# Patient Record
Sex: FEMALE | Race: BLACK OR AFRICAN AMERICAN | NOT HISPANIC OR LATINO | Employment: OTHER | URBAN - METROPOLITAN AREA
[De-identification: names, ages, dates, MRNs, and addresses within clinical notes are randomized per-mention and may not be internally consistent; named-entity substitution may affect disease eponyms.]

---

## 2020-07-01 ENCOUNTER — OFFICE VISIT (OUTPATIENT)
Dept: PRIMARY CARE CLINIC | Facility: CLINIC | Age: 65
End: 2020-07-01
Payer: MEDICARE

## 2020-07-01 VITALS
HEART RATE: 71 BPM | WEIGHT: 192 LBS | HEIGHT: 61 IN | SYSTOLIC BLOOD PRESSURE: 150 MMHG | TEMPERATURE: 98 F | DIASTOLIC BLOOD PRESSURE: 84 MMHG | OXYGEN SATURATION: 98 % | BODY MASS INDEX: 36.25 KG/M2

## 2020-07-01 DIAGNOSIS — E11.9 ENCOUNTER FOR DIABETIC FOOT EXAM: ICD-10-CM

## 2020-07-01 DIAGNOSIS — Z79.4 TYPE 2 DIABETES MELLITUS WITH DIABETIC POLYNEUROPATHY, WITH LONG-TERM CURRENT USE OF INSULIN: ICD-10-CM

## 2020-07-01 DIAGNOSIS — R51.9 CHRONIC NONINTRACTABLE HEADACHE, UNSPECIFIED HEADACHE TYPE: ICD-10-CM

## 2020-07-01 DIAGNOSIS — H81.13 BENIGN PAROXYSMAL POSITIONAL VERTIGO DUE TO BILATERAL VESTIBULAR DISORDER: ICD-10-CM

## 2020-07-01 DIAGNOSIS — M79.18 CHRONIC MUSCULOSKELETAL PAIN: ICD-10-CM

## 2020-07-01 DIAGNOSIS — B37.9 YEAST INFECTION: ICD-10-CM

## 2020-07-01 DIAGNOSIS — K21.9 GASTROESOPHAGEAL REFLUX DISEASE WITHOUT ESOPHAGITIS: ICD-10-CM

## 2020-07-01 DIAGNOSIS — I50.22 CHRONIC SYSTOLIC HEART FAILURE: ICD-10-CM

## 2020-07-01 DIAGNOSIS — L28.0 LICHEN SIMPLEX CHRONICUS: ICD-10-CM

## 2020-07-01 DIAGNOSIS — E78.2 MIXED HYPERLIPIDEMIA: ICD-10-CM

## 2020-07-01 DIAGNOSIS — I25.118 CORONARY ARTERY DISEASE OF NATIVE ARTERY OF NATIVE HEART WITH STABLE ANGINA PECTORIS: ICD-10-CM

## 2020-07-01 DIAGNOSIS — E66.9 OBESITY (BMI 30-39.9): ICD-10-CM

## 2020-07-01 DIAGNOSIS — Z01.00 DIABETIC EYE EXAM: ICD-10-CM

## 2020-07-01 DIAGNOSIS — G56.03 BILATERAL CARPAL TUNNEL SYNDROME: ICD-10-CM

## 2020-07-01 DIAGNOSIS — G89.29 CHRONIC NONINTRACTABLE HEADACHE, UNSPECIFIED HEADACHE TYPE: ICD-10-CM

## 2020-07-01 DIAGNOSIS — E11.9 DIABETIC EYE EXAM: ICD-10-CM

## 2020-07-01 DIAGNOSIS — E55.9 VITAMIN D DEFICIENCY: ICD-10-CM

## 2020-07-01 DIAGNOSIS — Z95.5 HISTORY OF HEART ARTERY STENT: ICD-10-CM

## 2020-07-01 DIAGNOSIS — Z76.89 ESTABLISHING CARE WITH NEW DOCTOR, ENCOUNTER FOR: ICD-10-CM

## 2020-07-01 DIAGNOSIS — Z00.00 ANNUAL PHYSICAL EXAM: Primary | ICD-10-CM

## 2020-07-01 DIAGNOSIS — I10 ESSENTIAL HYPERTENSION: ICD-10-CM

## 2020-07-01 DIAGNOSIS — G89.29 CHRONIC MUSCULOSKELETAL PAIN: ICD-10-CM

## 2020-07-01 DIAGNOSIS — G47.33 OSA (OBSTRUCTIVE SLEEP APNEA): ICD-10-CM

## 2020-07-01 DIAGNOSIS — E11.42 TYPE 2 DIABETES MELLITUS WITH DIABETIC POLYNEUROPATHY, WITH LONG-TERM CURRENT USE OF INSULIN: ICD-10-CM

## 2020-07-01 DIAGNOSIS — I25.2 HISTORY OF HEART ATTACK: ICD-10-CM

## 2020-07-01 LAB
ALBUMIN/CREAT UR: 8.6 UG/MG (ref 0–30)
BACTERIA #/AREA URNS AUTO: ABNORMAL /HPF
BILIRUB UR QL STRIP: NEGATIVE
CLARITY UR REFRACT.AUTO: ABNORMAL
COLOR UR AUTO: YELLOW
CREAT UR-MCNC: 151 MG/DL (ref 15–325)
GLUCOSE UR QL STRIP: NEGATIVE
HGB UR QL STRIP: NEGATIVE
KETONES UR QL STRIP: NEGATIVE
LEUKOCYTE ESTERASE UR QL STRIP: ABNORMAL
MICROALBUMIN UR DL<=1MG/L-MCNC: 13 UG/ML
MICROSCOPIC COMMENT: ABNORMAL
NITRITE UR QL STRIP: NEGATIVE
PH UR STRIP: 7 [PH] (ref 5–8)
PROT UR QL STRIP: NEGATIVE
RBC #/AREA URNS AUTO: 1 /HPF (ref 0–4)
SP GR UR STRIP: 1.01 (ref 1–1.03)
SQUAMOUS #/AREA URNS AUTO: 5 /HPF
URN SPEC COLLECT METH UR: ABNORMAL
WBC #/AREA URNS AUTO: 10 /HPF (ref 0–5)

## 2020-07-01 PROCEDURE — 99999 PR PBB SHADOW E&M-NEW PATIENT-LVL V: CPT | Mod: PBBFAC,,, | Performed by: FAMILY MEDICINE

## 2020-07-01 PROCEDURE — 93010 EKG 12-LEAD: ICD-10-PCS | Mod: S$GLB,,, | Performed by: INTERNAL MEDICINE

## 2020-07-01 PROCEDURE — 99387 INIT PM E/M NEW PAT 65+ YRS: CPT | Mod: S$GLB,,, | Performed by: FAMILY MEDICINE

## 2020-07-01 PROCEDURE — 3046F PR MOST RECENT HEMOGLOBIN A1C LEVEL > 9.0%: ICD-10-PCS | Mod: CPTII,S$GLB,, | Performed by: FAMILY MEDICINE

## 2020-07-01 PROCEDURE — 81001 URINALYSIS AUTO W/SCOPE: CPT

## 2020-07-01 PROCEDURE — 99387 PR PREVENTIVE VISIT,NEW,65 & OVER: ICD-10-PCS | Mod: S$GLB,,, | Performed by: FAMILY MEDICINE

## 2020-07-01 PROCEDURE — 99499 RISK ADDL DX/OHS AUDIT: ICD-10-PCS | Mod: S$GLB,,, | Performed by: FAMILY MEDICINE

## 2020-07-01 PROCEDURE — 93005 ELECTROCARDIOGRAM TRACING: CPT | Mod: S$GLB,,, | Performed by: FAMILY MEDICINE

## 2020-07-01 PROCEDURE — 3046F HEMOGLOBIN A1C LEVEL >9.0%: CPT | Mod: CPTII,S$GLB,, | Performed by: FAMILY MEDICINE

## 2020-07-01 PROCEDURE — 82043 UR ALBUMIN QUANTITATIVE: CPT

## 2020-07-01 PROCEDURE — 93010 ELECTROCARDIOGRAM REPORT: CPT | Mod: S$GLB,,, | Performed by: INTERNAL MEDICINE

## 2020-07-01 PROCEDURE — 99499 UNLISTED E&M SERVICE: CPT | Mod: S$GLB,,, | Performed by: FAMILY MEDICINE

## 2020-07-01 PROCEDURE — 93005 EKG 12-LEAD: ICD-10-PCS | Mod: S$GLB,,, | Performed by: FAMILY MEDICINE

## 2020-07-01 PROCEDURE — 99999 PR PBB SHADOW E&M-NEW PATIENT-LVL V: ICD-10-PCS | Mod: PBBFAC,,, | Performed by: FAMILY MEDICINE

## 2020-07-01 RX ORDER — FUROSEMIDE 40 MG/1
40 TABLET ORAL DAILY
COMMUNITY
Start: 2019-04-10 | End: 2021-04-07 | Stop reason: SDUPTHER

## 2020-07-01 RX ORDER — DICLOFENAC SODIUM 10 MG/G
1 GEL TOPICAL
COMMUNITY
Start: 2020-02-03 | End: 2023-04-18 | Stop reason: SDUPTHER

## 2020-07-01 RX ORDER — DEXTROSE 4 G
TABLET,CHEWABLE ORAL
Status: CANCELLED | OUTPATIENT
Start: 2020-07-01

## 2020-07-01 RX ORDER — METOPROLOL SUCCINATE 200 MG/1
TABLET, EXTENDED RELEASE ORAL
COMMUNITY
Start: 2020-06-23 | End: 2020-09-22 | Stop reason: SDUPTHER

## 2020-07-01 RX ORDER — CYCLOBENZAPRINE HCL 10 MG
10 TABLET ORAL
Qty: 36 TABLET | Refills: 3 | Status: SHIPPED | OUTPATIENT
Start: 2020-07-01 | End: 2020-09-22 | Stop reason: SDUPTHER

## 2020-07-01 RX ORDER — PEN NEEDLE, DIABETIC 30 GX3/16"
1 NEEDLE, DISPOSABLE MISCELLANEOUS
COMMUNITY
Start: 2014-04-22 | End: 2020-07-01

## 2020-07-01 RX ORDER — AMLODIPINE BESYLATE 10 MG/1
10 TABLET ORAL DAILY
Status: ON HOLD | COMMUNITY
Start: 2014-08-20 | End: 2021-10-30 | Stop reason: HOSPADM

## 2020-07-01 RX ORDER — INSULIN PUMP SYRINGE, 3 ML
1 EACH MISCELLANEOUS DAILY PRN
Qty: 1 EACH | Refills: 1 | Status: SHIPPED | OUTPATIENT
Start: 2020-07-01 | End: 2020-08-10 | Stop reason: HOSPADM

## 2020-07-01 RX ORDER — AMITRIPTYLINE HYDROCHLORIDE 75 MG/1
75 TABLET ORAL
COMMUNITY
Start: 2020-02-03 | End: 2020-07-01 | Stop reason: SDUPTHER

## 2020-07-01 RX ORDER — CHLORTHALIDONE 25 MG/1
25 TABLET ORAL DAILY
Status: ON HOLD | COMMUNITY
Start: 2020-04-20 | End: 2021-10-30 | Stop reason: HOSPADM

## 2020-07-01 RX ORDER — ATORVASTATIN CALCIUM 80 MG/1
80 TABLET, FILM COATED ORAL NIGHTLY
COMMUNITY
Start: 2020-04-20 | End: 2020-09-22 | Stop reason: SDUPTHER

## 2020-07-01 RX ORDER — INSULIN ASPART 100 [IU]/ML
25 INJECTION, SOLUTION INTRAVENOUS; SUBCUTANEOUS
COMMUNITY
Start: 2014-08-20 | End: 2020-07-01 | Stop reason: SDUPTHER

## 2020-07-01 RX ORDER — INSULIN ASPART 100 [IU]/ML
25 INJECTION, SOLUTION INTRAVENOUS; SUBCUTANEOUS
Qty: 69 ML | Refills: 3 | Status: SHIPPED | OUTPATIENT
Start: 2020-07-01 | End: 2020-07-02

## 2020-07-01 RX ORDER — LISINOPRIL 40 MG/1
40 TABLET ORAL DAILY
COMMUNITY
Start: 2020-06-23 | End: 2020-09-22 | Stop reason: SDUPTHER

## 2020-07-01 RX ORDER — FLUCONAZOLE 150 MG/1
150 TABLET ORAL ONCE AS NEEDED
Qty: 3 TABLET | Refills: 5 | Status: SHIPPED | OUTPATIENT
Start: 2020-07-01 | End: 2020-07-01

## 2020-07-01 RX ORDER — INSULIN LISPRO 100 [IU]/ML
15 INJECTION, SOLUTION INTRAVENOUS; SUBCUTANEOUS
COMMUNITY
Start: 2020-03-09 | End: 2020-07-01 | Stop reason: HOSPADM

## 2020-07-01 RX ORDER — FLUCONAZOLE 150 MG/1
TABLET ORAL
COMMUNITY
Start: 2020-05-12 | End: 2020-07-01 | Stop reason: SDUPTHER

## 2020-07-01 RX ORDER — CLOPIDOGREL BISULFATE 75 MG/1
TABLET ORAL
COMMUNITY
Start: 2020-04-20 | End: 2020-09-22 | Stop reason: SDUPTHER

## 2020-07-01 RX ORDER — INSULIN ASPART 100 [IU]/ML
25 INJECTION, SOLUTION INTRAVENOUS; SUBCUTANEOUS
COMMUNITY
End: 2020-07-01

## 2020-07-01 RX ORDER — DULAGLUTIDE 1.5 MG/.5ML
1.5 INJECTION, SOLUTION SUBCUTANEOUS WEEKLY
Qty: 6 ML | Refills: 3
Start: 2020-07-01 | End: 2020-11-23 | Stop reason: SDUPTHER

## 2020-07-01 RX ORDER — GABAPENTIN 100 MG/1
100 CAPSULE ORAL 3 TIMES DAILY
COMMUNITY
Start: 2020-04-01 | End: 2020-08-10 | Stop reason: SDUPTHER

## 2020-07-01 RX ORDER — METFORMIN HYDROCHLORIDE 500 MG/1
1000 TABLET, EXTENDED RELEASE ORAL
COMMUNITY
Start: 2015-03-31 | End: 2020-07-15

## 2020-07-01 RX ORDER — LIDOCAINE AND PRILOCAINE 25; 25 MG/G; MG/G
CREAM TOPICAL
COMMUNITY
Start: 2020-06-17 | End: 2021-05-17 | Stop reason: SDUPTHER

## 2020-07-01 RX ORDER — AMITRIPTYLINE HYDROCHLORIDE 75 MG/1
75 TABLET ORAL NIGHTLY
Qty: 90 TABLET | Refills: 3
Start: 2020-07-01 | End: 2020-11-23 | Stop reason: SDUPTHER

## 2020-07-01 RX ORDER — ASPIRIN 81 MG/1
81 TABLET ORAL DAILY
COMMUNITY
Start: 2014-08-20 | End: 2020-08-10 | Stop reason: HOSPADM

## 2020-07-01 RX ORDER — SPIRONOLACTONE 50 MG/1
50 TABLET, FILM COATED ORAL DAILY
COMMUNITY
Start: 2020-06-23 | End: 2020-09-22 | Stop reason: SDUPTHER

## 2020-07-01 RX ORDER — LANCETS
1 EACH MISCELLANEOUS 3 TIMES DAILY
Qty: 300 EACH | Refills: 3 | Status: SHIPPED | OUTPATIENT
Start: 2020-07-01 | End: 2022-02-10

## 2020-07-01 RX ORDER — DULAGLUTIDE 0.75 MG/.5ML
INJECTION, SOLUTION SUBCUTANEOUS
COMMUNITY
Start: 2020-05-10 | End: 2020-07-01 | Stop reason: HOSPADM

## 2020-07-01 RX ORDER — DEXTROSE 4 G
TABLET,CHEWABLE ORAL
COMMUNITY
Start: 2020-03-09 | End: 2020-08-10 | Stop reason: HOSPADM

## 2020-07-01 RX ORDER — PEN NEEDLE, DIABETIC 30 GX3/16"
1 NEEDLE, DISPOSABLE MISCELLANEOUS 4 TIMES DAILY
Qty: 400 EACH | Refills: 3 | Status: SHIPPED | OUTPATIENT
Start: 2020-07-01

## 2020-07-01 RX ORDER — EZETIMIBE 10 MG/1
10 TABLET ORAL DAILY
COMMUNITY
Start: 2019-11-21 | End: 2020-09-22 | Stop reason: SDUPTHER

## 2020-07-01 RX ORDER — ISOPROPYL ALCOHOL 70 ML/100ML
1 SWAB TOPICAL 3 TIMES DAILY
Qty: 300 EACH | Refills: 3 | Status: SHIPPED | OUTPATIENT
Start: 2020-07-01 | End: 2020-07-01 | Stop reason: SDUPTHER

## 2020-07-01 RX ORDER — INSULIN PUMP SYRINGE, 3 ML
EACH MISCELLANEOUS
Qty: 1 EACH | Refills: 0 | Status: SHIPPED | OUTPATIENT
Start: 2020-07-01 | End: 2020-07-01 | Stop reason: SDUPTHER

## 2020-07-01 RX ORDER — LANCETS
EACH MISCELLANEOUS
COMMUNITY
Start: 2013-08-21 | End: 2020-07-01 | Stop reason: SDUPTHER

## 2020-07-01 NOTE — PROGRESS NOTES
Patient, Mercedez Purvis (MRN #[id number]), presented with a recorded BMI of 36.28 kg/m^2 and a documented comorbidity(s):  - Diabetes Mellitus Type 2  - Obstructive Sleep Apnea  - Hypertension  - Hyperlipidemia  - Atrial Fibrillation  to which the severe obesity is a contributing factor. This is consistent with the definition of severe obesity (BMI 35.0-39.9) with comorbidity (ICD-10 E66.01, Z68.35). The patient's severe obesity was monitored, evaluated, addressed and/or treated. This addendum to the medical record is made on 07/01/2020.

## 2020-07-01 NOTE — TELEPHONE ENCOUNTER
"----- Message from Marcelo Elizabeth sent at 7/1/2020 10:18 AM CDT -----  Regarding: Rx refil  Contact: [facility] [phone]  Patient would like to get medical advice.    Comments: Pharmacy calling stating Insurance will only cover "[facility]" and must go through the Insurance [facility], will not cover through pharmacy,    Please call an advise  Thank you      "

## 2020-07-01 NOTE — PATIENT INSTRUCTIONS
Fasting, 2 hrs after largest (lunch time), bedtime    Monitor blood sugars when you wake up (fasting) target < 100. Range 70 - 130; Before each meal target < 110. Range 70 - 130; Two hours after each meal target < 140, but will accept < 180 and at Bedtime Target < 120. Range 90- 150.     Avoid any blood sugar readings below 70 mg/dL; do not skip meals; eat at regular intervals Sunday-Sunday. Signs and symptoms of hypoglycemia are shakes, feeling like heart racing, sweating, dizziness, weakness, and confusion ; remember your body is so used to HIGH readings that even when on target you may feel like you are low. So measure blood sugars ANYTIME you feel unwell.     How to treat hypoglycemia  Take 15 to 20 grams of oral glucose. Glucose may be ingested in the form of tablets, (6 oz) juice, milk, other snacks, or a meal. Retest glucose after 15 minutes and retreat if glucose is not improved.

## 2020-07-01 NOTE — TELEPHONE ENCOUNTER
Patient's insurance([employer]) does not cover Novolog, they will cover Humalog.    Also all testing supplies prescription needs to be printed, to be sent to [employer]. Will pend supplies to be printed--and will fax to [employer] once complete.

## 2020-07-01 NOTE — PROGRESS NOTES
Subjective:       Patient ID: Mercedez Purvis is a 65 y.o. female.    Chief Complaint: Annual physical and  Establish Care    66 yo female presents for annual physical exam. This is her first visit.    She needs medication refills.    She reports no adverse events with her medication.    She would like to see all her specialists within [facility] and would need referrals.    She does not routine check her blood pressure or blood sugar at home.    The following portions of the patient's history were reviewed and updated as appropriate: allergies, current medications, past family history, past medical history, past social history, past surgical history and problem list.      Review of Systems   Constitutional: Negative for activity change, appetite change, chills, diaphoresis, fatigue, fever and unexpected weight change.   HENT: Negative for nasal congestion, dental problem, facial swelling, nosebleeds, postnasal drip, rhinorrhea, sore throat, tinnitus and trouble swallowing.    Eyes: Positive for visual disturbance. Negative for pain, discharge and itching.   Respiratory: Negative for apnea, chest tightness, shortness of breath, wheezing and stridor.    Cardiovascular: Negative for chest pain, palpitations, leg swelling and claudication.   Gastrointestinal: Negative for abdominal distention, abdominal pain, constipation, diarrhea, nausea, rectal pain and vomiting.   Endocrine: Negative for cold intolerance, heat intolerance and polyuria.   Genitourinary: Positive for vaginal discharge. Negative for difficulty urinating, dysuria, frequency, hematuria and urgency.   Musculoskeletal: Positive for arthralgias. Negative for myalgias, neck pain and neck stiffness.   Integumentary:  Negative for color change and rash.   Neurological: Positive for headaches (chronic vertigo). Negative for dizziness, tremors, seizures, syncope, facial asymmetry and weakness.   Hematological: Negative for adenopathy. Does not bruise/bleed  easily.   Psychiatric/Behavioral: Negative for agitation, confusion, hallucinations, self-injury and suicidal ideas. The patient is not hyperactive.          Objective:      Physical Exam  Constitutional:       General: She is not in acute distress.     Appearance: She is well-developed. She is obese. She is not diaphoretic.   HENT:      Head: Normocephalic and atraumatic.      Right Ear: External ear normal.      Left Ear: External ear normal.   Eyes:      General: No scleral icterus.        Right eye: No discharge.         Left eye: No discharge.      Conjunctiva/sclera: Conjunctivae normal.      Pupils: Pupils are equal, round, and reactive to light.   Neck:      Musculoskeletal: Normal range of motion and neck supple.      Thyroid: No thyromegaly.   Cardiovascular:      Rate and Rhythm: Normal rate and regular rhythm.      Heart sounds: Normal heart sounds. No murmur. No friction rub. No gallop.    Pulmonary:      Effort: Pulmonary effort is normal. No respiratory distress.      Breath sounds: Normal breath sounds.   Chest:      Chest wall: No tenderness.   Abdominal:      General: Bowel sounds are normal. There is distension (obese).      Palpations: Abdomen is soft. There is no mass.      Tenderness: There is no abdominal tenderness. There is no guarding or rebound.   Musculoskeletal: Normal range of motion.   Lymphadenopathy:      Cervical: No cervical adenopathy.   Skin:     General: Skin is warm.      Capillary Refill: Capillary refill takes less than 2 seconds.      Findings: No rash.   Neurological:      Mental Status: She is alert and oriented to person, place, and time.      Cranial Nerves: No cranial nerve deficit.      Motor: No abnormal muscle tone.      Coordination: Coordination normal.      Deep Tendon Reflexes: Reflexes normal.   Psychiatric:         Thought Content: Thought content normal.         Judgment: Judgment normal.         October 3rd, 2019  ECHO   Low normal to mildly impaired LV  systolic function.  LVEF of 50-55%.  2. The diastolic filling indicates abnormal relaxation pattern with normal left atrial pressure.  3. An insufficent tricuspid regurgitation jet velocity envelope precludes confident assessment of pulmonary artery systolic pressure.  4. EF has improved compared to 4/2019.    Assessment:       1. Annual physical exam    2. Establishing care with new doctor, encounter for    3. Type 2 diabetes mellitus with diabetic polyneuropathy, with long-term current use of insulin    4. Encounter for diabetic foot exam    5. Diabetic eye exam    6. Yeast infection    7. Chronic systolic heart failure    8. Coronary artery disease of native artery of native heart with stable angina pectoris    9. History of heart attack    10. History of heart artery stent    11. Essential hypertension    12. TIKA (obstructive sleep apnea)    13. Obesity (BMI 30-39.9)    14. Gastroesophageal reflux disease without esophagitis    15. Mixed hyperlipidemia    16. Vitamin D deficiency    17. Benign paroxysmal positional vertigo due to bilateral vestibular disorder    18. Lichen simplex chronicus    19. Chronic musculoskeletal pain    20. Bilateral carpal tunnel syndrome    21. Chronic nonintractable headache, unspecified headache type        Plan:         1. Annual physical exam  2. Establishing care with new doctor, encounter for  Age appropriate prevention guidelines implemented, unless patient refused  Encourage Advanced directives  Labs ordered   Immunizations reviewed. Encourage yearly influenza vaccine.  Patient Counseling:  --Nutrition: Encourage healthy food choices, adequate water intake, moderate sodium/caffeine intake, diet low in saturated fat and cholesterol. Importance of caloric balance and sufficient intake of fresh fruits, vegetables, fiber, calcium, iron, and 1 mg of folate supplement per day (for females capable of pregnancy).  --Exercise: Stressed the importance of regular exercise.   --Substance  "Abuse: Abstinence from tobacco products. No more than 2 alcoholic drinks per day in men or 1 alcoholic drink per day in women. Avoid illicit drugs, driving or other dangerous activities under the influence. In the event of abuse, treatment offered.   --Sexuality: Safe sex practices to avoid sexually transmitted diseases; careful partner selection, use of condoms and contraceptive alternatives, avoidance of unintended pregnancy in fertile women of child-bearing age  --Injury prevention: encourage safety belts, safety helmets, smoke detector.  --Dental health: Discussed importance of regular tooth brushing X2 daily, flossing X1 daily, and routine dental visits.  --Encourage use of sun screen, wear sun protective clothing  --Encourage routine eye exams    3. Type 2 diabetes mellitus with diabetic polyneuropathy, with long-term current use of insulin  -     blood glucose control, normal Soln; 1 each by Misc.(Non-Drug; Combo Route) route daily as needed. ICD E11.65  Dispense: 1 each; Refill: 1  -     dulaglutide (TRULICITY) 1.5 mg/0.5 mL pen injector; Inject 1.5 mg into the skin once a week.  Dispense: 6 mL; Refill: 3  -     pen needle, diabetic (PEN NEEDLE) 32 gauge x 5/32" Ndle; 1 each by Misc.(Non-Drug; Combo Route) route 4 (four) times daily. ICD 10 E11.42  Dispense: 400 each; Refill: 3  -     lancets Misc; Inject 1 each into the skin 3 (three) times daily. ICD 10 code E 11.42, monitor blood sugar TID  Dispense: 300 each; Refill: 3  -     insulin aspart U-100 (NOVOLOG) 100 unit/mL (3 mL) InPn pen; Inject 25 Units into the skin 3 (three) times daily with meals.  Dispense: 69 mL; Refill: 3  -     alcohol swabs (ALCOHOL WIPES) PadM; Apply 1 each topically 3 (three) times daily. ICD 10 code E 11.42, monitor blood sugar TID, per insurance coverage  Dispense: 300 each; Refill: 3  -     blood sugar diagnostic Strp; 1 strip by Misc.(Non-Drug; Combo Route) route 3 (three) times daily. ICD 10 code E 11.42, monitor blood sugar " TID, per insurance coverage  Dispense: 300 each; Refill: 3  -     blood-glucose meter kit; ICD 10 code E 11.42, monitor blood sugar TID, per insurance coverage  Dispense: 1 each; Refill: 0  -     Ambulatory referral/consult to Endocrinology; Future; Expected date: 07/08/2020  -     CBC Without Differential; Future  -     Comprehensive metabolic panel; Future  -     Hemoglobin A1C; Future  -     Urinalysis  -     Ambulatory referral/consult to Diabetes Education; Future; Expected date: 07/08/2020 Or WILL HAVE HER SEE THE EDUCATOR AT THIS LOCATION    4. Encounter for diabetic foot exam  -     Ambulatory referral/consult to Podiatry; Future; Expected date: 07/08/2020    5. Diabetic eye exam  -     Ambulatory referral/consult to Ophthalmology; Future; Expected date: 07/08/2020    6. Yeast infection  -     fluconazole (DIFLUCAN) 150 MG Tab; Take 1 tablet (150 mg total) by mouth once as needed (yeast infection.). May repeat dose after 72 hrs as needed.  Dispense: 3 tablet; Refill: 5    7. Chronic systolic heart failure  Asymptomatic. Follow with cardiology.    8. Coronary artery disease of native artery of native heart with stable angina pectoris  -     Ambulatory referral/consult to Cardiology; Future; Expected date: 07/08/2020: reevaluated for continued need for dual antiplatelet therapy  -     IN OFFICE EKG 12-LEAD (to Muse)    9. History of heart attack  10. History of heart artery stent  Continue blood pressure medication, statin, optimize blood sugar control.  Optimize blood pressure control.  Weight loss.  Low salt diet.  Follow with Cardiology to reassess continued need of dual antiplatelet therapy but need to continue aspirin lifelong.  The goal is to prevent no further cardiovascular adverse events.    11. Essential hypertension  -     IN OFFICE EKG 12-LEAD (to Muse)  -     TSH; Future  -     Microalbumin/creatinine urine ratio  The patient continues with the antihypertensive medication(s) as prescribed. The  blood pressure readings appear to be above goal. There does not appear to be any symptoms such as chest pain, shortness of breath, palpitations, fatigue, syncope, seizures or headaches. There are no new visual problems. We have discussed the importance of getting more BP data - nurse only appointments can help get data into the chart and/or continuous outpatient blood pressure monitoring. Blood pressure above goal can lead to end organ damage. Further titration of blood pressure medication(s) is necessary per JNC guidelines.  On amlodipine, lisinopril, spironolactone, chlorthalidone, metoprolol.  I will not change her current blood pressure regimen at this time; will work towards optimization with lifestyle changes and adjust blood pressure accordingly.    12. TIKA (obstructive sleep apnea)  CPAP set at 8    13. Obesity (BMI 30-39.9)  The importance of optimum diet & exercise discussed. The goals for weight management of 5-10 % of total body weight reduction in 3 months addressed.     The consumption of a reduced calorie diet, frequent self-weighing, and participation in a lifestyle intervention program are strategies to help maintain weight loss.     14. Gastroesophageal reflux disease without esophagitis  Not currently on PPI therapy.  No bleeding events reported    15. Mixed hyperlipidemia  -     Lipid Panel; Future    16. Vitamin D deficiency  -     Vitamin D; Future    17. Benign paroxysmal positional vertigo due to bilateral vestibular disorder  Manages conservatively with positional changes and Epley's maneuver    18. Lichen simplex chronicus  Asymptomatic at present.  Topical steroids as needed    19. Chronic musculoskeletal pain  -     cyclobenzaprine (FLEXERIL) 10 MG tablet; Take 1 tablet (10 mg total) by mouth 3 (three) times a week.  Dispense: 36 tablet; Refill: 3  Caution advised.  She will benefit from physical therapy program.    20. Bilateral carpal tunnel syndrome  No worsening features    21.  Chronic nonintractable headache, unspecified headache type  -     amitriptyline (ELAVIL) 75 MG tablet; Take 1 tablet (75 mg total) by mouth every evening.  Dispense: 90 tablet; Refill: 3    Follow-up in 1 month.  Will readdress her willingness for health maintenance update.  She will bring in her logs at next visit.      Disclaimer: This note has been generated using voice-recognition software. There may be typographical errors that have been missed during proof-reading

## 2020-07-02 DIAGNOSIS — Z12.11 COLON CANCER SCREENING: ICD-10-CM

## 2020-07-02 RX ORDER — ISOPROPYL ALCOHOL 70 ML/100ML
1 SWAB TOPICAL 3 TIMES DAILY
Qty: 300 EACH | Refills: 3 | Status: SHIPPED | OUTPATIENT
Start: 2020-07-02 | End: 2022-03-28

## 2020-07-02 RX ORDER — INSULIN PUMP SYRINGE, 3 ML
EACH MISCELLANEOUS
Qty: 1 EACH | Refills: 0 | Status: SHIPPED | OUTPATIENT
Start: 2020-07-02 | End: 2020-08-10 | Stop reason: HOSPADM

## 2020-07-02 RX ORDER — INSULIN LISPRO 100 [IU]/ML
25 INJECTION, SOLUTION INTRAVENOUS; SUBCUTANEOUS
Qty: 69 ML | Refills: 3 | Status: SHIPPED | OUTPATIENT
Start: 2020-07-02 | End: 2020-07-15

## 2020-07-13 ENCOUNTER — LAB VISIT (OUTPATIENT)
Dept: LAB | Facility: HOSPITAL | Age: 65
End: 2020-07-13
Payer: MEDICARE

## 2020-07-13 DIAGNOSIS — Z79.4 TYPE 2 DIABETES MELLITUS WITH DIABETIC POLYNEUROPATHY, WITH LONG-TERM CURRENT USE OF INSULIN: ICD-10-CM

## 2020-07-13 DIAGNOSIS — E11.42 TYPE 2 DIABETES MELLITUS WITH DIABETIC POLYNEUROPATHY, WITH LONG-TERM CURRENT USE OF INSULIN: ICD-10-CM

## 2020-07-13 DIAGNOSIS — I10 ESSENTIAL HYPERTENSION: ICD-10-CM

## 2020-07-13 DIAGNOSIS — E55.9 VITAMIN D DEFICIENCY: ICD-10-CM

## 2020-07-13 DIAGNOSIS — E78.2 MIXED HYPERLIPIDEMIA: ICD-10-CM

## 2020-07-13 LAB
25(OH)D3+25(OH)D2 SERPL-MCNC: 18 NG/ML (ref 30–96)
ALBUMIN SERPL BCP-MCNC: 3.4 G/DL (ref 3.5–5.2)
ALP SERPL-CCNC: 128 U/L (ref 55–135)
ALT SERPL W/O P-5'-P-CCNC: 11 U/L (ref 10–44)
ANION GAP SERPL CALC-SCNC: 10 MMOL/L (ref 8–16)
AST SERPL-CCNC: 12 U/L (ref 10–40)
BILIRUB SERPL-MCNC: 0.3 MG/DL (ref 0.1–1)
BUN SERPL-MCNC: 14 MG/DL (ref 8–23)
CALCIUM SERPL-MCNC: 9.4 MG/DL (ref 8.7–10.5)
CHLORIDE SERPL-SCNC: 96 MMOL/L (ref 95–110)
CHOLEST SERPL-MCNC: 175 MG/DL (ref 120–199)
CHOLEST/HDLC SERPL: 5.5 {RATIO} (ref 2–5)
CO2 SERPL-SCNC: 28 MMOL/L (ref 23–29)
CREAT SERPL-MCNC: 1.2 MG/DL (ref 0.5–1.4)
ERYTHROCYTE [DISTWIDTH] IN BLOOD BY AUTOMATED COUNT: 15.9 % (ref 11.5–14.5)
EST. GFR  (AFRICAN AMERICAN): 54.4 ML/MIN/1.73 M^2
EST. GFR  (NON AFRICAN AMERICAN): 47.2 ML/MIN/1.73 M^2
ESTIMATED AVG GLUCOSE: ABNORMAL MG/DL (ref 68–131)
GLUCOSE SERPL-MCNC: 414 MG/DL (ref 70–110)
HBA1C MFR BLD HPLC: >14 % (ref 4–5.6)
HCT VFR BLD AUTO: 41.4 % (ref 37–48.5)
HDLC SERPL-MCNC: 32 MG/DL (ref 40–75)
HDLC SERPL: 18.3 % (ref 20–50)
HGB BLD-MCNC: 12.3 G/DL (ref 12–16)
LDLC SERPL CALC-MCNC: 98.8 MG/DL (ref 63–159)
MCH RBC QN AUTO: 22.4 PG (ref 27–31)
MCHC RBC AUTO-ENTMCNC: 29.7 G/DL (ref 32–36)
MCV RBC AUTO: 76 FL (ref 82–98)
NONHDLC SERPL-MCNC: 143 MG/DL
PLATELET # BLD AUTO: 254 K/UL (ref 150–350)
PMV BLD AUTO: 12.2 FL (ref 9.2–12.9)
POTASSIUM SERPL-SCNC: 4.3 MMOL/L (ref 3.5–5.1)
PROT SERPL-MCNC: 7.5 G/DL (ref 6–8.4)
RBC # BLD AUTO: 5.48 M/UL (ref 4–5.4)
SODIUM SERPL-SCNC: 134 MMOL/L (ref 136–145)
TRIGL SERPL-MCNC: 221 MG/DL (ref 30–150)
TSH SERPL DL<=0.005 MIU/L-ACNC: 2.3 UIU/ML (ref 0.4–4)
WBC # BLD AUTO: 8.16 K/UL (ref 3.9–12.7)

## 2020-07-13 PROCEDURE — 86803 HEPATITIS C AB TEST: CPT

## 2020-07-13 PROCEDURE — 80053 COMPREHEN METABOLIC PANEL: CPT

## 2020-07-13 PROCEDURE — 85027 COMPLETE CBC AUTOMATED: CPT

## 2020-07-13 PROCEDURE — 82306 VITAMIN D 25 HYDROXY: CPT

## 2020-07-13 PROCEDURE — 80061 LIPID PANEL: CPT

## 2020-07-13 PROCEDURE — 83036 HEMOGLOBIN GLYCOSYLATED A1C: CPT

## 2020-07-13 PROCEDURE — 84443 ASSAY THYROID STIM HORMONE: CPT

## 2020-07-13 PROCEDURE — 36415 COLL VENOUS BLD VENIPUNCTURE: CPT

## 2020-07-14 ENCOUNTER — TELEPHONE (OUTPATIENT)
Dept: PRIMARY CARE CLINIC | Facility: CLINIC | Age: 65
End: 2020-07-14

## 2020-07-14 LAB — HCV AB SERPL QL IA: NEGATIVE

## 2020-07-14 NOTE — TELEPHONE ENCOUNTER
----- Message from Jackie Chavez MD sent at 7/14/2020  7:51 AM CDT -----  Results discussed with patient.  Patient would like to see Margarita, our diabetes educator.  Please assist with scheduling.  Patient should follow-up with me with her diabetic logs in 4 weeks.    She was instructed to take at least 1000 units of vitamin-D daily.  Discussed importance of diabetes management.  Discussed that fish oil can be instituted along with statin but patient would like to work on blood sugar control and exercise 1st.

## 2020-07-15 ENCOUNTER — OFFICE VISIT (OUTPATIENT)
Dept: ENDOCRINOLOGY | Facility: CLINIC | Age: 65
End: 2020-07-15
Payer: MEDICARE

## 2020-07-15 ENCOUNTER — CLINICAL SUPPORT (OUTPATIENT)
Dept: OPHTHALMOLOGY | Facility: CLINIC | Age: 65
End: 2020-07-15
Attending: INTERNAL MEDICINE
Payer: MEDICARE

## 2020-07-15 VITALS
WEIGHT: 186.5 LBS | BODY MASS INDEX: 35.21 KG/M2 | SYSTOLIC BLOOD PRESSURE: 104 MMHG | HEART RATE: 100 BPM | DIASTOLIC BLOOD PRESSURE: 70 MMHG | RESPIRATION RATE: 16 BRPM | OXYGEN SATURATION: 95 % | HEIGHT: 61 IN

## 2020-07-15 DIAGNOSIS — I25.2 HISTORY OF HEART ATTACK: ICD-10-CM

## 2020-07-15 DIAGNOSIS — E55.9 VITAMIN D DEFICIENCY: ICD-10-CM

## 2020-07-15 DIAGNOSIS — E78.2 MIXED HYPERLIPIDEMIA: ICD-10-CM

## 2020-07-15 DIAGNOSIS — Z79.4 TYPE 2 DIABETES MELLITUS WITH DIABETIC POLYNEUROPATHY, WITH LONG-TERM CURRENT USE OF INSULIN: Primary | ICD-10-CM

## 2020-07-15 DIAGNOSIS — Z79.4 TYPE 2 DIABETES MELLITUS WITH DIABETIC POLYNEUROPATHY, WITH LONG-TERM CURRENT USE OF INSULIN: ICD-10-CM

## 2020-07-15 DIAGNOSIS — I10 ESSENTIAL HYPERTENSION: ICD-10-CM

## 2020-07-15 DIAGNOSIS — E11.42 TYPE 2 DIABETES MELLITUS WITH DIABETIC POLYNEUROPATHY, WITH LONG-TERM CURRENT USE OF INSULIN: Primary | ICD-10-CM

## 2020-07-15 DIAGNOSIS — E11.3393 MODERATE NONPROLIFERATIVE DIABETIC RETINOPATHY OF BOTH EYES WITHOUT MACULAR EDEMA ASSOCIATED WITH TYPE 2 DIABETES MELLITUS: Primary | ICD-10-CM

## 2020-07-15 DIAGNOSIS — E11.42 TYPE 2 DIABETES MELLITUS WITH DIABETIC POLYNEUROPATHY, WITH LONG-TERM CURRENT USE OF INSULIN: ICD-10-CM

## 2020-07-15 PROCEDURE — 99999 PR PBB SHADOW E&M-EST. PATIENT-LVL V: CPT | Mod: PBBFAC,,, | Performed by: INTERNAL MEDICINE

## 2020-07-15 PROCEDURE — 99999 PR PBB SHADOW E&M-EST. PATIENT-LVL I: CPT | Mod: PBBFAC,,,

## 2020-07-15 PROCEDURE — 99204 PR OFFICE/OUTPT VISIT, NEW, LEVL IV, 45-59 MIN: ICD-10-PCS | Mod: S$GLB,,, | Performed by: INTERNAL MEDICINE

## 2020-07-15 PROCEDURE — 1126F AMNT PAIN NOTED NONE PRSNT: CPT | Mod: S$GLB,,, | Performed by: INTERNAL MEDICINE

## 2020-07-15 PROCEDURE — 3008F BODY MASS INDEX DOCD: CPT | Mod: CPTII,S$GLB,, | Performed by: INTERNAL MEDICINE

## 2020-07-15 PROCEDURE — 1126F PR PAIN SEVERITY QUANTIFIED, NO PAIN PRESENT: ICD-10-PCS | Mod: S$GLB,,, | Performed by: INTERNAL MEDICINE

## 2020-07-15 PROCEDURE — 1159F MED LIST DOCD IN RCRD: CPT | Mod: S$GLB,,, | Performed by: INTERNAL MEDICINE

## 2020-07-15 PROCEDURE — 99999 PR PBB SHADOW E&M-EST. PATIENT-LVL I: ICD-10-PCS | Mod: PBBFAC,,,

## 2020-07-15 PROCEDURE — 2022F DIABETIC EYE SCREENING PHOTO: ICD-10-PCS | Mod: S$GLB,,, | Performed by: OPHTHALMOLOGY

## 2020-07-15 PROCEDURE — 3046F HEMOGLOBIN A1C LEVEL >9.0%: CPT | Mod: CPTII,S$GLB,, | Performed by: INTERNAL MEDICINE

## 2020-07-15 PROCEDURE — 2022F DILAT RTA XM EVC RTNOPTHY: CPT | Mod: S$GLB,,, | Performed by: OPHTHALMOLOGY

## 2020-07-15 PROCEDURE — 1101F PT FALLS ASSESS-DOCD LE1/YR: CPT | Mod: CPTII,S$GLB,, | Performed by: INTERNAL MEDICINE

## 2020-07-15 PROCEDURE — 99999 PR PBB SHADOW E&M-EST. PATIENT-LVL V: ICD-10-PCS | Mod: PBBFAC,,, | Performed by: INTERNAL MEDICINE

## 2020-07-15 PROCEDURE — 1101F PR PT FALLS ASSESS DOC 0-1 FALLS W/OUT INJ PAST YR: ICD-10-PCS | Mod: CPTII,S$GLB,, | Performed by: INTERNAL MEDICINE

## 2020-07-15 PROCEDURE — 1159F PR MEDICATION LIST DOCUMENTED IN MEDICAL RECORD: ICD-10-PCS | Mod: S$GLB,,, | Performed by: INTERNAL MEDICINE

## 2020-07-15 PROCEDURE — 3008F PR BODY MASS INDEX (BMI) DOCUMENTED: ICD-10-PCS | Mod: CPTII,S$GLB,, | Performed by: INTERNAL MEDICINE

## 2020-07-15 PROCEDURE — 92250 FUNDUS PHOTOGRAPHY W/I&R: CPT | Mod: 26,S$GLB,, | Performed by: OPHTHALMOLOGY

## 2020-07-15 PROCEDURE — 92250 DIABETIC EYE SCREENING PHOTO: ICD-10-PCS | Mod: 26,S$GLB,, | Performed by: OPHTHALMOLOGY

## 2020-07-15 PROCEDURE — 99204 OFFICE O/P NEW MOD 45 MIN: CPT | Mod: S$GLB,,, | Performed by: INTERNAL MEDICINE

## 2020-07-15 PROCEDURE — 3046F PR MOST RECENT HEMOGLOBIN A1C LEVEL > 9.0%: ICD-10-PCS | Mod: CPTII,S$GLB,, | Performed by: INTERNAL MEDICINE

## 2020-07-15 RX ORDER — INSULIN LISPRO 100 [IU]/ML
30 INJECTION, SOLUTION INTRAVENOUS; SUBCUTANEOUS
Qty: 90 ML | Refills: 3 | Status: SHIPPED | OUTPATIENT
Start: 2020-07-15 | End: 2020-07-20 | Stop reason: ALTCHOICE

## 2020-07-15 RX ORDER — ACETAMINOPHEN 500 MG
1 TABLET ORAL DAILY
Start: 2020-07-15 | End: 2021-10-29

## 2020-07-15 RX ORDER — INSULIN LISPRO 100 [IU]/ML
30 INJECTION, SOLUTION INTRAVENOUS; SUBCUTANEOUS
Qty: 90 ML | Refills: 3 | Status: SHIPPED | OUTPATIENT
Start: 2020-07-15 | End: 2020-07-15

## 2020-07-15 RX ORDER — METFORMIN HYDROCHLORIDE 500 MG/1
1000 TABLET, EXTENDED RELEASE ORAL 2 TIMES DAILY WITH MEALS
Qty: 360 TABLET | Refills: 3 | Status: SHIPPED | OUTPATIENT
Start: 2020-07-15 | End: 2022-03-28

## 2020-07-15 NOTE — ASSESSMENT & PLAN NOTE
Reviewed goals of therapy are to get the best control we can without hypoglycemia    Refer to education  Plan cgms     Medication changes:   Increase basal to 45 bid  Increase prandial to 30   Increase metformin to 1 gm bid     Reviewed patient's current insulin regimen. Clarified proper insulin dose and timing in relation to meals, etc. Insulin injection sites and proper rotation instructed.       -Advised frequent self blood glucose monitoring.  Patient encouraged to document glucose results and bring them to every clinic visit      -Hypoglycemia precautions discussed. Instructed on precautions before driving.      -Close adherence to lifestyle changes recommended.      -Periodic follow ups for eye evaluations, foot care and dental care suggested.    rtc in  4 weeks    Refer to eye exam

## 2020-07-15 NOTE — LETTER
July 15, 2020      Jackie Chavez MD  [address] [facility] [address]           [facility] - Endocrinology [address] [place]  [address]  [facility] [address]  Phone: [phone]  Fax: [phone]          Patient: Mercedez Purvis   MR Number: [id number]   YOB: 1955   Date of Visit: 7/15/2020       Dear Dr. Jackie Chavez:    Thank you for referring Mercedez Purvis to me for evaluation. Attached you will find relevant portions of my assessment and plan of care.    If you have questions, please do not hesitate to call me. I look forward to following Mercedez Purvis along with you.    Sincerely,    Meera Olivo MD    Enclosure  CC:  No Recipients    If you would like to receive this communication electronically, please contact [email or web address] or [phone] to request more information on [employer] access.    For providers and/or their staff who would like to refer a patient to [facility], please contact us through our one-stop-shop provider referral line, [facility], at [phone].    If you feel you have received this communication in error or would no longer like to receive these types of communications, please e-mail [email or web address]

## 2020-07-15 NOTE — PROGRESS NOTES
Subjective:      Patient ID: Mercedez Purvis is a 65 y.o. female.    Chief Complaint:  Diabetes      History of Present Illness  With regards to the diabetes:    Diagnosed: in her mid 49   Known complications:  PN, CAD    Current regimen:  Metformin 1 gm qd   Basal insulin : levemir 37 bid   prandial insulin: lispro 25 ac tid   trulicity 1.5       Missed doses?  3 x week novolog   Does not miss levemir         # times a day testing - is just starting to recheck - just got a meter   Log reviewed: yes, 200 -400      Hypoglycemic event? None      Diabetes Management Status    Statin: Taking  ACE/ARB: Taking    Screening or Prevention Patient's value Goal Complete/Controlled?   HgA1C Testing and Control   Lab Results   Component Value Date    HGBA1C >14.0 (H) 07/13/2020      Annually/Less than 8% Yes   Lipid profile : 07/13/2020 Annually Yes   LDL control Lab Results   Component Value Date    LDLCALC 98.8 07/13/2020    Annually/Less than 100 mg/dl  Yes   Nephropathy screening Lab Results   Component Value Date    LABMICR 13.0 07/01/2020     Lab Results   Component Value Date    PROTEINUA Negative 07/01/2020    Annually Yes   Blood pressure BP Readings from Last 1 Encounters:   07/15/20 104/70    Less than 140/90 Yes   Dilated retinal exam Most Recent Eye Exam Date: Not Found Annually Yes   Foot exam   Most Recent Foot Exam Date: Not Found Annually No       Last eye exam: > 1 year. no DR   Last podiatry exam: none       Typical meals: trying to keep to a healthy diet    Education - last visit:  > 1 yr ago   Exercise - tries to stay active     Some Polyuria polydipsia nocturia           With regards to the vitamin d deficiency:  currently taking otc 2000 iu a day  Ergocalciferol 50 k monthly      Has sergey on cpap         Review of Systems   Constitutional: Negative for unexpected weight change.   Eyes: Negative for visual disturbance.   Respiratory: Negative for shortness of breath.    Cardiovascular: Negative for  "chest pain.   Gastrointestinal: Negative for abdominal pain.   Musculoskeletal: Negative for arthralgias.   Skin: Negative for wound.   Neurological: Negative for headaches.   Hematological: Does not bruise/bleed easily.   Psychiatric/Behavioral: Negative for sleep disturbance.       Objective:     /70   Pulse 100   Resp 16   Ht 5' 1" (1.549 m)   Wt 84.6 kg (186 lb 8.2 oz)   SpO2 95%   BMI 35.24 kg/m²   BP Readings from Last 3 Encounters:   07/15/20 104/70   07/01/20 (!) 150/84   09/23/10 132/88     Wt Readings from Last 1 Encounters:   07/15/20 0903 84.6 kg (186 lb 8.2 oz)     Body mass index is 35.24 kg/m².      Physical Exam  Vitals signs reviewed.   Constitutional:       Appearance: She is well-developed.   HENT:      Right Ear: External ear normal.      Left Ear: External ear normal.      Nose: Nose normal.   Neck:      Thyroid: No thyromegaly.      Trachea: No tracheal deviation.   Cardiovascular:      Rate and Rhythm: Normal rate.      Heart sounds: No murmur.   Pulmonary:      Effort: Pulmonary effort is normal.      Breath sounds: Normal breath sounds.   Abdominal:      Palpations: Abdomen is soft.      Tenderness: There is no abdominal tenderness.      Hernia: No hernia is present.   Skin:     Findings: No rash.      Comments:        Neurological:      Cranial Nerves: No cranial nerve deficit.      Deep Tendon Reflexes: Reflexes normal.   Psychiatric:         Judgment: Judgment normal.     +acanthosis and skin tags  No supraclavicular fulness  Pale thin striae  Normal proximal muscle strength      injection sites are ok. No lipo hypertropthy or atrophy    Protective Sensation (w/ 10 gram monofilament):  Right: Decreased  Left: Decreased    Visual Inspection:  Normal -  Bilateral    Pedal Pulses:   Right: Present  Left: Present    Posterior tibialis:   Right:Present  Left: Present             Lab Review:   Lab Results   Component Value Date    HGBA1C >14.0 (H) 07/13/2020     Lab Results "   Component Value Date    CHOL 175 07/13/2020    HDL 32 (L) 07/13/2020    LDLCALC 98.8 07/13/2020    TRIG 221 (H) 07/13/2020    CHOLHDL 18.3 (L) 07/13/2020     Lab Results   Component Value Date     (L) 07/13/2020    K 4.3 07/13/2020    CL 96 07/13/2020    CO2 28 07/13/2020     (H) 07/13/2020    BUN 14 07/13/2020    CREATININE 1.2 07/13/2020    CALCIUM 9.4 07/13/2020    PROT 7.5 07/13/2020    ALBUMIN 3.4 (L) 07/13/2020    BILITOT 0.3 07/13/2020    ALKPHOS 128 07/13/2020    AST 12 07/13/2020    ALT 11 07/13/2020    ANIONGAP 10 07/13/2020    ESTGFRAFRICA 54.4 (A) 07/13/2020    EGFRNONAA 47.2 (A) 07/13/2020    TSH 2.301 07/13/2020     Vit D, 25-Hydroxy   Date Value Ref Range Status   07/13/2020 18 (L) 30 - 96 ng/mL Final     Comment:     Vitamin D deficiency.........<10 ng/mL                              Vitamin D insufficiency......10-29 ng/mL       Vitamin D sufficiency........> or equal to 30 ng/mL  Vitamin D toxicity............>100 ng/mL         Assessment and Plan     Type 2 diabetes mellitus with diabetic polyneuropathy, with long-term current use of insulin  Reviewed goals of therapy are to get the best control we can without hypoglycemia    Refer to education  Plan cgms     Medication changes:   Increase basal to 45 bid  Increase prandial to 30   Increase metformin to 1 gm bid     Reviewed patient's current insulin regimen. Clarified proper insulin dose and timing in relation to meals, etc. Insulin injection sites and proper rotation instructed.       -Advised frequent self blood glucose monitoring.  Patient encouraged to document glucose results and bring them to every clinic visit      -Hypoglycemia precautions discussed. Instructed on precautions before driving.      -Close adherence to lifestyle changes recommended.      -Periodic follow ups for eye evaluations, foot care and dental care suggested.    rtc in  4 weeks    Refer to eye exam               Hyperlipemia  On statin      Hypertension  Controlled     History of heart attack  Address above   May benefit from an SGLT 2 as next steps    Vitamin D deficiency  Start over the counter vitamin D 2000 iu a day

## 2020-07-16 NOTE — PROGRESS NOTES
HPI     Diabetic Eye Exam      Additional comments: diabetic eye photos              Comments     Screening diabetic photos          Last edited by Meseret Brower on 7/16/2020  9:54 AM. (History)            Assessment /Plan     For exam results, see Encounter Report.    Type 2 diabetes mellitus with diabetic polyneuropathy, with long-term current use of insulin  -     Diabetic Eye Screening Photo      65 y.o. y/o here for screening for Diabetic Renopathy with non-dilated fundus photos per Sharla Vasquez MD

## 2020-07-17 PROBLEM — E11.3393 MODERATE NONPROLIFERATIVE DIABETIC RETINOPATHY OF BOTH EYES WITHOUT MACULAR EDEMA ASSOCIATED WITH TYPE 2 DIABETES MELLITUS: Status: ACTIVE | Noted: 2020-07-17

## 2020-07-20 ENCOUNTER — OFFICE VISIT (OUTPATIENT)
Dept: PRIMARY CARE CLINIC | Facility: CLINIC | Age: 65
End: 2020-07-20
Payer: MEDICARE

## 2020-07-20 VITALS
RESPIRATION RATE: 18 BRPM | TEMPERATURE: 98 F | DIASTOLIC BLOOD PRESSURE: 65 MMHG | BODY MASS INDEX: 35.71 KG/M2 | WEIGHT: 189.13 LBS | HEIGHT: 61 IN | HEART RATE: 107 BPM | SYSTOLIC BLOOD PRESSURE: 125 MMHG

## 2020-07-20 DIAGNOSIS — Z79.4 TYPE 2 DIABETES MELLITUS WITH DIABETIC POLYNEUROPATHY, WITH LONG-TERM CURRENT USE OF INSULIN: Primary | ICD-10-CM

## 2020-07-20 DIAGNOSIS — E11.42 TYPE 2 DIABETES MELLITUS WITH DIABETIC POLYNEUROPATHY, WITH LONG-TERM CURRENT USE OF INSULIN: Primary | ICD-10-CM

## 2020-07-20 PROCEDURE — 1101F PR PT FALLS ASSESS DOC 0-1 FALLS W/OUT INJ PAST YR: ICD-10-PCS | Mod: CPTII,S$GLB,, | Performed by: NURSE PRACTITIONER

## 2020-07-20 PROCEDURE — 1126F PR PAIN SEVERITY QUANTIFIED, NO PAIN PRESENT: ICD-10-PCS | Mod: S$GLB,,, | Performed by: NURSE PRACTITIONER

## 2020-07-20 PROCEDURE — 1159F MED LIST DOCD IN RCRD: CPT | Mod: S$GLB,,, | Performed by: NURSE PRACTITIONER

## 2020-07-20 PROCEDURE — 1101F PT FALLS ASSESS-DOCD LE1/YR: CPT | Mod: CPTII,S$GLB,, | Performed by: NURSE PRACTITIONER

## 2020-07-20 PROCEDURE — 2024F 7 FLD RTA PHOTO EVC RTNOPTHY: CPT | Mod: S$GLB,,, | Performed by: NURSE PRACTITIONER

## 2020-07-20 PROCEDURE — 99999 PR PBB SHADOW E&M-EST. PATIENT-LVL V: ICD-10-PCS | Mod: PBBFAC,,, | Performed by: NURSE PRACTITIONER

## 2020-07-20 PROCEDURE — 1126F AMNT PAIN NOTED NONE PRSNT: CPT | Mod: S$GLB,,, | Performed by: NURSE PRACTITIONER

## 2020-07-20 PROCEDURE — 3046F HEMOGLOBIN A1C LEVEL >9.0%: CPT | Mod: CPTII,S$GLB,, | Performed by: NURSE PRACTITIONER

## 2020-07-20 PROCEDURE — 3008F BODY MASS INDEX DOCD: CPT | Mod: CPTII,S$GLB,, | Performed by: NURSE PRACTITIONER

## 2020-07-20 PROCEDURE — 99214 PR OFFICE/OUTPT VISIT, EST, LEVL IV, 30-39 MIN: ICD-10-PCS | Mod: S$GLB,,, | Performed by: NURSE PRACTITIONER

## 2020-07-20 PROCEDURE — 2024F PR 7 FIELD PHOTOS WITH INTERP/ REVIEW: ICD-10-PCS | Mod: S$GLB,,, | Performed by: NURSE PRACTITIONER

## 2020-07-20 PROCEDURE — 1159F PR MEDICATION LIST DOCUMENTED IN MEDICAL RECORD: ICD-10-PCS | Mod: S$GLB,,, | Performed by: NURSE PRACTITIONER

## 2020-07-20 PROCEDURE — 99214 OFFICE O/P EST MOD 30 MIN: CPT | Mod: S$GLB,,, | Performed by: NURSE PRACTITIONER

## 2020-07-20 PROCEDURE — 3008F PR BODY MASS INDEX (BMI) DOCUMENTED: ICD-10-PCS | Mod: CPTII,S$GLB,, | Performed by: NURSE PRACTITIONER

## 2020-07-20 PROCEDURE — 3046F PR MOST RECENT HEMOGLOBIN A1C LEVEL > 9.0%: ICD-10-PCS | Mod: CPTII,S$GLB,, | Performed by: NURSE PRACTITIONER

## 2020-07-20 PROCEDURE — 99999 PR PBB SHADOW E&M-EST. PATIENT-LVL V: CPT | Mod: PBBFAC,,, | Performed by: NURSE PRACTITIONER

## 2020-07-20 RX ORDER — BLOOD-GLUCOSE,RECEIVER,CONT
1 EACH MISCELLANEOUS DAILY
Qty: 1 EACH | Refills: 0
Start: 2020-07-20 | End: 2023-05-04

## 2020-07-20 RX ORDER — SUB-Q INSULIN DEVICE, 40 UNIT
1 EACH MISCELLANEOUS DAILY
Qty: 30 EACH | Refills: 6 | Status: SHIPPED | OUTPATIENT
Start: 2020-07-20 | End: 2021-03-25 | Stop reason: SDUPTHER

## 2020-07-20 RX ORDER — BLOOD-GLUCOSE TRANSMITTER
1 EACH MISCELLANEOUS DAILY
Qty: 1 EACH | Refills: 3
Start: 2020-07-20 | End: 2021-08-23 | Stop reason: SDUPTHER

## 2020-07-20 RX ORDER — BLOOD-GLUCOSE SENSOR
1 EACH MISCELLANEOUS
Qty: 3 EACH | Refills: 3
Start: 2020-07-20 | End: 2021-08-23 | Stop reason: SDUPTHER

## 2020-07-20 RX ORDER — INSULIN LISPRO 100 [IU]/ML
80 INJECTION, SOLUTION INTRAVENOUS; SUBCUTANEOUS DAILY
Qty: 30 ML | Refills: 11 | Status: SHIPPED | OUTPATIENT
Start: 2020-07-20 | End: 2020-09-22 | Stop reason: SDUPTHER

## 2020-07-20 NOTE — PROGRESS NOTES
"  HPI: Mercedez Purvis is a 65 y.o.  female c/I for visit to address Diabetes Type 2   This is the first time I am seeing her, she is a patient of Dr. Vasquez's for primary care needs.   She also saw general endocrinology - [facility], Dr. Olivo just last week to address her uncontrolled diabetes.   She is unsure why she is here with me today as well as she just saw a specialist for diabetes management.     She was diagnosed with T2DM about 10 years ago.   Taking metformin 500mg XR - 2 tablets bid. This dose was just increased last week. She is taking.   Also on Trulicity 1.5mg sc weekly - has been taking for about 6 months.   Also on MDI - for 10 years.   Levemir 37 units BID, was advised to increase to 45 units bid, but hasn't done yet/hasn't changed yet.   and novolog 25 units tid ac meals - was advised to increase to 30 units tid meals at last visit, but hasn't done yet.   Patient denies skipping dose of insulin.   She takes insulin injections 4x/daily.   Was hospitalized with what she recalls as a "diabetic coma" in 2010 - that was when she was initially diagnosed with diabetes, and insulin was begun right away.   Has not been hospitalized for diabetes since that time.   Checking sugars 4x/daily - not writing down.   Today's blood sugars 2 hours post prandial is 309 on fingerstick in my office. Patient took her 25 units of novolog this am, but did not take the levemir yet for her morning dose.    Past medical History:   Past Medical History:   Diagnosis Date    Chronic headache     Diabetes     Heart attack 2004    8 stents    Heart failure     History of heart artery stent     Hyperlipemia     Hypertension     Obesity (BMI 30-39.9)     Yeast infection       Family hx: No family history on file.   Current meds:   Current Outpatient Medications:     alcohol swabs (ALCOHOL WIPES) PadM, Apply 1 each topically 3 (three) times daily. ICD 10 code E 11.42, monitor blood sugar TID, per insurance " coverage, Disp: 300 each, Rfl: 3    amitriptyline (ELAVIL) 75 MG tablet, Take 1 tablet (75 mg total) by mouth every evening., Disp: 90 tablet, Rfl: 3    amLODIPine (NORVASC) 10 MG tablet, Take 10 mg by mouth once daily., Disp: , Rfl:     aspirin (ECOTRIN) 81 MG EC tablet, Take 81 mg by mouth once daily., Disp: , Rfl:     atorvastatin (LIPITOR) 80 MG tablet, Take 80 mg by mouth every evening., Disp: , Rfl:     blood glucose control, normal Soln, 1 each by Misc.(Non-Drug; Combo Route) route daily as needed. ICD E11.65, Disp: 1 each, Rfl: 1    blood sugar diagnostic Strp, 1 strip., Disp: , Rfl:     blood sugar diagnostic Strp, 1 strip by Misc.(Non-Drug; Combo Route) route 3 (three) times daily. ICD 10 code E 11.42, monitor blood sugar TID, per insurance coverage, Disp: 300 each, Rfl: 3    blood-glucose meter kit, ICD 10 code E 11.42, monitor blood sugar TID, per insurance coverage, Disp: 1 each, Rfl: 0    blood-glucose meter Misc, Accuchek meter or whatever meter insurance pays for., Disp: , Rfl:     chlorthalidone (HYGROTEN) 25 MG Tab, Take 25 mg by mouth once daily., Disp: , Rfl:     clopidogreL (PLAVIX) 75 mg tablet, TK 1 T PO D, Disp: , Rfl:     cyclobenzaprine (FLEXERIL) 10 MG tablet, Take 1 tablet (10 mg total) by mouth 3 (three) times a week., Disp: 36 tablet, Rfl: 3    diclofenac sodium (VOLTAREN) 1 % Gel, Apply 1 application topically., Disp: , Rfl:     dulaglutide (TRULICITY) 1.5 mg/0.5 mL pen injector, Inject 1.5 mg into the skin once a week., Disp: 6 mL, Rfl: 3    ezetimibe (ZETIA) 10 mg tablet, Take 10 mg by mouth once daily., Disp: , Rfl:     furosemide (LASIX) 40 MG tablet, Take 40 mg by mouth once daily., Disp: , Rfl:     gabapentin (NEURONTIN) 100 MG capsule, Take 100 mg by mouth 3 (three) times daily., Disp: , Rfl:     insulin detemir U-100 (LEVEMIR FLEXTOUCH) 100 unit/mL (3 mL) SubQ InPn pen, Inject 45 Units into the skin 2 (two) times daily., Disp: 90 mL, Rfl: 3    lancets Misc,  "Inject 1 each into the skin 3 (three) times daily. ICD 10 code E 11.42, monitor blood sugar TID, Disp: 300 each, Rfl: 3    lidocaine-prilocaine (EMLA) cream, APPLY AA PRF PAINFUL EPISODES OVER BOTH FEET D RUB IN THOROUGHLY, Disp: , Rfl:     lisinopriL (PRINIVIL,ZESTRIL) 40 MG tablet, Take 40 mg by mouth once daily., Disp: , Rfl:     metFORMIN (GLUCOPHAGE-XR) 500 MG XR 24hr tablet, Take 2 tablets (1,000 mg total) by mouth 2 (two) times daily with meals., Disp: 360 tablet, Rfl: 3    metoprolol succinate (TOPROL-XL) 200 MG 24 hr tablet, TK 1 T PO QD, Disp: , Rfl:     pen needle, diabetic (PEN NEEDLE) 32 gauge x 5/32" Ndle, 1 each by Misc.(Non-Drug; Combo Route) route 4 (four) times daily. ICD 10 E11.42, Disp: 400 each, Rfl: 3    spironolactone (ALDACTONE) 50 MG tablet, Take 50 mg by mouth once daily., Disp: , Rfl:     blood-glucose meter,continuous (DEXCOM G6 ) Misc, 1 each by Misc.(Non-Drug; Combo Route) route Daily. Use as directed with Dexcom G6 transmitter and sensor, Disp: 1 each, Rfl: 0    blood-glucose sensor (DEXCOM G6 SENSOR) Roberta, 1 each by Misc.(Non-Drug; Combo Route) route every 10 days. Apply/change sensor to skin every 10 days., Disp: 3 each, Rfl: 3    blood-glucose transmitter (DEXCOM G6 TRANSMITTER) Roberta, 1 each by Misc.(Non-Drug; Combo Route) route Daily. Use transmitter in sensor with G6 system. Change new transmitter every 3 months., Disp: 1 each, Rfl: 3    cholecalciferol, vitamin D3, (VITAMIN D3) 50 mcg (2,000 unit) Cap, Take 1 capsule (2,000 Units total) by mouth once daily. (Patient not taking: Reported on 7/20/2020), Disp: , Rfl:     insulin lispro (HUMALOG U-100 INSULIN) 100 unit/mL injection, Inject 80 Units into the skin once daily. Gives up to 80 units daily via VGO 40, Disp: 30 mL, Rfl: 11    sub-q insulin device, 40 unit (V-GO 40) Roberta, 1 Device by Misc.(Non-Drug; Combo Route) route once daily., Disp: 30 each, Rfl: 6       Social:   Lives at home by herself.  Does have " "family visit her with social distancing.   Has 5 children, 3 are still living. 13 grandchildren, 11 great-grandchildren.   Diet: not always following ADA diet   Meals: 3 per day and snacks   Breakfast - grits, eggs, sausage,   Lunch - salad, fish, chicken breast.   Dinner -steaks, green beans, small portions of potatoe salad.   Snacks - fruits, apples/oranges, or nuts/pistachios or almonds.  Exercise: none. But does work in yard  Activities: not working.     Current Diabetes medications:   Long acting insulin:  Levemir 37 units bid   Short acting insulin: Novolog 25 units tid ac meals - takes 5 - 15 minutes prior to meals.  GLP1:  Trulicity.   Oral meds: meformin 500 xr - 2 tabs bid  Metformin bid with food    Medications Tried and Failed:   levemir  Novolog.   trulicity  Metformin     Glucose Monitoring:   Checking sugars 4x/day by fingerstick.   Not writing down sugars. States range from 300 - 400 fasting 4x/daily.   Glucometer :  Unsure of name.   CGM: has not used.   Gets supplies from : [facility]    Review of Pertinent co-morbidities/risk factors:   CV: history of MI 2004 - with stents placed in 2010 and also in 2019.    CAD: yes   Takes aspirin and plavix daily.  BP: has history of HTN  Statin: Taking  ACE/ARB: Taking    Vital Signs  /65 (BP Location: Right arm)   Pulse 107   Temp 98 °F (36.7 °C) (Temporal)   Resp 18   Ht 5' 1" (1.549 m)   Wt 85.8 kg (189 lb 2.5 oz)   BMI 35.74 kg/m²   Recheck BP is 125/65 of note.     Pertinent Labs:   Hgba1c   Lab Results   Component Value Date    HGBA1C >14.0 (H) 07/13/2020     Lipid panel   Lab Results   Component Value Date    CHOL 175 07/13/2020    CHOL 99 02/03/2020     Lab Results   Component Value Date    HDL 32 (L) 07/13/2020    HDL 35 (L) 02/03/2020     Lab Results   Component Value Date    LDLCALC 98.8 07/13/2020    LDLCALC 48 02/03/2020     Lab Results   Component Value Date    TRIG 221 (H) 07/13/2020    TRIG 79 02/03/2020     Lab Results   Component " Value Date    CHOLHDL 18.3 (L) 07/13/2020    CHOLHDL 2.83 02/03/2020      CMP  Glucose   Date Value Ref Range Status   07/13/2020 414 (H) 70 - 110 mg/dL Final     BUN, Bld   Date Value Ref Range Status   07/13/2020 14 8 - 23 mg/dL Final     Creatinine   Date Value Ref Range Status   07/13/2020 1.2 0.5 - 1.4 mg/dL Final     eGFR if    Date Value Ref Range Status   07/13/2020 54.4 (A) >60 mL/min/1.73 m^2 Final     eGFR if non    Date Value Ref Range Status   07/13/2020 47.2 (A) >60 mL/min/1.73 m^2 Final     Comment:     Calculation used to obtain the estimated glomerular filtration  rate (eGFR) is the CKD-EPI equation.         Microalbumin creatinine ratio:   Lab Results   Component Value Date    MICALBCREAT 8.6 07/01/2020       Social History     Tobacco Use   Smoking Status Never Smoker        Standards of care:   Eyes: .: 07/16/2020  Foot exam: : 07/15/2020 and done today.   Diabetes education: none.     Review Of Systems:   Gen: Appetite good, no weight gain or loss, denies fatigue and weakness. Has some mild polydypsia  Skin: Skin is intact and heals well, no rashes, no hair changes  Eyes: Denies visual disturbances, no blurred vision.   Resp: no SOB or Dyspnea on exertion, denies cough.   Cardiac: Denies chest pain, palpitations, or swelling.   GI: Denies abdominal pain, nausea or vomiting, diarrhea, constipation.   /GYN: + nocturia, burning or pain.   MS/Neuro: Denies numbness/ tingling in BLE; Gait steady, speech clear  Psych: Denies drug/ETOH abuse, no hx of depression.  Other systems: negative.    Physical Exam:   GENERAL: Well developed, well nourished in appearance.   PSYCH: AAOx3, appropriate mood and affect, pleasant expression, conversant, appears relaxed, well groomed.   EYES: PERRL, Conjunctiva and corneas clear  NECK: Soft and Supple, trachea midline   CHEST: Even, regular, and unlabored respirations  CARDIAC: RRR, S1, S2 heard, no murmurs  ABDOMEN: Soft, non-tender,  non-distended   VASCULAR: pedal pulses palpable bilaterally, no edema.  NEURO:  cranial nerves II - XII intact   MUSCULOSKELETAL: Good ROM, equal strength against resistance to bilateral lower extremities, steady gait.   SKIN: Skin warm, dry, and intact - + acanthosis nigracans mild and skin tags mild.  FEET: +2 dorsalis pedis and posterior pulses noted.  No foot deformity, corns or callus formation,   No Onychomycosis, nails in good condition and well trimmed, no interspace maceration or ulceration noted.    hair growth present over toes/feet.  Positive vibratory response to 128 Hz tuning fork bilaterally.    Protective sensation intact with 10 gram monofilament.      Assessment and Plan of Care:     Mercedez was seen today for diabetes.    Diagnoses and all orders for this visit:    Type 2 diabetes mellitus with diabetic polyneuropathy, with long-term current use of insulin  -     POCT Glucose, Hand-Held Device  -     Ambulatory referral/consult to Diabetes Education; Future  -     Cancel: Ambulatory referral/consult to Ophthalmology; Future    Other orders  -     blood-glucose sensor (DEXCOM G6 SENSOR) Roberta; 1 each by Misc.(Non-Drug; Combo Route) route every 10 days. Apply/change sensor to skin every 10 days.  -     blood-glucose transmitter (DEXCOM G6 TRANSMITTER) Roberta; 1 each by Misc.(Non-Drug; Combo Route) route Daily. Use transmitter in sensor with G6 system. Change new transmitter every 3 months.  -     blood-glucose meter,continuous (DEXCOM G6 ) Misc; 1 each by Misc.(Non-Drug; Combo Route) route Daily. Use as directed with Dexcom G6 transmitter and sensor  -     sub-q insulin device, 40 unit (V-GO 40) Roberta; 1 Device by Misc.(Non-Drug; Combo Route) route once daily.  -     insulin lispro (HUMALOG U-100 INSULIN) 100 unit/mL injection; Inject 80 Units into the skin once daily. Gives up to 80 units daily via VGO 40         1. T2DM with hyperglycemia- Hgba1c goal is 7.5% or less - not at goal. a1c is >  14%.   She had seen Dr. Olivo with general endocrinology at [facility] last week, but prefers my location so will continue care with me for her diabetes management.   Continue metformin 1000 bid. Renal function stable.   Will consider add on of SGLT2i in future. She has not tried.   Continue trulicity 1.5mg sc weekly.   Continue MDI for now - will switch her to VGO 40 - this is pathophysiologically more like the way her body was meant for insulin to be delivered and accepted.   She will likely be on less insulin.   Sent rx's to our pharmacy and put in consult for her to meet with Diabetic educator booker serra, to train.   Also ordered dexcom CGM G6 for personal use - she is checking sugars 4x/daily.   Also taking insulin injections 4x/daily.   This will help with glycemic awareness and control.   discussed DM, progression of disease, long term complications, CV risk factors and tx options.   She already has established CAD and history of MI, so at risk for repeat event.   Advise compliance with ADA diet and encourage exercise  Reviewed  hypoglycemia, s/s and appropriate tx.    Instructed to monitor Blood glucose 4x/day and bring meter/ log to every clinic visit.   Write down in log to bring to clinic to review for next visit.   Eyes - dilated retinal exam 7/15/2020 - no diabetic retinopathy but with follow up recommended in 1 month with ophthalmology.   Podiatry - had check up recently. Foot exam negative today. Advised on proper foot care.     2. HTN- controlled, continue meds as previously prescribed and monitor.   bp initially was 160 systolic, but 120's systolic on recheck.     3. HLP - LDL goal < 100. Minimally at goal. On atorvastatin 80mg every Hs.   Currently on statin therapy- to continue.   hsitory of MI and stents already - on plavix daily.   LFTs WNL    4. Weight - BMI Body mass index is 35.74 kg/m².   Encourage Ada diet and exercise.      Follow up in 4 weeks with OV to review new POC, she will  have started on vgo and dexcom at this point, and can see how her regimen change is going.        7/27/2020 - addendum - dexcom approved per [employer] - GRANT Tejada

## 2020-07-20 NOTE — PATIENT INSTRUCTIONS
Switch from multi dose insulin injections to VGO 40 and novolog insulin in your vgo.   You will meet with Whitney GARCIA (diabetic educator) to show you how to use your vgo.     Also will start on dexcom G6 personal CGM - to help you monitor your sugars continuously.     Continue metformin.     Continue trulicity.

## 2020-07-21 ENCOUNTER — TELEPHONE (OUTPATIENT)
Dept: PRIMARY CARE CLINIC | Facility: CLINIC | Age: 65
End: 2020-07-21

## 2020-07-21 DIAGNOSIS — E11.42 TYPE 2 DIABETES MELLITUS WITH DIABETIC POLYNEUROPATHY, WITH LONG-TERM CURRENT USE OF INSULIN: Primary | ICD-10-CM

## 2020-07-21 DIAGNOSIS — Z79.4 TYPE 2 DIABETES MELLITUS WITH DIABETIC POLYNEUROPATHY, WITH LONG-TERM CURRENT USE OF INSULIN: Primary | ICD-10-CM

## 2020-07-21 NOTE — TELEPHONE ENCOUNTER
Will you make sure this patient gets scheduled with Whitney for diabetes education - she is starting on a personal Dexcom CGM and VGO 40 so needs training for vgo start, I do not think her consult got scheduled.     Thanks,  leyla

## 2020-07-23 ENCOUNTER — OFFICE VISIT (OUTPATIENT)
Dept: CARDIOLOGY | Facility: CLINIC | Age: 65
End: 2020-07-23
Payer: MEDICARE

## 2020-07-23 VITALS
DIASTOLIC BLOOD PRESSURE: 70 MMHG | HEIGHT: 61 IN | OXYGEN SATURATION: 96 % | SYSTOLIC BLOOD PRESSURE: 118 MMHG | WEIGHT: 186.31 LBS | HEART RATE: 88 BPM | BODY MASS INDEX: 35.18 KG/M2

## 2020-07-23 DIAGNOSIS — I25.118 CORONARY ARTERY DISEASE OF NATIVE ARTERY OF NATIVE HEART WITH STABLE ANGINA PECTORIS: ICD-10-CM

## 2020-07-23 DIAGNOSIS — E11.9 TYPE 2 DIABETES MELLITUS WITHOUT COMPLICATION, WITH LONG-TERM CURRENT USE OF INSULIN: ICD-10-CM

## 2020-07-23 DIAGNOSIS — E66.9 CLASS 2 OBESITY WITH BODY MASS INDEX (BMI) OF 35.0 TO 35.9 IN ADULT, UNSPECIFIED OBESITY TYPE, UNSPECIFIED WHETHER SERIOUS COMORBIDITY PRESENT: ICD-10-CM

## 2020-07-23 DIAGNOSIS — I25.10 CORONARY ARTERY DISEASE INVOLVING NATIVE CORONARY ARTERY OF NATIVE HEART WITHOUT ANGINA PECTORIS: Primary | ICD-10-CM

## 2020-07-23 DIAGNOSIS — Z98.61 HISTORY OF PTCA 2: ICD-10-CM

## 2020-07-23 DIAGNOSIS — E78.2 MIXED HYPERLIPIDEMIA: ICD-10-CM

## 2020-07-23 DIAGNOSIS — Z79.4 TYPE 2 DIABETES MELLITUS WITHOUT COMPLICATION, WITH LONG-TERM CURRENT USE OF INSULIN: ICD-10-CM

## 2020-07-23 DIAGNOSIS — I10 ESSENTIAL HYPERTENSION: ICD-10-CM

## 2020-07-23 DIAGNOSIS — G47.33 OSA ON CPAP: ICD-10-CM

## 2020-07-23 PROCEDURE — 1101F PR PT FALLS ASSESS DOC 0-1 FALLS W/OUT INJ PAST YR: ICD-10-PCS | Mod: CPTII,S$GLB,, | Performed by: INTERNAL MEDICINE

## 2020-07-23 PROCEDURE — 2024F PR 7 FIELD PHOTOS WITH INTERP/ REVIEW: ICD-10-PCS | Mod: S$GLB,,, | Performed by: INTERNAL MEDICINE

## 2020-07-23 PROCEDURE — 1101F PT FALLS ASSESS-DOCD LE1/YR: CPT | Mod: CPTII,S$GLB,, | Performed by: INTERNAL MEDICINE

## 2020-07-23 PROCEDURE — 3046F PR MOST RECENT HEMOGLOBIN A1C LEVEL > 9.0%: ICD-10-PCS | Mod: CPTII,S$GLB,, | Performed by: INTERNAL MEDICINE

## 2020-07-23 PROCEDURE — 3008F PR BODY MASS INDEX (BMI) DOCUMENTED: ICD-10-PCS | Mod: CPTII,S$GLB,, | Performed by: INTERNAL MEDICINE

## 2020-07-23 PROCEDURE — 99204 OFFICE O/P NEW MOD 45 MIN: CPT | Mod: S$GLB,,, | Performed by: INTERNAL MEDICINE

## 2020-07-23 PROCEDURE — 99999 PR PBB SHADOW E&M-EST. PATIENT-LVL V: ICD-10-PCS | Mod: PBBFAC,,, | Performed by: INTERNAL MEDICINE

## 2020-07-23 PROCEDURE — 1126F PR PAIN SEVERITY QUANTIFIED, NO PAIN PRESENT: ICD-10-PCS | Mod: S$GLB,,, | Performed by: INTERNAL MEDICINE

## 2020-07-23 PROCEDURE — 2024F 7 FLD RTA PHOTO EVC RTNOPTHY: CPT | Mod: S$GLB,,, | Performed by: INTERNAL MEDICINE

## 2020-07-23 PROCEDURE — 3008F BODY MASS INDEX DOCD: CPT | Mod: CPTII,S$GLB,, | Performed by: INTERNAL MEDICINE

## 2020-07-23 PROCEDURE — 1159F MED LIST DOCD IN RCRD: CPT | Mod: S$GLB,,, | Performed by: INTERNAL MEDICINE

## 2020-07-23 PROCEDURE — 1159F PR MEDICATION LIST DOCUMENTED IN MEDICAL RECORD: ICD-10-PCS | Mod: S$GLB,,, | Performed by: INTERNAL MEDICINE

## 2020-07-23 PROCEDURE — 3046F HEMOGLOBIN A1C LEVEL >9.0%: CPT | Mod: CPTII,S$GLB,, | Performed by: INTERNAL MEDICINE

## 2020-07-23 PROCEDURE — 99204 PR OFFICE/OUTPT VISIT, NEW, LEVL IV, 45-59 MIN: ICD-10-PCS | Mod: S$GLB,,, | Performed by: INTERNAL MEDICINE

## 2020-07-23 PROCEDURE — 99999 PR PBB SHADOW E&M-EST. PATIENT-LVL V: CPT | Mod: PBBFAC,,, | Performed by: INTERNAL MEDICINE

## 2020-07-23 PROCEDURE — 1126F AMNT PAIN NOTED NONE PRSNT: CPT | Mod: S$GLB,,, | Performed by: INTERNAL MEDICINE

## 2020-07-23 NOTE — LETTER
July 23, 2020      Jackie Chavez MD  [address] [facility] [facility] [address]           [facility]  [address]  [facility] [address]  Phone: [phone]  Fax: [phone]          Patient: Mercedez Purvis   MR Number: [id number]   YOB: 1955   Date of Visit: 7/23/2020       Dear Dr. Jackie Chavez:    Thank you for referring Mercedez Purvis to me for evaluation. Attached you will find relevant portions of my assessment and plan of care.    If you have questions, please do not hesitate to call me. I look forward to following Mercedez Purvis along with you.    Sincerely,    Chadd Sweeney MD    Enclosure  CC:  No Recipients    If you would like to receive this communication electronically, please contact [email or web address] or [phone] to request more information on [facility] [employer] access.    For providers and/or their staff who would like to refer a patient to [facility], please contact us through our one-stop-shop provider referral line, [facility], at [phone].    If you feel you have received this communication in error or would no longer like to receive these types of communications, please e-mail [email or web address]

## 2020-07-23 NOTE — PROGRESS NOTES
Subjective:    Patient ID:  Mercedez Purvis is a 65 y.o. female who presents for evaluation of CAD    HPI     The patient is a 65 year old female referred for Cardiology evaluation. She has CAD post PCI, hypertension,  hyperlipidemia, diabetes and obesity.She presented to [facility] in 2016 with chest pain and had a PCI to RCA ans OM2. She presented 4/18/19 with SOB and had a PCI to Cfx and a staged PCI to Cfx and mid RCA. She dnies cyest paion or ARREOLA but not exercising. A echo at [facility] 10/3/19 was normal with EF 50-55%.  Lab Results   Component Value Date     (L) 07/13/2020    K 4.3 07/13/2020    CL 96 07/13/2020    CO2 28 07/13/2020    BUN 14 07/13/2020    CREATININE 1.2 07/13/2020     (H) 07/13/2020    HGBA1C >14.0 (H) 07/13/2020    MG 2.2 08/28/2010    AST 12 07/13/2020    ALT 11 07/13/2020    ALBUMIN 3.4 (L) 07/13/2020    PROT 7.5 07/13/2020    BILITOT 0.3 07/13/2020    WBC 8.16 07/13/2020    HGB 12.3 07/13/2020    HCT 41.4 07/13/2020    MCV 76 (L) 07/13/2020     07/13/2020    INR 1.0 08/27/2010    TSH 2.301 07/13/2020         Lab Results   Component Value Date    CHOL 175 07/13/2020    HDL 32 (L) 07/13/2020    TRIG 221 (H) 07/13/2020       Lab Results   Component Value Date    LDLCALC 98.8 07/13/2020       Past Medical History:   Diagnosis Date    Chronic headache     Diabetes     Heart attack 2004    8 stents    Heart failure     History of heart artery stent     Hyperlipemia     Hypertension     Obesity (BMI 30-39.9)     Yeast infection        Current Outpatient Medications:     amitriptyline (ELAVIL) 75 MG tablet, Take 1 tablet (75 mg total) by mouth every evening., Disp: 90 tablet, Rfl: 3    amLODIPine (NORVASC) 10 MG tablet, Take 10 mg by mouth once daily., Disp: , Rfl:     aspirin (ECOTRIN) 81 MG EC tablet, Take 81 mg by mouth once daily., Disp: , Rfl:     atorvastatin (LIPITOR) 80 MG tablet, Take 80 mg by mouth every evening., Disp: , Rfl:     blood glucose control,  normal Soln, 1 each by Misc.(Non-Drug; Combo Route) route daily as needed. ICD E11.65, Disp: 1 each, Rfl: 1    blood sugar diagnostic Strp, 1 strip., Disp: , Rfl:     blood-glucose meter kit, ICD 10 code E 11.42, monitor blood sugar TID, per insurance coverage, Disp: 1 each, Rfl: 0    blood-glucose meter Misc, Accuchek meter or whatever meter insurance pays for., Disp: , Rfl:     blood-glucose meter,continuous (DEXCOM G6 ) Misc, 1 each by Misc.(Non-Drug; Combo Route) route Daily. Use as directed with Dexcom G6 transmitter and sensor, Disp: 1 each, Rfl: 0    blood-glucose sensor (DEXCOM G6 SENSOR) Roberta, 1 each by Misc.(Non-Drug; Combo Route) route every 10 days. Apply/change sensor to skin every 10 days., Disp: 3 each, Rfl: 3    blood-glucose transmitter (DEXCOM G6 TRANSMITTER) Roberta, 1 each by Misc.(Non-Drug; Combo Route) route Daily. Use transmitter in sensor with G6 system. Change new transmitter every 3 months., Disp: 1 each, Rfl: 3    chlorthalidone (HYGROTEN) 25 MG Tab, Take 25 mg by mouth once daily., Disp: , Rfl:     cholecalciferol, vitamin D3, (VITAMIN D3) 50 mcg (2,000 unit) Cap, Take 1 capsule (2,000 Units total) by mouth once daily., Disp: , Rfl:     clopidogreL (PLAVIX) 75 mg tablet, TK 1 T PO D, Disp: , Rfl:     cyclobenzaprine (FLEXERIL) 10 MG tablet, Take 1 tablet (10 mg total) by mouth 3 (three) times a week., Disp: 36 tablet, Rfl: 3    diclofenac sodium (VOLTAREN) 1 % Gel, Apply 1 application topically., Disp: , Rfl:     dulaglutide (TRULICITY) 1.5 mg/0.5 mL pen injector, Inject 1.5 mg into the skin once a week., Disp: 6 mL, Rfl: 3    ezetimibe (ZETIA) 10 mg tablet, Take 10 mg by mouth once daily., Disp: , Rfl:     furosemide (LASIX) 40 MG tablet, Take 40 mg by mouth once daily., Disp: , Rfl:     gabapentin (NEURONTIN) 100 MG capsule, Take 100 mg by mouth 3 (three) times daily., Disp: , Rfl:     insulin detemir U-100 (LEVEMIR FLEXTOUCH) 100 unit/mL (3 mL) SubQ InPn pen,  "Inject 45 Units into the skin 2 (two) times daily., Disp: 90 mL, Rfl: 3    insulin lispro (HUMALOG U-100 INSULIN) 100 unit/mL injection, Inject 80 Units into the skin once daily. Gives up to 80 units daily via VGO 40, Disp: 30 mL, Rfl: 11    lancets Misc, Inject 1 each into the skin 3 (three) times daily. ICD 10 code E 11.42, monitor blood sugar TID, Disp: 300 each, Rfl: 3    lidocaine-prilocaine (EMLA) cream, APPLY AA PRF PAINFUL EPISODES OVER BOTH FEET D RUB IN THOROUGHLY, Disp: , Rfl:     lisinopriL (PRINIVIL,ZESTRIL) 40 MG tablet, Take 40 mg by mouth once daily., Disp: , Rfl:     metFORMIN (GLUCOPHAGE-XR) 500 MG XR 24hr tablet, Take 2 tablets (1,000 mg total) by mouth 2 (two) times daily with meals., Disp: 360 tablet, Rfl: 3    metoprolol succinate (TOPROL-XL) 200 MG 24 hr tablet, TK 1 T PO QD, Disp: , Rfl:     pen needle, diabetic (PEN NEEDLE) 32 gauge x 5/32" Ndle, 1 each by Misc.(Non-Drug; Combo Route) route 4 (four) times daily. ICD 10 E11.42, Disp: 400 each, Rfl: 3    spironolactone (ALDACTONE) 50 MG tablet, Take 50 mg by mouth once daily., Disp: , Rfl:     sub-q insulin device, 40 unit (V-GO 40) Roberta, 1 Device by Misc.(Non-Drug; Combo Route) route once daily., Disp: 30 each, Rfl: 6    alcohol swabs (ALCOHOL WIPES) PadM, Apply 1 each topically 3 (three) times daily. ICD 10 code E 11.42, monitor blood sugar TID, per insurance coverage (Patient not taking: Reported on 7/23/2020), Disp: 300 each, Rfl: 3    blood sugar diagnostic Strp, 1 strip by Misc.(Non-Drug; Combo Route) route 3 (three) times daily. ICD 10 code E 11.42, monitor blood sugar TID, per insurance coverage, Disp: 300 each, Rfl: 3          Review of Systems   Constitution: Positive for weight loss. Negative for decreased appetite, diaphoresis, fever, malaise/fatigue and weight gain.   HENT: Negative for congestion, ear discharge, ear pain and nosebleeds.    Eyes: Negative for blurred vision, double vision and visual disturbance. " "  Cardiovascular: Negative for chest pain, claudication, cyanosis, dyspnea on exertion, irregular heartbeat, leg swelling, near-syncope, orthopnea, palpitations, paroxysmal nocturnal dyspnea and syncope.   Respiratory: Negative for cough, hemoptysis, shortness of breath, sleep disturbances due to breathing, snoring, sputum production and wheezing.    Endocrine: Negative for polydipsia, polyphagia and polyuria.   Hematologic/Lymphatic: Negative for adenopathy and bleeding problem. Does not bruise/bleed easily.   Skin: Negative for color change, nail changes, poor wound healing and rash.   Musculoskeletal: Negative for muscle cramps and muscle weakness.   Gastrointestinal: Negative for abdominal pain, anorexia, change in bowel habit, hematochezia, nausea and vomiting.   Genitourinary: Positive for frequency. Negative for dysuria and hematuria.   Neurological: Negative for brief paralysis, difficulty with concentration, excessive daytime sleepiness, dizziness, focal weakness, headaches, light-headedness, seizures, vertigo and weakness.   Psychiatric/Behavioral: Negative for altered mental status and depression.   Allergic/Immunologic: Negative for persistent infections.        Objective:/70   Pulse 88   Ht 5' 1" (1.549 m)   Wt 84.5 kg (186 lb 4.6 oz)   SpO2 96%   BMI 35.20 kg/m²           Physical Exam   Constitutional: She is oriented to person, place, and time. She appears well-developed and well-nourished.   obese   HENT:   Head: Normocephalic.   Right Ear: External ear normal.   Left Ear: External ear normal.   Nose: Nose normal.   Inspection of lips, teeth and gums normal   Eyes: Pupils are equal, round, and reactive to light. Conjunctivae and EOM are normal. No scleral icterus.   Neck: Normal range of motion. No JVD present. No tracheal deviation present. No thyromegaly present.   Cardiovascular: Normal rate, regular rhythm, normal heart sounds and intact distal pulses. Exam reveals no gallop and no " friction rub.   No murmur heard.  Pulses:       Femoral pulses are 1+ on the right side and 1+ on the left side.       Dorsalis pedis pulses are 0 on the right side and 1+ on the left side.        Posterior tibial pulses are 0 on the right side and 0 on the left side.   Pulmonary/Chest: Effort normal and breath sounds normal. No respiratory distress. She has no wheezes. She has no rales. She exhibits no tenderness.   Abdominal: Soft. Bowel sounds are normal. She exhibits no distension. There is no hepatosplenomegaly. There is no abdominal tenderness. There is no guarding.   Musculoskeletal: Normal range of motion.         General: No tenderness or edema.   Lymphadenopathy:   Palpation of lymph nodes of neck and groin normal   Neurological: She is oriented to person, place, and time. No cranial nerve deficit. She exhibits normal muscle tone. Coordination normal.   Skin: Skin is warm and dry. No rash noted. No erythema. No pallor.   Palpation of skin normal   Psychiatric: She has a normal mood and affect. Her behavior is normal. Judgment and thought content normal.         Assessment:       1. Coronary artery disease involving native coronary artery of native heart without angina pectoris    2. Coronary artery disease of native artery of native heart with stable angina pectoris    3. History of PTCA 2    4. Essential hypertension    5. Mixed hyperlipidemia    6. Type 2 diabetes mellitus without complication, with long-term current use of insulin    7. TIKA on CPAP    8. Class 2 obesity with body mass index (BMI) of 35.0 to 35.9 in adult, unspecified obesity type, unspecified whether serious comorbidity present         Plan:       Mercedez was seen today for coronary artery disease.    Diagnoses and all orders for this visit:    Coronary artery disease involving native coronary artery of native heart without angina pectoris    Coronary artery disease of native artery of native heart with stable angina pectoris  -      Ambulatory referral/consult to Cardiology  -     Lipid Panel; Future; Expected date: 01/22/2021    History of PTCA 2    Essential hypertension    Mixed hyperlipidemia  -     Lipid Panel; Future; Expected date: 01/22/2021    Type 2 diabetes mellitus without complication, with long-term current use of insulin    TIKA on CPAP    Class 2 obesity with body mass index (BMI) of 35.0 to 35.9 in adult, unspecified obesity type, unspecified whether serious comorbidity present

## 2020-07-24 ENCOUNTER — TELEPHONE (OUTPATIENT)
Dept: OPHTHALMOLOGY | Facility: CLINIC | Age: 65
End: 2020-07-24

## 2020-07-24 ENCOUNTER — TELEPHONE (OUTPATIENT)
Dept: INTERNAL MEDICINE | Facility: CLINIC | Age: 65
End: 2020-07-24

## 2020-07-24 NOTE — TELEPHONE ENCOUNTER
elo with pt , he order that was sent to [email or web address] needs to be sent to [facility]. The paperwork is being submitted on today.

## 2020-07-24 NOTE — TELEPHONE ENCOUNTER
----- Message from Ya Bueno sent at 7/24/2020 10:04 AM CDT -----  Regarding: diabetic supplies  Contact: self   Pt states she came to her appt yesterday and now she is having trouble getting her diabetic supplies filled through the pharmacy. Those items are the blood-glucose meter,continuous (DEXCOM G6 ) Misc,  blood-glucose sensor (DEXCOM G6 SENSOR) Roberta, blood-glucose transmitter (DEXCOM G6 TRANSMITTER) Roberta.Please send to [email or web address] #[id number] [place] [id number] AT [facility] [place] [phone] (Phone)[phone] (Fax) Please call and advise

## 2020-07-27 ENCOUNTER — PATIENT OUTREACH (OUTPATIENT)
Dept: ADMINISTRATIVE | Facility: HOSPITAL | Age: 65
End: 2020-07-27

## 2020-07-27 ENCOUNTER — TELEPHONE (OUTPATIENT)
Dept: PRIMARY CARE CLINIC | Facility: CLINIC | Age: 65
End: 2020-07-27

## 2020-07-27 NOTE — PROGRESS NOTES
Pre-visit chart review completed.  Immunizations reviewed and updated.    Patient due for the following    Mammogram     DEXA SCAN     Shingles Vaccine (1 of 2)    Colorectal Cancer Screening     Pneumococcal Vaccine (65+ Low/Medium Risk) (1 of 2 - PCV13)

## 2020-08-07 ENCOUNTER — PATIENT OUTREACH (OUTPATIENT)
Dept: ADMINISTRATIVE | Facility: OTHER | Age: 65
End: 2020-08-07

## 2020-08-07 DIAGNOSIS — Z12.31 ENCOUNTER FOR SCREENING MAMMOGRAM FOR MALIGNANT NEOPLASM OF BREAST: ICD-10-CM

## 2020-08-07 DIAGNOSIS — Z12.11 ENCOUNTER FOR FIT (FECAL IMMUNOCHEMICAL TEST) SCREENING: Primary | ICD-10-CM

## 2020-08-07 NOTE — PROGRESS NOTES
Care Everywhere: updated uploaded 2017 mammogram report  Immunization: updated  Health Maintenance: updated  Media Review: reviewed for outside mammogram and colon cancer report  Legacy Review:   Order placed: fecal immunochemical test, mammogram   Upcoming appts:  Mammogram scheduling ticket sent to patient's portal

## 2020-08-10 ENCOUNTER — CLINICAL SUPPORT (OUTPATIENT)
Dept: DIABETES | Facility: CLINIC | Age: 65
End: 2020-08-10
Payer: MEDICARE

## 2020-08-10 ENCOUNTER — OFFICE VISIT (OUTPATIENT)
Dept: PRIMARY CARE CLINIC | Facility: CLINIC | Age: 65
End: 2020-08-10
Payer: MEDICARE

## 2020-08-10 VITALS
TEMPERATURE: 99 F | HEIGHT: 61 IN | SYSTOLIC BLOOD PRESSURE: 132 MMHG | OXYGEN SATURATION: 99 % | WEIGHT: 188.06 LBS | BODY MASS INDEX: 35.5 KG/M2 | HEART RATE: 94 BPM | DIASTOLIC BLOOD PRESSURE: 80 MMHG

## 2020-08-10 DIAGNOSIS — E11.42 TYPE 2 DIABETES MELLITUS WITH DIABETIC POLYNEUROPATHY, WITH LONG-TERM CURRENT USE OF INSULIN: Primary | ICD-10-CM

## 2020-08-10 DIAGNOSIS — E11.42 TYPE 2 DIABETES MELLITUS WITH DIABETIC POLYNEUROPATHY, WITH LONG-TERM CURRENT USE OF INSULIN: ICD-10-CM

## 2020-08-10 DIAGNOSIS — E66.9 OBESITY (BMI 30-39.9): ICD-10-CM

## 2020-08-10 DIAGNOSIS — Z12.11 SCREENING FOR MALIGNANT NEOPLASM OF COLON: ICD-10-CM

## 2020-08-10 DIAGNOSIS — Z79.4 TYPE 2 DIABETES MELLITUS WITH DIABETIC POLYNEUROPATHY, WITH LONG-TERM CURRENT USE OF INSULIN: ICD-10-CM

## 2020-08-10 DIAGNOSIS — Z79.4 TYPE 2 DIABETES MELLITUS WITH DIABETIC POLYNEUROPATHY, WITH LONG-TERM CURRENT USE OF INSULIN: Primary | ICD-10-CM

## 2020-08-10 DIAGNOSIS — Z78.0 ENCOUNTER FOR OSTEOPOROSIS SCREENING IN ASYMPTOMATIC POSTMENOPAUSAL PATIENT: ICD-10-CM

## 2020-08-10 DIAGNOSIS — Z13.820 ENCOUNTER FOR OSTEOPOROSIS SCREENING IN ASYMPTOMATIC POSTMENOPAUSAL PATIENT: ICD-10-CM

## 2020-08-10 DIAGNOSIS — L30.9 DERMATITIS: ICD-10-CM

## 2020-08-10 PROCEDURE — 1159F MED LIST DOCD IN RCRD: CPT | Mod: S$GLB,,, | Performed by: FAMILY MEDICINE

## 2020-08-10 PROCEDURE — 2024F 7 FLD RTA PHOTO EVC RTNOPTHY: CPT | Mod: S$GLB,,, | Performed by: FAMILY MEDICINE

## 2020-08-10 PROCEDURE — 1101F PT FALLS ASSESS-DOCD LE1/YR: CPT | Mod: CPTII,S$GLB,, | Performed by: FAMILY MEDICINE

## 2020-08-10 PROCEDURE — 1126F AMNT PAIN NOTED NONE PRSNT: CPT | Mod: S$GLB,,, | Performed by: FAMILY MEDICINE

## 2020-08-10 PROCEDURE — 99214 PR OFFICE/OUTPT VISIT, EST, LEVL IV, 30-39 MIN: ICD-10-PCS | Mod: S$GLB,,, | Performed by: FAMILY MEDICINE

## 2020-08-10 PROCEDURE — 1126F PR PAIN SEVERITY QUANTIFIED, NO PAIN PRESENT: ICD-10-PCS | Mod: S$GLB,,, | Performed by: FAMILY MEDICINE

## 2020-08-10 PROCEDURE — 99999 PR PBB SHADOW E&M-EST. PATIENT-LVL V: CPT | Mod: PBBFAC,,, | Performed by: FAMILY MEDICINE

## 2020-08-10 PROCEDURE — 3008F BODY MASS INDEX DOCD: CPT | Mod: CPTII,S$GLB,, | Performed by: FAMILY MEDICINE

## 2020-08-10 PROCEDURE — 99214 OFFICE O/P EST MOD 30 MIN: CPT | Mod: S$GLB,,, | Performed by: FAMILY MEDICINE

## 2020-08-10 PROCEDURE — G0108 DIAB MANAGE TRN  PER INDIV: HCPCS | Mod: S$GLB,,, | Performed by: DIETITIAN, REGISTERED

## 2020-08-10 PROCEDURE — 3046F HEMOGLOBIN A1C LEVEL >9.0%: CPT | Mod: CPTII,S$GLB,, | Performed by: FAMILY MEDICINE

## 2020-08-10 PROCEDURE — 99999 PR PBB SHADOW E&M-EST. PATIENT-LVL II: ICD-10-PCS | Mod: PBBFAC,,, | Performed by: DIETITIAN, REGISTERED

## 2020-08-10 PROCEDURE — G0108 PR DIAB MANAGE TRN  PER INDIV: ICD-10-PCS | Mod: S$GLB,,, | Performed by: DIETITIAN, REGISTERED

## 2020-08-10 PROCEDURE — 3008F PR BODY MASS INDEX (BMI) DOCUMENTED: ICD-10-PCS | Mod: CPTII,S$GLB,, | Performed by: FAMILY MEDICINE

## 2020-08-10 PROCEDURE — 1159F PR MEDICATION LIST DOCUMENTED IN MEDICAL RECORD: ICD-10-PCS | Mod: S$GLB,,, | Performed by: FAMILY MEDICINE

## 2020-08-10 PROCEDURE — 2024F PR 7 FIELD PHOTOS WITH INTERP/ REVIEW: ICD-10-PCS | Mod: S$GLB,,, | Performed by: FAMILY MEDICINE

## 2020-08-10 PROCEDURE — 1101F PR PT FALLS ASSESS DOC 0-1 FALLS W/OUT INJ PAST YR: ICD-10-PCS | Mod: CPTII,S$GLB,, | Performed by: FAMILY MEDICINE

## 2020-08-10 PROCEDURE — 3046F PR MOST RECENT HEMOGLOBIN A1C LEVEL > 9.0%: ICD-10-PCS | Mod: CPTII,S$GLB,, | Performed by: FAMILY MEDICINE

## 2020-08-10 PROCEDURE — 99999 PR PBB SHADOW E&M-EST. PATIENT-LVL V: ICD-10-PCS | Mod: PBBFAC,,, | Performed by: FAMILY MEDICINE

## 2020-08-10 PROCEDURE — 99999 PR PBB SHADOW E&M-EST. PATIENT-LVL II: CPT | Mod: PBBFAC,,, | Performed by: DIETITIAN, REGISTERED

## 2020-08-10 RX ORDER — GABAPENTIN 100 MG/1
100 CAPSULE ORAL 3 TIMES DAILY
Qty: 270 CAPSULE | Refills: 3 | Status: SHIPPED | OUTPATIENT
Start: 2020-08-10 | End: 2020-11-23 | Stop reason: SDUPTHER

## 2020-08-10 RX ORDER — CLOTRIMAZOLE AND BETAMETHASONE DIPROPIONATE 10; .64 MG/G; MG/G
CREAM TOPICAL 2 TIMES DAILY
Qty: 60 G | Refills: 0 | Status: SHIPPED | OUTPATIENT
Start: 2020-08-10 | End: 2020-08-24

## 2020-08-10 NOTE — PROGRESS NOTES
Subjective:       Patient ID: Mercedez Purvis is a 65 y.o. female.    Chief Complaint: Diabetes (follow up) and Itching (foot)    HPI  Review of Systems      Objective:      Physical Exam    Assessment:       No diagnosis found.    Plan:       ***

## 2020-08-10 NOTE — PATIENT INSTRUCTIONS
Stop all over the counter medication including vitamins and aspirin 1 week before the procedure. Continue Plavix and all other prescription medications. Follow directions for the bowel preparation. Nothing to eat or drink from midnight the day of the procedure.  Once your diet changes (fasting or liquid diet): do not take the rapid acting insulin eg Humalog or Novolog  You may take 1/2 long acting insulin the day prior to the procedure.  You need to continue Plavix because of your heart.  On the morning of the procedure take your blood pressure medication with no more than a teaspoon of water.    You will resume all medications per usual schedule once you are eating and no adverse events were reported.    Colonoscopy: call to schedule

## 2020-08-10 NOTE — PROGRESS NOTES
Subjective:       Patient ID: Mercedez Purvis is a 65 y.o. female.    Chief Complaint:  Diabetes (follow up) and Itching (foot)    64 yo female presents for Diabetes follow up.  She plans to follow with the diabetic specialist/ manager at [facility] and has an upcoming appointment later today with the diabetes educator. She will learn how to use the Dexicom. She is on insulin, Trulicity and metformin.    She would like to see podiatry within [facility]; she has not yet set up an appointment.    She complains of itching of feet. Not burning or tingling for which she takes Gabapentin. She has no rashes on her feet.    She is not up to date on Colonoscopy or DEXA scan.    The following portions of the patient's history were reviewed and updated as appropriate: allergies, current medications, past family history, past medical history, past social history, past surgical history and problem list.      Review of Systems   Constitutional: Negative for activity change, appetite change, chills, diaphoresis, fatigue, fever and unexpected weight change.   HENT: Negative for nasal congestion, dental problem, facial swelling, nosebleeds, postnasal drip, rhinorrhea, sore throat, tinnitus and trouble swallowing.    Eyes: Positive for visual disturbance. Negative for pain, discharge and itching.   Respiratory: Negative for apnea, chest tightness, shortness of breath, wheezing and stridor.    Cardiovascular: Negative for chest pain, palpitations, leg swelling and claudication.   Gastrointestinal: Negative for abdominal distention, abdominal pain, constipation, diarrhea, nausea, rectal pain and vomiting.   Endocrine: Negative for cold intolerance, heat intolerance and polyuria.   Genitourinary: Negative for difficulty urinating, dysuria, frequency, hematuria and urgency.   Musculoskeletal: Positive for arthralgias. Negative for myalgias, neck pain and neck stiffness.   Integumentary:  Negative for color change and rash.  "  Neurological: Positive for headaches (chronic vertigo). Negative for dizziness, tremors, seizures, syncope, facial asymmetry and weakness.   Hematological: Negative for adenopathy. Does not bruise/bleed easily.   Psychiatric/Behavioral: Negative for agitation, confusion, hallucinations, self-injury and suicidal ideas. The patient is not hyperactive.          Objective:       Vitals:    08/10/20 0942   BP: 132/80   Pulse: 94   Temp: 98.5 °F (36.9 °C)   TempSrc: Oral   SpO2: 99%   Weight: 85.3 kg (188 lb 0.8 oz)   Height: 5' 1" (1.549 m)     Physical Exam  Constitutional:       General: She is not in acute distress.     Appearance: She is well-developed. She is obese. She is not diaphoretic.   HENT:      Head: Normocephalic and atraumatic.      Right Ear: External ear normal.      Left Ear: External ear normal.   Eyes:      General: No scleral icterus.        Right eye: No discharge.         Left eye: No discharge.      Conjunctiva/sclera: Conjunctivae normal.      Pupils: Pupils are equal, round, and reactive to light.   Neck:      Musculoskeletal: Normal range of motion and neck supple.      Thyroid: No thyromegaly.   Cardiovascular:      Rate and Rhythm: Normal rate and regular rhythm.      Heart sounds: Normal heart sounds. No murmur. No friction rub. No gallop.    Pulmonary:      Effort: Pulmonary effort is normal. No respiratory distress.      Breath sounds: Normal breath sounds.   Chest:      Chest wall: No tenderness.   Abdominal:      General: Bowel sounds are normal. There is distension (obese).      Palpations: Abdomen is soft. There is no mass.      Tenderness: There is no abdominal tenderness. There is no guarding or rebound.   Musculoskeletal: Normal range of motion.   Lymphadenopathy:      Cervical: No cervical adenopathy.   Skin:     General: Skin is warm.      Capillary Refill: Capillary refill takes less than 2 seconds.      Findings: No rash.   Neurological:      Mental Status: She is alert and " oriented to person, place, and time.      Cranial Nerves: No cranial nerve deficit.      Motor: No abnormal muscle tone.      Coordination: Coordination normal.      Deep Tendon Reflexes: Reflexes normal.   Psychiatric:         Thought Content: Thought content normal.         Judgment: Judgment normal.   Feet:  No rashes or lesions.      October 3rd, 2019  ECHO   Low normal to mildly impaired LV systolic function.  LVEF of 50-55%.  2. The diastolic filling indicates abnormal relaxation pattern with normal left atrial pressure.  3. An insufficent tricuspid regurgitation jet velocity envelope precludes confident assessment of pulmonary artery systolic pressure.  4. EF has improved compared to 4/2019.    Assessment:       1. Type 2 diabetes mellitus with diabetic polyneuropathy, with long-term current use of insulin    2. Obesity (BMI 30-39.9)    3. Dermatitis    4. Encounter for osteoporosis screening in asymptomatic postmenopausal patient    5. Screening for malignant neoplasm of colon        Plan:       1. Type 2 diabetes mellitus with diabetic polyneuropathy, with long-term current use of insulin  Keep appointment with diabetes educator, Diabetic specialist, Optometry  She has an appointment with Podiatrist Dr. Kuhn; wants to see a provider in [facility].  -     gabapentin (NEURONTIN) 100 MG capsule; Take 1 capsule (100 mg total) by mouth 3 (three) times daily.  Dispense: 270 capsule; Refill: 3    2. Obesity (BMI 30-39.9)  The importance of optimum diet & exercise discussed. The goals for weight management of 5-10 % of total body weight reduction in 3 months addressed.   The consumption of a reduced calorie diet, frequent self-weighing, and participation in a lifestyle intervention program are strategies to help maintain weight loss.     3. Dermatitis  -     clotrimazole-betamethasone 1-0.05% (LOTRISONE) cream; Apply topically 2 (two) times daily. May split into individual creams if more affordable. for 14 days   Dispense: 60 g; Refill: 0    4. Encounter for osteoporosis screening in asymptomatic postmenopausal patient  -     DXA Bone Density Spine And Hip; Future    5. Screening for malignant neoplasm of colon  -     Case request GI: COLONOSCOPY  Stop all over the counter medication including vitamins and aspirin 1 week before the procedure. Continue Plavix and all other prescription medications. Follow directions for the bowel preparation. Nothing to eat or drink from midnight the day of the procedure.  Once your diet changes (fasting or liquid diet): do not take the rapid acting insulin eg Humalog or Novolog  You may take 1/2 long acting insulin the day prior to the procedure.  You need to continue Plavix because of your heart.  On the morning of the procedure take your blood pressure medication with no more than a teaspoon of water.    You will resume all medications per usual schedule once you are eating and no adverse events were reported.    Disclaimer: This note has been generated using voice-recognition software. There may be typographical errors that have been missed during proof-reading

## 2020-08-10 NOTE — PROGRESS NOTES
Diabetes Education  Author: Whitney Sheriff RD, CDE  Date: 8/10/2020    Diabetes Care Management Summary  Diabetes Education Record Assessment/Progress: Initial - VGO start    Diabetes Type  Diabetes Type : Type II    Diabetes History  Current Treatment: Insulin    Health Maintenance was reviewed today with patient. Discussed with patient importance of routine eye exams, foot exams/foot care, blood work (i.e.: A1c, microalbumin, and lipid), dental visits, yearly flu vaccine, and pneumonia vaccine as indicated by PCP. Patient verbalized understanding.     Health Maintenance Topics with due status: Not Due       Topic Last Completion Date    TETANUS VACCINE 11/29/2011    Lipid Panel 07/13/2020    Hemoglobin A1c 07/13/2020    Eye Exam 07/16/2020    Foot Exam 07/20/2020    Aspirin/Antiplatelet Therapy 08/10/2020    Influenza Vaccine Not Due     Health Maintenance Due   Topic Date Due    DEXA SCAN  01/28/1994    Shingles Vaccine (1 of 2) 01/28/2004    Colorectal Cancer Screening  12/06/2017    Pneumococcal Vaccine (65+ Low/Medium Risk) (1 of 2 - PCV13) 01/28/2019    Mammogram  10/24/2019     Current Diabetes Treatment   Current Treatment: Insulin    Diabetes Education Assessment/Progress  Diabetes Disease Process (diabetes disease process and treatment options): Not Covered/Deferred  Nutrition (Incorporating nutritional management into one's lifestyle): Not Covered/Deferred  Physical Activity (incorporating physical activity into one's lifestyle): Not Covered/Deferred  Medications (states correct name, dose, onset, peak, duration, side effects & timing of meds): Discussion, Demonstration, Video, Individual Session, Written Materials Provided, Comprehends Key Points, Demonstrates Understanding/Competency(verbalizes/demonstrates)    Patient is here for training on the VGo 40 which will provide 40 units of basal insulin given over 24 hours (1.667 units/hr) and up to 36 units of on-demand bolus dosing in 2-unit increments.      Patient will only be using Humalog insulin in the VGo system. She will be giving 3 clicks at each meal. 4 clicks if BG over 200, 2 clicks if BG less than 100, no clicks if BG less than 70 and 1 clicks at each snack.    Patient was instructed to hold their long-acting insulin this morning. Patient was also advised that she will discontinue taking all insulin injections and that she will only use the Humalog vial with the VGo system.     Reviewed that pt should also DISCONTINUE the following diabetes medications: Novolog and Levemir pens  Reviewed that pt should also CONTINUE the following diabetes medications: Trulicity and Metformin    Patient was instructed on the following:    Insert vial into the EZ Fill and leave in place until vial is empty   Remove plug and insert VGo    Draw insulin into EZ Fill and fill VGo with insulin (check that VGo is full of insulin)    Remove VGo and clean and replace plug (advised to wipe the circular opening with an alcohol swab before replacing)    Select site for VGo (site selection reviewed) and prepare infusion site with aseptic technique    Remove button cover and adhesive liner    Attach VGo to site selected and insert needle by pushing needle button in to activate basal rate    Instructed patient on how to activate the bolus ready button and to push the bolus delivery button into the VGo to deliver 2 units of insulin (1 click = 2 units). Patient advised that once all 36 units of the on-demand bolus have been given, the bolus buttons will lock, but the basal insulin will continue for the remainder of the 24 hours    To remove, release needle by sliding and pressing the needle release button    Remove the VGo and discard (the VGo is 100% disposable)   Patient instructed that the VGo needs to be changed every 24 hours    Patient was able to perform successful return demonstration of all.     General precautions reviewed with the patient:    VGo needs to be removed  before having an MRI. Replace with a new V-Go after the test or procedure is completed. Patient also advised to check with her doctor before any type of surgical procedure to see if the V-Go can be worn.    Patient advised to monitor his/her BG at least 4 times a day (pre-meals and at bedtime)    Patient advised to keep back up long-acting insulin pens (non-) should a problem develop with the VGo. Patient also advised that he/she should have directions from her MD regarding insulin doses should he/she need to switch back to multiple daily injections temporarily.    The VGo should not be exposed to direct sunlight, hot tub, whirlpool or sauna use (replace with a new filled VGo afterward)    Check that the VGo is securely in place during and after periods of increased physical activity, exposure to water, or gone under water to the depth of 3 feet, 3 inches (the VGo can go under water and continue to work safely)    Reviewed s/s and tx of hypoglycemia    Monitoring (monitoring blood glucose/other parameters & using results): Discussion, Individual Session, Comprehends Key Points - patient wearing Dexcom G6 - is using the  because her phone is not compatible. Created a Clarity Account for patient and instructed on how to download  at home.  was downloaded today for comparison with next download (after starting VGO)  Acute Complications (preventing, detecting, and treating acute complications): Discussion, Individual Session, Comprehends Key Points  Chronic Complications (preventing, detecting, and treating chronic complications): Not Covered/Deferred  Clinical (diabetes, other pertinent medical history, and relevant comorbidities reviewed during visit): Not Covered/Deferred  Cognitive (knowledge of self-management skills, functional health literacy): Not Covered/Deferred  Psychosocial (emotional response to diabetes): Not Covered/Deferred  Diabetes Distress and Support Systems: Not  Covered/Deferred  Behavioral (readiness for change, lifestyle practices, self-care behaviors): Not Covered/Deferred    Goals  Patient has selected/evaluated goals during today's session: No    Diabetes Care Plan/Intervention  Education Plan/Intervention: Individual Follow-Up DSMT    Today's Self-Management Care Plan was developed with the patient's input and is based on barriers identified during today's assessment.    The long and short-term goals in the care plan were written with the patient/caregiver's input. The patient has agreed to work toward these goals to improve her overall diabetes control.      The patient received a copy of today's self-management plan and verbalized understanding of the care plan, goals, and all of today's instructions.      The patient was encouraged to communicate with her physician and care team regarding her condition(s) and treatment.  I provided the patient with my contact information today and encouraged her to contact me via phone or patient portal as needed.     Education Units of Time   Time Spent: 60 min

## 2020-08-18 ENCOUNTER — OFFICE VISIT (OUTPATIENT)
Dept: PRIMARY CARE CLINIC | Facility: CLINIC | Age: 65
End: 2020-08-18
Payer: MEDICARE

## 2020-08-18 VITALS
HEART RATE: 83 BPM | SYSTOLIC BLOOD PRESSURE: 130 MMHG | BODY MASS INDEX: 35.09 KG/M2 | RESPIRATION RATE: 16 BRPM | TEMPERATURE: 99 F | WEIGHT: 185.88 LBS | DIASTOLIC BLOOD PRESSURE: 76 MMHG | HEIGHT: 61 IN

## 2020-08-18 DIAGNOSIS — E78.2 MIXED HYPERLIPIDEMIA: ICD-10-CM

## 2020-08-18 DIAGNOSIS — E11.42 TYPE 2 DIABETES MELLITUS WITH DIABETIC POLYNEUROPATHY, WITH LONG-TERM CURRENT USE OF INSULIN: ICD-10-CM

## 2020-08-18 DIAGNOSIS — I25.118 CORONARY ARTERY DISEASE OF NATIVE ARTERY OF NATIVE HEART WITH STABLE ANGINA PECTORIS: ICD-10-CM

## 2020-08-18 DIAGNOSIS — I10 ESSENTIAL HYPERTENSION: ICD-10-CM

## 2020-08-18 DIAGNOSIS — Z79.4 TYPE 2 DIABETES MELLITUS WITH DIABETIC POLYNEUROPATHY, WITH LONG-TERM CURRENT USE OF INSULIN: ICD-10-CM

## 2020-08-18 DIAGNOSIS — E66.9 OBESITY (BMI 30-39.9): Primary | ICD-10-CM

## 2020-08-18 PROCEDURE — 1126F AMNT PAIN NOTED NONE PRSNT: CPT | Mod: S$GLB,,, | Performed by: NURSE PRACTITIONER

## 2020-08-18 PROCEDURE — 1101F PR PT FALLS ASSESS DOC 0-1 FALLS W/OUT INJ PAST YR: ICD-10-PCS | Mod: CPTII,S$GLB,, | Performed by: NURSE PRACTITIONER

## 2020-08-18 PROCEDURE — 1101F PT FALLS ASSESS-DOCD LE1/YR: CPT | Mod: CPTII,S$GLB,, | Performed by: NURSE PRACTITIONER

## 2020-08-18 PROCEDURE — 1159F PR MEDICATION LIST DOCUMENTED IN MEDICAL RECORD: ICD-10-PCS | Mod: S$GLB,,, | Performed by: NURSE PRACTITIONER

## 2020-08-18 PROCEDURE — 99999 PR PBB SHADOW E&M-EST. PATIENT-LVL III: ICD-10-PCS | Mod: PBBFAC,,, | Performed by: NURSE PRACTITIONER

## 2020-08-18 PROCEDURE — 1126F PR PAIN SEVERITY QUANTIFIED, NO PAIN PRESENT: ICD-10-PCS | Mod: S$GLB,,, | Performed by: NURSE PRACTITIONER

## 2020-08-18 PROCEDURE — 3046F HEMOGLOBIN A1C LEVEL >9.0%: CPT | Mod: CPTII,S$GLB,, | Performed by: NURSE PRACTITIONER

## 2020-08-18 PROCEDURE — 99214 PR OFFICE/OUTPT VISIT, EST, LEVL IV, 30-39 MIN: ICD-10-PCS | Mod: S$GLB,,, | Performed by: NURSE PRACTITIONER

## 2020-08-18 PROCEDURE — 99214 OFFICE O/P EST MOD 30 MIN: CPT | Mod: S$GLB,,, | Performed by: NURSE PRACTITIONER

## 2020-08-18 PROCEDURE — 3008F PR BODY MASS INDEX (BMI) DOCUMENTED: ICD-10-PCS | Mod: CPTII,S$GLB,, | Performed by: NURSE PRACTITIONER

## 2020-08-18 PROCEDURE — 95251 PR GLUCOSE MONITOR, 72 HOUR, PHYS INTERP: ICD-10-PCS | Mod: S$GLB,,, | Performed by: NURSE PRACTITIONER

## 2020-08-18 PROCEDURE — 2024F 7 FLD RTA PHOTO EVC RTNOPTHY: CPT | Mod: S$GLB,,, | Performed by: NURSE PRACTITIONER

## 2020-08-18 PROCEDURE — 95251 CONT GLUC MNTR ANALYSIS I&R: CPT | Mod: S$GLB,,, | Performed by: NURSE PRACTITIONER

## 2020-08-18 PROCEDURE — 99999 PR PBB SHADOW E&M-EST. PATIENT-LVL III: CPT | Mod: PBBFAC,,, | Performed by: NURSE PRACTITIONER

## 2020-08-18 PROCEDURE — 3008F BODY MASS INDEX DOCD: CPT | Mod: CPTII,S$GLB,, | Performed by: NURSE PRACTITIONER

## 2020-08-18 PROCEDURE — 3046F PR MOST RECENT HEMOGLOBIN A1C LEVEL > 9.0%: ICD-10-PCS | Mod: CPTII,S$GLB,, | Performed by: NURSE PRACTITIONER

## 2020-08-18 PROCEDURE — 2024F PR 7 FIELD PHOTOS WITH INTERP/ REVIEW: ICD-10-PCS | Mod: S$GLB,,, | Performed by: NURSE PRACTITIONER

## 2020-08-18 PROCEDURE — 1159F MED LIST DOCD IN RCRD: CPT | Mod: S$GLB,,, | Performed by: NURSE PRACTITIONER

## 2020-08-18 NOTE — PROGRESS NOTES
Very pleasant, 65 y.o. female, here for 4 week follow up - managing her type 2 diabetes.   I first saw her July 20th, 2020 -   She was on MDI at that time, with very high sugars.     In the interim, I switched her from MDI to vgo 40 and dexcom.   She is enjoying using both - she is much more aware of her sugars.   She is trying to eat better, and now is learning what foods increase her sugars.     She is using humalog in her vgo 40 - 3 clicks with meals, 4 clicks if sugar is greater than 200 going into meal.   Then 1 click with a snack.   She often snacks in afternoon and before bed - usually a yogurt or/and apple before bed.   Taking in less sweets.   States primarily is doing 3 clicks with meals for most of the day.   Still continues on trulicity 1.5mg sc weekly.   Continues on metformin 1000 metformin XR bid.     Dexcom G6 download interpreted today -   Average BG is 221.   Estimated a1c is 8.6%.   19% time in range.   56% highs.   25% very high.   0% lows.     Her last a1c on mdi was nondetectable >> 14%.   So she has improved for sure down to average BG of 220.   Still not to goal, but large improvement now that she is on continuous insulin.       Last Office visit notes as follows:   HPI: Mercedez Purvis is a 65 y.o.  female c/I for visit to address Diabetes Type 2   This is the first time I am seeing her, she is a patient of Dr. Vasquez's for primary care needs.   She also saw general endocrinology - [facility], Dr. Olivo just last week to address her uncontrolled diabetes.   She is unsure why she is here with me today as well as she just saw a specialist for diabetes management.     She was diagnosed with T2DM about 10 years ago.   Taking metformin 500mg XR - 2 tablets bid. This dose was just increased last week. She is taking.   Also on Trulicity 1.5mg sc weekly - has been taking for about 6 months.   Also on MDI - for 10 years.   Levemir 37 units BID, was advised to increase to 45 units bid, but  "hasn't done yet/hasn't changed yet.   and novolog 25 units tid ac meals - was advised to increase to 30 units tid meals at last visit, but hasn't done yet.   Patient denies skipping dose of insulin.   She takes insulin injections 4x/daily.   Was hospitalized with what she recalls as a "diabetic coma" in 2010 - that was when she was initially diagnosed with diabetes, and insulin was begun right away.   Has not been hospitalized for diabetes since that time.   Checking sugars 4x/daily - not writing down.   Today's blood sugars 2 hours post prandial is 309 on fingerstick in my office. Patient took her 25 units of novolog this am, but did not take the levemir yet for her morning dose.    Past medical History:   Past Medical History:   Diagnosis Date    Chronic headache     Diabetes     Heart attack 2004    8 stents    Heart failure     History of heart artery stent     Hyperlipemia     Hypertension     Obesity (BMI 30-39.9)     Yeast infection       Family hx: No family history on file.   Current meds:   Current Outpatient Medications:     alcohol swabs (ALCOHOL WIPES) PadM, Apply 1 each topically 3 (three) times daily. ICD 10 code E 11.42, monitor blood sugar TID, per insurance coverage, Disp: 300 each, Rfl: 3    amitriptyline (ELAVIL) 75 MG tablet, Take 1 tablet (75 mg total) by mouth every evening., Disp: 90 tablet, Rfl: 3    amLODIPine (NORVASC) 10 MG tablet, Take 10 mg by mouth once daily., Disp: , Rfl:     atorvastatin (LIPITOR) 80 MG tablet, Take 80 mg by mouth every evening., Disp: , Rfl:     blood-glucose meter,continuous (DEXCOM G6 ) Misc, 1 each by Misc.(Non-Drug; Combo Route) route Daily. Use as directed with Dexcom G6 transmitter and sensor, Disp: 1 each, Rfl: 0    blood-glucose sensor (DEXCOM G6 SENSOR) Roberta, 1 each by Misc.(Non-Drug; Combo Route) route every 10 days. Apply/change sensor to skin every 10 days., Disp: 3 each, Rfl: 3    blood-glucose transmitter (DEXCOM G6 " TRANSMITTER) Roberta, 1 each by Misc.(Non-Drug; Combo Route) route Daily. Use transmitter in sensor with G6 system. Change new transmitter every 3 months., Disp: 1 each, Rfl: 3    chlorthalidone (HYGROTEN) 25 MG Tab, Take 25 mg by mouth once daily., Disp: , Rfl:     cholecalciferol, vitamin D3, (VITAMIN D3) 50 mcg (2,000 unit) Cap, Take 1 capsule (2,000 Units total) by mouth once daily., Disp: , Rfl:     clopidogreL (PLAVIX) 75 mg tablet, TK 1 T PO D, Disp: , Rfl:     clotrimazole-betamethasone 1-0.05% (LOTRISONE) cream, Apply topically 2 (two) times daily. May split into individual creams if more affordable. for 14 days, Disp: 60 g, Rfl: 0    cyclobenzaprine (FLEXERIL) 10 MG tablet, Take 1 tablet (10 mg total) by mouth 3 (three) times a week., Disp: 36 tablet, Rfl: 3    diclofenac sodium (VOLTAREN) 1 % Gel, Apply 1 application topically., Disp: , Rfl:     dulaglutide (TRULICITY) 1.5 mg/0.5 mL pen injector, Inject 1.5 mg into the skin once a week., Disp: 6 mL, Rfl: 3    ezetimibe (ZETIA) 10 mg tablet, Take 10 mg by mouth once daily., Disp: , Rfl:     furosemide (LASIX) 40 MG tablet, Take 40 mg by mouth once daily., Disp: , Rfl:     gabapentin (NEURONTIN) 100 MG capsule, Take 1 capsule (100 mg total) by mouth 3 (three) times daily., Disp: 270 capsule, Rfl: 3    insulin detemir U-100 (LEVEMIR FLEXTOUCH) 100 unit/mL (3 mL) SubQ InPn pen, Inject 45 Units into the skin 2 (two) times daily., Disp: 90 mL, Rfl: 3    insulin lispro (HUMALOG U-100 INSULIN) 100 unit/mL injection, Inject 80 Units into the skin once daily. Gives up to 80 units daily via VGO 40, Disp: 30 mL, Rfl: 11    lancets Misc, Inject 1 each into the skin 3 (three) times daily. ICD 10 code E 11.42, monitor blood sugar TID, Disp: 300 each, Rfl: 3    lidocaine-prilocaine (EMLA) cream, APPLY AA PRF PAINFUL EPISODES OVER BOTH FEET D RUB IN THOROUGHLY, Disp: , Rfl:     lisinopriL (PRINIVIL,ZESTRIL) 40 MG tablet, Take 40 mg by mouth once daily., Disp:  ", Rfl:     metFORMIN (GLUCOPHAGE-XR) 500 MG XR 24hr tablet, Take 2 tablets (1,000 mg total) by mouth 2 (two) times daily with meals., Disp: 360 tablet, Rfl: 3    metoprolol succinate (TOPROL-XL) 200 MG 24 hr tablet, TK 1 T PO QD, Disp: , Rfl:     pen needle, diabetic (PEN NEEDLE) 32 gauge x 5/32" Ndle, 1 each by Misc.(Non-Drug; Combo Route) route 4 (four) times daily. ICD 10 E11.42, Disp: 400 each, Rfl: 3    spironolactone (ALDACTONE) 50 MG tablet, Take 50 mg by mouth once daily., Disp: , Rfl:     sub-q insulin device, 40 unit (V-GO 40) Roberta, 1 Device by Misc.(Non-Drug; Combo Route) route once daily., Disp: 30 each, Rfl: 6       Social:   Lives at home by herself.  Does have family visit her with social distancing.   Has 5 children, 3 are still living. 13 grandchildren, 11 great-grandchildren.   Diet: not always following ADA diet   Meals: 3 per day and snacks   Breakfast - grits, eggs, sausage,   Lunch - salad, fish, chicken breast.   Dinner -steaks, green beans, small portions of potatoe salad.   Snacks - fruits, apples/oranges, or nuts/pistachios or almonds.  Exercise: none. But does work in yard  Activities: not working.     Current Diabetes medications:   Long acting insulin:  Levemir 37 units bid   Short acting insulin: Novolog 25 units tid ac meals - takes 5 - 15 minutes prior to meals.  GLP1:  Trulicity.   Oral meds: meformin 500 xr - 2 tabs bid  Metformin bid with food    Medications Tried and Failed:   levemir  Novolog.   trulicity  Metformin     Glucose Monitoring:   Checking sugars 4x/day by fingerstick.   Not writing down sugars. States range from 300 - 400 fasting 4x/daily.   Glucometer :  Unsure of name.   CGM: has not used.   Gets supplies from : [employer] by [employer]    Review of Pertinent co-morbidities/risk factors:   CV: history of MI 2004 - with stents placed in 2010 and also in 2019.    CAD: yes   Takes aspirin and plavix daily.  BP: has history of HTN  Statin: Taking  ACE/ARB: Taking    Vital Signs  BP " "130/76 (BP Location: Left arm, Patient Position: Sitting, BP Method: Medium (Automatic))   Pulse 83   Temp 98.6 °F (37 °C) (Temporal)   Resp 16   Ht 5' 1" (1.549 m)   Wt 84.3 kg (185 lb 13.6 oz)   BMI 35.12 kg/m²   Recheck BP is 125/65 of note.     Pertinent Labs:   Hgba1c   Lab Results   Component Value Date    HGBA1C >14.0 (H) 07/13/2020     Lipid panel   Lab Results   Component Value Date    CHOL 175 07/13/2020    CHOL 99 02/03/2020     Lab Results   Component Value Date    HDL 32 (L) 07/13/2020    HDL 35 (L) 02/03/2020     Lab Results   Component Value Date    LDLCALC 98.8 07/13/2020    LDLCALC 48 02/03/2020     Lab Results   Component Value Date    TRIG 221 (H) 07/13/2020    TRIG 79 02/03/2020     Lab Results   Component Value Date    CHOLHDL 18.3 (L) 07/13/2020    CHOLHDL 2.83 02/03/2020      CMP  Glucose   Date Value Ref Range Status   07/13/2020 414 (H) 70 - 110 mg/dL Final     BUN, Bld   Date Value Ref Range Status   07/13/2020 14 8 - 23 mg/dL Final     Creatinine   Date Value Ref Range Status   07/13/2020 1.2 0.5 - 1.4 mg/dL Final     eGFR if    Date Value Ref Range Status   07/13/2020 54.4 (A) >60 mL/min/1.73 m^2 Final     eGFR if non    Date Value Ref Range Status   07/13/2020 47.2 (A) >60 mL/min/1.73 m^2 Final     Comment:     Calculation used to obtain the estimated glomerular filtration  rate (eGFR) is the CKD-EPI equation.         Microalbumin creatinine ratio:   Lab Results   Component Value Date    MICALBCREAT 8.6 07/01/2020       Social History     Tobacco Use   Smoking Status Never Smoker        Standards of care:   Eyes: .: 07/16/2020  Foot exam: : 07/20/2020 and done today.   Diabetes education: none.     Review Of Systems:   Gen: Appetite good, no weight gain or loss, denies fatigue and weakness. Polydipsia, but improving.   Skin: Skin is intact and heals well, no rashes, no hair changes  Eyes: Denies visual disturbances, some occasional blurred vision. "   Resp: no SOB or Dyspnea on exertion, denies cough.   Cardiac: Denies chest pain, palpitations, or swelling.   GI: Denies abdominal pain, nausea or vomiting, diarrhea, constipation.   /GYN: + nocturia, denies any vaginal burning or pain.   MS/Neuro: Denies numbness/ tingling in BLE; Gait steady, speech clear  Psych: Denies drug/ETOH abuse, no hx of depression.  Other systems: negative.    Physical Exam:   GENERAL: Well developed, well nourished in appearance.   PSYCH: AAOx3, appropriate mood and affect, pleasant expression, conversant, appears relaxed, well groomed.   EYES: PERRL, Conjunctiva and corneas clear  NECK: Soft and Supple, trachea midline   CHEST: Even, regular, and unlabored respirations  ABDOMEN: Soft, non-tender, non-distended   VASCULAR: pedal pulses palpable bilaterally, no edema.  NEURO:  cranial nerves II - XII intact   MUSCULOSKELETAL: Good ROM, equal strength against resistance to bilateral lower extremities, steady gait.   SKIN: Skin warm, dry, and intact - + acanthosis nigracans mild and skin tags mild.  FEET: +2 dorsalis pedis and posterior pulses noted.    Assessment and Plan of Care:     There are no diagnoses linked to this encounter.     1. T2DM with hyperglycemia- Hgba1c goal is 7.5% or less - not at goal. a1c is > 14%.  Improving...   Estimated a1c is now at 8.6%.   She however needs more insulin.   Will continue vgo 40 - increase from 3 clicks with meals to 5 clicks with meal - began 2 weeks ago - had switched her from MDI, doing much better and on less insulin.  1 click for a snakc.    Continue metformin 1000 bid. Renal function stable.   Will consider add on of SGLT2i in future. She has not tried.   Continue trulicity 1.5mg sc weekly.   Continue using Dexcom G6 cgm - interpreted today - see scanned in report.   Titrated clicks/increased based on CGM interpretation).   discussed DM, progression of disease, long term complications, CV risk factors and tx options.   She already has  established CAD and history of MI, so at risk for repeat event.   Advise compliance with ADA diet and encourage exercise  Reviewed  hypoglycemia, s/s and appropriate tx.    Instructed to monitor Blood glucose 4x/day and bring meter/ log to every clinic visit.   Eyes - dilated retinal exam 7/15/2020 - no diabetic retinopathy but with follow up recommended in 1 month with ophthalmology.   Podiatry - had check up recently. Foot exam negative today. Advised on proper foot care.     2. HTN- controlled, continue meds as previously prescribed and monitor.   bp initially was 160 systolic, but 120's systolic on recheck.     3. HLD - LDL goal < 100. Minimally at goal. On atorvastatin 80mg every Hs.   Currently on statin therapy- to continue.   hsitory of MI and stents already - on plavix daily.   LFTs WNL    4. Weight - BMI Body mass index is 35.12 kg/m².   Encourage Ada diet and exercise.    continue Trulicity.     Follow up in 3 -4 weeks with OV to review changes to vgo.   Will consider addition of additional medication at that time or possibly omnipod if not getting enough insulin.

## 2020-08-19 ENCOUNTER — HOSPITAL ENCOUNTER (OUTPATIENT)
Dept: RADIOLOGY | Facility: CLINIC | Age: 65
Discharge: HOME | End: 2020-08-19
Attending: FAMILY MEDICINE
Payer: MEDICARE

## 2020-08-19 DIAGNOSIS — Z78.0 ENCOUNTER FOR OSTEOPOROSIS SCREENING IN ASYMPTOMATIC POSTMENOPAUSAL PATIENT: ICD-10-CM

## 2020-08-19 DIAGNOSIS — Z13.820 ENCOUNTER FOR OSTEOPOROSIS SCREENING IN ASYMPTOMATIC POSTMENOPAUSAL PATIENT: ICD-10-CM

## 2020-08-19 PROCEDURE — 77080 DEXA BONE DENSITY SPINE HIP: ICD-10-PCS | Mod: 26,,, | Performed by: INTERNAL MEDICINE

## 2020-08-19 PROCEDURE — 77080 DXA BONE DENSITY AXIAL: CPT | Mod: 26,,, | Performed by: INTERNAL MEDICINE

## 2020-08-19 PROCEDURE — 77080 DXA BONE DENSITY AXIAL: CPT | Mod: TC

## 2020-08-25 ENCOUNTER — TELEPHONE (OUTPATIENT)
Dept: PRIMARY CARE CLINIC | Facility: CLINIC | Age: 65
End: 2020-08-25

## 2020-09-02 ENCOUNTER — OFFICE VISIT (OUTPATIENT)
Dept: OPTOMETRY | Facility: CLINIC | Age: 65
End: 2020-09-02
Payer: MEDICARE

## 2020-09-02 DIAGNOSIS — E11.3291 TYPE 2 DIABETES MELLITUS WITH MILD NONPROLIFERATIVE RETINOPATHY OF RIGHT EYE WITHOUT MACULAR EDEMA, UNSPECIFIED WHETHER LONG TERM INSULIN USE: ICD-10-CM

## 2020-09-02 DIAGNOSIS — E11.3212 TYPE 2 DIABETES MELLITUS WITH MILD NONPROLIFERATIVE RETINOPATHY OF LEFT EYE AND MACULAR EDEMA, UNSPECIFIED WHETHER LONG TERM INSULIN USE: Primary | ICD-10-CM

## 2020-09-02 DIAGNOSIS — H35.033 HYPERTENSIVE RETINOPATHY OF BOTH EYES: ICD-10-CM

## 2020-09-02 DIAGNOSIS — H52.7 REFRACTIVE ERROR: ICD-10-CM

## 2020-09-02 DIAGNOSIS — E11.9 DIABETIC EYE EXAM: ICD-10-CM

## 2020-09-02 DIAGNOSIS — H25.13 NUCLEAR SCLEROSIS OF BOTH EYES: ICD-10-CM

## 2020-09-02 DIAGNOSIS — Z01.00 DIABETIC EYE EXAM: ICD-10-CM

## 2020-09-02 DIAGNOSIS — H04.123 DRY EYE SYNDROME OF BOTH EYES: ICD-10-CM

## 2020-09-02 PROCEDURE — 99999 PR PBB SHADOW E&M-EST. PATIENT-LVL V: CPT | Mod: PBBFAC,,, | Performed by: OPTOMETRIST

## 2020-09-02 PROCEDURE — 92004 COMPRE OPH EXAM NEW PT 1/>: CPT | Mod: S$GLB,,, | Performed by: OPTOMETRIST

## 2020-09-02 PROCEDURE — 92004 PR EYE EXAM, NEW PATIENT,COMPREHESV: ICD-10-PCS | Mod: S$GLB,,, | Performed by: OPTOMETRIST

## 2020-09-02 PROCEDURE — 99999 PR PBB SHADOW E&M-EST. PATIENT-LVL V: ICD-10-PCS | Mod: PBBFAC,,, | Performed by: OPTOMETRIST

## 2020-09-02 NOTE — PROGRESS NOTES
HPI     Ms. Mercedez Purvis was referred by PCP for a diabetic eye exam.     Patient complains of blurred vision at distance and near. Occasional   itching, no drops.  She had a photo done here 7/2020. She was seen at [facility] last year for a   retina check. No retinopathy at last eye exan. No sx, no injections.    BS this morning was 130. Yesterday her BS fluctuated all day, highest   recording was 265.    Would patient like a refraction today? yes     Paitent denies diplopia, headaches, flashes/floaters, tearing, burning,   redness, and pain.    (-)drops    (+)Diabetes  LBS   Hemoglobin A1C       Date                     Value               Ref Range             Status                07/13/2020               >14.0 (H)           4.0 - 5.6 %           Final                   02/03/2020               14.7 (H)            4.7 - 5.6 %           Final                 08/27/2010               12.4 (H)            4.0 - 6.2 %           Final                OCULAR HISTORY  Last Eye Exam: 9/2019 with outside O.D.  (-)eye surgery   (-)diagnosed or treated for any eye conditions or diseases, n/a    FAMILY HISTORY  (-)Glaucoma        Last edited by Mirtha Akins, OD on 9/2/2020  9:02 PM. (History)            Assessment /Plan     For exam results, see Encounter Report.    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 Educated pt on findings. Mild NPDR OU. No obvious DME OD. Probable DME OS. Will order Mac OCT. Pt to return to [facility] in <2 weeks  for extra testing. Will call with results and determine f/u at that time. Monitor.     3. Educated pt on findings. Not visually significant. No need for removal at this time. Monitor yearly.     4. Educated pt on findings. Recommended ATs BID-TID + gel/jonathon QHS for added lubrication and comfort. Monitor.     5. Vessel changes, OU. Noted in previous notes.   Discussed importance of BP control, taking medications as directed, and following-up with primary care. If changes noted in vision, RTC. Monitor yearly unless changes noted sooner.     6. Refraction deferred today due to unstable BS. Patient to RTC in 1-2 months for refraction after BS has stablized. Monitor.       RTC for Mac OCT, if testing WNL RTC in 1-2 months for f/u refraction.       Addendum 09/04/2020  Mac OCT  OD: Para-foveal edema   OS: DME     Patient called today with results. Refer to retina for further evaluation. Appointment scheduled.      RTC with retina, me prn.

## 2020-09-03 ENCOUNTER — CLINICAL SUPPORT (OUTPATIENT)
Dept: OPHTHALMOLOGY | Facility: CLINIC | Age: 65
End: 2020-09-03
Payer: MEDICARE

## 2020-09-03 ENCOUNTER — OFFICE VISIT (OUTPATIENT)
Dept: PODIATRY | Facility: CLINIC | Age: 65
End: 2020-09-03
Payer: MEDICARE

## 2020-09-03 VITALS
SYSTOLIC BLOOD PRESSURE: 130 MMHG | WEIGHT: 185.88 LBS | HEART RATE: 82 BPM | BODY MASS INDEX: 35.09 KG/M2 | DIASTOLIC BLOOD PRESSURE: 78 MMHG | HEIGHT: 61 IN

## 2020-09-03 DIAGNOSIS — E11.9 ENCOUNTER FOR DIABETIC FOOT EXAM: ICD-10-CM

## 2020-09-03 DIAGNOSIS — E11.3212 TYPE 2 DIABETES MELLITUS WITH MILD NONPROLIFERATIVE RETINOPATHY OF LEFT EYE AND MACULAR EDEMA, UNSPECIFIED WHETHER LONG TERM INSULIN USE: ICD-10-CM

## 2020-09-03 PROCEDURE — 3046F PR MOST RECENT HEMOGLOBIN A1C LEVEL > 9.0%: ICD-10-PCS | Mod: CPTII,S$GLB,, | Performed by: PODIATRIST

## 2020-09-03 PROCEDURE — 99203 PR OFFICE/OUTPT VISIT, NEW, LEVL III, 30-44 MIN: ICD-10-PCS | Mod: S$GLB,,, | Performed by: PODIATRIST

## 2020-09-03 PROCEDURE — 3046F HEMOGLOBIN A1C LEVEL >9.0%: CPT | Mod: CPTII,S$GLB,, | Performed by: PODIATRIST

## 2020-09-03 PROCEDURE — 99999 PR PBB SHADOW E&M-EST. PATIENT-LVL V: ICD-10-PCS | Mod: PBBFAC,,, | Performed by: PODIATRIST

## 2020-09-03 PROCEDURE — 2024F PR 7 FIELD PHOTOS WITH INTERP/ REVIEW: ICD-10-PCS | Mod: S$GLB,,, | Performed by: PODIATRIST

## 2020-09-03 PROCEDURE — 1159F MED LIST DOCD IN RCRD: CPT | Mod: S$GLB,,, | Performed by: PODIATRIST

## 2020-09-03 PROCEDURE — 3008F BODY MASS INDEX DOCD: CPT | Mod: CPTII,S$GLB,, | Performed by: PODIATRIST

## 2020-09-03 PROCEDURE — 2024F 7 FLD RTA PHOTO EVC RTNOPTHY: CPT | Mod: S$GLB,,, | Performed by: PODIATRIST

## 2020-09-03 PROCEDURE — 92134 CPTRZ OPH DX IMG PST SGM RTA: CPT | Mod: S$GLB,,, | Performed by: OPTOMETRIST

## 2020-09-03 PROCEDURE — 1125F PR PAIN SEVERITY QUANTIFIED, PAIN PRESENT: ICD-10-PCS | Mod: S$GLB,,, | Performed by: PODIATRIST

## 2020-09-03 PROCEDURE — 99203 OFFICE O/P NEW LOW 30 MIN: CPT | Mod: S$GLB,,, | Performed by: PODIATRIST

## 2020-09-03 PROCEDURE — 1101F PR PT FALLS ASSESS DOC 0-1 FALLS W/OUT INJ PAST YR: ICD-10-PCS | Mod: CPTII,S$GLB,, | Performed by: PODIATRIST

## 2020-09-03 PROCEDURE — 92134 OCT, RETINA - OU - BOTH EYES: ICD-10-PCS | Mod: S$GLB,,, | Performed by: OPTOMETRIST

## 2020-09-03 PROCEDURE — 99999 PR PBB SHADOW E&M-EST. PATIENT-LVL V: CPT | Mod: PBBFAC,,, | Performed by: PODIATRIST

## 2020-09-03 PROCEDURE — 99999 PR PBB SHADOW E&M-EST. PATIENT-LVL I: CPT | Mod: PBBFAC,,,

## 2020-09-03 PROCEDURE — 1159F PR MEDICATION LIST DOCUMENTED IN MEDICAL RECORD: ICD-10-PCS | Mod: S$GLB,,, | Performed by: PODIATRIST

## 2020-09-03 PROCEDURE — 1125F AMNT PAIN NOTED PAIN PRSNT: CPT | Mod: S$GLB,,, | Performed by: PODIATRIST

## 2020-09-03 PROCEDURE — 1101F PT FALLS ASSESS-DOCD LE1/YR: CPT | Mod: CPTII,S$GLB,, | Performed by: PODIATRIST

## 2020-09-03 PROCEDURE — 99999 PR PBB SHADOW E&M-EST. PATIENT-LVL I: ICD-10-PCS | Mod: PBBFAC,,,

## 2020-09-03 PROCEDURE — 3008F PR BODY MASS INDEX (BMI) DOCUMENTED: ICD-10-PCS | Mod: CPTII,S$GLB,, | Performed by: PODIATRIST

## 2020-09-03 NOTE — LETTER
September 3, 2020      Jackie Chavez MD  [address]  [facility] [address]           [id number]  [address]  [facility] [address]  Phone: [phone]          Patient: Mercedez Purvis   MR Number: [id number]   YOB: 1955   Date of Visit: 9/3/2020       Dear Dr. Jackie Chavez:    Thank you for referring Mercedez Purvis to me for evaluation. Attached you will find relevant portions of my assessment and plan of care.    If you have questions, please do not hesitate to call me. I look forward to following Mercedez Purvis along with you.    Sincerely,    Alona Aponte, JACQUELINE    Enclosure  CC:  No Recipients    If you would like to receive this communication electronically, please contact [email or web address] or [phone] to request more information on "[email or web address]" Link access.    For providers and/or their staff who would like to refer a patient to [facility], please contact us through our one-stop-shop provider referral line, [facility], at [phone].    If you feel you have received this communication in error or would no longer like to receive these types of communications, please e-mail [email or web address]

## 2020-09-03 NOTE — PROGRESS NOTES
Subjective:      Patient ID: Mercedez Purvis is a 65 y.o. female.    Chief Complaint: Diabetic Foot Exam (PHONE ENOUNTER WITH PCP DR MULLEN ) and Foot Pain (BOTTOM OF BOTH FEET )    Mercedez is a 65 y.o. female who presents to the clinic upon referral from Dr. Chavez  for evaluation and treatment of diabetic feet. Mercedez has a past medical history of Chronic headache, Diabetes, Heart attack (2004), Heart failure, History of heart artery stent, Hyperlipemia, Hypertension, Obesity (BMI 30-39.9), and Yeast infection. Patient relates no major problem with feet. Only complaints today consist of diabetic foot exam.    PCP: Jackie Chavez MD    Date Last Seen by PCP:     Current shoe gear: Tennis shoes    Hemoglobin A1C   Date Value Ref Range Status   07/13/2020 >14.0 (H) 4.0 - 5.6 % Final     Comment:     ADA Screening Guidelines:  5.7-6.4%  Consistent with prediabetes  >or=6.5%  Consistent with diabetes  High levels of fetal hemoglobin interfere with the HbA1C  assay. Heterozygous hemoglobin variants (HbS, HgC, etc)do  not significantly interfere with this assay.   However, presence of multiple variants may affect accuracy.     02/03/2020 14.7 (H) 4.7 - 5.6 % Final   08/27/2010 12.4 (H) 4.0 - 6.2 % Final           Review of Systems   Constitution: Negative for chills, fever and malaise/fatigue.   HENT: Negative for hearing loss.    Cardiovascular: Negative for claudication.   Respiratory: Negative for shortness of breath.    Skin: Negative for flushing and rash.   Musculoskeletal: Negative for joint pain and myalgias.   Neurological: Negative for loss of balance, numbness, paresthesias and sensory change.   Psychiatric/Behavioral: Negative for altered mental status.           Objective:      Physical Exam  Vitals signs reviewed.   Cardiovascular:      Pulses:           Dorsalis pedis pulses are 2+ on the right side and 2+ on the left side.        Posterior tibial pulses are 2+ on the right side and 2+  on the left side.      Comments: No edema noted to b/L LEs  Musculoskeletal:      Right foot: Deformity present.      Left foot: Deformity present.      Comments: Adequate joint ROM noted to all lower extremity muscle groups with no pain or crepitation noted. Muscle strength is 5/5 in all groups bilaterally.  Decreased height of medial arches noted with loading of the foot b/L     Feet:      Right foot:      Protective Sensation: 5 sites tested. 5 sites sensed.      Left foot:      Protective Sensation: 5 sites tested. 5 sites sensed.   Skin:     Capillary Refill: Capillary refill takes 2 to 3 seconds.      Findings: No abscess, rash or wound.      Comments: Normal skin tugor noted.   No open lesion noted b/L  Skin temp is warm to warm from proximal to distal b/L.  Webspaces clean, dry, and intact  Nails x10 short   Neurological:      Mental Status: She is alert and oriented to person, place, and time.      Comments: Intact gross sensation noted to b/L LEs   Psychiatric:         Behavior: Behavior normal.               Assessment:       Encounter Diagnosis   Name Primary?    Encounter for diabetic foot exam          Plan:       Mercedez was seen today for diabetic foot exam and foot pain.    Diagnoses and all orders for this visit:    Encounter for diabetic foot exam  -     Ambulatory referral/consult to Podiatry      I counseled the patient on her conditions, their implications and medical management.        - Shoe inspection. Diabetic Foot Education. Patient reminded of the importance of good nutrition and blood sugar control to help prevent podiatric complications of diabetes. Patient instructed on proper foot hygeine. We discussed wearing proper shoe gear, daily foot inspections, never walking without protective shoe gear.   RTC in 1 year or sooner if any new pedal problems should arise.

## 2020-09-14 ENCOUNTER — HOSPITAL ENCOUNTER (OUTPATIENT)
Dept: RADIOLOGY | Facility: HOSPITAL | Age: 65
Discharge: HOME | End: 2020-09-14
Attending: FAMILY MEDICINE
Payer: MEDICARE

## 2020-09-14 DIAGNOSIS — Z12.31 ENCOUNTER FOR SCREENING MAMMOGRAM FOR MALIGNANT NEOPLASM OF BREAST: ICD-10-CM

## 2020-09-14 PROCEDURE — 77063 MAMMO DIGITAL SCREENING BILAT WITH TOMO: ICD-10-PCS | Mod: 26,,, | Performed by: RADIOLOGY

## 2020-09-14 PROCEDURE — 77067 SCR MAMMO BI INCL CAD: CPT | Mod: TC

## 2020-09-14 PROCEDURE — 77067 MAMMO DIGITAL SCREENING BILAT WITH TOMO: ICD-10-PCS | Mod: 26,,, | Performed by: RADIOLOGY

## 2020-09-14 PROCEDURE — 77067 SCR MAMMO BI INCL CAD: CPT | Mod: 26,,, | Performed by: RADIOLOGY

## 2020-09-14 PROCEDURE — 77063 BREAST TOMOSYNTHESIS BI: CPT | Mod: 26,,, | Performed by: RADIOLOGY

## 2020-09-18 ENCOUNTER — TELEPHONE (OUTPATIENT)
Dept: RADIOLOGY | Facility: HOSPITAL | Age: 65
End: 2020-09-18

## 2020-09-18 NOTE — TELEPHONE ENCOUNTER
Spoke with patient and explained mammogram findings.Patient expressed understanding of results. Patient scheduled abnormal mammogram follow up appointment at [facility] on 10/25/2020.

## 2020-09-20 ENCOUNTER — PATIENT OUTREACH (OUTPATIENT)
Dept: ADMINISTRATIVE | Facility: OTHER | Age: 65
End: 2020-09-20

## 2020-09-21 ENCOUNTER — OFFICE VISIT (OUTPATIENT)
Dept: OPHTHALMOLOGY | Facility: CLINIC | Age: 65
End: 2020-09-21
Payer: MEDICARE

## 2020-09-21 DIAGNOSIS — E11.3291 TYPE 2 DIABETES MELLITUS WITH RIGHT EYE AFFECTED BY MILD NONPROLIFERATIVE RETINOPATHY WITHOUT MACULAR EDEMA, WITH LONG-TERM CURRENT USE OF INSULIN: ICD-10-CM

## 2020-09-21 DIAGNOSIS — Z12.11 SCREEN FOR COLON CANCER: Primary | ICD-10-CM

## 2020-09-21 DIAGNOSIS — Z79.4 TYPE 2 DIABETES MELLITUS WITH RIGHT EYE AFFECTED BY MILD NONPROLIFERATIVE RETINOPATHY WITHOUT MACULAR EDEMA, WITH LONG-TERM CURRENT USE OF INSULIN: ICD-10-CM

## 2020-09-21 DIAGNOSIS — E11.3212 TYPE 2 DIABETES MELLITUS WITH LEFT EYE AFFECTED BY MILD NONPROLIFERATIVE RETINOPATHY AND MACULAR EDEMA, WITH LONG-TERM CURRENT USE OF INSULIN: Primary | ICD-10-CM

## 2020-09-21 DIAGNOSIS — E11.36 DIABETIC CATARACT OF BOTH EYES: ICD-10-CM

## 2020-09-21 DIAGNOSIS — Z79.4 TYPE 2 DIABETES MELLITUS WITH LEFT EYE AFFECTED BY MILD NONPROLIFERATIVE RETINOPATHY AND MACULAR EDEMA, WITH LONG-TERM CURRENT USE OF INSULIN: Primary | ICD-10-CM

## 2020-09-21 PROCEDURE — 92014 COMPRE OPH EXAM EST PT 1/>: CPT | Mod: S$GLB,,, | Performed by: OPHTHALMOLOGY

## 2020-09-21 PROCEDURE — 99999 PR PBB SHADOW E&M-EST. PATIENT-LVL III: CPT | Mod: PBBFAC,,, | Performed by: OPHTHALMOLOGY

## 2020-09-21 PROCEDURE — 99999 PR PBB SHADOW E&M-EST. PATIENT-LVL III: ICD-10-PCS | Mod: PBBFAC,,, | Performed by: OPHTHALMOLOGY

## 2020-09-21 PROCEDURE — 92014 PR EYE EXAM, EST PATIENT,COMPREHESV: ICD-10-PCS | Mod: S$GLB,,, | Performed by: OPHTHALMOLOGY

## 2020-09-21 RX ORDER — FLUCONAZOLE 150 MG/1
TABLET ORAL
COMMUNITY
Start: 2020-07-01 | End: 2021-10-29

## 2020-09-21 NOTE — LETTER
September 21, 2020      Mirtha Akins, OD  [address]  [facility] [address]           [facility] [address]  [address]  [facility] [address]  Phone: [phone]  Fax: [phone]          Patient: Mercedez Purvis   MR Number: [id number]   YOB: 1955   Date of Visit: 9/21/2020       Dear Dr. Mirtha Akins:    Thank you for referring Mercedez Purvis to me for evaluation. Attached you will find relevant portions of my assessment and plan of care.    If you have questions, please do not hesitate to call me. I look forward to following Mercedez Purvis along with you.    Sincerely,    Ian Alanis MD    Enclosure  CC:  No Recipients    If you would like to receive this communication electronically, please contact [email or web address] or [phone] to request more information on [facility] [employer] access.    For providers and/or their staff who would like to refer a patient to [facility], please contact us through our one-stop-shop provider referral line, [facility], at [phone].    If you feel you have received this communication in error or would no longer like to receive these types of communications, please e-mail [email or web address]

## 2020-09-21 NOTE — PROGRESS NOTES
Subjective:       Patient ID: Mercedez Purvis is a 65 y.o. female      Chief Complaint   Patient presents with    Concerns About Ocular Health     History of Present Illness  HPI     65 y.o female pt referred by Dr. Akins for  a diabetic eye exam.     Patient complains of blurred vision at distance and near OS worse than OD.     Occasional itching, no drops.  No f/f/pain OU    DM x 10 yrs  Recently getting better BS control with new monitor     Eye med(s) - none        Hemoglobin A1C       Date                     Value               Ref Range             Status                07/13/2020               >14.0 (H)           4.0 - 5.6 %           Final                   02/03/2020               14.7 (H)            4.7 - 5.6 %           Final                 08/27/2010               12.4 (H)            4.0 - 6.2 %           Final                OCULAR HISTORY  Last Eye Exam: 9/2019 with outside O.D.  (-)eye surgery   (-)diagnosed or treated for any eye conditions or diseases, n/a    FAMILY HISTORY  (-)Glaucoma        Last edited by Ian Alanis MD on 9/21/2020  7:11 PM. (History)        Imaging:    Reviewed OCT from Dr Akins visit 2 wks ago    Assessment/Plan:     1. 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 Type 2 diabetes mellitus with right eye affected by mild nonproliferative retinopathy without macular edema, with long-term current use of insulin      3. Diabetic cataract of both eyes      Min ME OS.  Unable to assess vision well with fluctuating BS  Recent improved control of BS  Recommend close obs and Rx of ME OS if worsening or vision worsening  Discussed poss inj and pt amenable if needed    Diabetic Retinopathy discussed in detail, all questions answered  Stressed importance of good BS/BP/Chol Control  RTC immediately PRN any vision changes, hortencia blurry vision, missing vision, floaters,  distortions, etc      Follow up in about 6 weeks (around 11/2/2020), or if symptoms worsen or fail to improve, for Dilated examination, OCT Mac.

## 2020-09-22 ENCOUNTER — OFFICE VISIT (OUTPATIENT)
Dept: PRIMARY CARE CLINIC | Facility: CLINIC | Age: 65
End: 2020-09-22
Payer: MEDICARE

## 2020-09-22 VITALS
WEIGHT: 189.81 LBS | BODY MASS INDEX: 35.84 KG/M2 | HEART RATE: 69 BPM | RESPIRATION RATE: 16 BRPM | SYSTOLIC BLOOD PRESSURE: 138 MMHG | HEIGHT: 61 IN | TEMPERATURE: 97 F | DIASTOLIC BLOOD PRESSURE: 80 MMHG

## 2020-09-22 DIAGNOSIS — E11.69 HYPERLIPIDEMIA ASSOCIATED WITH TYPE 2 DIABETES MELLITUS: ICD-10-CM

## 2020-09-22 DIAGNOSIS — E78.2 MIXED HYPERLIPIDEMIA: ICD-10-CM

## 2020-09-22 DIAGNOSIS — I50.22 CHRONIC SYSTOLIC HEART FAILURE: ICD-10-CM

## 2020-09-22 DIAGNOSIS — E78.5 HYPERLIPIDEMIA ASSOCIATED WITH TYPE 2 DIABETES MELLITUS: ICD-10-CM

## 2020-09-22 DIAGNOSIS — M79.18 CHRONIC MUSCULOSKELETAL PAIN: ICD-10-CM

## 2020-09-22 DIAGNOSIS — G89.29 CHRONIC MUSCULOSKELETAL PAIN: ICD-10-CM

## 2020-09-22 DIAGNOSIS — I15.2 HYPERTENSION ASSOCIATED WITH DIABETES: ICD-10-CM

## 2020-09-22 DIAGNOSIS — I25.118 CORONARY ARTERY DISEASE OF NATIVE ARTERY OF NATIVE HEART WITH STABLE ANGINA PECTORIS: ICD-10-CM

## 2020-09-22 DIAGNOSIS — Z95.5 HISTORY OF HEART ARTERY STENT: ICD-10-CM

## 2020-09-22 DIAGNOSIS — E11.3393 MODERATE NONPROLIFERATIVE DIABETIC RETINOPATHY OF BOTH EYES WITHOUT MACULAR EDEMA ASSOCIATED WITH TYPE 2 DIABETES MELLITUS: ICD-10-CM

## 2020-09-22 DIAGNOSIS — I10 ESSENTIAL HYPERTENSION: ICD-10-CM

## 2020-09-22 DIAGNOSIS — Z79.4 TYPE 2 DIABETES MELLITUS WITH DIABETIC POLYNEUROPATHY, WITH LONG-TERM CURRENT USE OF INSULIN: Primary | ICD-10-CM

## 2020-09-22 DIAGNOSIS — E11.59 HYPERTENSION ASSOCIATED WITH DIABETES: ICD-10-CM

## 2020-09-22 DIAGNOSIS — E11.42 TYPE 2 DIABETES MELLITUS WITH DIABETIC POLYNEUROPATHY, WITH LONG-TERM CURRENT USE OF INSULIN: Primary | ICD-10-CM

## 2020-09-22 DIAGNOSIS — G47.33 OSA (OBSTRUCTIVE SLEEP APNEA): ICD-10-CM

## 2020-09-22 DIAGNOSIS — K21.9 GASTROESOPHAGEAL REFLUX DISEASE WITHOUT ESOPHAGITIS: ICD-10-CM

## 2020-09-22 PROCEDURE — 3008F PR BODY MASS INDEX (BMI) DOCUMENTED: ICD-10-PCS | Mod: CPTII,S$GLB,, | Performed by: NURSE PRACTITIONER

## 2020-09-22 PROCEDURE — 3008F BODY MASS INDEX DOCD: CPT | Mod: CPTII,S$GLB,, | Performed by: NURSE PRACTITIONER

## 2020-09-22 PROCEDURE — 1159F MED LIST DOCD IN RCRD: CPT | Mod: S$GLB,,, | Performed by: NURSE PRACTITIONER

## 2020-09-22 PROCEDURE — 95251 PR GLUCOSE MONITOR, 72 HOUR, PHYS INTERP: ICD-10-PCS | Mod: S$GLB,,, | Performed by: NURSE PRACTITIONER

## 2020-09-22 PROCEDURE — 95251 CONT GLUC MNTR ANALYSIS I&R: CPT | Mod: S$GLB,,, | Performed by: NURSE PRACTITIONER

## 2020-09-22 PROCEDURE — 99999 PR PBB SHADOW E&M-EST. PATIENT-LVL V: ICD-10-PCS | Mod: PBBFAC,,, | Performed by: NURSE PRACTITIONER

## 2020-09-22 PROCEDURE — 99499 UNLISTED E&M SERVICE: CPT | Mod: S$GLB,,, | Performed by: NURSE PRACTITIONER

## 2020-09-22 PROCEDURE — 99999 PR PBB SHADOW E&M-EST. PATIENT-LVL V: CPT | Mod: PBBFAC,,, | Performed by: NURSE PRACTITIONER

## 2020-09-22 PROCEDURE — 99215 OFFICE O/P EST HI 40 MIN: CPT | Mod: S$GLB,,, | Performed by: NURSE PRACTITIONER

## 2020-09-22 PROCEDURE — 1126F AMNT PAIN NOTED NONE PRSNT: CPT | Mod: S$GLB,,, | Performed by: NURSE PRACTITIONER

## 2020-09-22 PROCEDURE — 3046F PR MOST RECENT HEMOGLOBIN A1C LEVEL > 9.0%: ICD-10-PCS | Mod: CPTII,S$GLB,, | Performed by: NURSE PRACTITIONER

## 2020-09-22 PROCEDURE — 1126F PR PAIN SEVERITY QUANTIFIED, NO PAIN PRESENT: ICD-10-PCS | Mod: S$GLB,,, | Performed by: NURSE PRACTITIONER

## 2020-09-22 PROCEDURE — 99215 PR OFFICE/OUTPT VISIT, EST, LEVL V, 40-54 MIN: ICD-10-PCS | Mod: S$GLB,,, | Performed by: NURSE PRACTITIONER

## 2020-09-22 PROCEDURE — 1101F PR PT FALLS ASSESS DOC 0-1 FALLS W/OUT INJ PAST YR: ICD-10-PCS | Mod: CPTII,S$GLB,, | Performed by: NURSE PRACTITIONER

## 2020-09-22 PROCEDURE — 3046F HEMOGLOBIN A1C LEVEL >9.0%: CPT | Mod: CPTII,S$GLB,, | Performed by: NURSE PRACTITIONER

## 2020-09-22 PROCEDURE — 1159F PR MEDICATION LIST DOCUMENTED IN MEDICAL RECORD: ICD-10-PCS | Mod: S$GLB,,, | Performed by: NURSE PRACTITIONER

## 2020-09-22 PROCEDURE — 1101F PT FALLS ASSESS-DOCD LE1/YR: CPT | Mod: CPTII,S$GLB,, | Performed by: NURSE PRACTITIONER

## 2020-09-22 PROCEDURE — 99499 RISK ADDL DX/OHS AUDIT: ICD-10-PCS | Mod: S$GLB,,, | Performed by: NURSE PRACTITIONER

## 2020-09-22 RX ORDER — EZETIMIBE 10 MG/1
10 TABLET ORAL NIGHTLY
Qty: 90 TABLET | Refills: 3 | Status: SHIPPED | OUTPATIENT
Start: 2020-09-22 | End: 2021-02-22 | Stop reason: ALTCHOICE

## 2020-09-22 RX ORDER — EMPAGLIFLOZIN 10 MG/1
10 TABLET, FILM COATED ORAL DAILY
Qty: 30 TABLET | Refills: 6 | Status: SHIPPED | OUTPATIENT
Start: 2020-09-22 | End: 2021-02-22

## 2020-09-22 RX ORDER — CLOTRIMAZOLE AND BETAMETHASONE DIPROPIONATE 10; .64 MG/G; MG/G
CREAM TOPICAL 2 TIMES DAILY
Qty: 15 G | Refills: 1 | Status: SHIPPED | OUTPATIENT
Start: 2020-09-22 | End: 2020-12-21

## 2020-09-22 RX ORDER — ATORVASTATIN CALCIUM 80 MG/1
80 TABLET, FILM COATED ORAL NIGHTLY
Qty: 90 TABLET | Refills: 3 | Status: SHIPPED | OUTPATIENT
Start: 2020-09-22 | End: 2021-11-12 | Stop reason: SDUPTHER

## 2020-09-22 RX ORDER — METOPROLOL SUCCINATE 200 MG/1
200 TABLET, EXTENDED RELEASE ORAL DAILY
Qty: 90 TABLET | Refills: 3 | Status: SHIPPED | OUTPATIENT
Start: 2020-09-22 | End: 2021-12-13 | Stop reason: SDUPTHER

## 2020-09-22 RX ORDER — LISINOPRIL 40 MG/1
40 TABLET ORAL DAILY
Qty: 90 TABLET | Refills: 3 | Status: SHIPPED | OUTPATIENT
Start: 2020-09-22 | End: 2021-09-28 | Stop reason: SDUPTHER

## 2020-09-22 RX ORDER — CLOPIDOGREL BISULFATE 75 MG/1
75 TABLET ORAL DAILY
Qty: 90 TABLET | Refills: 3 | Status: SHIPPED | OUTPATIENT
Start: 2020-09-22 | End: 2021-02-22 | Stop reason: SDUPTHER

## 2020-09-22 RX ORDER — INSULIN LISPRO 100 [IU]/ML
80 INJECTION, SOLUTION INTRAVENOUS; SUBCUTANEOUS DAILY
Qty: 30 ML | Refills: 11 | Status: SHIPPED | OUTPATIENT
Start: 2020-09-22 | End: 2021-05-17 | Stop reason: SDUPTHER

## 2020-09-22 RX ORDER — SPIRONOLACTONE 50 MG/1
50 TABLET, FILM COATED ORAL DAILY
Qty: 90 TABLET | Refills: 1 | Status: SHIPPED | OUTPATIENT
Start: 2020-09-22 | End: 2021-03-25 | Stop reason: SDUPTHER

## 2020-09-22 RX ORDER — CYCLOBENZAPRINE HCL 10 MG
10 TABLET ORAL
Qty: 36 TABLET | Refills: 3 | Status: SHIPPED | OUTPATIENT
Start: 2020-09-23 | End: 2023-01-19 | Stop reason: SDUPTHER

## 2020-09-22 NOTE — PATIENT INSTRUCTIONS
Continue VGO 40 - 5 clicks with breakfast, 5 clicks with lunch, 6 clicks with dinner.   Then 1 - 2 clicks with a snack. You have a total of 18 clicks available to you to give with your meals or snacks.   If BG is 100 or less before the meal, subtract a click - give 4 clicks instead of 5 clicks.   If BG is 180 or greater going into the meal, give 6 clicks (instead of 5 for breakfast and lunch )    Start jardiance 10mg tablet daily - this will help decrease your sugars a bit more.   Drink water, make sure to stay hydrated.     Continue using dexcom G6 - check your sugars all day long and especially before you give your meal or snack time insulin (before you click on your vgo).     Labs and OV in 6 - 8 weeks.

## 2020-09-22 NOTE — PROGRESS NOTES
65 y.o. female, here for 1 month check up for management of type 2 diabetes.   Previous a1c's at 14% on MDI -   Switched her to vgo, and put her on a dexcom.   Average 's. So much improved.     Continues on Trulicity 1.5mg sc weekly.   Also on metformin 1000mg po bid.   Taking humalog insulin via VGO 40 - taking 5 clicks of insulin with meals, 1 with a snack.   Often eats yogurt at bedtime and notices sugar high at nighttime.   Previous on levemir 45 units bid and novolog 25 units tid meals - so previously on total daily dose of 165 units daily.   Large improvement. Now on about 70 units total daily insulin.     Wearing Dexcom G6 - downloaded and interpreted today -   Average BG is 189   42% time in range.   48% highs.   9% very highs.   No lows.     She has multiple comorbidites including heart failure and CAD, with history of multiple stents.   On statin, zetia, bp meds, diuretics and plavix. Requesting refills.   No acute complaints today's visit.     Last visit notes from August 18th, 2020 as follows:   Very pleasant, 65 y.o. female, here for 4 week follow up - managing her type 2 diabetes.   I first saw her July 20th, 2020 -   She was on MDI at that time, with very high sugars.     In the interim, I switched her from MDI to vgo 40 and dexcom.   She is enjoying using both - she is much more aware of her sugars.   She is trying to eat better, and now is learning what foods increase her sugars.     She is using humalog in her vgo 40 - 3 clicks with meals, 4 clicks if sugar is greater than 200 going into meal.   Then 1 click with a snack.     She often snacks in afternoon and before bed - usually a yogurt or/and apple before bed.   Taking in less sweets.   States primarily is doing 3 clicks with meals for most of the day.   Still continues on trulicity 1.5mg sc weekly.   Continues on metformin 1000 metformin XR bid.     Dexcom G6 download interpreted today -   Average BG is 221.   Estimated a1c is 8.6%.   19%  "time in range.   56% highs.   25% very high.   0% lows.     Her last a1c on mdi was nondetectable >> 14%.   So she has improved for sure down to average BG of 220.   Still not to goal, but large improvement now that she is on continuous insulin.       Last Office visit notes as follows:   HPI: Mercedez Purvis is a 65 y.o.  female c/I for visit to address Diabetes Type 2   This is the first time I am seeing her, she is a patient of Dr. Vasquez's for primary care needs.   She also saw general endocrinology - [facility], Dr. Olivo just last week to address her uncontrolled diabetes.   She is unsure why she is here with me today as well as she just saw a specialist for diabetes management.     She was diagnosed with T2DM about 10 years ago.   Taking metformin 500mg XR - 2 tablets bid. This dose was just increased last week. She is taking.   Also on Trulicity 1.5mg sc weekly - has been taking for about 6 months.   Also on MDI - for 10 years.   Levemir 37 units BID, was advised to increase to 45 units bid, but hasn't done yet/hasn't changed yet.   and novolog 25 units tid ac meals - was advised to increase to 30 units tid meals at last visit, but hasn't done yet.   Patient denies skipping dose of insulin.   She takes insulin injections 4x/daily.   Was hospitalized with what she recalls as a "diabetic coma" in 2010 - that was when she was initially diagnosed with diabetes, and insulin was begun right away.   Has not been hospitalized for diabetes since that time.   Checking sugars 4x/daily - not writing down.   Today's blood sugars 2 hours post prandial is 309 on fingerstick in my office. Patient took her 25 units of novolog this am, but did not take the levemir yet for her morning dose.    Past medical History:   Past Medical History:   Diagnosis Date    Chronic headache     Diabetes     Heart attack 2004    8 stents    Heart failure     History of heart artery stent     Hyperlipemia     Hypertension     " Obesity (BMI 30-39.9)     Yeast infection       Family hx: No family history on file.   Current meds:   Current Outpatient Medications:     alcohol swabs (ALCOHOL WIPES) PadM, Apply 1 each topically 3 (three) times daily. ICD 10 code E 11.42, monitor blood sugar TID, per insurance coverage, Disp: 300 each, Rfl: 3    amitriptyline (ELAVIL) 75 MG tablet, Take 1 tablet (75 mg total) by mouth every evening., Disp: 90 tablet, Rfl: 3    amLODIPine (NORVASC) 10 MG tablet, Take 10 mg by mouth once daily., Disp: , Rfl:     atorvastatin (LIPITOR) 80 MG tablet, Take 1 tablet (80 mg total) by mouth every evening., Disp: 90 tablet, Rfl: 3    blood-glucose meter,continuous (DEXCOM G6 ) Misc, 1 each by Misc.(Non-Drug; Combo Route) route Daily. Use as directed with Dexcom G6 transmitter and sensor, Disp: 1 each, Rfl: 0    blood-glucose sensor (DEXCOM G6 SENSOR) Roberta, 1 each by Misc.(Non-Drug; Combo Route) route every 10 days. Apply/change sensor to skin every 10 days., Disp: 3 each, Rfl: 3    blood-glucose transmitter (DEXCOM G6 TRANSMITTER) Roberta, 1 each by Misc.(Non-Drug; Combo Route) route Daily. Use transmitter in sensor with G6 system. Change new transmitter every 3 months., Disp: 1 each, Rfl: 3    chlorthalidone (HYGROTEN) 25 MG Tab, Take 25 mg by mouth once daily., Disp: , Rfl:     cholecalciferol, vitamin D3, (VITAMIN D3) 50 mcg (2,000 unit) Cap, Take 1 capsule (2,000 Units total) by mouth once daily., Disp: , Rfl:     clopidogreL (PLAVIX) 75 mg tablet, Take 1 tablet (75 mg total) by mouth once daily., Disp: 90 tablet, Rfl: 3    [START ON 9/23/2020] cyclobenzaprine (FLEXERIL) 10 MG tablet, Take 1 tablet (10 mg total) by mouth 3 (three) times a week., Disp: 36 tablet, Rfl: 3    diclofenac sodium (VOLTAREN) 1 % Gel, Apply 1 application topically., Disp: , Rfl:     dulaglutide (TRULICITY) 1.5 mg/0.5 mL pen injector, Inject 1.5 mg into the skin once a week., Disp: 6 mL, Rfl: 3    ezetimibe (ZETIA) 10 mg  "tablet, Take 1 tablet (10 mg total) by mouth every evening., Disp: 90 tablet, Rfl: 3    fluconazole (DIFLUCAN) 150 MG Tab, , Disp: , Rfl:     furosemide (LASIX) 40 MG tablet, Take 40 mg by mouth once daily., Disp: , Rfl:     gabapentin (NEURONTIN) 100 MG capsule, Take 1 capsule (100 mg total) by mouth 3 (three) times daily., Disp: 270 capsule, Rfl: 3    insulin detemir U-100 (LEVEMIR FLEXTOUCH) 100 unit/mL (3 mL) SubQ InPn pen, Inject 45 Units into the skin 2 (two) times daily., Disp: 90 mL, Rfl: 3    insulin lispro (HUMALOG U-100 INSULIN) 100 unit/mL injection, Inject 80 Units into the skin once daily. Gives up to 80 units daily via VGO 40, Disp: 30 mL, Rfl: 11    lancets Misc, Inject 1 each into the skin 3 (three) times daily. ICD 10 code E 11.42, monitor blood sugar TID, Disp: 300 each, Rfl: 3    lidocaine-prilocaine (EMLA) cream, APPLY AA PRF PAINFUL EPISODES OVER BOTH FEET D RUB IN THOROUGHLY, Disp: , Rfl:     lisinopriL (PRINIVIL,ZESTRIL) 40 MG tablet, Take 1 tablet (40 mg total) by mouth once daily., Disp: 90 tablet, Rfl: 3    metFORMIN (GLUCOPHAGE-XR) 500 MG XR 24hr tablet, Take 2 tablets (1,000 mg total) by mouth 2 (two) times daily with meals., Disp: 360 tablet, Rfl: 3    metoprolol succinate (TOPROL-XL) 200 MG 24 hr tablet, Take 1 tablet (200 mg total) by mouth once daily., Disp: 90 tablet, Rfl: 3    pen needle, diabetic (PEN NEEDLE) 32 gauge x 5/32" Ndle, 1 each by Misc.(Non-Drug; Combo Route) route 4 (four) times daily. ICD 10 E11.42, Disp: 400 each, Rfl: 3    spironolactone (ALDACTONE) 50 MG tablet, Take 1 tablet (50 mg total) by mouth once daily., Disp: 90 tablet, Rfl: 1    sub-q insulin device, 40 unit (V-GO 40) Roberta, 1 Device by Misc.(Non-Drug; Combo Route) route once daily., Disp: 30 each, Rfl: 6    clotrimazole-betamethasone 1-0.05% (LOTRISONE) cream, Apply topically 2 (two) times daily., Disp: 15 g, Rfl: 1    empagliflozin (JARDIANCE) 10 mg tablet, Take 1 tablet (10 mg total) by " "mouth once daily., Disp: 30 tablet, Rfl: 6       Social:   Lives at home by herself.  Does have family visit her with social distancing.   Has 5 children, 3 are still living. 13 grandchildren, 11 great-grandchildren.   Diet: not always following ADA diet   Meals: 3 per day and snacks   Breakfast - grits, eggs, sausage,   Lunch - salad, fish, chicken breast.   Dinner -steaks, green beans, small portions of potatoe salad.   Snacks - fruits, apples/oranges, or nuts/pistachios or almonds.  Exercise: none. But does work in yard  Activities: not working.     Current Diabetes medications:   humalog insulin via VGO 40 insulin patch.  GLP1:  Trulicity.   Oral meds: meformin 500 xr - 2 tabs bid    Medications Tried and Failed:   levemir 45 units bid.   Novolog 25 units tid ac meals.   trulicity  Metformin     Glucose Monitoring:   CGM: Dexcom G6   Gets supplies from : [employer]  Has glucometer at home.     Review of Pertinent co-morbidities/risk factors:   CV: history of MI 2004 - with stents placed in 2010 and also in 2019.    CAD: yes   Takes aspirin and plavix daily.  BP: has history of HTN  Statin: Taking  ACE/ARB: Taking    Vital Signs  /80 (BP Location: Left arm, Patient Position: Sitting, BP Method: Medium (Manual))   Pulse 69   Temp 96.7 °F (35.9 °C) (Temporal)   Resp 16   Ht 5' 1" (1.549 m)   Wt 86.1 kg (189 lb 13.1 oz)   BMI 35.87 kg/m²   Recheck BP is 125/65 of note.     Pertinent Labs:   Hgba1c   Lab Results   Component Value Date    HGBA1C >14.0 (H) 07/13/2020     Lipid panel   Lab Results   Component Value Date    CHOL 175 07/13/2020    CHOL 99 02/03/2020     Lab Results   Component Value Date    HDL 32 (L) 07/13/2020    HDL 35 (L) 02/03/2020     Lab Results   Component Value Date    LDLCALC 98.8 07/13/2020    LDLCALC 48 02/03/2020     Lab Results   Component Value Date    TRIG 221 (H) 07/13/2020    TRIG 79 02/03/2020     Lab Results   Component Value Date    CHOLHDL 18.3 (L) 07/13/2020    CHOLHDL 2.83 " 02/03/2020      CMP  Glucose   Date Value Ref Range Status   07/13/2020 414 (H) 70 - 110 mg/dL Final     BUN, Bld   Date Value Ref Range Status   07/13/2020 14 8 - 23 mg/dL Final     Creatinine   Date Value Ref Range Status   07/13/2020 1.2 0.5 - 1.4 mg/dL Final     eGFR if    Date Value Ref Range Status   07/13/2020 54.4 (A) >60 mL/min/1.73 m^2 Final     eGFR if non    Date Value Ref Range Status   07/13/2020 47.2 (A) >60 mL/min/1.73 m^2 Final     Comment:     Calculation used to obtain the estimated glomerular filtration  rate (eGFR) is the CKD-EPI equation.         Microalbumin creatinine ratio:   Lab Results   Component Value Date    MICALBCREAT 8.6 07/01/2020       Social History     Tobacco Use   Smoking Status Never Smoker        Standards of care:   Eyes: .: 09/21/2020  Foot exam: : 07/20/2020 and done today.   Diabetes education: none.     Review Of Systems:   Gen: Appetite good, no weight gain or loss, denies fatigue and weakness. Polydipsia, but improving.   Skin: Skin is intact and heals well, no rashes, no hair changes  C/o some itching to her feet, has used a steroid cream in the past which helped and requesting refills.  Eyes: Denies acute visual disturbances, some occasional blurred vision but seems to be improving.   Resp: no SOB or Dyspnea on exertion, denies cough.   Cardiac: Denies chest pain, palpitations, or swelling.   GI: Denies abdominal pain, nausea or vomiting, diarrhea, constipation.   /GYN: + nocturia, denies any vaginal burning or pain.  Previous yeast infection. None in the past year.   MS/Neuro: Denies numbness/ tingling in BLE; taking neurontin 100 tid which helps with her leg and foot neuropathy.   Gait steady, speech clear  Psych: Denies drug/ETOH abuse, no hx of depression.  Other systems: negative.    Physical Exam:   GENERAL: Well developed, well nourished in appearance.   PSYCH: AAOx3, appropriate mood and affect, pleasant expression,  conversant, appears relaxed, well groomed.   EYES: PERRL, Conjunctiva and corneas clear  NECK: Soft and Supple, trachea midline   CHEST: Even, regular, and unlabored respirations  ABDOMEN: Soft, non-tender, non-distended   VASCULAR: pedal pulses palpable bilaterally, no edema.  NEURO:  cranial nerves II - XII intact   MUSCULOSKELETAL: Good ROM, equal strength against resistance to bilateral lower extremities, steady gait.   SKIN: Skin warm, dry, and intact - + acanthosis nigracans mild and skin tags mild.  FEET: +2 dorsalis pedis and posterior pulses noted.    Assessment and Plan of Care:     Mercedez was seen today for diabetes and medication refill.    Diagnoses and all orders for this visit:    Type 2 diabetes mellitus with diabetic polyneuropathy, with long-term current use of insulin  -     Hemoglobin A1C; Future  -     Comprehensive metabolic panel; Future    Moderate nonproliferative diabetic retinopathy of both eyes without macular edema associated with type 2 diabetes mellitus    Hypertension associated with diabetes  -     Microalbumin/creatinine urine ratio; Future    Hyperlipidemia associated with type 2 diabetes mellitus  -     Lipid Panel; Future    Gastroesophageal reflux disease without esophagitis    TIKA (obstructive sleep apnea)    Chronic systolic heart failure  -     spironolactone (ALDACTONE) 50 MG tablet; Take 1 tablet (50 mg total) by mouth once daily.  -     atorvastatin (LIPITOR) 80 MG tablet; Take 1 tablet (80 mg total) by mouth every evening.    Coronary artery disease of native artery of native heart with stable angina pectoris  -     metoprolol succinate (TOPROL-XL) 200 MG 24 hr tablet; Take 1 tablet (200 mg total) by mouth once daily.  -     clopidogreL (PLAVIX) 75 mg tablet; Take 1 tablet (75 mg total) by mouth once daily.  -     atorvastatin (LIPITOR) 80 MG tablet; Take 1 tablet (80 mg total) by mouth every evening.    Essential hypertension  -     metoprolol succinate (TOPROL-XL) 200  MG 24 hr tablet; Take 1 tablet (200 mg total) by mouth once daily.  -     lisinopriL (PRINIVIL,ZESTRIL) 40 MG tablet; Take 1 tablet (40 mg total) by mouth once daily.    Chronic musculoskeletal pain  -     cyclobenzaprine (FLEXERIL) 10 MG tablet; Take 1 tablet (10 mg total) by mouth 3 (three) times a week.    Mixed hyperlipidemia  -     ezetimibe (ZETIA) 10 mg tablet; Take 1 tablet (10 mg total) by mouth every evening.    History of heart artery stent    Other orders  -     empagliflozin (JARDIANCE) 10 mg tablet; Take 1 tablet (10 mg total) by mouth once daily.  -     clotrimazole-betamethasone 1-0.05% (LOTRISONE) cream; Apply topically 2 (two) times daily.  -     insulin lispro (HUMALOG U-100 INSULIN) 100 unit/mL injection; Inject 80 Units into the skin once daily. Gives up to 80 units daily via VGO 40         1. T2DM with hyperglycemia- Hgba1c goal is 7.5% or less - not at goal. a1c is > 14%. Excellent improvement per CGM report today - BG average 189  Continue humalog in vgo 40 - continue 5 clicks with breakfast, 5 clicks with lunch, increase to 6 clicks with dinner. And make sure to do 1 - 2 clicks with snacks.   Gave her paramaters    (previously on 165 units of insulin per day with MDI, now more controlled on 70 total units of insulin per day).   Continue metformin 1000 bid. Renal function stable.   Continue trulicity 1.5mg sc weekly.    Start Jardiance 10 mg - Take 1 tablet po daily.   Discussed MOA, possible side effects including yeast infection, UTI, dehydration and ketoacidosis, importance of maintaining hydration and avoiding No carb diets. Good use hygiene. Notify my office if any side effects. Will monitor renal function closely. Stable at present.   Continue using Dexcom G6 cgm - interpreted today - see scanned in report.   Titrated clicks/increased based on CGM interpretation).   Continues to check sugars 4 times per day.   Continues on insulin injections 4 times per day via VGO  discussed DM,  progression of disease, long term complications, CV risk factors and tx options.   She already has established CAD and history of MI, so at risk for repeat event.   Advise compliance with ADA diet and encourage exercise  Reviewed  hypoglycemia, s/s and appropriate tx.    Instructed to monitor Blood glucose 4x/day and bring meter/ log to every clinic visit.   Eyes - dilated retinal exam 7/15/2020 - no diabetic retinopathy but with follow up recommended in 1 month with ophthalmology.   Had exam done 9/2/2020 with opthalmology  Podiatry - had check up recently. Foot exam negative today. Advised on proper foot care. - Dr. Carrera 2020. Refilled clotrimazole/betamethasone.    2. HTN- controlled, continue meds as previously prescribed and monitor.   meds refilled.     3. HLD - LDL goal < 100. Minimally at goal. On atorvastatin 80mg every Hs and zetia.   Refills given for both.   Currently on statin therapy- to continue.   hsitory of MI and stents already - on plavix daily. Refilled.     4. Weight - BMI Body mass index is 35.87 kg/m².   Encourage Ada diet and exercise.    continue Trulicity.     5. GERD - previously on nexium but stable.   No longer taking.     6. TIKA - stable on cpap    7. Heart failure - stable - refilled BB and diuretics.     8. CAD - continues on aspirin and plavix, s/p 6 stents.       Follow up in 6 - 8 weeks for repeat labs and OV prior.

## 2020-09-25 ENCOUNTER — HOSPITAL ENCOUNTER (OUTPATIENT)
Dept: RADIOLOGY | Facility: HOSPITAL | Age: 65
Discharge: HOME | End: 2020-09-25
Attending: FAMILY MEDICINE
Payer: MEDICARE

## 2020-09-25 DIAGNOSIS — R92.8 ABNORMAL MAMMOGRAM: ICD-10-CM

## 2020-09-25 PROCEDURE — 76642 US BREAST LEFT LIMITED: ICD-10-PCS | Mod: 26,LT,, | Performed by: RADIOLOGY

## 2020-09-25 PROCEDURE — 77065 DX MAMMO INCL CAD UNI: CPT | Mod: 26,LT,, | Performed by: RADIOLOGY

## 2020-09-25 PROCEDURE — 77061 MAMMO DIGITAL DIAGNOSTIC LEFT WITH TOMO: ICD-10-PCS | Mod: 26,LT,, | Performed by: RADIOLOGY

## 2020-09-25 PROCEDURE — 77065 MAMMO DIGITAL DIAGNOSTIC LEFT WITH TOMO: ICD-10-PCS | Mod: 26,LT,, | Performed by: RADIOLOGY

## 2020-09-25 PROCEDURE — 77065 DX MAMMO INCL CAD UNI: CPT | Mod: TC,LT

## 2020-09-25 PROCEDURE — 76642 ULTRASOUND BREAST LIMITED: CPT | Mod: 26,LT,, | Performed by: RADIOLOGY

## 2020-09-25 PROCEDURE — 76642 ULTRASOUND BREAST LIMITED: CPT | Mod: TC,LT

## 2020-09-25 PROCEDURE — 77061 BREAST TOMOSYNTHESIS UNI: CPT | Mod: 26,LT,, | Performed by: RADIOLOGY

## 2020-10-09 ENCOUNTER — PATIENT MESSAGE (OUTPATIENT)
Dept: ADMINISTRATIVE | Facility: HOSPITAL | Age: 65
End: 2020-10-09

## 2020-10-12 ENCOUNTER — TELEPHONE (OUTPATIENT)
Dept: OPTOMETRY | Facility: CLINIC | Age: 65
End: 2020-10-12

## 2020-10-12 NOTE — TELEPHONE ENCOUNTER
Patient BS still uncontrolled. BS >300 per patient. She notes she does not check it regularly. She last checked 1-2 weeks ago.     Discussed spec Rx may be void due to uncontrolled BS. Patient notes understanding and wishes to reschedule refraction appointment. New appointment made for Dec. 2020. Patient scheduled to f/u with Dr. Alanis on 11/02/2020.      Mirtha Akins, OD  10/12/2020

## 2020-10-17 DIAGNOSIS — E55.9 VITAMIN D DEFICIENCY: ICD-10-CM

## 2020-10-17 DIAGNOSIS — E11.42 TYPE 2 DIABETES MELLITUS WITH DIABETIC POLYNEUROPATHY, WITH LONG-TERM CURRENT USE OF INSULIN: ICD-10-CM

## 2020-10-17 DIAGNOSIS — E11.59 HYPERTENSION ASSOCIATED WITH DIABETES: ICD-10-CM

## 2020-10-17 DIAGNOSIS — I15.2 HYPERTENSION ASSOCIATED WITH DIABETES: ICD-10-CM

## 2020-10-17 DIAGNOSIS — E66.9 OBESITY (BMI 30-39.9): ICD-10-CM

## 2020-10-17 DIAGNOSIS — I25.118 CORONARY ARTERY DISEASE OF NATIVE ARTERY OF NATIVE HEART WITH STABLE ANGINA PECTORIS: Primary | ICD-10-CM

## 2020-10-17 DIAGNOSIS — E11.69 HYPERLIPIDEMIA ASSOCIATED WITH TYPE 2 DIABETES MELLITUS: ICD-10-CM

## 2020-10-17 DIAGNOSIS — Z79.4 TYPE 2 DIABETES MELLITUS WITH DIABETIC POLYNEUROPATHY, WITH LONG-TERM CURRENT USE OF INSULIN: ICD-10-CM

## 2020-10-17 DIAGNOSIS — E78.5 HYPERLIPIDEMIA ASSOCIATED WITH TYPE 2 DIABETES MELLITUS: ICD-10-CM

## 2020-11-01 ENCOUNTER — PATIENT OUTREACH (OUTPATIENT)
Dept: ADMINISTRATIVE | Facility: OTHER | Age: 65
End: 2020-11-01

## 2020-11-01 NOTE — PROGRESS NOTES
Requested updates within Care Everywhere.  Patient's chart was reviewed for overdue BECKIE topics.  Immunizations reconciled.

## 2020-11-17 ENCOUNTER — LAB VISIT (OUTPATIENT)
Dept: LAB | Facility: HOSPITAL | Age: 65
End: 2020-11-17
Attending: OPHTHALMOLOGY
Payer: MEDICARE

## 2020-11-17 ENCOUNTER — TELEPHONE (OUTPATIENT)
Dept: INTERNAL MEDICINE | Facility: CLINIC | Age: 65
End: 2020-11-17

## 2020-11-17 DIAGNOSIS — E11.42 TYPE 2 DIABETES MELLITUS WITH DIABETIC POLYNEUROPATHY, WITH LONG-TERM CURRENT USE OF INSULIN: ICD-10-CM

## 2020-11-17 DIAGNOSIS — E11.69 HYPERLIPIDEMIA ASSOCIATED WITH TYPE 2 DIABETES MELLITUS: ICD-10-CM

## 2020-11-17 DIAGNOSIS — E78.5 HYPERLIPIDEMIA ASSOCIATED WITH TYPE 2 DIABETES MELLITUS: ICD-10-CM

## 2020-11-17 DIAGNOSIS — Z79.4 TYPE 2 DIABETES MELLITUS WITH DIABETIC POLYNEUROPATHY, WITH LONG-TERM CURRENT USE OF INSULIN: ICD-10-CM

## 2020-11-17 LAB
ALBUMIN SERPL BCP-MCNC: 3.4 G/DL (ref 3.5–5.2)
ALP SERPL-CCNC: 119 U/L (ref 55–135)
ALT SERPL W/O P-5'-P-CCNC: 12 U/L (ref 10–44)
ANION GAP SERPL CALC-SCNC: 10 MMOL/L (ref 8–16)
AST SERPL-CCNC: 13 U/L (ref 10–40)
BILIRUB SERPL-MCNC: 0.3 MG/DL (ref 0.1–1)
BUN SERPL-MCNC: 13 MG/DL (ref 8–23)
CALCIUM SERPL-MCNC: 8.9 MG/DL (ref 8.7–10.5)
CHLORIDE SERPL-SCNC: 102 MMOL/L (ref 95–110)
CHOLEST SERPL-MCNC: 164 MG/DL (ref 120–199)
CHOLEST/HDLC SERPL: 4.6 {RATIO} (ref 2–5)
CO2 SERPL-SCNC: 30 MMOL/L (ref 23–29)
CREAT SERPL-MCNC: 1.2 MG/DL (ref 0.5–1.4)
EST. GFR  (AFRICAN AMERICAN): 54.4 ML/MIN/1.73 M^2
EST. GFR  (NON AFRICAN AMERICAN): 47.2 ML/MIN/1.73 M^2
ESTIMATED AVG GLUCOSE: 212 MG/DL (ref 68–131)
GLUCOSE SERPL-MCNC: 178 MG/DL (ref 70–110)
HBA1C MFR BLD HPLC: 9 % (ref 4–5.6)
HDLC SERPL-MCNC: 36 MG/DL (ref 40–75)
HDLC SERPL: 22 % (ref 20–50)
LDLC SERPL CALC-MCNC: 96.6 MG/DL (ref 63–159)
NONHDLC SERPL-MCNC: 128 MG/DL
POTASSIUM SERPL-SCNC: 4.1 MMOL/L (ref 3.5–5.1)
PROT SERPL-MCNC: 7.4 G/DL (ref 6–8.4)
SODIUM SERPL-SCNC: 142 MMOL/L (ref 136–145)
TRIGL SERPL-MCNC: 157 MG/DL (ref 30–150)

## 2020-11-17 PROCEDURE — 80053 COMPREHEN METABOLIC PANEL: CPT

## 2020-11-17 PROCEDURE — 36415 COLL VENOUS BLD VENIPUNCTURE: CPT

## 2020-11-17 PROCEDURE — 80061 LIPID PANEL: CPT

## 2020-11-17 PROCEDURE — 83036 HEMOGLOBIN GLYCOSYLATED A1C: CPT

## 2020-11-17 NOTE — TELEPHONE ENCOUNTER
----- Message from Juli Mejia NP sent at 11/17/2020 10:19 AM CST -----  Labs reviewed.   Hgba1c improved to 9%.   Cholesterol improved.   Kidney and liver function stable.   Will see you for upcoming follow up visit -   Thank you,   Juli

## 2020-11-18 ENCOUNTER — TELEPHONE (OUTPATIENT)
Dept: INTERNAL MEDICINE | Facility: CLINIC | Age: 65
End: 2020-11-18

## 2020-11-18 NOTE — TELEPHONE ENCOUNTER
----- Message from Juli Mejia NP sent at 11/18/2020  9:09 AM CST -----  Labs reviewed -   There is no protein in your urine.   Thanks,  juli

## 2020-11-19 ENCOUNTER — OFFICE VISIT (OUTPATIENT)
Dept: OPHTHALMOLOGY | Facility: CLINIC | Age: 65
End: 2020-11-19
Payer: MEDICARE

## 2020-11-19 DIAGNOSIS — E11.3212 TYPE 2 DIABETES MELLITUS WITH LEFT EYE AFFECTED BY MILD NONPROLIFERATIVE RETINOPATHY AND MACULAR EDEMA, WITH LONG-TERM CURRENT USE OF INSULIN: Primary | ICD-10-CM

## 2020-11-19 DIAGNOSIS — Z12.11 SPECIAL SCREENING FOR MALIGNANT NEOPLASMS, COLON: Primary | ICD-10-CM

## 2020-11-19 DIAGNOSIS — E11.36 DIABETIC CATARACT OF BOTH EYES: ICD-10-CM

## 2020-11-19 DIAGNOSIS — Z79.4 TYPE 2 DIABETES MELLITUS WITH LEFT EYE AFFECTED BY MILD NONPROLIFERATIVE RETINOPATHY AND MACULAR EDEMA, WITH LONG-TERM CURRENT USE OF INSULIN: Primary | ICD-10-CM

## 2020-11-19 DIAGNOSIS — Z79.4 TYPE 2 DIABETES MELLITUS WITH RIGHT EYE AFFECTED BY MILD NONPROLIFERATIVE RETINOPATHY WITHOUT MACULAR EDEMA, WITH LONG-TERM CURRENT USE OF INSULIN: ICD-10-CM

## 2020-11-19 DIAGNOSIS — E11.3291 TYPE 2 DIABETES MELLITUS WITH RIGHT EYE AFFECTED BY MILD NONPROLIFERATIVE RETINOPATHY WITHOUT MACULAR EDEMA, WITH LONG-TERM CURRENT USE OF INSULIN: ICD-10-CM

## 2020-11-19 PROCEDURE — 3288F FALL RISK ASSESSMENT DOCD: CPT | Mod: CPTII,S$GLB,, | Performed by: OPHTHALMOLOGY

## 2020-11-19 PROCEDURE — 1126F AMNT PAIN NOTED NONE PRSNT: CPT | Mod: S$GLB,,, | Performed by: OPHTHALMOLOGY

## 2020-11-19 PROCEDURE — 92012 PR EYE EXAM, EST PATIENT,INTERMED: ICD-10-PCS | Mod: S$GLB,,, | Performed by: OPHTHALMOLOGY

## 2020-11-19 PROCEDURE — 3288F PR FALLS RISK ASSESSMENT DOCUMENTED: ICD-10-PCS | Mod: CPTII,S$GLB,, | Performed by: OPHTHALMOLOGY

## 2020-11-19 PROCEDURE — 92134 POSTERIOR SEGMENT OCT RETINA (OCULAR COHERENCE TOMOGRAPHY)-BOTH EYES: ICD-10-PCS | Mod: S$GLB,,, | Performed by: OPHTHALMOLOGY

## 2020-11-19 PROCEDURE — 99999 PR PBB SHADOW E&M-EST. PATIENT-LVL IV: CPT | Mod: PBBFAC,,, | Performed by: OPHTHALMOLOGY

## 2020-11-19 PROCEDURE — 92012 INTRM OPH EXAM EST PATIENT: CPT | Mod: S$GLB,,, | Performed by: OPHTHALMOLOGY

## 2020-11-19 PROCEDURE — 1101F PT FALLS ASSESS-DOCD LE1/YR: CPT | Mod: CPTII,S$GLB,, | Performed by: OPHTHALMOLOGY

## 2020-11-19 PROCEDURE — 92134 CPTRZ OPH DX IMG PST SGM RTA: CPT | Mod: S$GLB,,, | Performed by: OPHTHALMOLOGY

## 2020-11-19 PROCEDURE — 1126F PR PAIN SEVERITY QUANTIFIED, NO PAIN PRESENT: ICD-10-PCS | Mod: S$GLB,,, | Performed by: OPHTHALMOLOGY

## 2020-11-19 PROCEDURE — 1101F PR PT FALLS ASSESS DOC 0-1 FALLS W/OUT INJ PAST YR: ICD-10-PCS | Mod: CPTII,S$GLB,, | Performed by: OPHTHALMOLOGY

## 2020-11-19 PROCEDURE — 99999 PR PBB SHADOW E&M-EST. PATIENT-LVL IV: ICD-10-PCS | Mod: PBBFAC,,, | Performed by: OPHTHALMOLOGY

## 2020-11-19 NOTE — LETTER
November 19, 2020      Evelyn Almendarez, FNP  [address] [facility] [address]           [facility] [address]  [address]  [facility] [address]  Phone: [phone]  Fax: [phone]          Patient: Mercedez Purvis   MR Number: [id number]   YOB: 1955   Date of Visit: 11/19/2020       Dear Evelyn Almendarez:    Thank you for referring Mercedez Purvis to me for evaluation. Attached you will find relevant portions of my assessment and plan of care.    If you have questions, please do not hesitate to call me. I look forward to following Mercedez Purvis along with you.    Sincerely,    Ian Alanis MD    Enclosure  CC:  No Recipients    If you would like to receive this communication electronically, please contact [email or web address] or [phone] to request more information on [facility] access.    For providers and/or their staff who would like to refer a patient to [facility], please contact us through our one-stop-shop provider referral line, [facility], at [phone].    If you feel you have received this communication in error or would no longer like to receive these types of communications, please e-mail [email or web address]

## 2020-11-19 NOTE — PROGRESS NOTES
Subjective:       Patient ID: Mercedez Purvis is a 65 y.o. female      Chief Complaint   Patient presents with    Diabetic Eye Exam    Blurred Vision     History of Present Illness  HPI     DLS 09/21/2020 Dr Alanis    Pt here for follow up DFE OCT     Pt reports no changes in vision.  Overall thinks vision is pretty good  No eye pain , no F/F OU        gtts   Refresh BID OU          Ocular hx   NPDR w/ME OS   Mild NPDR w/o ME OD   Diabetic Cataracts OU    Last edited by Ian Alanis MD on 11/19/2020  9:44 AM. (History)        Imaging:    See report    Assessment/Plan:     1. Type 2 diabetes mellitus with left eye affected by mild nonproliferative retinopathy and macular edema, with long-term current use of insulin    - Posterior Segment OCT Retina-Both eyes    2. Type 2 diabetes mellitus with right eye affected by mild nonproliferative retinopathy without macular edema, with long-term current use of insulin      ME OS improving.  Has better BS control.  Rec continued obs    Diabetic Retinopathy discussed in detail, all questions answered  Stressed importance of good BS/BP/Chol Control  RTC immediately PRN any vision changes, hortencia blurry vision, missing vision, floaters, distortions, etc    - Posterior Segment OCT Retina-Both eyes    3. Diabetic cataract of both eyes  Refraction.  Has appt with Dr Akins    Follow up in about 3 months (around 2/19/2021), or if symptoms worsen or fail to improve, for OCT Mac, Comprehensive Examination.

## 2020-11-23 ENCOUNTER — IMMUNIZATION (OUTPATIENT)
Dept: PHARMACY | Facility: CLINIC | Age: 65
End: 2020-11-23
Payer: MEDICARE

## 2020-11-23 ENCOUNTER — OFFICE VISIT (OUTPATIENT)
Dept: PRIMARY CARE CLINIC | Facility: CLINIC | Age: 65
End: 2020-11-23
Payer: MEDICARE

## 2020-11-23 VITALS
WEIGHT: 192.69 LBS | TEMPERATURE: 97 F | HEART RATE: 70 BPM | RESPIRATION RATE: 16 BRPM | DIASTOLIC BLOOD PRESSURE: 90 MMHG | SYSTOLIC BLOOD PRESSURE: 148 MMHG | HEIGHT: 60 IN | BODY MASS INDEX: 37.83 KG/M2

## 2020-11-23 DIAGNOSIS — E11.42 TYPE 2 DIABETES MELLITUS WITH DIABETIC POLYNEUROPATHY, WITH LONG-TERM CURRENT USE OF INSULIN: Primary | ICD-10-CM

## 2020-11-23 DIAGNOSIS — E78.5 HYPERLIPIDEMIA ASSOCIATED WITH TYPE 2 DIABETES MELLITUS: ICD-10-CM

## 2020-11-23 DIAGNOSIS — I15.2 HYPERTENSION ASSOCIATED WITH DIABETES: ICD-10-CM

## 2020-11-23 DIAGNOSIS — R51.9 CHRONIC NONINTRACTABLE HEADACHE, UNSPECIFIED HEADACHE TYPE: ICD-10-CM

## 2020-11-23 DIAGNOSIS — G47.33 OSA (OBSTRUCTIVE SLEEP APNEA): ICD-10-CM

## 2020-11-23 DIAGNOSIS — E66.01 SEVERE OBESITY (BMI 35.0-39.9) WITH COMORBIDITY: ICD-10-CM

## 2020-11-23 DIAGNOSIS — G89.29 CHRONIC NONINTRACTABLE HEADACHE, UNSPECIFIED HEADACHE TYPE: ICD-10-CM

## 2020-11-23 DIAGNOSIS — R42 DIZZINESS: ICD-10-CM

## 2020-11-23 DIAGNOSIS — E11.40 NEUROPATHY DUE TO TYPE 2 DIABETES MELLITUS: ICD-10-CM

## 2020-11-23 DIAGNOSIS — Z79.4 TYPE 2 DIABETES MELLITUS WITH DIABETIC POLYNEUROPATHY, WITH LONG-TERM CURRENT USE OF INSULIN: Primary | ICD-10-CM

## 2020-11-23 DIAGNOSIS — E11.59 HYPERTENSION ASSOCIATED WITH DIABETES: ICD-10-CM

## 2020-11-23 DIAGNOSIS — K21.9 GASTROESOPHAGEAL REFLUX DISEASE WITHOUT ESOPHAGITIS: ICD-10-CM

## 2020-11-23 DIAGNOSIS — E11.3393 MODERATE NONPROLIFERATIVE DIABETIC RETINOPATHY OF BOTH EYES WITHOUT MACULAR EDEMA ASSOCIATED WITH TYPE 2 DIABETES MELLITUS: ICD-10-CM

## 2020-11-23 DIAGNOSIS — E11.69 HYPERLIPIDEMIA ASSOCIATED WITH TYPE 2 DIABETES MELLITUS: ICD-10-CM

## 2020-11-23 PROCEDURE — 3052F PR MOST RECENT HEMOGLOBIN A1C LEVEL 8.0 - < 9.0%: ICD-10-PCS | Mod: CPTII,S$GLB,, | Performed by: NURSE PRACTITIONER

## 2020-11-23 PROCEDURE — 3288F PR FALLS RISK ASSESSMENT DOCUMENTED: ICD-10-PCS | Mod: CPTII,S$GLB,, | Performed by: NURSE PRACTITIONER

## 2020-11-23 PROCEDURE — 1126F AMNT PAIN NOTED NONE PRSNT: CPT | Mod: S$GLB,,, | Performed by: NURSE PRACTITIONER

## 2020-11-23 PROCEDURE — 95251 CONT GLUC MNTR ANALYSIS I&R: CPT | Mod: S$GLB,,, | Performed by: NURSE PRACTITIONER

## 2020-11-23 PROCEDURE — 1101F PT FALLS ASSESS-DOCD LE1/YR: CPT | Mod: CPTII,S$GLB,, | Performed by: NURSE PRACTITIONER

## 2020-11-23 PROCEDURE — 3008F PR BODY MASS INDEX (BMI) DOCUMENTED: ICD-10-PCS | Mod: CPTII,S$GLB,, | Performed by: NURSE PRACTITIONER

## 2020-11-23 PROCEDURE — 99215 OFFICE O/P EST HI 40 MIN: CPT | Mod: S$GLB,,, | Performed by: NURSE PRACTITIONER

## 2020-11-23 PROCEDURE — 3052F HG A1C>EQUAL 8.0%<EQUAL 9.0%: CPT | Mod: CPTII,S$GLB,, | Performed by: NURSE PRACTITIONER

## 2020-11-23 PROCEDURE — 3288F FALL RISK ASSESSMENT DOCD: CPT | Mod: CPTII,S$GLB,, | Performed by: NURSE PRACTITIONER

## 2020-11-23 PROCEDURE — 99999 PR PBB SHADOW E&M-EST. PATIENT-LVL V: CPT | Mod: PBBFAC,,, | Performed by: NURSE PRACTITIONER

## 2020-11-23 PROCEDURE — 3008F BODY MASS INDEX DOCD: CPT | Mod: CPTII,S$GLB,, | Performed by: NURSE PRACTITIONER

## 2020-11-23 PROCEDURE — 99215 PR OFFICE/OUTPT VISIT, EST, LEVL V, 40-54 MIN: ICD-10-PCS | Mod: S$GLB,,, | Performed by: NURSE PRACTITIONER

## 2020-11-23 PROCEDURE — 99999 PR PBB SHADOW E&M-EST. PATIENT-LVL V: ICD-10-PCS | Mod: PBBFAC,,, | Performed by: NURSE PRACTITIONER

## 2020-11-23 PROCEDURE — 99499 RISK ADDL DX/OHS AUDIT: ICD-10-PCS | Mod: S$GLB,,, | Performed by: NURSE PRACTITIONER

## 2020-11-23 PROCEDURE — 99499 UNLISTED E&M SERVICE: CPT | Mod: S$GLB,,, | Performed by: NURSE PRACTITIONER

## 2020-11-23 PROCEDURE — 1159F PR MEDICATION LIST DOCUMENTED IN MEDICAL RECORD: ICD-10-PCS | Mod: S$GLB,,, | Performed by: NURSE PRACTITIONER

## 2020-11-23 PROCEDURE — 95251 PR GLUCOSE MONITOR, 72 HOUR, PHYS INTERP: ICD-10-PCS | Mod: S$GLB,,, | Performed by: NURSE PRACTITIONER

## 2020-11-23 PROCEDURE — 1159F MED LIST DOCD IN RCRD: CPT | Mod: S$GLB,,, | Performed by: NURSE PRACTITIONER

## 2020-11-23 PROCEDURE — 1101F PR PT FALLS ASSESS DOC 0-1 FALLS W/OUT INJ PAST YR: ICD-10-PCS | Mod: CPTII,S$GLB,, | Performed by: NURSE PRACTITIONER

## 2020-11-23 PROCEDURE — 1126F PR PAIN SEVERITY QUANTIFIED, NO PAIN PRESENT: ICD-10-PCS | Mod: S$GLB,,, | Performed by: NURSE PRACTITIONER

## 2020-11-23 RX ORDER — DULAGLUTIDE 1.5 MG/.5ML
1.5 INJECTION, SOLUTION SUBCUTANEOUS WEEKLY
Qty: 6 ML | Refills: 3 | Status: SHIPPED | OUTPATIENT
Start: 2020-11-23 | End: 2021-02-22 | Stop reason: SDUPTHER

## 2020-11-23 RX ORDER — GABAPENTIN 300 MG/1
300 CAPSULE ORAL 3 TIMES DAILY
Qty: 270 CAPSULE | Refills: 1 | Status: SHIPPED | OUTPATIENT
Start: 2020-11-23 | End: 2021-05-10

## 2020-11-23 RX ORDER — CLOTRIMAZOLE AND BETAMETHASONE DIPROPIONATE 10; .64 MG/G; MG/G
CREAM TOPICAL 2 TIMES DAILY
Qty: 45 G | Refills: 1 | Status: SHIPPED | OUTPATIENT
Start: 2020-11-23 | End: 2021-05-17 | Stop reason: SDUPTHER

## 2020-11-23 RX ORDER — AMITRIPTYLINE HYDROCHLORIDE 75 MG/1
75 TABLET ORAL NIGHTLY
Qty: 90 TABLET | Refills: 3 | Status: SHIPPED | OUTPATIENT
Start: 2020-11-23 | End: 2021-11-12 | Stop reason: SDUPTHER

## 2020-11-23 RX ORDER — MECLIZINE HCL 12.5 MG 12.5 MG/1
25 TABLET ORAL 2 TIMES DAILY PRN
Qty: 30 TABLET | Refills: 1 | Status: SHIPPED | OUTPATIENT
Start: 2020-11-23 | End: 2022-03-28

## 2020-11-23 NOTE — PROGRESS NOTES
"65 y.o. female, here for 2 month follow up for T2dm.   Last seen 9/22/2020 - those notes below.     History of CHF, HTN, HLd - current bp 140 - 150's systolic. Has not taken bp meds yet this morning.   Denies cp, palpitations, shortness of breath.   HLD - stable on current meds. On zetia and statin.   History of MI in past.     Doing well/much improved with diabetes management.   Last Hgba1c was undetectable at > 14%.   Now improved at 9%.   Her bg's are much more controlled.     She continues on metformin 1000 bid.   Also on trulicity 1.5 every week.   Taking jardiance 10 mg tablet daily in am now - tolerating well. No s/e's.   Continue on VGO 40 - 6 clicks with breakfast - 5 clicks with lunch and dinner.   Uses 1 - 2 clicks for a snack in the evening if needed.   Feeling well - and denies any hypoglycemic episodes.     dexcom G6 interpreted/downloaded today - she is using a reader.   Average Bg is 173.   63% time in range.   0% lows.   33% highs.   4% extreme highs.     C/o feet "tingling" - painful itching type sensation to her feet.   Has been using gabapentin 100mg 3 times per day, but not sure if helping much.   Also reports using clobetasol cream to her feet prn which helps.   Reports history of lower back issues/not too painful at present. Just hurts to stoop/lean forward.   Not interested in seeing pain management at this time.     C/o dizziness - happens every day. Has been alleviated by meclizine in the past. Requesting refill.   Comes and goes, usually daily, often accompanied by nausea and sometimes emesis.   Has not seen ENT for this. Denies any vision changes.         Last visit notes from 9/22/2020:   65 y.o. female, here for 1 month check up for management of type 2 diabetes.   Previous a1c's at 14% on MDI -   Switched her to vgo, and put her on a dexcom.   Average 's. So much improved.     Continues on Trulicity 1.5mg sc weekly.   Also on metformin 1000mg po bid.   Taking humalog insulin via " VGO 40 - taking 5 clicks of insulin with meals, 1 with a snack.   Often eats yogurt at bedtime and notices sugar high at nighttime.   Previous on levemir 45 units bid and novolog 25 units tid meals - so previously on total daily dose of 165 units daily.   Large improvement. Now on about 70 units total daily insulin.     Wearing Dexcom G6 - downloaded and interpreted today -   Average BG is 189   42% time in range.   48% highs.   9% very highs.   No lows.     She has multiple comorbidites including heart failure and CAD, with history of multiple stents.   On statin, zetia, bp meds, diuretics and plavix. Requesting refills.   No acute complaints today's visit.     Last visit notes from August 18th, 2020 as follows:   Very pleasant, 65 y.o. female, here for 4 week follow up - managing her type 2 diabetes.   I first saw her July 20th, 2020 -   She was on MDI at that time, with very high sugars.     In the interim, I switched her from MDI to vgo 40 and dexcom.   She is enjoying using both - she is much more aware of her sugars.   She is trying to eat better, and now is learning what foods increase her sugars.     She is using humalog in her vgo 40 - 3 clicks with meals, 4 clicks if sugar is greater than 200 going into meal.   Then 1 click with a snack.     She often snacks in afternoon and before bed - usually a yogurt or/and apple before bed.   Taking in less sweets.   States primarily is doing 3 clicks with meals for most of the day.   Still continues on trulicity 1.5mg sc weekly.   Continues on metformin 1000 metformin XR bid.     Dexcom G6 download interpreted today -   Average BG is 221.   Estimated a1c is 8.6%.   19% time in range.   56% highs.   25% very high.   0% lows.     Her last a1c on mdi was nondetectable >> 14%.   So she has improved for sure down to average BG of 220.   Still not to goal, but large improvement now that she is on continuous insulin.       Last Office visit notes as follows:   HPI: Mercedez  "Will Purvis is a 65 y.o.  female c/I for visit to address Diabetes Type 2   This is the first time I am seeing her, she is a patient of Dr. Vasquez's for primary care needs.   She also saw general endocrinology - [facility], Dr. Olivo just last week to address her uncontrolled diabetes.   She is unsure why she is here with me today as well as she just saw a specialist for diabetes management.     She was diagnosed with T2DM about 10 years ago.   Taking metformin 500mg XR - 2 tablets bid. This dose was just increased last week. She is taking.   Also on Trulicity 1.5mg sc weekly - has been taking for about 6 months.   Also on MDI - for 10 years.   Levemir 37 units BID, was advised to increase to 45 units bid, but hasn't done yet/hasn't changed yet.   and novolog 25 units tid ac meals - was advised to increase to 30 units tid meals at last visit, but hasn't done yet.   Patient denies skipping dose of insulin.   She takes insulin injections 4x/daily.   Was hospitalized with what she recalls as a "diabetic coma" in 2010 - that was when she was initially diagnosed with diabetes, and insulin was begun right away.   Has not been hospitalized for diabetes since that time.   Checking sugars 4x/daily - not writing down.   Today's blood sugars 2 hours post prandial is 309 on fingerstick in my office. Patient took her 25 units of novolog this am, but did not take the levemir yet for her morning dose.    Past medical History:   Past Medical History:   Diagnosis Date    Chronic headache     Diabetes     Heart attack 2004    8 stents    Heart failure     History of heart artery stent     Hyperlipemia     Hypertension     Obesity (BMI 30-39.9)     Yeast infection       Family hx: No family history on file.   Current meds:   Current Outpatient Medications:     alcohol swabs (ALCOHOL WIPES) PadM, Apply 1 each topically 3 (three) times daily. ICD 10 code E 11.42, monitor blood sugar TID, per insurance coverage, Disp: 300 " each, Rfl: 3    amitriptyline (ELAVIL) 75 MG tablet, Take 1 tablet (75 mg total) by mouth every evening., Disp: 90 tablet, Rfl: 3    amLODIPine (NORVASC) 10 MG tablet, Take 10 mg by mouth once daily., Disp: , Rfl:     atorvastatin (LIPITOR) 80 MG tablet, Take 1 tablet (80 mg total) by mouth every evening., Disp: 90 tablet, Rfl: 3    blood-glucose meter,continuous (DEXCOM G6 ) Misc, 1 each by Misc.(Non-Drug; Combo Route) route Daily. Use as directed with Dexcom G6 transmitter and sensor, Disp: 1 each, Rfl: 0    blood-glucose sensor (DEXCOM G6 SENSOR) Roberta, 1 each by Misc.(Non-Drug; Combo Route) route every 10 days. Apply/change sensor to skin every 10 days., Disp: 3 each, Rfl: 3    blood-glucose transmitter (DEXCOM G6 TRANSMITTER) Roberta, 1 each by Misc.(Non-Drug; Combo Route) route Daily. Use transmitter in sensor with G6 system. Change new transmitter every 3 months., Disp: 1 each, Rfl: 3    chlorthalidone (HYGROTEN) 25 MG Tab, Take 25 mg by mouth once daily., Disp: , Rfl:     cholecalciferol, vitamin D3, (VITAMIN D3) 50 mcg (2,000 unit) Cap, Take 1 capsule (2,000 Units total) by mouth once daily., Disp: , Rfl:     clopidogreL (PLAVIX) 75 mg tablet, Take 1 tablet (75 mg total) by mouth once daily., Disp: 90 tablet, Rfl: 3    clotrimazole-betamethasone 1-0.05% (LOTRISONE) cream, Apply topically 2 (two) times daily., Disp: 15 g, Rfl: 1    cyclobenzaprine (FLEXERIL) 10 MG tablet, Take 1 tablet (10 mg total) by mouth 3 (three) times a week., Disp: 36 tablet, Rfl: 3    diclofenac sodium (VOLTAREN) 1 % Gel, Apply 1 application topically., Disp: , Rfl:     dulaglutide (TRULICITY) 1.5 mg/0.5 mL pen injector, Inject 1.5 mg into the skin once a week., Disp: 6 mL, Rfl: 3    empagliflozin (JARDIANCE) 10 mg tablet, Take 1 tablet (10 mg total) by mouth once daily., Disp: 30 tablet, Rfl: 6    ezetimibe (ZETIA) 10 mg tablet, Take 1 tablet (10 mg total) by mouth every evening., Disp: 90 tablet, Rfl: 3     "fluconazole (DIFLUCAN) 150 MG Tab, , Disp: , Rfl:     furosemide (LASIX) 40 MG tablet, Take 40 mg by mouth once daily., Disp: , Rfl:     gabapentin (NEURONTIN) 300 MG capsule, Take 1 capsule (300 mg total) by mouth 3 (three) times daily., Disp: 270 capsule, Rfl: 1    insulin detemir U-100 (LEVEMIR FLEXTOUCH) 100 unit/mL (3 mL) SubQ InPn pen, Inject 45 Units into the skin 2 (two) times daily., Disp: 90 mL, Rfl: 3    insulin lispro (HUMALOG U-100 INSULIN) 100 unit/mL injection, Inject 80 Units into the skin once daily. Gives up to 80 units daily via VGO 40, Disp: 30 mL, Rfl: 11    lancets Misc, Inject 1 each into the skin 3 (three) times daily. ICD 10 code E 11.42, monitor blood sugar TID, Disp: 300 each, Rfl: 3    lidocaine-prilocaine (EMLA) cream, APPLY AA PRF PAINFUL EPISODES OVER BOTH FEET D RUB IN THOROUGHLY, Disp: , Rfl:     lisinopriL (PRINIVIL,ZESTRIL) 40 MG tablet, Take 1 tablet (40 mg total) by mouth once daily., Disp: 90 tablet, Rfl: 3    metFORMIN (GLUCOPHAGE-XR) 500 MG XR 24hr tablet, Take 2 tablets (1,000 mg total) by mouth 2 (two) times daily with meals., Disp: 360 tablet, Rfl: 3    metoprolol succinate (TOPROL-XL) 200 MG 24 hr tablet, Take 1 tablet (200 mg total) by mouth once daily., Disp: 90 tablet, Rfl: 3    pen needle, diabetic (PEN NEEDLE) 32 gauge x 5/32" Ndle, 1 each by Misc.(Non-Drug; Combo Route) route 4 (four) times daily. ICD 10 E11.42, Disp: 400 each, Rfl: 3    spironolactone (ALDACTONE) 50 MG tablet, Take 1 tablet (50 mg total) by mouth once daily., Disp: 90 tablet, Rfl: 1    sub-q insulin device, 40 unit (V-GO 40) Roberta, 1 Device by Misc.(Non-Drug; Combo Route) route once daily., Disp: 30 each, Rfl: 6    clotrimazole-betamethasone 1-0.05% (LOTRISONE) cream, Apply topically 2 (two) times daily., Disp: 45 g, Rfl: 1    flu vac 2020 [id number],PF, (FLUAD QUAD 2020-21,65Y UP,,PF,) 60 mcg (15 mcg x 4)/0.5 mL Syrg, Inject 0.5 mLs into the muscle once. for 1 dose, Disp: 0.5 mL, " Rfl: 0    meclizine (ANTIVERT) 12.5 mg tablet, Take 2 tablets (25 mg total) by mouth 2 (two) times daily as needed for Dizziness., Disp: 30 tablet, Rfl: 1       Social:   Lives at home by herself.  Does have family visit her with social distancing.   Has 5 children, 3 are still living. 13 grandchildren, 11 great-grandchildren.   Diet: not always following ADA diet   Meals: 3 per day and snacks   Breakfast - grits, eggs, sausage,   Lunch - salad, fish, chicken breast.   Dinner -steaks, green beans, small portions of potatoe salad.   Snacks - fruits, apples/oranges, or nuts/pistachios or almonds.  Exercise: none. But does work in yard  Activities: not working.     Current Diabetes medications:   humalog insulin via VGO 40 insulin patch.- 6 clicks with breakfast, 5 clicks with lunch and dinner.   1 - 2 clicks with a snack.   GLP1:  Trulicity 1.5 every week.   Oral meds: meformin 500 xr - 2 tabs bid  jardiance 10 daily.     Medications Tried and Failed:   levemir 45 units bid.   Novolog 25 units tid ac meals.   (switched to vgo 40)  trulicity  Metformin     Glucose Monitoring:   CGM: Dexcom G6   Gets supplies from : [employer]  Has glucometer at home.     Review of Pertinent co-morbidities/risk factors:   CV: history of MI 2004 - with stents placed in 2010 and also in 2019.    CAD: yes   Takes aspirin and plavix daily.  BP: has history of HTN  Statin: Taking  ACE/ARB: Taking    Vital Signs  BP (!) 148/90 (BP Location: Right arm, Patient Position: Sitting, BP Method: Medium (Manual))   Pulse 70   Temp 97.3 °F (36.3 °C) (Temporal)   Resp 16   Ht 5' (1.524 m)   Wt 87.4 kg (192 lb 10.9 oz)   BMI 37.63 kg/m²   Recheck BP is 125/65 of note.     Pertinent Labs:   Hgba1c   Lab Results   Component Value Date    HGBA1C 9.0 (H) 11/17/2020     Lipid panel   Lab Results   Component Value Date    CHOL 164 11/17/2020    CHOL 175 07/13/2020    CHOL 99 02/03/2020     Lab Results   Component Value Date    HDL 36 (L) 11/17/2020    HDL  32 (L) 07/13/2020    HDL 35 (L) 02/03/2020     Lab Results   Component Value Date    LDLCALC 96.6 11/17/2020    LDLCALC 98.8 07/13/2020    LDLCALC 48 02/03/2020     Lab Results   Component Value Date    TRIG 157 (H) 11/17/2020    TRIG 221 (H) 07/13/2020    TRIG 79 02/03/2020     Lab Results   Component Value Date    CHOLHDL 22.0 11/17/2020    CHOLHDL 18.3 (L) 07/13/2020    CHOLHDL 2.83 02/03/2020      CMP  Glucose   Date Value Ref Range Status   11/17/2020 178 (H) 70 - 110 mg/dL Final     BUN   Date Value Ref Range Status   11/17/2020 13 8 - 23 mg/dL Final     Creatinine   Date Value Ref Range Status   11/17/2020 1.2 0.5 - 1.4 mg/dL Final     eGFR if    Date Value Ref Range Status   11/17/2020 54.4 (A) >60 mL/min/1.73 m^2 Final     eGFR if non    Date Value Ref Range Status   11/17/2020 47.2 (A) >60 mL/min/1.73 m^2 Final     Comment:     Calculation used to obtain the estimated glomerular filtration  rate (eGFR) is the CKD-EPI equation.         Microalbumin creatinine ratio:   Lab Results   Component Value Date    MICALBCREAT 9.1 11/17/2020       Social History     Tobacco Use   Smoking Status Never Smoker        Standards of care:   Eyes: .: 11/19/2020  Foot exam: : 07/20/2020 and done today.   Diabetes education: yes    Review Of Systems:   Gen: Appetite good, no weight gain or loss, denies fatigue and weakness. Denies polydipsia.   Skin: Skin is intact and heals well, no rashes, no hair changes  C/o some itching to her feet, has used a steroid cream in the past which helped and requesting refills again.   Eyes: Denies acute visual disturbances, some occasional blurred vision but seems to be improving.   Resp: no SOB or Dyspnea on exertion, denies cough.   Cardiac: Denies chest pain, palpitations, or swelling. Denies tremors.   GI: Denies abdominal pain, nausea or vomiting, diarrhea, constipation.   However reports gets n/v with vertigo episodes.   /GYN: + nocturia, denies any  vaginal burning or pain.  Previous yeast infection. None in the past year.   MS/Neuro: Denies numbness/ tingling in BLE; taking neurontin 100 tid which helps with her leg and foot neuropathy. Doesn't feel is enough.   Gait steady, speech clear  Psych: Denies drug/ETOH abuse, no hx of depression.  Other systems: negative.    Physical Exam:   GENERAL: Well developed, well nourished in appearance.   PSYCH: AAOx3, appropriate mood and affect, pleasant expression, conversant, appears relaxed, well groomed.   EYES: PERRL, Conjunctiva and corneas clear  NECK: Soft and Supple, trachea midline   CHEST: Even, regular, and unlabored respirations  ABDOMEN: Soft, non-tender, non-distended   VASCULAR: pedal pulses palpable bilaterally, no edema.  NEURO:  cranial nerves II - XII intact   MUSCULOSKELETAL: Good ROM, equal strength against resistance to bilateral lower extremities, steady gait.   SKIN: Skin warm, dry, and intact - + acanthosis nigracans mild and skin tags mild.  FEET: +2 dorsalis pedis and posterior pulses noted.  No swelling. No onychomycosis. Nails are trimmed and no thickening.     Assessment and Plan of Care:     Mercedez was seen today for diabetes.    Diagnoses and all orders for this visit:    Type 2 diabetes mellitus with diabetic polyneuropathy, with long-term current use of insulin  -     gabapentin (NEURONTIN) 300 MG capsule; Take 1 capsule (300 mg total) by mouth 3 (three) times daily.  -     Comprehensive Metabolic Panel; Future  -     Hemoglobin A1C; Future  -     dulaglutide (TRULICITY) 1.5 mg/0.5 mL pen injector; Inject 1.5 mg into the skin once a week.    Moderate nonproliferative diabetic retinopathy of both eyes without macular edema associated with type 2 diabetes mellitus    Hyperlipidemia associated with type 2 diabetes mellitus  -     Lipid Panel; Future    Hypertension associated with diabetes  -     Microalbumin/Creatinine Ratio, Urine; Future    Gastroesophageal reflux disease without  esophagitis    TIKA (obstructive sleep apnea)    Severe obesity (BMI 35.0-39.9) with comorbidity    Neuropathy due to type 2 diabetes mellitus    Chronic nonintractable headache, unspecified headache type  -     amitriptyline (ELAVIL) 75 MG tablet; Take 1 tablet (75 mg total) by mouth every evening.    Dizziness  -     Ambulatory referral/consult to ENT; Future    Other orders  -     clotrimazole-betamethasone 1-0.05% (LOTRISONE) cream; Apply topically 2 (two) times daily.  -     meclizine (ANTIVERT) 12.5 mg tablet; Take 2 tablets (25 mg total) by mouth 2 (two) times daily as needed for Dizziness.     1. T2DM with hyperglycemia- Hgba1c goal is 7.5% or less -Hgba1c was > 14%---> improved to 9% on labs.    Excellent improvement per CGM  - reviewed. See scanned in report.   bg's are now at goal mostly.   Continue humalog in vgo 40 - continue 6 clicks with breakfast, 5 clicks with lunch, 5 clicks with dinner. And make sure to do 1 - 2 clicks with snacks.   Gave her paramaters    (previously on 165 units of insulin per day with MDI, now more controlled on 70 total units of insulin per day).   Continue metformin 1000 bid. Renal function stable.   Continue trulicity 1.5mg sc weekly.    Continue Jardiance 10 mg - Take 1 tablet po daily.   Discussed MOA, possible side effects including yeast infection, UTI, dehydration and ketoacidosis, importance of maintaining hydration and avoiding No carb diets. Good use hygiene. Notify my office if any side effects. Will monitor renal function closely. Stable at present.   Continue using Dexcom G6 cgm - interpreted today - see scanned in report.   Titrated clicks/increased based on CGM interpretation).   Continues to check sugars 4 times per day.   Continues on insulin injections 4 times per day via VGO  discussed DM, progression of disease, long term complications, CV risk factors and tx options.   She already has established CAD and history of MI, so at risk for repeat event.   Advise  compliance with ADA diet and encourage exercise  Reviewed  hypoglycemia, s/s and appropriate tx.    Instructed to monitor Blood glucose 4x/day and bring meter/ log to every clinic visit.   Eyes - dilated retinal exam 7/15/2020 - no diabetic retinopathy but with follow up recommended in 1 month with ophthalmology.   Had exam done 9/2/2020 with opthalmology  Podiatry - had check up recently. Foot exam negative today. Advised on proper foot care. - Dr. Carrera 2020. Refilled clotrimazole/betamethasone.  Neuropathy - increased gabapentin from 100 to 300 tid. Recommended consult with pain management doctor as I think some of this might be related to her back, but she declines for now. Advised to increase gabapentin/wean dose slowly.     2. HTN- controlled, continue meds as previously prescribed and monitor.   meds refilled. \has not taken bp meds yet this morning. Encouraged to take.     3. HLD - LDL goal < 100. Minimally at goal. On atorvastatin 80mg every Hs and zetia.   Refills given for both at last visit.   Currently on statin therapy- to continue.   hsitory of MI and stents already - on plavix daily. Refilled at last visit.     4. Weight - BMI Body mass index is 37.63 kg/m².   Encourage Ada diet and exercise.    continue Trulicity.     5. GERD - previously on nexium but stable.   No longer taking.     6. TIKA - stable on cpap    7. Heart failure - stable - refilled BB and diuretics at lst visit.     8. CAD - continues on aspirin and plavix, s/p 6 stents.     9. Vertigo - often accompanied by N/V - increasing in frequency.   Put in consult for ENT      Follow up in 3 months with Ov and labs.

## 2020-11-23 NOTE — PATIENT INSTRUCTIONS
"Continue metformin 1000 twice per day.   Continue Jardiance 10 mg daily.   Continue Trulicity 1.5 mg every week.   Continue VGO - 6 clicks with breakfast, 5 clicks with lunch and dinner.   Continue using Dexcom G6     For low blood sugar - remember to keep glucose tablets on hand. You can purchase these at the pharmacy check out desk/over the counter.   If blood sugar is less than 70, take/eat 2 - 3 tablets quickly to bring your sugar up.   Always keep 15 grams of Quick acting carbohydrates on hand to eat/drink if your sugar is low - examples are 1/2 cup of juice, coke, or crackers, granola bars.   Remember to eat meals frequently to prevent low blood blood sugars.     Low Carb Snacks & Diabetes friendly foods   Aim for 30 - 40 grams of carbs for 3 meals per day (or 2 meals and  1 - 2 snacks - however you prefer)  Snacks can be 15 - 20 grams of carbs.     Eating small, frequent meals will help you to feel more satisfied, and more full so that you don't over eat or eat the wrong foods later.   Also, radha meals/snacks allow you to spread carbohydrates evenly, which may stabilize blood sugars.   Below you will find a list of ideas of foods that I like/prefer.   Feel free to give me your ideas and tips if you find good ones too!   Overall - please remember to limit refined sugars such as soft drinks, juices, rices, pastas, breads, cakes.   Look at the back of the label - look at the amount of "carbohydrates", then look at the amount of "fiber" - subtract the amount of fiber from the amount of carbs - this is your "net carb intake".  The more fiber a food has, the better generally, as you get to subtract this from the net carbs.     0-5 gm carb   Crystal Light flavoring (I like fruit punch flavor!)   Vitamin Water Zero   Yamilex antioxidant drinks.    Sparkling ice (long skinny flavored water bottles for $1 that are sugar free).    Mora water, WaterLoo - carbonated flavored elliott, various flavors.   Diet copedro luis, " "diet barqs, sprite zero.   Diet sunkist (orange drink)   Sugar free powerade   Herbal tea, unsweetened   2 tsp peanut butter on celery or carrots   Melany's original Candies - (the Sugar Free ones!)   1/2 cup sugar-free jell-o   1 sugar-free popsicle   ¼ cup blueberries or strawberries   8oz Blue Thelma unsweetened almond milk (or there is sugar free vanilla flavored as well).   5 baby carrots & celery sticks, cucumbers, bell peppers dipped in ¼ cup salsa, 2Tbsp light ranch dressing or 2Tbsp plain Greek yogurt   10 Goldfish crackers   ½ oz low-fat cheese or string cheese   1 closed handful of nuts, unsalted (example-->almonds, pistachios, cashews, peanuts, etc).   1 Tbsp of sunflower seeds, unsalted   1 cup Smart Pop popcorn   1 whole grain brown rice cake    "Think Thin" protein bars - my personal favorite is Creamy Peanut butter, chocolate brownie, or oreo flavors.   "Morrow" bars  - can be found at [employer] grocery store - they have 5 grams of net carbohydrates.   Quest bars (my favorite is birthday cake, and also cinnamon roll is good melted for 10 seconds in the microwave - please remove the wrapper first!)   Premier protein shakes - sold at [employer], or other brand alternatives - usually 1 - 2 grams of carbs (strawberry, vanilla, chocolate flavors) Coffee flavor is my new morning favorite!    Scrambled eggs! Or a fried egg or boiled eggs - add sliced tomatoes, cilantro or some chopped green onions.    Eggs are good with any and all veggies! You can even eat them for dinner or in a low carb tortilla, or served with your favorite grilled meat/sausage or mcrae is my favorite.    Veggzurdo torres - zucchini - can buy in the frozen foods section. Birds eye brand is usually cheap. Tip - saute with oil/onions or italian seasoning and use this as a pasta substitution.   Milk - is usually high in carbs/sugar - use Libersy milk brand instead - it is "filtered" milk - with half the amount of " carbs of regular milk. (next to the milk in milk aisle).    Smuckers sugar free Breakfast syrup (or 1 tablespoon of low sugar breakfast syrup) instead of regular syrup.        15 gm carb   1 small piece of fruit or ½ banana or 1/2 cup lite canned fruit   3 kevin cracker squares   3 cups Smart Pop popcorn, top spray butter, Lang lite salt or cinnamon and Truvia   5 Vanilla Wafers   ½ cup low fat, no added sugar ice cream or frozen yogurt (Blue bell, Blue Bunny, Weight Watchers, Skinny Cow)   1/2 - 1 cup Light n' fit Vanilla yogurt (has added protein in it to make you feel full).    ½ turkey, ham, or chicken sandwich   ½ c fruit with ½ c Cottage cheese   4-6 unsalted wheat crackers with 1 oz low fat cheese or 1 tbsp peanut butter    30-45 goldfish crackers (depending on flavor)    7-8 Religion mini brown rice cakes (caramel, apple cinnamon, chocolate)    12 Religion mini brown rice cakes (cheddar, bbq, ranch)    1/3 cup hummus dip with raw veg   1/2 whole wheat cecilia, 1Tbsp hummus   Mini Pizza (1/2 whole wheat English muffin, low-fat  cheese, tomato sauce)   100 calorie snack pack (Oreo, Chips Ahoy, Ritz Mix, Baked Cheetos)   4-6 oz. light or Greek Style yogurt (Chobani, Yoplait, Okios, Stoneyfield)   ½ cup sugar-free pudding     6 in. wheat tortilla or cecilia oven toasted chips (topped with spray butter flavoring, cinnamon, Truvia OR spray butter, garlic powder, chili powder)    18 BBQ Popchips (available at Target, Whole Foods, Fresh Market)   Mini bagel (small size) toasted - add fat free cream cheese or avocado and sliced tomato on top - yum!    1/2 cup Halo top icecream - birthday cake is my go-to flavor.    Truth Bars - can be found at Fresh market - Net carbs is 11 - 12 grams depending on the flavor.    Kind bars = mostly nuts and dried berries - find at most local groceries stores, drug stores, whole foods, fresh market. Net carbs is around 10 grams.     Blas Mccray - I'm often asked  ""what smoothie is healthy for me".   Do not be decieved to think that all smoothies are "healthy". In fact, most are loaded with hidden sugars.   Here are some from the menu that you are allowed to have being diabetic:   The gladiator - any flavor.   Keto champ (berry, chocolate or coffee - all have 10 grams of carbs).   The Lean 1 smoothies all have 15 - 20 grams of carbs, so this is slightly more. But would be ok for a meal substitute or snack.   The Shredder in Vanilla or chocolate only. (the strawberry has more sugar considerably)    Tips and Tricks:     Eat an extra vegetable once per day - green veggies (such as broccoli, cauliflower, green beans) - make you feel full and are low in sugar.     My favorite "secret" seasoning to make things extra yummy is cinnamon for sweet taste   For an added secret "salty" taste - add "everything but the bagel" seasoning (you can get on amazon.com or at  joes. I have seen at local groceries such as [employer] too!).     Dark colored fruits are your friend -- blueberries, strawberries, raspberries, blackberries. They have a lower sugar content. So will be the best choice for you.   Light colored fruits are NOT your friend - they tend to be higher in sugar and are not the best options for an ADA diet - examples are oranges, bananas, peaches, watermelon, -- you can eat these, but carefully and in moderation.     WATER. I cannot emphasize drinking water enough - it will hydrate you, make you full. Your body needs it - it's a natural appetite suppressant.   Sometimes hunger and thirst can feel the same. Try drinking some water first, then eat if you are still hungry.     Other snack choices    Celery with peanut butter   Celery with tuna salad   Dill pickles and cheddar cheese (no kidding, it's a great combo)   Nuts (keep raw ones in the freezer if you think you'll overeat them)   Sunflower seeds (get them in the shell so it will take longer to eat them)   Other seeds (How " "to Toast Pumpkin or Squash Seeds)    Pistachios or almonds - will fill you up and are tasty!    Low-Carb Trail Mix   Jerky (beef or turkey -- try to find low-sugar varieties)   Salami slices (you can find in the deli section)   Cheese sticks, such as string cheese   Sugar-free Jello, alone or with cottage cheese and a sprinkling of nuts. Make sugar-free lime Jello with part coconut milk -- For a large package, dissolve the powder in a cup of boiling water, add a can of coconut milk, and then add the rest of the water. Stir well.   Pepperoni "chips" -- Zap the slices in the microwave   Cheese with a few apple slices   4-ounce plain or sugar-free yogurt with berries and flax seed meal   Smoked salmon and cream cheese on cucumber slices   Lettuce Roll-ups -- Roll luncheon meat, egg salad, tuna or other filling and veggies in lettuce leaves   Lunch Meat Roll-ups -- Roll cheese or veggies in lunch meat (read the labels for carbs on the lunch meat)   Spread bean dip, spinach dip, or other low-carb dip or spread on the lunch meat or lettuce and then roll it up   Raw veggies and spinach dip, or other low-carb dip   Pork rinds (Chicharrón), with or without dip   Ricotta cheese with fruit and/or nuts and/or flax seed meal   Mushrooms with cheese spread inside (or other spreads or dips)   Low-carb snack bars (watch out for sugar alcohols, especially maltitol)   Product Review: Atkins Advantage Bars   Pepperoni Chips -- Microwave pepperoni slices until crisp. Great with cheeses and dips   Garlic Parmesan Flax Seed Crackers   Parmesan Crisps -- Good when you want a crunchy snack.   Peanut Butter Protein Balls          "

## 2020-11-27 ENCOUNTER — OFFICE VISIT (OUTPATIENT)
Dept: OPTOMETRY | Facility: CLINIC | Age: 65
End: 2020-11-27
Payer: MEDICARE

## 2020-11-27 DIAGNOSIS — H52.4 HYPEROPIA WITH ASTIGMATISM AND PRESBYOPIA, LEFT: ICD-10-CM

## 2020-11-27 DIAGNOSIS — H52.02 HYPEROPIA WITH ASTIGMATISM AND PRESBYOPIA, LEFT: ICD-10-CM

## 2020-11-27 DIAGNOSIS — H04.123 DRY EYE SYNDROME OF BOTH EYES: Primary | ICD-10-CM

## 2020-11-27 DIAGNOSIS — H52.202 HYPEROPIA WITH ASTIGMATISM AND PRESBYOPIA, LEFT: ICD-10-CM

## 2020-11-27 DIAGNOSIS — H52.01 HYPEROPIA WITH PRESBYOPIA OF RIGHT EYE: ICD-10-CM

## 2020-11-27 DIAGNOSIS — H52.4 HYPEROPIA WITH PRESBYOPIA OF RIGHT EYE: ICD-10-CM

## 2020-11-27 PROCEDURE — 1101F PR PT FALLS ASSESS DOC 0-1 FALLS W/OUT INJ PAST YR: ICD-10-PCS | Mod: CPTII,S$GLB,, | Performed by: OPTOMETRIST

## 2020-11-27 PROCEDURE — 99999 PR PBB SHADOW E&M-EST. PATIENT-LVL IV: CPT | Mod: PBBFAC,,, | Performed by: OPTOMETRIST

## 2020-11-27 PROCEDURE — 3288F PR FALLS RISK ASSESSMENT DOCUMENTED: ICD-10-PCS | Mod: CPTII,S$GLB,, | Performed by: OPTOMETRIST

## 2020-11-27 PROCEDURE — 92015 DETERMINE REFRACTIVE STATE: CPT | Mod: S$GLB,,, | Performed by: OPTOMETRIST

## 2020-11-27 PROCEDURE — 1101F PT FALLS ASSESS-DOCD LE1/YR: CPT | Mod: CPTII,S$GLB,, | Performed by: OPTOMETRIST

## 2020-11-27 PROCEDURE — 92015 PR REFRACTION: ICD-10-PCS | Mod: S$GLB,,, | Performed by: OPTOMETRIST

## 2020-11-27 PROCEDURE — 1126F PR PAIN SEVERITY QUANTIFIED, NO PAIN PRESENT: ICD-10-PCS | Mod: S$GLB,,, | Performed by: OPTOMETRIST

## 2020-11-27 PROCEDURE — 3288F FALL RISK ASSESSMENT DOCD: CPT | Mod: CPTII,S$GLB,, | Performed by: OPTOMETRIST

## 2020-11-27 PROCEDURE — 92012 PR EYE EXAM, EST PATIENT,INTERMED: ICD-10-PCS | Mod: S$GLB,,, | Performed by: OPTOMETRIST

## 2020-11-27 PROCEDURE — 92012 INTRM OPH EXAM EST PATIENT: CPT | Mod: S$GLB,,, | Performed by: OPTOMETRIST

## 2020-11-27 PROCEDURE — 1126F AMNT PAIN NOTED NONE PRSNT: CPT | Mod: S$GLB,,, | Performed by: OPTOMETRIST

## 2020-11-27 PROCEDURE — 99999 PR PBB SHADOW E&M-EST. PATIENT-LVL IV: ICD-10-PCS | Mod: PBBFAC,,, | Performed by: OPTOMETRIST

## 2020-11-27 NOTE — PROGRESS NOTES
HPI     Here for refraction today. C/o blurred distance vision OU. Ref. by Dr Alanis.     (+)DM  Hemoglobin A1C       Date                     Value               Ref Range             Status                11/17/2020               9.0 (H)             4.0 - 5.6 %           Final                  07/13/2020               >14.0 (H)           4.0 - 5.6 %           Final                  02/03/2020               14.7 (H)            4.7 - 5.6 %           Final                 08/27/2010               12.4 (H)            4.0 - 6.2 %           Final            ----------      Last edited by Mirtha Akins, OD on 11/27/2020 12:21 PM. (History)            Assessment /Plan     For exam results, see Encounter Report.    Dry eye syndrome of both eyes    Hyperopia with presbyopia of right eye    Hyperopia with astigmatism and presbyopia, left      1. Educated pt on findings. Recommend ATs BID-TID + jonathon/gel QHS for added lubrication and comfort. Monitor.     2-3. Updated SRx. Moderate change OD, no change OS. Monitor yearly.       NOTE: Patient followed regularly by Dr. Alanis due to NPDR + DME. Continue management and care with retina as directed.       RTC with Dr. Alanis 02/2021, me prn.

## 2020-12-11 ENCOUNTER — PATIENT MESSAGE (OUTPATIENT)
Dept: OTHER | Facility: OTHER | Age: 65
End: 2020-12-11

## 2020-12-21 ENCOUNTER — PATIENT OUTREACH (OUTPATIENT)
Dept: ADMINISTRATIVE | Facility: OTHER | Age: 65
End: 2020-12-21

## 2020-12-21 ENCOUNTER — PATIENT MESSAGE (OUTPATIENT)
Dept: ADMINISTRATIVE | Facility: OTHER | Age: 65
End: 2020-12-21

## 2020-12-21 NOTE — PROGRESS NOTES
LINKS immunization registry not responding  Care Everywhere updated  Health Maintenance updated  Chart reviewed for overdue Proactive [facility] Encounters (BECKIE) health maintenance testing (CRS, Breast Ca, Diabetic Eye Exam)   Orders entered:N/A  Patient has open case request for colonoscopy.

## 2020-12-22 ENCOUNTER — OFFICE VISIT (OUTPATIENT)
Dept: OTOLARYNGOLOGY | Facility: CLINIC | Age: 65
End: 2020-12-22
Payer: MEDICARE

## 2020-12-22 ENCOUNTER — CLINICAL SUPPORT (OUTPATIENT)
Dept: AUDIOLOGY | Facility: CLINIC | Age: 65
End: 2020-12-22
Payer: MEDICARE

## 2020-12-22 VITALS — WEIGHT: 187.81 LBS | BODY MASS INDEX: 36.68 KG/M2

## 2020-12-22 DIAGNOSIS — H81.12 BPPV (BENIGN PAROXYSMAL POSITIONAL VERTIGO), LEFT: Primary | ICD-10-CM

## 2020-12-22 DIAGNOSIS — R42 DIZZINESS: ICD-10-CM

## 2020-12-22 DIAGNOSIS — R26.89 BALANCE PROBLEMS: ICD-10-CM

## 2020-12-22 DIAGNOSIS — E11.40 NEUROPATHY DUE TO TYPE 2 DIABETES MELLITUS: ICD-10-CM

## 2020-12-22 DIAGNOSIS — H90.3 SENSORINEURAL HEARING LOSS, BILATERAL: Primary | ICD-10-CM

## 2020-12-22 PROCEDURE — 99999 PR PBB SHADOW E&M-EST. PATIENT-LVL V: ICD-10-PCS | Mod: PBBFAC,,, | Performed by: OTOLARYNGOLOGY

## 2020-12-22 PROCEDURE — 3052F HG A1C>EQUAL 8.0%<EQUAL 9.0%: CPT | Mod: CPTII,S$GLB,, | Performed by: OTOLARYNGOLOGY

## 2020-12-22 PROCEDURE — 1101F PT FALLS ASSESS-DOCD LE1/YR: CPT | Mod: CPTII,S$GLB,, | Performed by: OTOLARYNGOLOGY

## 2020-12-22 PROCEDURE — 1126F PR PAIN SEVERITY QUANTIFIED, NO PAIN PRESENT: ICD-10-PCS | Mod: S$GLB,,, | Performed by: OTOLARYNGOLOGY

## 2020-12-22 PROCEDURE — 99203 OFFICE O/P NEW LOW 30 MIN: CPT | Mod: S$GLB,,, | Performed by: OTOLARYNGOLOGY

## 2020-12-22 PROCEDURE — 3008F BODY MASS INDEX DOCD: CPT | Mod: CPTII,S$GLB,, | Performed by: OTOLARYNGOLOGY

## 2020-12-22 PROCEDURE — 1101F PR PT FALLS ASSESS DOC 0-1 FALLS W/OUT INJ PAST YR: ICD-10-PCS | Mod: CPTII,S$GLB,, | Performed by: OTOLARYNGOLOGY

## 2020-12-22 PROCEDURE — 99999 PR PBB SHADOW E&M-EST. PATIENT-LVL I: ICD-10-PCS | Mod: PBBFAC,,, | Performed by: AUDIOLOGIST

## 2020-12-22 PROCEDURE — 92557 PR COMPREHENSIVE HEARING TEST: ICD-10-PCS | Mod: S$GLB,,, | Performed by: AUDIOLOGIST

## 2020-12-22 PROCEDURE — 3288F PR FALLS RISK ASSESSMENT DOCUMENTED: ICD-10-PCS | Mod: CPTII,S$GLB,, | Performed by: OTOLARYNGOLOGY

## 2020-12-22 PROCEDURE — 99999 PR PBB SHADOW E&M-EST. PATIENT-LVL V: CPT | Mod: PBBFAC,,, | Performed by: OTOLARYNGOLOGY

## 2020-12-22 PROCEDURE — 3052F PR MOST RECENT HEMOGLOBIN A1C LEVEL 8.0 - < 9.0%: ICD-10-PCS | Mod: CPTII,S$GLB,, | Performed by: OTOLARYNGOLOGY

## 2020-12-22 PROCEDURE — 92557 COMPREHENSIVE HEARING TEST: CPT | Mod: S$GLB,,, | Performed by: AUDIOLOGIST

## 2020-12-22 PROCEDURE — 1159F MED LIST DOCD IN RCRD: CPT | Mod: S$GLB,,, | Performed by: OTOLARYNGOLOGY

## 2020-12-22 PROCEDURE — 1159F PR MEDICATION LIST DOCUMENTED IN MEDICAL RECORD: ICD-10-PCS | Mod: S$GLB,,, | Performed by: OTOLARYNGOLOGY

## 2020-12-22 PROCEDURE — 3288F FALL RISK ASSESSMENT DOCD: CPT | Mod: CPTII,S$GLB,, | Performed by: OTOLARYNGOLOGY

## 2020-12-22 PROCEDURE — 99999 PR PBB SHADOW E&M-EST. PATIENT-LVL I: CPT | Mod: PBBFAC,,, | Performed by: AUDIOLOGIST

## 2020-12-22 PROCEDURE — 92567 TYMPANOMETRY: CPT | Mod: S$GLB,,, | Performed by: AUDIOLOGIST

## 2020-12-22 PROCEDURE — 3008F PR BODY MASS INDEX (BMI) DOCUMENTED: ICD-10-PCS | Mod: CPTII,S$GLB,, | Performed by: OTOLARYNGOLOGY

## 2020-12-22 PROCEDURE — 1126F AMNT PAIN NOTED NONE PRSNT: CPT | Mod: S$GLB,,, | Performed by: OTOLARYNGOLOGY

## 2020-12-22 PROCEDURE — 99203 PR OFFICE/OUTPT VISIT, NEW, LEVL III, 30-44 MIN: ICD-10-PCS | Mod: S$GLB,,, | Performed by: OTOLARYNGOLOGY

## 2020-12-22 PROCEDURE — 92567 PR TYMPA2METRY: ICD-10-PCS | Mod: S$GLB,,, | Performed by: AUDIOLOGIST

## 2020-12-22 NOTE — LETTER
December 22, 2020      Jackie Chavez MD  [address] [place] [facility] [address]           Juan Vazquez  [address] [facility] [address]  [address]  [facility] [address]  Phone: [phone]  Fax: [phone]          Patient: Mercedez Purvis   MR Number: [id number]   YOB: 1955   Date of Visit: 12/22/2020       Dear Dr. Jackie Chavez:    Thank you for referring Mercedez Purvis to me for evaluation. Attached you will find relevant portions of my assessment and plan of care.    If you have questions, please do not hesitate to call me. I look forward to following Mercedez Purvis along with you.    Sincerely,    Ra Alfaro MD    Enclosure  CC:  No Recipients    If you would like to receive this communication electronically, please contact [email or web address] or [phone] to request more information on OrderUp Link access.    For providers and/or their staff who would like to refer a patient to [facility], please contact us through our one-stop-shop provider referral line, [facility], at [phone].    If you feel you have received this communication in error or would no longer like to receive these types of communications, please e-mail [email or web address]

## 2020-12-22 NOTE — PROGRESS NOTES
Subjective:       Patient ID: Mercedez Purvis is a 65 y.o. female.    Chief Complaint: Dizziness    Dizziness:   Chronicity:  Chronic  Onset:  More than 1 month ago  Progression since onset:  Unchanged  Frequency - weeks/days included: daily.  Severity:  Mild  Dizziness characteristics:  Spinning inside head only  Frequency of Spells:  Daily   Associated symptoms: headaches and light-headedness.no hearing loss.  Aggravated by:  Position changes  Treatments tried:  Meclizine  Improvements on treatment:  No reliefno ear surgery.    Review of Systems   Constitutional: Negative.    HENT: Negative for hearing loss.    Respiratory: Positive for shortness of breath.    Cardiovascular: Negative.    Gastrointestinal: Negative.    Genitourinary: Negative.    Musculoskeletal: Negative.    Integumentary:  Negative.   Allergic/Immunologic: Negative.    Neurological: Positive for dizziness, light-headedness and headaches.   Hematological: Negative.    Psychiatric/Behavioral: Negative.          Objective:      Physical Exam  Vitals signs and nursing note reviewed.   Constitutional:       General: She is not in acute distress.     Appearance: She is well-developed.   HENT:      Head: Normocephalic and atraumatic.      Right Ear: Tympanic membrane, ear canal and external ear normal.      Left Ear: Tympanic membrane, ear canal and external ear normal.      Ears:      Comments: Guillermina-Hallpike positive on left      Nose: Nose normal.      Mouth/Throat:      Pharynx: Uvula midline.   Eyes:      Conjunctiva/sclera: Conjunctivae normal.      Pupils: Pupils are equal, round, and reactive to light.   Neck:      Musculoskeletal: Normal range of motion.      Thyroid: No thyromegaly.      Trachea: No tracheal deviation.   Cardiovascular:      Rate and Rhythm: Normal rate and regular rhythm.   Pulmonary:      Effort: Pulmonary effort is normal. No respiratory distress.   Musculoskeletal: Normal range of motion.   Lymphadenopathy:       Head:      Right side of head: No submental, submandibular or tonsillar adenopathy.      Left side of head: No submental, submandibular or tonsillar adenopathy.      Cervical: No cervical adenopathy.   Neurological:      Mental Status: She is alert and oriented to person, place, and time.   Psychiatric:         Behavior: Behavior normal.       Data:      Audiogram tracings independently reviewed and discussed with patient.  There is a high frequency SNHL with overall normal pure tone average, and speech discrimination is 100%.  Tympanometry is type A in both ears.   Assessment:       1. BPPV (benign paroxysmal positional vertigo), left    2. Dizziness    3. Balance problems    4. Neuropathy due to type 2 diabetes mellitus        Plan:         65-year-old female with complaints consistent with left-sided BPPV.  She has positive on Guillermina-Hallpike exam.  She reports that her symptoms are chronic in have been present for last year.  She also has coinciding neuropathy.  And reports some balance difficulties.    Recommend vestibular therapy evaluation due to the chronic nature of her BPPV which is uncommon.  She may also benefit from balance exercises in addition to canalith repositioning maneuvers.    Answers for HPI/ROS submitted by the patient on 12/21/2020   Snoring?: Yes  Sleep Apnea?: Yes

## 2020-12-22 NOTE — PROGRESS NOTES
Answers for HPI/ROS submitted by the patient on 12/21/2020   None of these: Yes  hearing loss: Yes  Snoring?: Yes  Sleep Apnea?: Yes  shortness of breath: Yes  None of these : Yes  None of these: Yes  None of these: Yes  None of these: Yes  None of these : Yes  None of these: Yes  dizziness: Yes  headaches: Yes  Light-headedness: Yes  None of these: Yes  None of these: Yes

## 2020-12-22 NOTE — PROGRESS NOTES
Mercedez Purvis was seen today in the clinic for an audiologic evaluation.  Patient's main complaint was room spinning vertigo that lasts 30 seconds to a minute when she looks up and to the left or when she rolls over onto her left side in bed.  Ms. Purvis reported she had a hearing test in the beginning of the year that resulted in a mild hearing loss.    Tympanometry revealed Type A in the right ear and Type A in the left ear.     Audiogram results revealed a mild SNHL 8572-6697 Hz in the right and left ear.      Speech reception thresholds were noted at 20 dB in the right ear and 15 dB in the left ear.    Speech discrimination scores were 100% in the right ear and 100% in the left ear.    Recommendations:  1. Otologic evaluation  2. Annual audiogram  3. Noise protection when in noise

## 2021-01-04 ENCOUNTER — PATIENT MESSAGE (OUTPATIENT)
Dept: ADMINISTRATIVE | Facility: HOSPITAL | Age: 66
End: 2021-01-04

## 2021-02-18 ENCOUNTER — PATIENT OUTREACH (OUTPATIENT)
Dept: ADMINISTRATIVE | Facility: OTHER | Age: 66
End: 2021-02-18

## 2021-02-19 ENCOUNTER — LAB VISIT (OUTPATIENT)
Dept: LAB | Facility: HOSPITAL | Age: 66
End: 2021-02-19
Payer: MEDICARE

## 2021-02-19 ENCOUNTER — OFFICE VISIT (OUTPATIENT)
Dept: OPHTHALMOLOGY | Facility: CLINIC | Age: 66
End: 2021-02-19
Payer: MEDICARE

## 2021-02-19 DIAGNOSIS — E11.36 DIABETIC CATARACT OF BOTH EYES: ICD-10-CM

## 2021-02-19 DIAGNOSIS — Z79.4 TYPE 2 DIABETES MELLITUS WITH RIGHT EYE AFFECTED BY MILD NONPROLIFERATIVE RETINOPATHY WITHOUT MACULAR EDEMA, WITH LONG-TERM CURRENT USE OF INSULIN: ICD-10-CM

## 2021-02-19 DIAGNOSIS — E11.3212 TYPE 2 DIABETES MELLITUS WITH LEFT EYE AFFECTED BY MILD NONPROLIFERATIVE RETINOPATHY AND MACULAR EDEMA, WITH LONG-TERM CURRENT USE OF INSULIN: Primary | ICD-10-CM

## 2021-02-19 DIAGNOSIS — Z79.4 TYPE 2 DIABETES MELLITUS WITH LEFT EYE AFFECTED BY MILD NONPROLIFERATIVE RETINOPATHY AND MACULAR EDEMA, WITH LONG-TERM CURRENT USE OF INSULIN: Primary | ICD-10-CM

## 2021-02-19 DIAGNOSIS — E11.69 HYPERLIPIDEMIA ASSOCIATED WITH TYPE 2 DIABETES MELLITUS: ICD-10-CM

## 2021-02-19 DIAGNOSIS — E78.5 HYPERLIPIDEMIA ASSOCIATED WITH TYPE 2 DIABETES MELLITUS: ICD-10-CM

## 2021-02-19 DIAGNOSIS — E11.42 TYPE 2 DIABETES MELLITUS WITH DIABETIC POLYNEUROPATHY, WITH LONG-TERM CURRENT USE OF INSULIN: ICD-10-CM

## 2021-02-19 DIAGNOSIS — Z79.4 TYPE 2 DIABETES MELLITUS WITH DIABETIC POLYNEUROPATHY, WITH LONG-TERM CURRENT USE OF INSULIN: ICD-10-CM

## 2021-02-19 DIAGNOSIS — E11.3291 TYPE 2 DIABETES MELLITUS WITH RIGHT EYE AFFECTED BY MILD NONPROLIFERATIVE RETINOPATHY WITHOUT MACULAR EDEMA, WITH LONG-TERM CURRENT USE OF INSULIN: ICD-10-CM

## 2021-02-19 LAB
ALBUMIN SERPL BCP-MCNC: 3.3 G/DL (ref 3.5–5.2)
ALP SERPL-CCNC: 114 U/L (ref 55–135)
ALT SERPL W/O P-5'-P-CCNC: 9 U/L (ref 10–44)
ANION GAP SERPL CALC-SCNC: 5 MMOL/L (ref 8–16)
AST SERPL-CCNC: 12 U/L (ref 10–40)
BILIRUB SERPL-MCNC: 0.3 MG/DL (ref 0.1–1)
BUN SERPL-MCNC: 12 MG/DL (ref 8–23)
CALCIUM SERPL-MCNC: 9.1 MG/DL (ref 8.7–10.5)
CHLORIDE SERPL-SCNC: 101 MMOL/L (ref 95–110)
CHOLEST SERPL-MCNC: 175 MG/DL (ref 120–199)
CHOLEST/HDLC SERPL: 4.6 {RATIO} (ref 2–5)
CO2 SERPL-SCNC: 33 MMOL/L (ref 23–29)
CREAT SERPL-MCNC: 1 MG/DL (ref 0.5–1.4)
EST. GFR  (AFRICAN AMERICAN): >60 ML/MIN/1.73 M^2
EST. GFR  (NON AFRICAN AMERICAN): 58.4 ML/MIN/1.73 M^2
ESTIMATED AVG GLUCOSE: 246 MG/DL (ref 68–131)
GLUCOSE SERPL-MCNC: 164 MG/DL (ref 70–110)
HBA1C MFR BLD: 10.2 % (ref 4–5.6)
HDLC SERPL-MCNC: 38 MG/DL (ref 40–75)
HDLC SERPL: 21.7 % (ref 20–50)
LDLC SERPL CALC-MCNC: 116.2 MG/DL (ref 63–159)
LEFT EYE DM RETINOPATHY: POSITIVE
NONHDLC SERPL-MCNC: 137 MG/DL
POTASSIUM SERPL-SCNC: 4.1 MMOL/L (ref 3.5–5.1)
PROT SERPL-MCNC: 7.3 G/DL (ref 6–8.4)
RIGHT EYE DM RETINOPATHY: POSITIVE
SODIUM SERPL-SCNC: 139 MMOL/L (ref 136–145)
TRIGL SERPL-MCNC: 104 MG/DL (ref 30–150)

## 2021-02-19 PROCEDURE — 80061 LIPID PANEL: CPT

## 2021-02-19 PROCEDURE — 99999 PR PBB SHADOW E&M-EST. PATIENT-LVL IV: CPT | Mod: PBBFAC,,, | Performed by: OPHTHALMOLOGY

## 2021-02-19 PROCEDURE — 3288F FALL RISK ASSESSMENT DOCD: CPT | Mod: CPTII,S$GLB,, | Performed by: OPHTHALMOLOGY

## 2021-02-19 PROCEDURE — 99999 PR PBB SHADOW E&M-EST. PATIENT-LVL IV: ICD-10-PCS | Mod: PBBFAC,,, | Performed by: OPHTHALMOLOGY

## 2021-02-19 PROCEDURE — 80053 COMPREHEN METABOLIC PANEL: CPT

## 2021-02-19 PROCEDURE — 83036 HEMOGLOBIN GLYCOSYLATED A1C: CPT

## 2021-02-19 PROCEDURE — 1101F PT FALLS ASSESS-DOCD LE1/YR: CPT | Mod: CPTII,S$GLB,, | Performed by: OPHTHALMOLOGY

## 2021-02-19 PROCEDURE — 3288F PR FALLS RISK ASSESSMENT DOCUMENTED: ICD-10-PCS | Mod: CPTII,S$GLB,, | Performed by: OPHTHALMOLOGY

## 2021-02-19 PROCEDURE — 1101F PR PT FALLS ASSESS DOC 0-1 FALLS W/OUT INJ PAST YR: ICD-10-PCS | Mod: CPTII,S$GLB,, | Performed by: OPHTHALMOLOGY

## 2021-02-19 PROCEDURE — 1126F PR PAIN SEVERITY QUANTIFIED, NO PAIN PRESENT: ICD-10-PCS | Mod: S$GLB,,, | Performed by: OPHTHALMOLOGY

## 2021-02-19 PROCEDURE — 92134 POSTERIOR SEGMENT OCT RETINA (OCULAR COHERENCE TOMOGRAPHY)-BOTH EYES: ICD-10-PCS | Mod: S$GLB,,, | Performed by: OPHTHALMOLOGY

## 2021-02-19 PROCEDURE — 92014 PR EYE EXAM, EST PATIENT,COMPREHESV: ICD-10-PCS | Mod: S$GLB,,, | Performed by: OPHTHALMOLOGY

## 2021-02-19 PROCEDURE — 92134 CPTRZ OPH DX IMG PST SGM RTA: CPT | Mod: S$GLB,,, | Performed by: OPHTHALMOLOGY

## 2021-02-19 PROCEDURE — 1126F AMNT PAIN NOTED NONE PRSNT: CPT | Mod: S$GLB,,, | Performed by: OPHTHALMOLOGY

## 2021-02-19 PROCEDURE — 92014 COMPRE OPH EXAM EST PT 1/>: CPT | Mod: S$GLB,,, | Performed by: OPHTHALMOLOGY

## 2021-02-19 PROCEDURE — 36415 COLL VENOUS BLD VENIPUNCTURE: CPT

## 2021-02-22 ENCOUNTER — OFFICE VISIT (OUTPATIENT)
Dept: PRIMARY CARE CLINIC | Facility: CLINIC | Age: 66
End: 2021-02-22
Payer: MEDICARE

## 2021-02-22 VITALS
WEIGHT: 189.81 LBS | DIASTOLIC BLOOD PRESSURE: 78 MMHG | HEART RATE: 72 BPM | RESPIRATION RATE: 16 BRPM | HEIGHT: 60 IN | OXYGEN SATURATION: 98 % | TEMPERATURE: 98 F | SYSTOLIC BLOOD PRESSURE: 120 MMHG | BODY MASS INDEX: 37.27 KG/M2

## 2021-02-22 DIAGNOSIS — E78.5 HYPERLIPIDEMIA ASSOCIATED WITH TYPE 2 DIABETES MELLITUS: ICD-10-CM

## 2021-02-22 DIAGNOSIS — E66.01 SEVERE OBESITY (BMI 35.0-39.9) WITH COMORBIDITY: ICD-10-CM

## 2021-02-22 DIAGNOSIS — G47.33 OSA (OBSTRUCTIVE SLEEP APNEA): ICD-10-CM

## 2021-02-22 DIAGNOSIS — E11.42 TYPE 2 DIABETES MELLITUS WITH DIABETIC POLYNEUROPATHY, WITH LONG-TERM CURRENT USE OF INSULIN: Primary | ICD-10-CM

## 2021-02-22 DIAGNOSIS — I25.118 CORONARY ARTERY DISEASE OF NATIVE ARTERY OF NATIVE HEART WITH STABLE ANGINA PECTORIS: ICD-10-CM

## 2021-02-22 DIAGNOSIS — Z79.4 TYPE 2 DIABETES MELLITUS WITH DIABETIC POLYNEUROPATHY, WITH LONG-TERM CURRENT USE OF INSULIN: Primary | ICD-10-CM

## 2021-02-22 DIAGNOSIS — E11.69 HYPERLIPIDEMIA ASSOCIATED WITH TYPE 2 DIABETES MELLITUS: ICD-10-CM

## 2021-02-22 DIAGNOSIS — I15.2 HYPERTENSION ASSOCIATED WITH DIABETES: ICD-10-CM

## 2021-02-22 DIAGNOSIS — E11.59 HYPERTENSION ASSOCIATED WITH DIABETES: ICD-10-CM

## 2021-02-22 DIAGNOSIS — E11.3393 MODERATE NONPROLIFERATIVE DIABETIC RETINOPATHY OF BOTH EYES WITHOUT MACULAR EDEMA ASSOCIATED WITH TYPE 2 DIABETES MELLITUS: ICD-10-CM

## 2021-02-22 PROCEDURE — 1159F PR MEDICATION LIST DOCUMENTED IN MEDICAL RECORD: ICD-10-PCS | Mod: S$GLB,,, | Performed by: NURSE PRACTITIONER

## 2021-02-22 PROCEDURE — 99215 PR OFFICE/OUTPT VISIT, EST, LEVL V, 40-54 MIN: ICD-10-PCS | Mod: S$GLB,,, | Performed by: NURSE PRACTITIONER

## 2021-02-22 PROCEDURE — 3046F HEMOGLOBIN A1C LEVEL >9.0%: CPT | Mod: CPTII,S$GLB,, | Performed by: NURSE PRACTITIONER

## 2021-02-22 PROCEDURE — 3074F SYST BP LT 130 MM HG: CPT | Mod: CPTII,S$GLB,, | Performed by: NURSE PRACTITIONER

## 2021-02-22 PROCEDURE — 3008F PR BODY MASS INDEX (BMI) DOCUMENTED: ICD-10-PCS | Mod: CPTII,S$GLB,, | Performed by: NURSE PRACTITIONER

## 2021-02-22 PROCEDURE — 99215 OFFICE O/P EST HI 40 MIN: CPT | Mod: S$GLB,,, | Performed by: NURSE PRACTITIONER

## 2021-02-22 PROCEDURE — 95251 PR GLUCOSE MONITOR, 72 HOUR, PHYS INTERP: ICD-10-PCS | Mod: S$GLB,,, | Performed by: NURSE PRACTITIONER

## 2021-02-22 PROCEDURE — 3288F PR FALLS RISK ASSESSMENT DOCUMENTED: ICD-10-PCS | Mod: CPTII,S$GLB,, | Performed by: NURSE PRACTITIONER

## 2021-02-22 PROCEDURE — 1126F AMNT PAIN NOTED NONE PRSNT: CPT | Mod: S$GLB,,, | Performed by: NURSE PRACTITIONER

## 2021-02-22 PROCEDURE — 3008F BODY MASS INDEX DOCD: CPT | Mod: CPTII,S$GLB,, | Performed by: NURSE PRACTITIONER

## 2021-02-22 PROCEDURE — 3074F PR MOST RECENT SYSTOLIC BLOOD PRESSURE < 130 MM HG: ICD-10-PCS | Mod: CPTII,S$GLB,, | Performed by: NURSE PRACTITIONER

## 2021-02-22 PROCEDURE — 3046F PR MOST RECENT HEMOGLOBIN A1C LEVEL > 9.0%: ICD-10-PCS | Mod: CPTII,S$GLB,, | Performed by: NURSE PRACTITIONER

## 2021-02-22 PROCEDURE — 3078F DIAST BP <80 MM HG: CPT | Mod: CPTII,S$GLB,, | Performed by: NURSE PRACTITIONER

## 2021-02-22 PROCEDURE — 99999 PR PBB SHADOW E&M-EST. PATIENT-LVL V: ICD-10-PCS | Mod: PBBFAC,,, | Performed by: NURSE PRACTITIONER

## 2021-02-22 PROCEDURE — 1159F MED LIST DOCD IN RCRD: CPT | Mod: S$GLB,,, | Performed by: NURSE PRACTITIONER

## 2021-02-22 PROCEDURE — 99999 PR PBB SHADOW E&M-EST. PATIENT-LVL V: CPT | Mod: PBBFAC,,, | Performed by: NURSE PRACTITIONER

## 2021-02-22 PROCEDURE — 1101F PT FALLS ASSESS-DOCD LE1/YR: CPT | Mod: CPTII,S$GLB,, | Performed by: NURSE PRACTITIONER

## 2021-02-22 PROCEDURE — 3288F FALL RISK ASSESSMENT DOCD: CPT | Mod: CPTII,S$GLB,, | Performed by: NURSE PRACTITIONER

## 2021-02-22 PROCEDURE — 99499 UNLISTED E&M SERVICE: CPT | Mod: S$GLB,,, | Performed by: NURSE PRACTITIONER

## 2021-02-22 PROCEDURE — 1126F PR PAIN SEVERITY QUANTIFIED, NO PAIN PRESENT: ICD-10-PCS | Mod: S$GLB,,, | Performed by: NURSE PRACTITIONER

## 2021-02-22 PROCEDURE — 1101F PR PT FALLS ASSESS DOC 0-1 FALLS W/OUT INJ PAST YR: ICD-10-PCS | Mod: CPTII,S$GLB,, | Performed by: NURSE PRACTITIONER

## 2021-02-22 PROCEDURE — 95251 CONT GLUC MNTR ANALYSIS I&R: CPT | Mod: S$GLB,,, | Performed by: NURSE PRACTITIONER

## 2021-02-22 PROCEDURE — 3078F PR MOST RECENT DIASTOLIC BLOOD PRESSURE < 80 MM HG: ICD-10-PCS | Mod: CPTII,S$GLB,, | Performed by: NURSE PRACTITIONER

## 2021-02-22 PROCEDURE — 99499 RISK ADDL DX/OHS AUDIT: ICD-10-PCS | Mod: S$GLB,,, | Performed by: NURSE PRACTITIONER

## 2021-02-22 RX ORDER — CLOPIDOGREL BISULFATE 75 MG/1
75 TABLET ORAL DAILY
Qty: 90 TABLET | Refills: 3 | Status: SHIPPED | OUTPATIENT
Start: 2021-02-22 | End: 2021-12-20 | Stop reason: SDUPTHER

## 2021-02-22 RX ORDER — DULAGLUTIDE 1.5 MG/.5ML
1.5 INJECTION, SOLUTION SUBCUTANEOUS WEEKLY
Qty: 4 PEN | Refills: 6 | Status: SHIPPED | OUTPATIENT
Start: 2021-02-22 | End: 2021-04-07 | Stop reason: SDUPTHER

## 2021-02-22 RX ORDER — EMPAGLIFLOZIN 25 MG/1
25 TABLET, FILM COATED ORAL DAILY
Qty: 30 TABLET | Refills: 4 | Status: SHIPPED | OUTPATIENT
Start: 2021-02-22 | End: 2022-03-28 | Stop reason: SDUPTHER

## 2021-02-22 RX ORDER — EVOLOCUMAB 140 MG/ML
140 INJECTION, SOLUTION SUBCUTANEOUS
Qty: 2 SYRINGE | Refills: 6 | Status: SHIPPED | OUTPATIENT
Start: 2021-02-22 | End: 2021-08-23

## 2021-02-25 ENCOUNTER — CLINICAL SUPPORT (OUTPATIENT)
Dept: DIABETES | Facility: CLINIC | Age: 66
End: 2021-02-25
Payer: MEDICARE

## 2021-02-25 DIAGNOSIS — E11.42 TYPE 2 DIABETES MELLITUS WITH DIABETIC POLYNEUROPATHY, WITH LONG-TERM CURRENT USE OF INSULIN: ICD-10-CM

## 2021-02-25 DIAGNOSIS — Z79.4 TYPE 2 DIABETES MELLITUS WITH DIABETIC POLYNEUROPATHY, WITH LONG-TERM CURRENT USE OF INSULIN: ICD-10-CM

## 2021-02-25 PROCEDURE — G0108 DIAB MANAGE TRN  PER INDIV: HCPCS | Mod: S$GLB,,, | Performed by: DIETITIAN, REGISTERED

## 2021-02-25 PROCEDURE — G0108 PR DIAB MANAGE TRN  PER INDIV: ICD-10-PCS | Mod: S$GLB,,, | Performed by: DIETITIAN, REGISTERED

## 2021-02-25 PROCEDURE — 99999 PR PBB SHADOW E&M-EST. PATIENT-LVL I: ICD-10-PCS | Mod: PBBFAC,,, | Performed by: DIETITIAN, REGISTERED

## 2021-02-25 PROCEDURE — 99999 PR PBB SHADOW E&M-EST. PATIENT-LVL I: CPT | Mod: PBBFAC,,, | Performed by: DIETITIAN, REGISTERED

## 2021-03-03 ENCOUNTER — CLINICAL SUPPORT (OUTPATIENT)
Dept: DIABETES | Facility: CLINIC | Age: 66
End: 2021-03-03
Payer: MEDICARE

## 2021-03-03 DIAGNOSIS — Z79.4 TYPE 2 DIABETES MELLITUS WITH DIABETIC POLYNEUROPATHY, WITH LONG-TERM CURRENT USE OF INSULIN: ICD-10-CM

## 2021-03-03 DIAGNOSIS — E11.42 TYPE 2 DIABETES MELLITUS WITH DIABETIC POLYNEUROPATHY, WITH LONG-TERM CURRENT USE OF INSULIN: ICD-10-CM

## 2021-03-03 PROCEDURE — G0108 PR DIAB MANAGE TRN  PER INDIV: ICD-10-PCS | Mod: S$GLB,,, | Performed by: DIETITIAN, REGISTERED

## 2021-03-03 PROCEDURE — G0108 DIAB MANAGE TRN  PER INDIV: HCPCS | Mod: S$GLB,,, | Performed by: DIETITIAN, REGISTERED

## 2021-03-04 ENCOUNTER — SPECIALTY PHARMACY (OUTPATIENT)
Dept: PHARMACY | Facility: CLINIC | Age: 66
End: 2021-03-04

## 2021-03-09 ENCOUNTER — CLINICAL SUPPORT (OUTPATIENT)
Dept: DIABETES | Facility: CLINIC | Age: 66
End: 2021-03-09
Payer: MEDICARE

## 2021-03-09 DIAGNOSIS — E11.42 TYPE 2 DIABETES MELLITUS WITH DIABETIC POLYNEUROPATHY, WITH LONG-TERM CURRENT USE OF INSULIN: ICD-10-CM

## 2021-03-09 DIAGNOSIS — Z79.4 TYPE 2 DIABETES MELLITUS WITH DIABETIC POLYNEUROPATHY, WITH LONG-TERM CURRENT USE OF INSULIN: ICD-10-CM

## 2021-03-09 PROCEDURE — 95251 PR GLUCOSE MONITOR, 72 HOUR, PHYS INTERP: ICD-10-PCS | Mod: S$GLB,,, | Performed by: NURSE PRACTITIONER

## 2021-03-09 PROCEDURE — G0108 PR DIAB MANAGE TRN  PER INDIV: ICD-10-PCS | Mod: S$GLB,,, | Performed by: DIETITIAN, REGISTERED

## 2021-03-09 PROCEDURE — 99999 PR PBB SHADOW E&M-EST. PATIENT-LVL I: CPT | Mod: PBBFAC,,, | Performed by: DIETITIAN, REGISTERED

## 2021-03-09 PROCEDURE — 95251 CONT GLUC MNTR ANALYSIS I&R: CPT | Mod: S$GLB,,, | Performed by: NURSE PRACTITIONER

## 2021-03-09 PROCEDURE — 99999 PR PBB SHADOW E&M-EST. PATIENT-LVL I: ICD-10-PCS | Mod: PBBFAC,,, | Performed by: DIETITIAN, REGISTERED

## 2021-03-09 PROCEDURE — G0108 DIAB MANAGE TRN  PER INDIV: HCPCS | Mod: S$GLB,,, | Performed by: DIETITIAN, REGISTERED

## 2021-03-10 ENCOUNTER — PATIENT MESSAGE (OUTPATIENT)
Dept: PRIMARY CARE CLINIC | Facility: CLINIC | Age: 66
End: 2021-03-10

## 2021-03-16 ENCOUNTER — SPECIALTY PHARMACY (OUTPATIENT)
Dept: PHARMACY | Facility: CLINIC | Age: 66
End: 2021-03-16

## 2021-03-20 ENCOUNTER — IMMUNIZATION (OUTPATIENT)
Dept: PRIMARY CARE CLINIC | Facility: CLINIC | Age: 66
End: 2021-03-20
Payer: MEDICARE

## 2021-03-20 DIAGNOSIS — Z23 NEED FOR VACCINATION: Primary | ICD-10-CM

## 2021-03-20 PROCEDURE — 91300 PR SARS-COV- 2 COVID-19 VACCINE, NO PRSV, 30MCG/0.3ML, IM: ICD-10-PCS | Mod: S$GLB,,, | Performed by: INTERNAL MEDICINE

## 2021-03-20 PROCEDURE — 0001A PR IMMUNIZ ADMIN, SARS-COV-2 COVID-19 VACC, 30MCG/0.3ML, 1ST DOSE: ICD-10-PCS | Mod: CV19,S$GLB,, | Performed by: INTERNAL MEDICINE

## 2021-03-20 PROCEDURE — 91300 PR SARS-COV- 2 COVID-19 VACCINE, NO PRSV, 30MCG/0.3ML, IM: CPT | Mod: S$GLB,,, | Performed by: INTERNAL MEDICINE

## 2021-03-20 PROCEDURE — 0001A PR IMMUNIZ ADMIN, SARS-COV-2 COVID-19 VACC, 30MCG/0.3ML, 1ST DOSE: CPT | Mod: CV19,S$GLB,, | Performed by: INTERNAL MEDICINE

## 2021-03-20 RX ADMIN — Medication 0.3 ML: at 03:03

## 2021-03-22 ENCOUNTER — PATIENT OUTREACH (OUTPATIENT)
Dept: ADMINISTRATIVE | Facility: OTHER | Age: 66
End: 2021-03-22

## 2021-03-25 DIAGNOSIS — I50.22 CHRONIC SYSTOLIC HEART FAILURE: ICD-10-CM

## 2021-03-25 RX ORDER — SPIRONOLACTONE 50 MG/1
50 TABLET, FILM COATED ORAL DAILY
Qty: 90 TABLET | Refills: 1 | Status: SHIPPED | OUTPATIENT
Start: 2021-03-25 | End: 2022-03-21 | Stop reason: SDUPTHER

## 2021-03-25 RX ORDER — SUB-Q INSULIN DEVICE, 40 UNIT
1 EACH MISCELLANEOUS DAILY
Qty: 30 EACH | Refills: 6 | Status: SHIPPED | OUTPATIENT
Start: 2021-03-25 | End: 2021-04-24

## 2021-04-07 DIAGNOSIS — Z79.4 TYPE 2 DIABETES MELLITUS WITH DIABETIC POLYNEUROPATHY, WITH LONG-TERM CURRENT USE OF INSULIN: ICD-10-CM

## 2021-04-07 DIAGNOSIS — E11.42 TYPE 2 DIABETES MELLITUS WITH DIABETIC POLYNEUROPATHY, WITH LONG-TERM CURRENT USE OF INSULIN: ICD-10-CM

## 2021-04-07 DIAGNOSIS — I50.22 CHRONIC SYSTOLIC HEART FAILURE: ICD-10-CM

## 2021-04-07 RX ORDER — FUROSEMIDE 40 MG/1
40 TABLET ORAL DAILY
Qty: 90 TABLET | Refills: 3 | Status: SHIPPED | OUTPATIENT
Start: 2021-04-07 | End: 2022-03-16 | Stop reason: SDUPTHER

## 2021-04-07 RX ORDER — DULAGLUTIDE 1.5 MG/.5ML
1.5 INJECTION, SOLUTION SUBCUTANEOUS WEEKLY
Qty: 12 PEN | Refills: 3 | Status: SHIPPED | OUTPATIENT
Start: 2021-04-07 | End: 2021-05-17

## 2021-04-11 ENCOUNTER — IMMUNIZATION (OUTPATIENT)
Dept: PRIMARY CARE CLINIC | Facility: CLINIC | Age: 66
End: 2021-04-11
Payer: MEDICARE

## 2021-04-11 DIAGNOSIS — Z23 NEED FOR VACCINATION: Primary | ICD-10-CM

## 2021-04-11 PROCEDURE — 91300 PR SARS-COV- 2 COVID-19 VACCINE, NO PRSV, 30MCG/0.3ML, IM: CPT | Mod: S$GLB,,, | Performed by: INTERNAL MEDICINE

## 2021-04-11 PROCEDURE — 91300 PR SARS-COV- 2 COVID-19 VACCINE, NO PRSV, 30MCG/0.3ML, IM: ICD-10-PCS | Mod: S$GLB,,, | Performed by: INTERNAL MEDICINE

## 2021-04-11 PROCEDURE — 0002A PR IMMUNIZ ADMIN, SARS-COV-2 COVID-19 VACC, 30MCG/0.3ML, 2ND DOSE: ICD-10-PCS | Mod: CV19,S$GLB,, | Performed by: INTERNAL MEDICINE

## 2021-04-11 PROCEDURE — 0002A PR IMMUNIZ ADMIN, SARS-COV-2 COVID-19 VACC, 30MCG/0.3ML, 2ND DOSE: CPT | Mod: CV19,S$GLB,, | Performed by: INTERNAL MEDICINE

## 2021-04-11 RX ADMIN — Medication 0.3 ML: at 03:04

## 2021-04-20 ENCOUNTER — SPECIALTY PHARMACY (OUTPATIENT)
Dept: PHARMACY | Facility: CLINIC | Age: 66
End: 2021-04-20

## 2021-04-21 ENCOUNTER — PATIENT MESSAGE (OUTPATIENT)
Dept: ADMINISTRATIVE | Facility: HOSPITAL | Age: 66
End: 2021-04-21

## 2021-04-21 ENCOUNTER — PATIENT OUTREACH (OUTPATIENT)
Dept: ADMINISTRATIVE | Facility: HOSPITAL | Age: 66
End: 2021-04-21

## 2021-04-23 ENCOUNTER — PATIENT MESSAGE (OUTPATIENT)
Dept: PRIMARY CARE CLINIC | Facility: CLINIC | Age: 66
End: 2021-04-23

## 2021-04-26 ENCOUNTER — TELEPHONE (OUTPATIENT)
Dept: PRIMARY CARE CLINIC | Facility: CLINIC | Age: 66
End: 2021-04-26

## 2021-05-13 ENCOUNTER — SPECIALTY PHARMACY (OUTPATIENT)
Dept: PHARMACY | Facility: CLINIC | Age: 66
End: 2021-05-13

## 2021-05-17 ENCOUNTER — OFFICE VISIT (OUTPATIENT)
Dept: PRIMARY CARE CLINIC | Facility: CLINIC | Age: 66
End: 2021-05-17
Payer: MEDICARE

## 2021-05-17 VITALS
RESPIRATION RATE: 16 BRPM | WEIGHT: 191.56 LBS | BODY MASS INDEX: 37.61 KG/M2 | TEMPERATURE: 98 F | SYSTOLIC BLOOD PRESSURE: 138 MMHG | HEART RATE: 86 BPM | OXYGEN SATURATION: 98 % | HEIGHT: 60 IN | DIASTOLIC BLOOD PRESSURE: 80 MMHG

## 2021-05-17 DIAGNOSIS — E78.5 HYPERLIPIDEMIA ASSOCIATED WITH TYPE 2 DIABETES MELLITUS: ICD-10-CM

## 2021-05-17 DIAGNOSIS — M79.18 CHRONIC MUSCULOSKELETAL PAIN: ICD-10-CM

## 2021-05-17 DIAGNOSIS — I15.2 HYPERTENSION ASSOCIATED WITH DIABETES: ICD-10-CM

## 2021-05-17 DIAGNOSIS — E11.59 HYPERTENSION ASSOCIATED WITH DIABETES: ICD-10-CM

## 2021-05-17 DIAGNOSIS — E11.69 HYPERLIPIDEMIA ASSOCIATED WITH TYPE 2 DIABETES MELLITUS: ICD-10-CM

## 2021-05-17 DIAGNOSIS — E11.42 TYPE 2 DIABETES MELLITUS WITH DIABETIC POLYNEUROPATHY, WITH LONG-TERM CURRENT USE OF INSULIN: Primary | ICD-10-CM

## 2021-05-17 DIAGNOSIS — Z79.4 TYPE 2 DIABETES MELLITUS WITH DIABETIC POLYNEUROPATHY, WITH LONG-TERM CURRENT USE OF INSULIN: Primary | ICD-10-CM

## 2021-05-17 DIAGNOSIS — G89.29 CHRONIC MUSCULOSKELETAL PAIN: ICD-10-CM

## 2021-05-17 DIAGNOSIS — E66.01 SEVERE OBESITY (BMI 35.0-39.9) WITH COMORBIDITY: ICD-10-CM

## 2021-05-17 LAB — GLUCOSE SERPL-MCNC: 277 MG/DL (ref 70–110)

## 2021-05-17 PROCEDURE — 1101F PT FALLS ASSESS-DOCD LE1/YR: CPT | Mod: CPTII,S$GLB,, | Performed by: NURSE PRACTITIONER

## 2021-05-17 PROCEDURE — 95251 CONT GLUC MNTR ANALYSIS I&R: CPT | Mod: S$GLB,,, | Performed by: NURSE PRACTITIONER

## 2021-05-17 PROCEDURE — 82962 POCT GLUCOSE, HAND-HELD DEVICE: ICD-10-PCS | Mod: S$GLB,,, | Performed by: NURSE PRACTITIONER

## 2021-05-17 PROCEDURE — 82962 GLUCOSE BLOOD TEST: CPT | Mod: S$GLB,,, | Performed by: NURSE PRACTITIONER

## 2021-05-17 PROCEDURE — 3288F FALL RISK ASSESSMENT DOCD: CPT | Mod: CPTII,S$GLB,, | Performed by: NURSE PRACTITIONER

## 2021-05-17 PROCEDURE — 3046F HEMOGLOBIN A1C LEVEL >9.0%: CPT | Mod: CPTII,S$GLB,, | Performed by: NURSE PRACTITIONER

## 2021-05-17 PROCEDURE — 99999 PR PBB SHADOW E&M-EST. PATIENT-LVL V: CPT | Mod: PBBFAC,,, | Performed by: NURSE PRACTITIONER

## 2021-05-17 PROCEDURE — 1126F AMNT PAIN NOTED NONE PRSNT: CPT | Mod: S$GLB,,, | Performed by: NURSE PRACTITIONER

## 2021-05-17 PROCEDURE — 95251 PR GLUCOSE MONITOR, 72 HOUR, PHYS INTERP: ICD-10-PCS | Mod: S$GLB,,, | Performed by: NURSE PRACTITIONER

## 2021-05-17 PROCEDURE — 3008F PR BODY MASS INDEX (BMI) DOCUMENTED: ICD-10-PCS | Mod: CPTII,S$GLB,, | Performed by: NURSE PRACTITIONER

## 2021-05-17 PROCEDURE — 99215 PR OFFICE/OUTPT VISIT, EST, LEVL V, 40-54 MIN: ICD-10-PCS | Mod: S$GLB,,, | Performed by: NURSE PRACTITIONER

## 2021-05-17 PROCEDURE — 1126F PR PAIN SEVERITY QUANTIFIED, NO PAIN PRESENT: ICD-10-PCS | Mod: S$GLB,,, | Performed by: NURSE PRACTITIONER

## 2021-05-17 PROCEDURE — 3008F BODY MASS INDEX DOCD: CPT | Mod: CPTII,S$GLB,, | Performed by: NURSE PRACTITIONER

## 2021-05-17 PROCEDURE — 1159F PR MEDICATION LIST DOCUMENTED IN MEDICAL RECORD: ICD-10-PCS | Mod: S$GLB,,, | Performed by: NURSE PRACTITIONER

## 2021-05-17 PROCEDURE — 99499 UNLISTED E&M SERVICE: CPT | Mod: S$GLB,,, | Performed by: NURSE PRACTITIONER

## 2021-05-17 PROCEDURE — 99499 RISK ADDL DX/OHS AUDIT: ICD-10-PCS | Mod: S$GLB,,, | Performed by: NURSE PRACTITIONER

## 2021-05-17 PROCEDURE — 1159F MED LIST DOCD IN RCRD: CPT | Mod: S$GLB,,, | Performed by: NURSE PRACTITIONER

## 2021-05-17 PROCEDURE — 3288F PR FALLS RISK ASSESSMENT DOCUMENTED: ICD-10-PCS | Mod: CPTII,S$GLB,, | Performed by: NURSE PRACTITIONER

## 2021-05-17 PROCEDURE — 99999 PR PBB SHADOW E&M-EST. PATIENT-LVL V: ICD-10-PCS | Mod: PBBFAC,,, | Performed by: NURSE PRACTITIONER

## 2021-05-17 PROCEDURE — 3046F PR MOST RECENT HEMOGLOBIN A1C LEVEL > 9.0%: ICD-10-PCS | Mod: CPTII,S$GLB,, | Performed by: NURSE PRACTITIONER

## 2021-05-17 PROCEDURE — 1101F PR PT FALLS ASSESS DOC 0-1 FALLS W/OUT INJ PAST YR: ICD-10-PCS | Mod: CPTII,S$GLB,, | Performed by: NURSE PRACTITIONER

## 2021-05-17 PROCEDURE — 99215 OFFICE O/P EST HI 40 MIN: CPT | Mod: S$GLB,,, | Performed by: NURSE PRACTITIONER

## 2021-05-17 RX ORDER — LIDOCAINE AND PRILOCAINE 25; 25 MG/G; MG/G
CREAM TOPICAL 2 TIMES DAILY
Qty: 30 G | Refills: 1 | Status: SHIPPED | OUTPATIENT
Start: 2021-05-17

## 2021-05-17 RX ORDER — INSULIN LISPRO 100 [IU]/ML
100 INJECTION, SOLUTION INTRAVENOUS; SUBCUTANEOUS ONCE
Qty: 4 VIAL | Refills: 11 | Status: SHIPPED | OUTPATIENT
Start: 2021-05-17 | End: 2021-05-17

## 2021-05-17 RX ORDER — INSULIN LISPRO 100 [IU]/ML
100 INJECTION, SOLUTION INTRAVENOUS; SUBCUTANEOUS CONTINUOUS
Qty: 40 ML | Refills: 11 | Status: SHIPPED | OUTPATIENT
Start: 2021-05-17 | End: 2021-09-14 | Stop reason: SDUPTHER

## 2021-05-17 RX ORDER — DULAGLUTIDE 3 MG/.5ML
3 INJECTION, SOLUTION SUBCUTANEOUS WEEKLY
Qty: 4 PEN | Refills: 11 | Status: SHIPPED | OUTPATIENT
Start: 2021-05-17 | End: 2022-03-28

## 2021-05-17 RX ORDER — CLOTRIMAZOLE AND BETAMETHASONE DIPROPIONATE 10; .64 MG/G; MG/G
CREAM TOPICAL 2 TIMES DAILY
Qty: 45 G | Refills: 1 | Status: SHIPPED | OUTPATIENT
Start: 2021-05-17 | End: 2022-03-21 | Stop reason: SDUPTHER

## 2021-05-25 ENCOUNTER — PATIENT OUTREACH (OUTPATIENT)
Dept: ADMINISTRATIVE | Facility: HOSPITAL | Age: 66
End: 2021-05-25

## 2021-05-26 ENCOUNTER — TELEPHONE (OUTPATIENT)
Dept: PRIMARY CARE CLINIC | Facility: CLINIC | Age: 66
End: 2021-05-26

## 2021-05-27 ENCOUNTER — PATIENT MESSAGE (OUTPATIENT)
Dept: ADMINISTRATIVE | Facility: HOSPITAL | Age: 66
End: 2021-05-27

## 2021-06-04 ENCOUNTER — SPECIALTY PHARMACY (OUTPATIENT)
Dept: PHARMACY | Facility: CLINIC | Age: 66
End: 2021-06-04

## 2021-06-11 ENCOUNTER — PATIENT MESSAGE (OUTPATIENT)
Dept: PHARMACY | Facility: CLINIC | Age: 66
End: 2021-06-11

## 2021-06-24 ENCOUNTER — PATIENT OUTREACH (OUTPATIENT)
Dept: ADMINISTRATIVE | Facility: HOSPITAL | Age: 66
End: 2021-06-24

## 2021-07-06 ENCOUNTER — PATIENT MESSAGE (OUTPATIENT)
Dept: ADMINISTRATIVE | Facility: HOSPITAL | Age: 66
End: 2021-07-06

## 2021-07-07 ENCOUNTER — PATIENT MESSAGE (OUTPATIENT)
Dept: ADMINISTRATIVE | Facility: HOSPITAL | Age: 66
End: 2021-07-07

## 2021-07-09 ENCOUNTER — SPECIALTY PHARMACY (OUTPATIENT)
Dept: PHARMACY | Facility: CLINIC | Age: 66
End: 2021-07-09

## 2021-08-03 ENCOUNTER — PATIENT MESSAGE (OUTPATIENT)
Dept: ADMINISTRATIVE | Facility: HOSPITAL | Age: 66
End: 2021-08-03

## 2021-08-11 ENCOUNTER — SPECIALTY PHARMACY (OUTPATIENT)
Dept: PHARMACY | Facility: CLINIC | Age: 66
End: 2021-08-11

## 2021-08-17 ENCOUNTER — LAB VISIT (OUTPATIENT)
Dept: LAB | Facility: HOSPITAL | Age: 66
End: 2021-08-17
Attending: NURSE PRACTITIONER
Payer: MEDICARE

## 2021-08-17 DIAGNOSIS — E11.42 TYPE 2 DIABETES MELLITUS WITH DIABETIC POLYNEUROPATHY, WITH LONG-TERM CURRENT USE OF INSULIN: ICD-10-CM

## 2021-08-17 DIAGNOSIS — E11.59 HYPERTENSION ASSOCIATED WITH DIABETES: ICD-10-CM

## 2021-08-17 DIAGNOSIS — I15.2 HYPERTENSION ASSOCIATED WITH DIABETES: ICD-10-CM

## 2021-08-17 DIAGNOSIS — Z79.4 TYPE 2 DIABETES MELLITUS WITH DIABETIC POLYNEUROPATHY, WITH LONG-TERM CURRENT USE OF INSULIN: ICD-10-CM

## 2021-08-17 DIAGNOSIS — E78.5 HYPERLIPIDEMIA ASSOCIATED WITH TYPE 2 DIABETES MELLITUS: ICD-10-CM

## 2021-08-17 DIAGNOSIS — E11.69 HYPERLIPIDEMIA ASSOCIATED WITH TYPE 2 DIABETES MELLITUS: ICD-10-CM

## 2021-08-17 LAB
ALBUMIN SERPL BCP-MCNC: 3.4 G/DL (ref 3.5–5.2)
ALP SERPL-CCNC: 106 U/L (ref 55–135)
ALT SERPL W/O P-5'-P-CCNC: 14 U/L (ref 10–44)
ANION GAP SERPL CALC-SCNC: 12 MMOL/L (ref 8–16)
AST SERPL-CCNC: 13 U/L (ref 10–40)
BILIRUB SERPL-MCNC: 0.3 MG/DL (ref 0.1–1)
BUN SERPL-MCNC: 12 MG/DL (ref 8–23)
CALCIUM SERPL-MCNC: 9.5 MG/DL (ref 8.7–10.5)
CHLORIDE SERPL-SCNC: 103 MMOL/L (ref 95–110)
CHOLEST SERPL-MCNC: 141 MG/DL (ref 120–199)
CHOLEST/HDLC SERPL: 3.3 {RATIO} (ref 2–5)
CO2 SERPL-SCNC: 27 MMOL/L (ref 23–29)
CREAT SERPL-MCNC: 1 MG/DL (ref 0.5–1.4)
EST. GFR  (AFRICAN AMERICAN): >60 ML/MIN/1.73 M^2
EST. GFR  (NON AFRICAN AMERICAN): 58.4 ML/MIN/1.73 M^2
ESTIMATED AVG GLUCOSE: 177 MG/DL (ref 68–131)
GLUCOSE SERPL-MCNC: 119 MG/DL (ref 70–110)
HBA1C MFR BLD: 7.8 % (ref 4–5.6)
HDLC SERPL-MCNC: 43 MG/DL (ref 40–75)
HDLC SERPL: 30.5 % (ref 20–50)
LDLC SERPL CALC-MCNC: 81.2 MG/DL (ref 63–159)
NONHDLC SERPL-MCNC: 98 MG/DL
POTASSIUM SERPL-SCNC: 3.9 MMOL/L (ref 3.5–5.1)
PROT SERPL-MCNC: 7.5 G/DL (ref 6–8.4)
SODIUM SERPL-SCNC: 142 MMOL/L (ref 136–145)
TRIGL SERPL-MCNC: 84 MG/DL (ref 30–150)

## 2021-08-17 PROCEDURE — 36415 COLL VENOUS BLD VENIPUNCTURE: CPT | Performed by: NURSE PRACTITIONER

## 2021-08-17 PROCEDURE — 80061 LIPID PANEL: CPT | Performed by: NURSE PRACTITIONER

## 2021-08-17 PROCEDURE — 83036 HEMOGLOBIN GLYCOSYLATED A1C: CPT | Performed by: NURSE PRACTITIONER

## 2021-08-17 PROCEDURE — 80053 COMPREHEN METABOLIC PANEL: CPT | Performed by: NURSE PRACTITIONER

## 2021-08-18 ENCOUNTER — PATIENT MESSAGE (OUTPATIENT)
Dept: PHARMACY | Facility: CLINIC | Age: 66
End: 2021-08-18

## 2021-08-23 ENCOUNTER — SPECIALTY PHARMACY (OUTPATIENT)
Dept: PHARMACY | Facility: CLINIC | Age: 66
End: 2021-08-23

## 2021-08-23 ENCOUNTER — OFFICE VISIT (OUTPATIENT)
Dept: PRIMARY CARE CLINIC | Facility: CLINIC | Age: 66
End: 2021-08-23
Payer: MEDICARE

## 2021-08-23 VITALS
SYSTOLIC BLOOD PRESSURE: 140 MMHG | RESPIRATION RATE: 16 BRPM | HEIGHT: 60 IN | OXYGEN SATURATION: 99 % | HEART RATE: 86 BPM | BODY MASS INDEX: 36.7 KG/M2 | WEIGHT: 186.94 LBS | DIASTOLIC BLOOD PRESSURE: 80 MMHG | TEMPERATURE: 97 F

## 2021-08-23 DIAGNOSIS — I50.22 CHRONIC SYSTOLIC HEART FAILURE: ICD-10-CM

## 2021-08-23 DIAGNOSIS — E11.69 HYPERLIPIDEMIA ASSOCIATED WITH TYPE 2 DIABETES MELLITUS: ICD-10-CM

## 2021-08-23 DIAGNOSIS — E78.5 HYPERLIPIDEMIA ASSOCIATED WITH TYPE 2 DIABETES MELLITUS: ICD-10-CM

## 2021-08-23 DIAGNOSIS — Z79.4 TYPE 2 DIABETES MELLITUS WITH DIABETIC POLYNEUROPATHY, WITH LONG-TERM CURRENT USE OF INSULIN: Primary | ICD-10-CM

## 2021-08-23 DIAGNOSIS — I25.118 CORONARY ARTERY DISEASE OF NATIVE ARTERY OF NATIVE HEART WITH STABLE ANGINA PECTORIS: ICD-10-CM

## 2021-08-23 DIAGNOSIS — E11.59 HYPERTENSION ASSOCIATED WITH DIABETES: ICD-10-CM

## 2021-08-23 DIAGNOSIS — E11.42 TYPE 2 DIABETES MELLITUS WITH DIABETIC POLYNEUROPATHY, WITH LONG-TERM CURRENT USE OF INSULIN: Primary | ICD-10-CM

## 2021-08-23 DIAGNOSIS — E66.01 SEVERE OBESITY (BMI 35.0-39.9) WITH COMORBIDITY: ICD-10-CM

## 2021-08-23 DIAGNOSIS — I15.2 HYPERTENSION ASSOCIATED WITH DIABETES: ICD-10-CM

## 2021-08-23 PROCEDURE — 1101F PR PT FALLS ASSESS DOC 0-1 FALLS W/OUT INJ PAST YR: ICD-10-PCS | Mod: CPTII,S$GLB,, | Performed by: NURSE PRACTITIONER

## 2021-08-23 PROCEDURE — 3288F FALL RISK ASSESSMENT DOCD: CPT | Mod: CPTII,S$GLB,, | Performed by: NURSE PRACTITIONER

## 2021-08-23 PROCEDURE — 1159F MED LIST DOCD IN RCRD: CPT | Mod: CPTII,S$GLB,, | Performed by: NURSE PRACTITIONER

## 2021-08-23 PROCEDURE — 3079F PR MOST RECENT DIASTOLIC BLOOD PRESSURE 80-89 MM HG: ICD-10-PCS | Mod: CPTII,S$GLB,, | Performed by: NURSE PRACTITIONER

## 2021-08-23 PROCEDURE — 99214 PR OFFICE/OUTPT VISIT, EST, LEVL IV, 30-39 MIN: ICD-10-PCS | Mod: S$GLB,,, | Performed by: NURSE PRACTITIONER

## 2021-08-23 PROCEDURE — 99999 PR PBB SHADOW E&M-EST. PATIENT-LVL V: CPT | Mod: PBBFAC,,, | Performed by: NURSE PRACTITIONER

## 2021-08-23 PROCEDURE — 99499 RISK ADDL DX/OHS AUDIT: ICD-10-PCS | Mod: S$GLB,,, | Performed by: NURSE PRACTITIONER

## 2021-08-23 PROCEDURE — 99214 OFFICE O/P EST MOD 30 MIN: CPT | Mod: S$GLB,,, | Performed by: NURSE PRACTITIONER

## 2021-08-23 PROCEDURE — 1101F PT FALLS ASSESS-DOCD LE1/YR: CPT | Mod: CPTII,S$GLB,, | Performed by: NURSE PRACTITIONER

## 2021-08-23 PROCEDURE — 3008F BODY MASS INDEX DOCD: CPT | Mod: CPTII,S$GLB,, | Performed by: NURSE PRACTITIONER

## 2021-08-23 PROCEDURE — 1126F PR PAIN SEVERITY QUANTIFIED, NO PAIN PRESENT: ICD-10-PCS | Mod: CPTII,S$GLB,, | Performed by: NURSE PRACTITIONER

## 2021-08-23 PROCEDURE — 99999 PR PBB SHADOW E&M-EST. PATIENT-LVL V: ICD-10-PCS | Mod: PBBFAC,,, | Performed by: NURSE PRACTITIONER

## 2021-08-23 PROCEDURE — 99499 UNLISTED E&M SERVICE: CPT | Mod: S$GLB,,, | Performed by: NURSE PRACTITIONER

## 2021-08-23 PROCEDURE — 1160F PR REVIEW ALL MEDS BY PRESCRIBER/CLIN PHARMACIST DOCUMENTED: ICD-10-PCS | Mod: CPTII,S$GLB,, | Performed by: NURSE PRACTITIONER

## 2021-08-23 PROCEDURE — 3008F PR BODY MASS INDEX (BMI) DOCUMENTED: ICD-10-PCS | Mod: CPTII,S$GLB,, | Performed by: NURSE PRACTITIONER

## 2021-08-23 PROCEDURE — 1159F PR MEDICATION LIST DOCUMENTED IN MEDICAL RECORD: ICD-10-PCS | Mod: CPTII,S$GLB,, | Performed by: NURSE PRACTITIONER

## 2021-08-23 PROCEDURE — 3051F PR MOST RECENT HEMOGLOBIN A1C LEVEL 7.0 - < 8.0%: ICD-10-PCS | Mod: CPTII,S$GLB,, | Performed by: NURSE PRACTITIONER

## 2021-08-23 PROCEDURE — 3077F PR MOST RECENT SYSTOLIC BLOOD PRESSURE >= 140 MM HG: ICD-10-PCS | Mod: CPTII,S$GLB,, | Performed by: NURSE PRACTITIONER

## 2021-08-23 PROCEDURE — 1126F AMNT PAIN NOTED NONE PRSNT: CPT | Mod: CPTII,S$GLB,, | Performed by: NURSE PRACTITIONER

## 2021-08-23 PROCEDURE — 1160F RVW MEDS BY RX/DR IN RCRD: CPT | Mod: CPTII,S$GLB,, | Performed by: NURSE PRACTITIONER

## 2021-08-23 PROCEDURE — 3051F HG A1C>EQUAL 7.0%<8.0%: CPT | Mod: CPTII,S$GLB,, | Performed by: NURSE PRACTITIONER

## 2021-08-23 PROCEDURE — 3288F PR FALLS RISK ASSESSMENT DOCUMENTED: ICD-10-PCS | Mod: CPTII,S$GLB,, | Performed by: NURSE PRACTITIONER

## 2021-08-23 PROCEDURE — 3077F SYST BP >= 140 MM HG: CPT | Mod: CPTII,S$GLB,, | Performed by: NURSE PRACTITIONER

## 2021-08-23 PROCEDURE — 3079F DIAST BP 80-89 MM HG: CPT | Mod: CPTII,S$GLB,, | Performed by: NURSE PRACTITIONER

## 2021-08-23 RX ORDER — ALIROCUMAB 150 MG/ML
150 INJECTION, SOLUTION SUBCUTANEOUS
Qty: 2 SYRINGE | Refills: 6 | Status: SHIPPED | OUTPATIENT
Start: 2021-08-23 | End: 2021-08-24

## 2021-08-23 RX ORDER — BLOOD-GLUCOSE SENSOR
1 EACH MISCELLANEOUS
Qty: 10 EACH | Refills: 3 | Status: SHIPPED | OUTPATIENT
Start: 2021-08-23 | End: 2022-03-16 | Stop reason: SDUPTHER

## 2021-08-23 RX ORDER — BLOOD-GLUCOSE TRANSMITTER
1 EACH MISCELLANEOUS DAILY
Qty: 1 EACH | Refills: 3 | Status: SHIPPED | OUTPATIENT
Start: 2021-08-23 | End: 2022-03-16 | Stop reason: SDUPTHER

## 2021-08-24 ENCOUNTER — TELEPHONE (OUTPATIENT)
Dept: INTERNAL MEDICINE | Facility: CLINIC | Age: 66
End: 2021-08-24

## 2021-08-24 RX ORDER — EVOLOCUMAB 140 MG/ML
140 INJECTION, SOLUTION SUBCUTANEOUS
Qty: 2 ML | Refills: 11 | Status: SHIPPED | OUTPATIENT
Start: 2021-08-24 | End: 2022-10-05 | Stop reason: SDUPTHER

## 2021-08-25 ENCOUNTER — TELEPHONE (OUTPATIENT)
Dept: INTERNAL MEDICINE | Facility: CLINIC | Age: 66
End: 2021-08-25

## 2021-09-14 RX ORDER — INSULIN LISPRO 100 [IU]/ML
100 INJECTION, SOLUTION INTRAVENOUS; SUBCUTANEOUS CONTINUOUS
Qty: 40 ML | Refills: 11 | Status: SHIPPED | OUTPATIENT
Start: 2021-09-14 | End: 2022-10-04 | Stop reason: SDUPTHER

## 2021-09-28 DIAGNOSIS — I10 ESSENTIAL HYPERTENSION: ICD-10-CM

## 2021-09-28 RX ORDER — LISINOPRIL 40 MG/1
40 TABLET ORAL DAILY
Qty: 90 TABLET | Refills: 3 | Status: SHIPPED | OUTPATIENT
Start: 2021-09-28 | End: 2022-03-28 | Stop reason: SDUPTHER

## 2021-10-05 ENCOUNTER — PATIENT MESSAGE (OUTPATIENT)
Dept: ADMINISTRATIVE | Facility: HOSPITAL | Age: 66
End: 2021-10-05

## 2021-10-07 ENCOUNTER — TELEPHONE (OUTPATIENT)
Dept: INTERNAL MEDICINE | Facility: CLINIC | Age: 66
End: 2021-10-07

## 2021-10-18 ENCOUNTER — PATIENT MESSAGE (OUTPATIENT)
Dept: ADMINISTRATIVE | Facility: HOSPITAL | Age: 66
End: 2021-10-18
Payer: MEDICARE

## 2021-10-20 ENCOUNTER — SPECIALTY PHARMACY (OUTPATIENT)
Dept: PHARMACY | Facility: CLINIC | Age: 66
End: 2021-10-20

## 2021-10-29 ENCOUNTER — HOSPITAL ENCOUNTER (OUTPATIENT)
Facility: HOSPITAL | Age: 66
Discharge: HOME | End: 2021-10-30
Attending: EMERGENCY MEDICINE | Admitting: INTERNAL MEDICINE
Payer: MEDICARE

## 2021-10-29 DIAGNOSIS — I50.9 CHF (CONGESTIVE HEART FAILURE): ICD-10-CM

## 2021-10-29 DIAGNOSIS — I25.10 ATHEROSCLEROSIS OF NATIVE CORONARY ARTERY OF NATIVE HEART WITHOUT ANGINA PECTORIS: ICD-10-CM

## 2021-10-29 DIAGNOSIS — I25.2 HISTORY OF HEART ATTACK: ICD-10-CM

## 2021-10-29 DIAGNOSIS — R07.9 CHEST PAIN: ICD-10-CM

## 2021-10-29 DIAGNOSIS — I10 ESSENTIAL HYPERTENSION: ICD-10-CM

## 2021-10-29 DIAGNOSIS — N17.9 AKI (ACUTE KIDNEY INJURY): Primary | ICD-10-CM

## 2021-10-29 DIAGNOSIS — R07.9 CHEST PAIN, RULE OUT ACUTE MYOCARDIAL INFARCTION: ICD-10-CM

## 2021-10-29 DIAGNOSIS — I25.118 CORONARY ARTERY DISEASE OF NATIVE ARTERY OF NATIVE HEART WITH STABLE ANGINA PECTORIS: ICD-10-CM

## 2021-10-29 PROBLEM — E66.9 OBESITY, DIABETES, AND HYPERTENSION SYNDROME: Status: ACTIVE | Noted: 2021-10-29

## 2021-10-29 PROBLEM — I11.0 BENIGN HYPERTENSIVE HEART DISEASE WITH HF (HEART FAILURE): Status: ACTIVE | Noted: 2021-10-29

## 2021-10-29 PROBLEM — E11.59 OBESITY, DIABETES, AND HYPERTENSION SYNDROME: Status: ACTIVE | Noted: 2021-10-29

## 2021-10-29 PROBLEM — E11.69 OBESITY, DIABETES, AND HYPERTENSION SYNDROME: Status: ACTIVE | Noted: 2021-10-29

## 2021-10-29 PROBLEM — I15.2 OBESITY, DIABETES, AND HYPERTENSION SYNDROME: Status: ACTIVE | Noted: 2021-10-29

## 2021-10-29 LAB
ALBUMIN SERPL BCP-MCNC: 3.4 G/DL (ref 3.5–5.2)
ALP SERPL-CCNC: 111 U/L (ref 55–135)
ALT SERPL W/O P-5'-P-CCNC: 11 U/L (ref 10–44)
ANION GAP SERPL CALC-SCNC: 12 MMOL/L (ref 8–16)
AST SERPL-CCNC: 17 U/L (ref 10–40)
BASOPHILS # BLD AUTO: 0.03 K/UL (ref 0–0.2)
BASOPHILS NFR BLD: 0.4 % (ref 0–1.9)
BILIRUB SERPL-MCNC: 0.2 MG/DL (ref 0.1–1)
BNP SERPL-MCNC: <10 PG/ML (ref 0–99)
BUN SERPL-MCNC: 45 MG/DL (ref 8–23)
CALCIUM SERPL-MCNC: 9.5 MG/DL (ref 8.7–10.5)
CHLORIDE SERPL-SCNC: 94 MMOL/L (ref 95–110)
CO2 SERPL-SCNC: 26 MMOL/L (ref 23–29)
CREAT SERPL-MCNC: 1.8 MG/DL (ref 0.5–1.4)
CRP SERPL-MCNC: 29.5 MG/L (ref 0–8.2)
CTP QC/QA: YES
DIFFERENTIAL METHOD: ABNORMAL
EOSINOPHIL # BLD AUTO: 0.1 K/UL (ref 0–0.5)
EOSINOPHIL NFR BLD: 1.5 % (ref 0–8)
ERYTHROCYTE [DISTWIDTH] IN BLOOD BY AUTOMATED COUNT: 17.4 % (ref 11.5–14.5)
ERYTHROCYTE [SEDIMENTATION RATE] IN BLOOD BY WESTERGREN METHOD: 97 MM/HR (ref 0–36)
EST. GFR  (AFRICAN AMERICAN): 33.1 ML/MIN/1.73 M^2
EST. GFR  (NON AFRICAN AMERICAN): 28.7 ML/MIN/1.73 M^2
GLUCOSE SERPL-MCNC: 114 MG/DL (ref 70–110)
GLUCOSE SERPL-MCNC: 311 MG/DL (ref 70–110)
HCT VFR BLD AUTO: 40 % (ref 37–48.5)
HGB BLD-MCNC: 11.8 G/DL (ref 12–16)
IMM GRANULOCYTES # BLD AUTO: 0.02 K/UL (ref 0–0.04)
IMM GRANULOCYTES NFR BLD AUTO: 0.2 % (ref 0–0.5)
LYMPHOCYTES # BLD AUTO: 2.5 K/UL (ref 1–4.8)
LYMPHOCYTES NFR BLD: 30.5 % (ref 18–48)
MCH RBC QN AUTO: 21.5 PG (ref 27–31)
MCHC RBC AUTO-ENTMCNC: 29.5 G/DL (ref 32–36)
MCV RBC AUTO: 73 FL (ref 82–98)
MONOCYTES # BLD AUTO: 0.5 K/UL (ref 0.3–1)
MONOCYTES NFR BLD: 6.3 % (ref 4–15)
NEUTROPHILS # BLD AUTO: 5 K/UL (ref 1.8–7.7)
NEUTROPHILS NFR BLD: 61.1 % (ref 38–73)
NRBC BLD-RTO: 0 /100 WBC
PLATELET # BLD AUTO: 243 K/UL (ref 150–450)
PMV BLD AUTO: 11.3 FL (ref 9.2–12.9)
POCT GLUCOSE: 114 MG/DL (ref 70–110)
POTASSIUM SERPL-SCNC: 5.3 MMOL/L (ref 3.5–5.1)
PROT SERPL-MCNC: 8 G/DL (ref 6–8.4)
RBC # BLD AUTO: 5.5 M/UL (ref 4–5.4)
SARS-COV-2 RDRP RESP QL NAA+PROBE: NEGATIVE
SODIUM SERPL-SCNC: 132 MMOL/L (ref 136–145)
TROPONIN I SERPL DL<=0.01 NG/ML-MCNC: 0.01 NG/ML (ref 0–0.03)
TROPONIN I SERPL DL<=0.01 NG/ML-MCNC: 0.01 NG/ML (ref 0–0.03)
TROPONIN I SERPL DL<=0.01 NG/ML-MCNC: 0.02 NG/ML (ref 0–0.03)
WBC # BLD AUTO: 8.13 K/UL (ref 3.9–12.7)

## 2021-10-29 PROCEDURE — U0002 COVID-19 LAB TEST NON-CDC: HCPCS | Performed by: PHYSICIAN ASSISTANT

## 2021-10-29 PROCEDURE — G0378 HOSPITAL OBSERVATION PER HR: HCPCS

## 2021-10-29 PROCEDURE — 96372 THER/PROPH/DIAG INJ SC/IM: CPT | Mod: 59 | Performed by: EMERGENCY MEDICINE

## 2021-10-29 PROCEDURE — 84484 ASSAY OF TROPONIN QUANT: CPT | Performed by: PHYSICIAN ASSISTANT

## 2021-10-29 PROCEDURE — 25000003 PHARM REV CODE 250: Performed by: PHYSICIAN ASSISTANT

## 2021-10-29 PROCEDURE — 99285 EMERGENCY DEPT VISIT HI MDM: CPT | Mod: CS,,, | Performed by: PHYSICIAN ASSISTANT

## 2021-10-29 PROCEDURE — 82962 GLUCOSE BLOOD TEST: CPT

## 2021-10-29 PROCEDURE — 85652 RBC SED RATE AUTOMATED: CPT | Performed by: PHYSICIAN ASSISTANT

## 2021-10-29 PROCEDURE — 93010 EKG 12-LEAD: ICD-10-PCS | Mod: ,,, | Performed by: INTERNAL MEDICINE

## 2021-10-29 PROCEDURE — 96361 HYDRATE IV INFUSION ADD-ON: CPT

## 2021-10-29 PROCEDURE — 99285 PR EMERGENCY DEPT VISIT,LEVEL V: ICD-10-PCS | Mod: CS,,, | Performed by: PHYSICIAN ASSISTANT

## 2021-10-29 PROCEDURE — 83880 ASSAY OF NATRIURETIC PEPTIDE: CPT | Performed by: PHYSICIAN ASSISTANT

## 2021-10-29 PROCEDURE — 85025 COMPLETE CBC W/AUTO DIFF WBC: CPT | Performed by: PHYSICIAN ASSISTANT

## 2021-10-29 PROCEDURE — 99285 EMERGENCY DEPT VISIT HI MDM: CPT | Mod: 25

## 2021-10-29 PROCEDURE — 86140 C-REACTIVE PROTEIN: CPT | Performed by: PHYSICIAN ASSISTANT

## 2021-10-29 PROCEDURE — 93010 ELECTROCARDIOGRAM REPORT: CPT | Mod: ,,, | Performed by: INTERNAL MEDICINE

## 2021-10-29 PROCEDURE — 80053 COMPREHEN METABOLIC PANEL: CPT | Performed by: PHYSICIAN ASSISTANT

## 2021-10-29 PROCEDURE — 96360 HYDRATION IV INFUSION INIT: CPT

## 2021-10-29 PROCEDURE — C9399 UNCLASSIFIED DRUGS OR BIOLOG: HCPCS | Performed by: HOSPITALIST

## 2021-10-29 PROCEDURE — 86803 HEPATITIS C AB TEST: CPT | Performed by: EMERGENCY MEDICINE

## 2021-10-29 PROCEDURE — 87389 HIV-1 AG W/HIV-1&-2 AB AG IA: CPT | Performed by: EMERGENCY MEDICINE

## 2021-10-29 PROCEDURE — 93005 ELECTROCARDIOGRAM TRACING: CPT

## 2021-10-29 PROCEDURE — 99220 PR INITIAL OBSERVATION CARE,LEVL III: CPT | Mod: ,,, | Performed by: HOSPITALIST

## 2021-10-29 PROCEDURE — 25000003 PHARM REV CODE 250: Performed by: HOSPITALIST

## 2021-10-29 PROCEDURE — 99220 PR INITIAL OBSERVATION CARE,LEVL III: ICD-10-PCS | Mod: ,,, | Performed by: HOSPITALIST

## 2021-10-29 RX ORDER — SODIUM CHLORIDE 9 MG/ML
INJECTION, SOLUTION INTRAVENOUS CONTINUOUS
Status: ACTIVE | OUTPATIENT
Start: 2021-10-29 | End: 2021-10-30

## 2021-10-29 RX ORDER — IBUPROFEN 200 MG
16 TABLET ORAL
Status: DISCONTINUED | OUTPATIENT
Start: 2021-10-29 | End: 2021-10-30 | Stop reason: HOSPADM

## 2021-10-29 RX ORDER — INSULIN ASPART 100 [IU]/ML
0-5 INJECTION, SOLUTION INTRAVENOUS; SUBCUTANEOUS
Status: DISCONTINUED | OUTPATIENT
Start: 2021-10-29 | End: 2021-10-29

## 2021-10-29 RX ORDER — GLUCAGON 1 MG
1 KIT INJECTION
Status: DISCONTINUED | OUTPATIENT
Start: 2021-10-29 | End: 2021-10-30 | Stop reason: HOSPADM

## 2021-10-29 RX ORDER — NITROGLYCERIN 0.4 MG/1
0.4 TABLET SUBLINGUAL EVERY 5 MIN PRN
Status: DISCONTINUED | OUTPATIENT
Start: 2021-10-29 | End: 2021-10-29

## 2021-10-29 RX ORDER — IBUPROFEN 200 MG
24 TABLET ORAL
Status: DISCONTINUED | OUTPATIENT
Start: 2021-10-29 | End: 2021-10-30 | Stop reason: HOSPADM

## 2021-10-29 RX ORDER — ATORVASTATIN CALCIUM 40 MG/1
80 TABLET, FILM COATED ORAL NIGHTLY
Status: DISCONTINUED | OUTPATIENT
Start: 2021-10-29 | End: 2021-10-30 | Stop reason: HOSPADM

## 2021-10-29 RX ORDER — CLOPIDOGREL BISULFATE 75 MG/1
75 TABLET ORAL DAILY
Status: DISCONTINUED | OUTPATIENT
Start: 2021-10-30 | End: 2021-10-30 | Stop reason: HOSPADM

## 2021-10-29 RX ORDER — POLYETHYLENE GLYCOL 3350 17 G/17G
17 POWDER, FOR SOLUTION ORAL DAILY PRN
Status: DISCONTINUED | OUTPATIENT
Start: 2021-10-29 | End: 2021-10-30 | Stop reason: HOSPADM

## 2021-10-29 RX ORDER — ONDANSETRON 2 MG/ML
8 INJECTION INTRAMUSCULAR; INTRAVENOUS EVERY 6 HOURS PRN
Status: DISCONTINUED | OUTPATIENT
Start: 2021-10-29 | End: 2021-10-30 | Stop reason: HOSPADM

## 2021-10-29 RX ORDER — PROCHLORPERAZINE EDISYLATE 5 MG/ML
5 INJECTION INTRAMUSCULAR; INTRAVENOUS EVERY 6 HOURS PRN
Status: DISCONTINUED | OUTPATIENT
Start: 2021-10-29 | End: 2021-10-30 | Stop reason: HOSPADM

## 2021-10-29 RX ORDER — INSULIN ASPART 100 [IU]/ML
1-10 INJECTION, SOLUTION INTRAVENOUS; SUBCUTANEOUS
Status: DISCONTINUED | OUTPATIENT
Start: 2021-10-29 | End: 2021-10-30 | Stop reason: HOSPADM

## 2021-10-29 RX ORDER — ACETAMINOPHEN 325 MG/1
650 TABLET ORAL ONCE AS NEEDED
Status: DISCONTINUED | OUTPATIENT
Start: 2021-10-29 | End: 2021-10-30 | Stop reason: HOSPADM

## 2021-10-29 RX ORDER — ACETAMINOPHEN 325 MG/1
650 TABLET ORAL EVERY 4 HOURS PRN
Status: DISCONTINUED | OUTPATIENT
Start: 2021-10-29 | End: 2021-10-30 | Stop reason: HOSPADM

## 2021-10-29 RX ORDER — NITROGLYCERIN 0.4 MG/1
0.4 TABLET SUBLINGUAL EVERY 5 MIN PRN
Status: DISCONTINUED | OUTPATIENT
Start: 2021-10-29 | End: 2021-10-30 | Stop reason: HOSPADM

## 2021-10-29 RX ORDER — GABAPENTIN 300 MG/1
300 CAPSULE ORAL 3 TIMES DAILY
Status: DISCONTINUED | OUTPATIENT
Start: 2021-10-29 | End: 2021-10-30 | Stop reason: HOSPADM

## 2021-10-29 RX ORDER — HYDROCODONE BITARTRATE AND ACETAMINOPHEN 10; 325 MG/1; MG/1
1 TABLET ORAL EVERY 4 HOURS PRN
Status: DISCONTINUED | OUTPATIENT
Start: 2021-10-29 | End: 2021-10-29

## 2021-10-29 RX ORDER — TALC
6 POWDER (GRAM) TOPICAL NIGHTLY PRN
Status: DISCONTINUED | OUTPATIENT
Start: 2021-10-29 | End: 2021-10-30 | Stop reason: HOSPADM

## 2021-10-29 RX ORDER — ASPIRIN 325 MG
325 TABLET ORAL
Status: COMPLETED | OUTPATIENT
Start: 2021-10-29 | End: 2021-10-29

## 2021-10-29 RX ORDER — SODIUM CHLORIDE 0.9 % (FLUSH) 0.9 %
5 SYRINGE (ML) INJECTION
Status: DISCONTINUED | OUTPATIENT
Start: 2021-10-29 | End: 2021-10-30 | Stop reason: HOSPADM

## 2021-10-29 RX ADMIN — ATORVASTATIN CALCIUM 80 MG: 40 TABLET, FILM COATED ORAL at 09:10

## 2021-10-29 RX ADMIN — SODIUM CHLORIDE 1000 ML: 0.9 INJECTION, SOLUTION INTRAVENOUS at 03:10

## 2021-10-29 RX ADMIN — INSULIN DETEMIR 5 UNITS: 100 INJECTION, SOLUTION SUBCUTANEOUS at 09:10

## 2021-10-29 RX ADMIN — ASPIRIN 325 MG ORAL TABLET 325 MG: 325 PILL ORAL at 01:10

## 2021-10-29 RX ADMIN — AMITRIPTYLINE HYDROCHLORIDE 75 MG: 50 TABLET, FILM COATED ORAL at 09:10

## 2021-10-29 RX ADMIN — GABAPENTIN 300 MG: 300 CAPSULE ORAL at 09:10

## 2021-10-29 RX ADMIN — SODIUM CHLORIDE: 0.9 INJECTION, SOLUTION INTRAVENOUS at 07:10

## 2021-10-30 VITALS
RESPIRATION RATE: 20 BRPM | TEMPERATURE: 98 F | HEART RATE: 81 BPM | WEIGHT: 189 LBS | HEIGHT: 60 IN | SYSTOLIC BLOOD PRESSURE: 140 MMHG | OXYGEN SATURATION: 97 % | BODY MASS INDEX: 37.11 KG/M2 | DIASTOLIC BLOOD PRESSURE: 62 MMHG

## 2021-10-30 LAB
ALBUMIN SERPL BCP-MCNC: 3 G/DL (ref 3.5–5.2)
ALP SERPL-CCNC: 93 U/L (ref 55–135)
ALT SERPL W/O P-5'-P-CCNC: 8 U/L (ref 10–44)
ANION GAP SERPL CALC-SCNC: 11 MMOL/L (ref 8–16)
ASCENDING AORTA: 3.32 CM
AST SERPL-CCNC: 14 U/L (ref 10–40)
AV INDEX (PROSTH): 0.5
AV MEAN GRADIENT: 6 MMHG
AV PEAK GRADIENT: 12 MMHG
AV VALVE AREA: 1.59 CM2
AV VELOCITY RATIO: 0.45
BASOPHILS # BLD AUTO: 0.04 K/UL (ref 0–0.2)
BASOPHILS NFR BLD: 0.5 % (ref 0–1.9)
BILIRUB SERPL-MCNC: 0.3 MG/DL (ref 0.1–1)
BSA FOR ECHO PROCEDURE: 1.9 M2
BUN SERPL-MCNC: 35 MG/DL (ref 8–23)
CALCIUM SERPL-MCNC: 9.3 MG/DL (ref 8.7–10.5)
CHLORIDE SERPL-SCNC: 104 MMOL/L (ref 95–110)
CO2 SERPL-SCNC: 21 MMOL/L (ref 23–29)
CREAT SERPL-MCNC: 1.1 MG/DL (ref 0.5–1.4)
CV ECHO LV RWT: 0.4 CM
DIFFERENTIAL METHOD: ABNORMAL
DOP CALC AO PEAK VEL: 1.74 M/S
DOP CALC AO VTI: 28.98 CM
DOP CALC LVOT AREA: 3.2 CM2
DOP CALC LVOT DIAMETER: 2.02 CM
DOP CALC LVOT PEAK VEL: 0.79 M/S
DOP CALC LVOT STROKE VOLUME: 46.12 CM3
DOP CALCLVOT PEAK VEL VTI: 14.4 CM
E WAVE DECELERATION TIME: 167.73 MSEC
E/A RATIO: 0.59
E/E' RATIO: 13.45 M/S
ECHO LV POSTERIOR WALL: 0.99 CM (ref 0.6–1.1)
EJECTION FRACTION: 40 %
EOSINOPHIL # BLD AUTO: 0.2 K/UL (ref 0–0.5)
EOSINOPHIL NFR BLD: 2.2 % (ref 0–8)
ERYTHROCYTE [DISTWIDTH] IN BLOOD BY AUTOMATED COUNT: 17.2 % (ref 11.5–14.5)
EST. GFR  (AFRICAN AMERICAN): >60 ML/MIN/1.73 M^2
EST. GFR  (NON AFRICAN AMERICAN): 52.1 ML/MIN/1.73 M^2
FRACTIONAL SHORTENING: 25 % (ref 28–44)
GLUCOSE SERPL-MCNC: 155 MG/DL (ref 70–110)
HCT VFR BLD AUTO: 37 % (ref 37–48.5)
HGB BLD-MCNC: 11.3 G/DL (ref 12–16)
IMM GRANULOCYTES # BLD AUTO: 0.01 K/UL (ref 0–0.04)
IMM GRANULOCYTES NFR BLD AUTO: 0.1 % (ref 0–0.5)
INTERVENTRICULAR SEPTUM: 1.15 CM (ref 0.6–1.1)
IVRT: 105.61 MSEC
LA MAJOR: 4.69 CM
LA MINOR: 5.05 CM
LA WIDTH: 3.08 CM
LEFT ATRIUM SIZE: 3.31 CM
LEFT ATRIUM VOLUME INDEX MOD: 9 ML/M2
LEFT ATRIUM VOLUME INDEX: 23.2 ML/M2
LEFT ATRIUM VOLUME MOD: 16.38 CM3
LEFT ATRIUM VOLUME: 42.14 CM3
LEFT INTERNAL DIMENSION IN SYSTOLE: 3.7 CM (ref 2.1–4)
LEFT VENTRICLE DIASTOLIC VOLUME INDEX: 62.62 ML/M2
LEFT VENTRICLE DIASTOLIC VOLUME: 113.96 ML
LEFT VENTRICLE MASS INDEX: 107 G/M2
LEFT VENTRICLE SYSTOLIC VOLUME INDEX: 32 ML/M2
LEFT VENTRICLE SYSTOLIC VOLUME: 58.15 ML
LEFT VENTRICULAR INTERNAL DIMENSION IN DIASTOLE: 4.92 CM (ref 3.5–6)
LEFT VENTRICULAR MASS: 194.29 G
LV LATERAL E/E' RATIO: 12.33 M/S
LV SEPTAL E/E' RATIO: 14.8 M/S
LYMPHOCYTES # BLD AUTO: 2.5 K/UL (ref 1–4.8)
LYMPHOCYTES NFR BLD: 32.1 % (ref 18–48)
MAGNESIUM SERPL-MCNC: 2.3 MG/DL (ref 1.6–2.6)
MCH RBC QN AUTO: 22.1 PG (ref 27–31)
MCHC RBC AUTO-ENTMCNC: 30.5 G/DL (ref 32–36)
MCV RBC AUTO: 72 FL (ref 82–98)
MONOCYTES # BLD AUTO: 0.6 K/UL (ref 0.3–1)
MONOCYTES NFR BLD: 7.1 % (ref 4–15)
MV PEAK A VEL: 1.26 M/S
MV PEAK E VEL: 0.74 M/S
MV STENOSIS PRESSURE HALF TIME: 48.64 MS
MV VALVE AREA P 1/2 METHOD: 4.52 CM2
NEUTROPHILS # BLD AUTO: 4.5 K/UL (ref 1.8–7.7)
NEUTROPHILS NFR BLD: 58 % (ref 38–73)
NRBC BLD-RTO: 0 /100 WBC
PHOSPHATE SERPL-MCNC: 3.8 MG/DL (ref 2.7–4.5)
PLATELET # BLD AUTO: 216 K/UL (ref 150–450)
PMV BLD AUTO: 12.4 FL (ref 9.2–12.9)
POCT GLUCOSE: 142 MG/DL (ref 70–110)
POCT GLUCOSE: 239 MG/DL (ref 70–110)
POTASSIUM SERPL-SCNC: 4.7 MMOL/L (ref 3.5–5.1)
PROT SERPL-MCNC: 6.7 G/DL (ref 6–8.4)
QEF: 45 %
RA MAJOR: 3.58 CM
RA WIDTH: 2.71 CM
RBC # BLD AUTO: 5.11 M/UL (ref 4–5.4)
RIGHT VENTRICULAR END-DIASTOLIC DIMENSION: 2.6 CM
RV TISSUE DOPPLER FREE WALL SYSTOLIC VELOCITY 1 (APICAL 4 CHAMBER VIEW): 15.99 CM/S
SINUS: 2.86 CM
SODIUM SERPL-SCNC: 136 MMOL/L (ref 136–145)
STJ: 2.36 CM
TDI LATERAL: 0.06 M/S
TDI SEPTAL: 0.05 M/S
TDI: 0.06 M/S
TRICUSPID ANNULAR PLANE SYSTOLIC EXCURSION: 1.65 CM
TROPONIN I SERPL DL<=0.01 NG/ML-MCNC: 0.02 NG/ML (ref 0–0.03)
WBC # BLD AUTO: 7.73 K/UL (ref 3.9–12.7)

## 2021-10-30 PROCEDURE — G0378 HOSPITAL OBSERVATION PER HR: HCPCS

## 2021-10-30 PROCEDURE — 99217 PR OBSERVATION CARE DISCHARGE: CPT | Mod: ,,, | Performed by: PHYSICIAN ASSISTANT

## 2021-10-30 PROCEDURE — 25000003 PHARM REV CODE 250: Performed by: HOSPITALIST

## 2021-10-30 PROCEDURE — 36415 COLL VENOUS BLD VENIPUNCTURE: CPT | Performed by: HOSPITALIST

## 2021-10-30 PROCEDURE — 85025 COMPLETE CBC W/AUTO DIFF WBC: CPT | Performed by: HOSPITALIST

## 2021-10-30 PROCEDURE — 80053 COMPREHEN METABOLIC PANEL: CPT | Performed by: HOSPITALIST

## 2021-10-30 PROCEDURE — 84484 ASSAY OF TROPONIN QUANT: CPT | Performed by: HOSPITALIST

## 2021-10-30 PROCEDURE — 83735 ASSAY OF MAGNESIUM: CPT | Performed by: HOSPITALIST

## 2021-10-30 PROCEDURE — 96372 THER/PROPH/DIAG INJ SC/IM: CPT | Mod: 59 | Performed by: EMERGENCY MEDICINE

## 2021-10-30 PROCEDURE — 84100 ASSAY OF PHOSPHORUS: CPT | Performed by: HOSPITALIST

## 2021-10-30 PROCEDURE — 25500020 PHARM REV CODE 255: Performed by: INTERNAL MEDICINE

## 2021-10-30 PROCEDURE — 63600175 PHARM REV CODE 636 W HCPCS: Performed by: HOSPITALIST

## 2021-10-30 PROCEDURE — 99217 PR OBSERVATION CARE DISCHARGE: ICD-10-PCS | Mod: ,,, | Performed by: PHYSICIAN ASSISTANT

## 2021-10-30 PROCEDURE — 25000003 PHARM REV CODE 250: Performed by: PHYSICIAN ASSISTANT

## 2021-10-30 RX ORDER — METOPROLOL SUCCINATE 100 MG/1
200 TABLET, EXTENDED RELEASE ORAL DAILY
Status: DISCONTINUED | OUTPATIENT
Start: 2021-10-30 | End: 2021-10-30 | Stop reason: HOSPADM

## 2021-10-30 RX ADMIN — INSULIN ASPART 2 UNITS: 100 INJECTION, SOLUTION INTRAVENOUS; SUBCUTANEOUS at 01:10

## 2021-10-30 RX ADMIN — CLOPIDOGREL 75 MG: 75 TABLET, FILM COATED ORAL at 09:10

## 2021-10-30 RX ADMIN — GABAPENTIN 300 MG: 300 CAPSULE ORAL at 04:10

## 2021-10-30 RX ADMIN — HUMAN ALBUMIN MICROSPHERES AND PERFLUTREN 0.11 MG: 10; .22 INJECTION, SOLUTION INTRAVENOUS at 11:10

## 2021-10-30 RX ADMIN — METOPROLOL SUCCINATE 200 MG: 100 TABLET, EXTENDED RELEASE ORAL at 04:10

## 2021-10-30 RX ADMIN — GABAPENTIN 300 MG: 300 CAPSULE ORAL at 09:10

## 2021-11-01 LAB
HCV AB SERPL QL IA: NEGATIVE
HIV 1+2 AB+HIV1 P24 AG SERPL QL IA: NEGATIVE

## 2021-11-08 ENCOUNTER — SPECIALTY PHARMACY (OUTPATIENT)
Dept: PHARMACY | Facility: CLINIC | Age: 66
End: 2021-11-08
Payer: MEDICARE

## 2021-11-12 DIAGNOSIS — R51.9 CHRONIC NONINTRACTABLE HEADACHE, UNSPECIFIED HEADACHE TYPE: ICD-10-CM

## 2021-11-12 DIAGNOSIS — G89.29 CHRONIC NONINTRACTABLE HEADACHE, UNSPECIFIED HEADACHE TYPE: ICD-10-CM

## 2021-11-12 DIAGNOSIS — I25.118 CORONARY ARTERY DISEASE OF NATIVE ARTERY OF NATIVE HEART WITH STABLE ANGINA PECTORIS: ICD-10-CM

## 2021-11-12 DIAGNOSIS — I50.22 CHRONIC SYSTOLIC HEART FAILURE: ICD-10-CM

## 2021-11-12 RX ORDER — AMITRIPTYLINE HYDROCHLORIDE 75 MG/1
75 TABLET ORAL NIGHTLY
Qty: 90 TABLET | Refills: 3 | Status: SHIPPED | OUTPATIENT
Start: 2021-11-12 | End: 2023-01-19 | Stop reason: SDUPTHER

## 2021-11-12 RX ORDER — ATORVASTATIN CALCIUM 80 MG/1
80 TABLET, FILM COATED ORAL NIGHTLY
Qty: 90 TABLET | Refills: 3 | Status: SHIPPED | OUTPATIENT
Start: 2021-11-12 | End: 2022-03-28

## 2021-11-23 ENCOUNTER — PATIENT OUTREACH (OUTPATIENT)
Dept: ADMINISTRATIVE | Facility: HOSPITAL | Age: 66
End: 2021-11-23
Payer: MEDICARE

## 2021-11-23 ENCOUNTER — PATIENT MESSAGE (OUTPATIENT)
Dept: ADMINISTRATIVE | Facility: HOSPITAL | Age: 66
End: 2021-11-23
Payer: MEDICARE

## 2021-12-13 ENCOUNTER — TELEPHONE (OUTPATIENT)
Dept: PRIMARY CARE CLINIC | Facility: CLINIC | Age: 66
End: 2021-12-13
Payer: MEDICARE

## 2021-12-13 ENCOUNTER — SPECIALTY PHARMACY (OUTPATIENT)
Dept: PHARMACY | Facility: CLINIC | Age: 66
End: 2021-12-13
Payer: MEDICARE

## 2021-12-13 DIAGNOSIS — I25.118 CORONARY ARTERY DISEASE OF NATIVE ARTERY OF NATIVE HEART WITH STABLE ANGINA PECTORIS: ICD-10-CM

## 2021-12-13 DIAGNOSIS — I10 ESSENTIAL HYPERTENSION: ICD-10-CM

## 2021-12-13 RX ORDER — METOPROLOL SUCCINATE 200 MG/1
200 TABLET, EXTENDED RELEASE ORAL DAILY
Qty: 90 TABLET | Refills: 3 | Status: SHIPPED | OUTPATIENT
Start: 2021-12-13 | End: 2023-04-18 | Stop reason: SDUPTHER

## 2021-12-16 ENCOUNTER — LAB VISIT (OUTPATIENT)
Dept: LAB | Facility: HOSPITAL | Age: 66
End: 2021-12-16
Payer: MEDICARE

## 2021-12-16 DIAGNOSIS — I15.2 HYPERTENSION ASSOCIATED WITH DIABETES: ICD-10-CM

## 2021-12-16 DIAGNOSIS — E11.69 HYPERLIPIDEMIA ASSOCIATED WITH TYPE 2 DIABETES MELLITUS: ICD-10-CM

## 2021-12-16 DIAGNOSIS — E11.59 HYPERTENSION ASSOCIATED WITH DIABETES: ICD-10-CM

## 2021-12-16 DIAGNOSIS — E78.5 HYPERLIPIDEMIA ASSOCIATED WITH TYPE 2 DIABETES MELLITUS: ICD-10-CM

## 2021-12-16 DIAGNOSIS — Z79.4 TYPE 2 DIABETES MELLITUS WITH DIABETIC POLYNEUROPATHY, WITH LONG-TERM CURRENT USE OF INSULIN: ICD-10-CM

## 2021-12-16 DIAGNOSIS — E11.42 TYPE 2 DIABETES MELLITUS WITH DIABETIC POLYNEUROPATHY, WITH LONG-TERM CURRENT USE OF INSULIN: ICD-10-CM

## 2021-12-16 LAB
ALBUMIN SERPL BCP-MCNC: 3.2 G/DL (ref 3.5–5.2)
ALP SERPL-CCNC: 99 U/L (ref 55–135)
ALT SERPL W/O P-5'-P-CCNC: 11 U/L (ref 10–44)
ANION GAP SERPL CALC-SCNC: 11 MMOL/L (ref 8–16)
AST SERPL-CCNC: 16 U/L (ref 10–40)
BILIRUB SERPL-MCNC: 0.3 MG/DL (ref 0.1–1)
BUN SERPL-MCNC: 11 MG/DL (ref 8–23)
CALCIUM SERPL-MCNC: 8.5 MG/DL (ref 8.7–10.5)
CHLORIDE SERPL-SCNC: 101 MMOL/L (ref 95–110)
CHOLEST SERPL-MCNC: 86 MG/DL (ref 120–199)
CHOLEST/HDLC SERPL: 2.5 {RATIO} (ref 2–5)
CO2 SERPL-SCNC: 25 MMOL/L (ref 23–29)
CREAT SERPL-MCNC: 1.1 MG/DL (ref 0.5–1.4)
EST. GFR  (AFRICAN AMERICAN): >60 ML/MIN/1.73 M^2
EST. GFR  (NON AFRICAN AMERICAN): 52.1 ML/MIN/1.73 M^2
ESTIMATED AVG GLUCOSE: 214 MG/DL (ref 68–131)
GLUCOSE SERPL-MCNC: 193 MG/DL (ref 70–110)
HBA1C MFR BLD: 9.1 % (ref 4–5.6)
HDLC SERPL-MCNC: 34 MG/DL (ref 40–75)
HDLC SERPL: 39.5 % (ref 20–50)
LDLC SERPL CALC-MCNC: 33.4 MG/DL (ref 63–159)
NONHDLC SERPL-MCNC: 52 MG/DL
POTASSIUM SERPL-SCNC: 4.1 MMOL/L (ref 3.5–5.1)
PROT SERPL-MCNC: 7.1 G/DL (ref 6–8.4)
SODIUM SERPL-SCNC: 137 MMOL/L (ref 136–145)
TRIGL SERPL-MCNC: 93 MG/DL (ref 30–150)

## 2021-12-16 PROCEDURE — 83036 HEMOGLOBIN GLYCOSYLATED A1C: CPT | Performed by: NURSE PRACTITIONER

## 2021-12-16 PROCEDURE — 80061 LIPID PANEL: CPT | Performed by: NURSE PRACTITIONER

## 2021-12-16 PROCEDURE — 80053 COMPREHEN METABOLIC PANEL: CPT | Performed by: NURSE PRACTITIONER

## 2021-12-16 PROCEDURE — 36415 COLL VENOUS BLD VENIPUNCTURE: CPT | Performed by: NURSE PRACTITIONER

## 2021-12-20 ENCOUNTER — OFFICE VISIT (OUTPATIENT)
Dept: PRIMARY CARE CLINIC | Facility: CLINIC | Age: 66
End: 2021-12-20
Payer: MEDICARE

## 2021-12-20 VITALS
OXYGEN SATURATION: 98 % | DIASTOLIC BLOOD PRESSURE: 78 MMHG | RESPIRATION RATE: 14 BRPM | WEIGHT: 186.31 LBS | BODY MASS INDEX: 36.58 KG/M2 | TEMPERATURE: 98 F | HEIGHT: 60 IN | HEART RATE: 97 BPM | SYSTOLIC BLOOD PRESSURE: 140 MMHG

## 2021-12-20 DIAGNOSIS — Z79.4 TYPE 2 DIABETES MELLITUS WITH DIABETIC POLYNEUROPATHY, WITH LONG-TERM CURRENT USE OF INSULIN: Primary | ICD-10-CM

## 2021-12-20 DIAGNOSIS — I25.118 CORONARY ARTERY DISEASE OF NATIVE ARTERY OF NATIVE HEART WITH STABLE ANGINA PECTORIS: ICD-10-CM

## 2021-12-20 DIAGNOSIS — E11.42 TYPE 2 DIABETES MELLITUS WITH DIABETIC POLYNEUROPATHY, WITH LONG-TERM CURRENT USE OF INSULIN: Primary | ICD-10-CM

## 2021-12-20 DIAGNOSIS — E11.59 HYPERTENSION ASSOCIATED WITH DIABETES: ICD-10-CM

## 2021-12-20 DIAGNOSIS — E11.40 NEUROPATHY DUE TO TYPE 2 DIABETES MELLITUS: ICD-10-CM

## 2021-12-20 DIAGNOSIS — I10 ESSENTIAL HYPERTENSION: ICD-10-CM

## 2021-12-20 DIAGNOSIS — E11.69 HYPERLIPIDEMIA ASSOCIATED WITH TYPE 2 DIABETES MELLITUS: ICD-10-CM

## 2021-12-20 DIAGNOSIS — Z95.5 HISTORY OF HEART ARTERY STENT: ICD-10-CM

## 2021-12-20 DIAGNOSIS — I15.2 HYPERTENSION ASSOCIATED WITH DIABETES: ICD-10-CM

## 2021-12-20 DIAGNOSIS — E78.5 HYPERLIPIDEMIA ASSOCIATED WITH TYPE 2 DIABETES MELLITUS: ICD-10-CM

## 2021-12-20 LAB — GLUCOSE SERPL-MCNC: 112 MG/DL (ref 70–110)

## 2021-12-20 PROCEDURE — 3061F PR NEG MICROALBUMINURIA RESULT DOCUMENTED/REVIEW: ICD-10-PCS | Mod: CPTII,S$GLB,, | Performed by: NURSE PRACTITIONER

## 2021-12-20 PROCEDURE — 99999 PR PBB SHADOW E&M-EST. PATIENT-LVL V: CPT | Mod: PBBFAC,,, | Performed by: NURSE PRACTITIONER

## 2021-12-20 PROCEDURE — 3061F NEG MICROALBUMINURIA REV: CPT | Mod: CPTII,S$GLB,, | Performed by: NURSE PRACTITIONER

## 2021-12-20 PROCEDURE — 95251 PR GLUCOSE MONITOR, 72 HOUR, PHYS INTERP: ICD-10-PCS | Mod: S$GLB,,, | Performed by: NURSE PRACTITIONER

## 2021-12-20 PROCEDURE — 3066F PR DOCUMENTATION OF TREATMENT FOR NEPHROPATHY: ICD-10-PCS | Mod: CPTII,S$GLB,, | Performed by: NURSE PRACTITIONER

## 2021-12-20 PROCEDURE — 82962 GLUCOSE BLOOD TEST: CPT | Mod: S$GLB,,, | Performed by: NURSE PRACTITIONER

## 2021-12-20 PROCEDURE — 82962 POCT GLUCOSE, HAND-HELD DEVICE: ICD-10-PCS | Mod: S$GLB,,, | Performed by: NURSE PRACTITIONER

## 2021-12-20 PROCEDURE — 95251 CONT GLUC MNTR ANALYSIS I&R: CPT | Mod: S$GLB,,, | Performed by: NURSE PRACTITIONER

## 2021-12-20 PROCEDURE — 99999 PR PBB SHADOW E&M-EST. PATIENT-LVL V: ICD-10-PCS | Mod: PBBFAC,,, | Performed by: NURSE PRACTITIONER

## 2021-12-20 PROCEDURE — 99214 PR OFFICE/OUTPT VISIT, EST, LEVL IV, 30-39 MIN: ICD-10-PCS | Mod: S$GLB,,, | Performed by: NURSE PRACTITIONER

## 2021-12-20 PROCEDURE — 3066F NEPHROPATHY DOC TX: CPT | Mod: CPTII,S$GLB,, | Performed by: NURSE PRACTITIONER

## 2021-12-20 PROCEDURE — 4010F ACE/ARB THERAPY RXD/TAKEN: CPT | Mod: CPTII,S$GLB,, | Performed by: NURSE PRACTITIONER

## 2021-12-20 PROCEDURE — 99214 OFFICE O/P EST MOD 30 MIN: CPT | Mod: S$GLB,,, | Performed by: NURSE PRACTITIONER

## 2021-12-20 PROCEDURE — 4010F PR ACE/ARB THEARPY RXD/TAKEN: ICD-10-PCS | Mod: CPTII,S$GLB,, | Performed by: NURSE PRACTITIONER

## 2021-12-20 RX ORDER — AMLODIPINE BESYLATE 5 MG/1
5 TABLET ORAL DAILY
Qty: 90 TABLET | Refills: 3 | Status: SHIPPED | OUTPATIENT
Start: 2021-12-20 | End: 2023-01-09 | Stop reason: SDUPTHER

## 2021-12-20 RX ORDER — METOPROLOL SUCCINATE 200 MG/1
200 TABLET, EXTENDED RELEASE ORAL DAILY
Qty: 90 TABLET | Refills: 3 | Status: SHIPPED | OUTPATIENT
Start: 2021-12-20 | End: 2022-03-21

## 2021-12-20 RX ORDER — CLOPIDOGREL BISULFATE 75 MG/1
75 TABLET ORAL DAILY
Qty: 90 TABLET | Refills: 3 | Status: SHIPPED | OUTPATIENT
Start: 2021-12-20 | End: 2023-04-18 | Stop reason: SDUPTHER

## 2021-12-21 ENCOUNTER — PATIENT OUTREACH (OUTPATIENT)
Dept: ADMINISTRATIVE | Facility: OTHER | Age: 66
End: 2021-12-21
Payer: MEDICARE

## 2021-12-21 DIAGNOSIS — Z12.31 SCREENING MAMMOGRAM FOR BREAST CANCER: Primary | ICD-10-CM

## 2021-12-21 DIAGNOSIS — Z12.11 COLON CANCER SCREENING: ICD-10-CM

## 2021-12-26 ENCOUNTER — PATIENT MESSAGE (OUTPATIENT)
Dept: PODIATRY | Facility: CLINIC | Age: 66
End: 2021-12-26
Payer: MEDICARE

## 2021-12-28 ENCOUNTER — CLINICAL SUPPORT (OUTPATIENT)
Dept: DIABETES | Facility: CLINIC | Age: 66
End: 2021-12-28
Payer: MEDICARE

## 2021-12-28 DIAGNOSIS — E11.42 TYPE 2 DIABETES MELLITUS WITH DIABETIC POLYNEUROPATHY, WITH LONG-TERM CURRENT USE OF INSULIN: ICD-10-CM

## 2021-12-28 DIAGNOSIS — Z79.4 TYPE 2 DIABETES MELLITUS WITH DIABETIC POLYNEUROPATHY, WITH LONG-TERM CURRENT USE OF INSULIN: ICD-10-CM

## 2021-12-28 PROCEDURE — G0108 DIAB MANAGE TRN  PER INDIV: HCPCS | Mod: S$GLB,,, | Performed by: DIETITIAN, REGISTERED

## 2021-12-28 PROCEDURE — 99999 PR PBB SHADOW E&M-EST. PATIENT-LVL I: ICD-10-PCS | Mod: PBBFAC,,, | Performed by: DIETITIAN, REGISTERED

## 2021-12-28 PROCEDURE — G0108 PR DIAB MANAGE TRN  PER INDIV: ICD-10-PCS | Mod: S$GLB,,, | Performed by: DIETITIAN, REGISTERED

## 2021-12-28 PROCEDURE — 99999 PR PBB SHADOW E&M-EST. PATIENT-LVL I: CPT | Mod: PBBFAC,,, | Performed by: DIETITIAN, REGISTERED

## 2022-01-11 ENCOUNTER — SPECIALTY PHARMACY (OUTPATIENT)
Dept: PHARMACY | Facility: CLINIC | Age: 67
End: 2022-01-11
Payer: MEDICARE

## 2022-01-11 NOTE — TELEPHONE ENCOUNTER
Specialty Pharmacy - Refill Coordination    Specialty Medication Orders Linked to Encounter    Flowsheet Row Most Recent Value   Medication #1 evolocumab (REPATHA SURECLICK) 140 mg/mL PnIj (Order#[id number], Rx#[id number])          Refill Questions - Documented Responses    Flowsheet Row Most Recent Value   Patient Availability and HIPAA Verification    Does patient want to proceed with activity? Yes   HIPAA/medical authority confirmed? Yes   Relationship to patient of person spoken to? Self   Refill Screening Questions    Would patient like to speak to a pharmacist? No   When does the patient need to receive the medication? 01/29/22   Refill Delivery Questions    How will the patient receive the medication? Delivery Sherri   When does the patient need to receive the medication? 01/29/22   Shipping Address Home   Address in [facility] confirmed and updated if neccessary? Yes   Expected Copay ($) 0   Is the patient able to afford the medication copay? Yes   Payment Method zero copay   Days supply of Refill 28   Supplies needed? No supplies needed   Refill activity completed? Yes   Refill activity plan Refill scheduled   Shipment/Pickup Date: 01/21/22          Current Outpatient Medications   Medication Sig    alcohol swabs (ALCOHOL WIPES) PadM Apply 1 each topically 3 (three) times daily. ICD 10 code E 11.42, monitor blood sugar TID, per insurance coverage    amitriptyline (ELAVIL) 75 MG tablet Take 1 tablet (75 mg total) by mouth every evening.    amLODIPine (NORVASC) 5 MG tablet Take 1 tablet (5 mg total) by mouth once daily.    atorvastatin (LIPITOR) 80 MG tablet Take 1 tablet (80 mg total) by mouth every evening.    blood pressure monitor (IHEALTH EASE) ST size by Other route as needed for High Blood Pressure.    blood sugar diagnostic Strp 1 each by Misc.(Non-Drug; Combo Route) route 4 (four) times daily.    blood-glucose meter,continuous (DEXCOM G6 ) Misc 1 each by Misc.(Non-Drug; Combo  Route) route Daily. Use as directed with Dexcom G6 transmitter and sensor    blood-glucose sensor (DEXCOM G6 SENSOR) Roberta 1 each by Misc.(Non-Drug; Combo Route) route every 10 days. Apply/change sensor to skin every 10 days.    blood-glucose transmitter (DEXCOM G6 TRANSMITTER) Roberta 1 each by Misc.(Non-Drug; Combo Route) route Daily. Use transmitter in sensor with G6 system. Change new transmitter every 3 months.    clopidogreL (PLAVIX) 75 mg tablet Take 1 tablet (75 mg total) by mouth once daily.    clotrimazole-betamethasone 1-0.05% (LOTRISONE) cream Apply topically 2 (two) times daily.    cyclobenzaprine (FLEXERIL) 10 MG tablet Take 1 tablet (10 mg total) by mouth 3 (three) times a week.    diclofenac sodium (VOLTAREN) 1 % Gel Apply 1 application topically.    dulaglutide (TRULICITY) 3 mg/0.5 mL pen injector Inject 3 mg into the skin once a week.    empagliflozin (JARDIANCE) 25 mg tablet Take 1 tablet (25 mg total) by mouth once daily.    evolocumab (REPATHA SURECLICK) 140 mg/mL PnIj Inject 1 mL (140 mg total) into the skin every 14 (fourteen) days.    furosemide (LASIX) 40 MG tablet Take 1 tablet (40 mg total) by mouth once daily.    gabapentin (NEURONTIN) 300 MG capsule TAKE 1 CAPSULE(300 MG) BY MOUTH THREE TIMES DAILY    insulin lispro (HUMALOG U-100 INSULIN) 100 unit/mL injection Inject 100 Units into the skin continuous. Gives up to 100 units daily via Omnipod. Do not directly inject - only use within pods.    lancets Misc Inject 1 each into the skin 3 (three) times daily. ICD 10 code E 11.42, monitor blood sugar TID    LIDOcaine-prilocaine (EMLA) cream Apply topically 2 (two) times a day.    lisinopriL (PRINIVIL,ZESTRIL) 40 MG tablet Take 1 tablet (40 mg total) by mouth once daily.    meclizine (ANTIVERT) 12.5 mg tablet Take 2 tablets (25 mg total) by mouth 2 (two) times daily as needed for Dizziness.    metFORMIN (GLUCOPHAGE-XR) 500 MG XR 24hr tablet Take 2 tablets (1,000 mg total) by mouth  "2 (two) times daily with meals.    metoprolol succinate (TOPROL-XL) 200 MG 24 hr tablet Take 1 tablet (200 mg total) by mouth once daily.    metoprolol succinate (TOPROL-XL) 200 MG 24 hr tablet Take 1 tablet (200 mg total) by mouth once daily.    pen needle, diabetic (PEN NEEDLE) 32 gauge x 5/32" Ndle 1 each by Misc.(Non-Drug; Combo Route) route 4 (four) times daily. ICD 10 E11.42    spironolactone (ALDACTONE) 50 MG tablet Take 1 tablet (50 mg total) by mouth once daily.   Last reviewed on 12/20/2021 12:27 PM by Juli Mejia NP    Review of patient's allergies indicates:  No Known Allergies Last reviewed on  12/20/2021 11:23 AM by Job Knott      Tasks added this encounter   2/18/2022 - Refill Call (Auto Added)   Tasks due within next 3 months   No tasks due.     Mel Martinez [facility]  [address] [facility] [address]  Phone: [phone]  Fax: [phone]      "

## 2022-01-18 ENCOUNTER — PATIENT MESSAGE (OUTPATIENT)
Dept: ADMINISTRATIVE | Facility: HOSPITAL | Age: 67
End: 2022-01-18
Payer: MEDICARE

## 2022-02-08 ENCOUNTER — PATIENT OUTREACH (OUTPATIENT)
Dept: ADMINISTRATIVE | Facility: OTHER | Age: 67
End: 2022-02-08
Payer: MEDICARE

## 2022-02-09 NOTE — PROGRESS NOTES
Care Everywhere: updated  Immunization: updated  Health Maintenance: updated  Media Review: review for outside mammogram and colon cancer report   Legacy Review:   DIS:  Order placed:   Upcoming appts:  EFAX:  Task Tickets:Mammogram scheduling ticket sent to patient's portal on 1.18.2022  Referrals:

## 2022-02-10 ENCOUNTER — OFFICE VISIT (OUTPATIENT)
Dept: CARDIOLOGY | Facility: CLINIC | Age: 67
End: 2022-02-10
Payer: MEDICARE

## 2022-02-10 VITALS
HEART RATE: 90 BPM | BODY MASS INDEX: 37.41 KG/M2 | HEIGHT: 60 IN | OXYGEN SATURATION: 98 % | SYSTOLIC BLOOD PRESSURE: 140 MMHG | DIASTOLIC BLOOD PRESSURE: 80 MMHG | WEIGHT: 190.56 LBS

## 2022-02-10 DIAGNOSIS — I51.9 SYSTOLIC DYSFUNCTION: ICD-10-CM

## 2022-02-10 DIAGNOSIS — Z98.61 HISTORY OF PTCA 2: ICD-10-CM

## 2022-02-10 DIAGNOSIS — I10 ESSENTIAL HYPERTENSION: ICD-10-CM

## 2022-02-10 DIAGNOSIS — Z79.4 TYPE 2 DIABETES MELLITUS WITHOUT COMPLICATION, WITH LONG-TERM CURRENT USE OF INSULIN: ICD-10-CM

## 2022-02-10 DIAGNOSIS — I25.118 CORONARY ARTERY DISEASE OF NATIVE ARTERY OF NATIVE HEART WITH STABLE ANGINA PECTORIS: Primary | ICD-10-CM

## 2022-02-10 DIAGNOSIS — E66.9 CLASS 2 OBESITY WITH BODY MASS INDEX (BMI) OF 35.0 TO 35.9 IN ADULT, UNSPECIFIED OBESITY TYPE, UNSPECIFIED WHETHER SERIOUS COMORBIDITY PRESENT: ICD-10-CM

## 2022-02-10 DIAGNOSIS — E11.9 TYPE 2 DIABETES MELLITUS WITHOUT COMPLICATION, WITH LONG-TERM CURRENT USE OF INSULIN: ICD-10-CM

## 2022-02-10 PROCEDURE — 99999 PR PBB SHADOW E&M-EST. PATIENT-LVL V: ICD-10-PCS | Mod: PBBFAC,,, | Performed by: INTERNAL MEDICINE

## 2022-02-10 PROCEDURE — 99214 OFFICE O/P EST MOD 30 MIN: CPT | Mod: S$GLB,,, | Performed by: INTERNAL MEDICINE

## 2022-02-10 PROCEDURE — 3077F SYST BP >= 140 MM HG: CPT | Mod: CPTII,S$GLB,, | Performed by: INTERNAL MEDICINE

## 2022-02-10 PROCEDURE — 1101F PR PT FALLS ASSESS DOC 0-1 FALLS W/OUT INJ PAST YR: ICD-10-PCS | Mod: CPTII,S$GLB,, | Performed by: INTERNAL MEDICINE

## 2022-02-10 PROCEDURE — 3077F PR MOST RECENT SYSTOLIC BLOOD PRESSURE >= 140 MM HG: ICD-10-PCS | Mod: CPTII,S$GLB,, | Performed by: INTERNAL MEDICINE

## 2022-02-10 PROCEDURE — 99999 PR PBB SHADOW E&M-EST. PATIENT-LVL V: CPT | Mod: PBBFAC,,, | Performed by: INTERNAL MEDICINE

## 2022-02-10 PROCEDURE — 3079F PR MOST RECENT DIASTOLIC BLOOD PRESSURE 80-89 MM HG: ICD-10-PCS | Mod: CPTII,S$GLB,, | Performed by: INTERNAL MEDICINE

## 2022-02-10 PROCEDURE — 3008F BODY MASS INDEX DOCD: CPT | Mod: CPTII,S$GLB,, | Performed by: INTERNAL MEDICINE

## 2022-02-10 PROCEDURE — 1126F PR PAIN SEVERITY QUANTIFIED, NO PAIN PRESENT: ICD-10-PCS | Mod: CPTII,S$GLB,, | Performed by: INTERNAL MEDICINE

## 2022-02-10 PROCEDURE — 3288F PR FALLS RISK ASSESSMENT DOCUMENTED: ICD-10-PCS | Mod: CPTII,S$GLB,, | Performed by: INTERNAL MEDICINE

## 2022-02-10 PROCEDURE — 1126F AMNT PAIN NOTED NONE PRSNT: CPT | Mod: CPTII,S$GLB,, | Performed by: INTERNAL MEDICINE

## 2022-02-10 PROCEDURE — 3079F DIAST BP 80-89 MM HG: CPT | Mod: CPTII,S$GLB,, | Performed by: INTERNAL MEDICINE

## 2022-02-10 PROCEDURE — 3288F FALL RISK ASSESSMENT DOCD: CPT | Mod: CPTII,S$GLB,, | Performed by: INTERNAL MEDICINE

## 2022-02-10 PROCEDURE — 3008F PR BODY MASS INDEX (BMI) DOCUMENTED: ICD-10-PCS | Mod: CPTII,S$GLB,, | Performed by: INTERNAL MEDICINE

## 2022-02-10 PROCEDURE — 99214 PR OFFICE/OUTPT VISIT, EST, LEVL IV, 30-39 MIN: ICD-10-PCS | Mod: S$GLB,,, | Performed by: INTERNAL MEDICINE

## 2022-02-10 PROCEDURE — 1159F PR MEDICATION LIST DOCUMENTED IN MEDICAL RECORD: ICD-10-PCS | Mod: CPTII,S$GLB,, | Performed by: INTERNAL MEDICINE

## 2022-02-10 PROCEDURE — 1101F PT FALLS ASSESS-DOCD LE1/YR: CPT | Mod: CPTII,S$GLB,, | Performed by: INTERNAL MEDICINE

## 2022-02-10 PROCEDURE — 1159F MED LIST DOCD IN RCRD: CPT | Mod: CPTII,S$GLB,, | Performed by: INTERNAL MEDICINE

## 2022-02-10 PROCEDURE — 99215 OFFICE O/P EST HI 40 MIN: CPT | Mod: PBBFAC,PN | Performed by: INTERNAL MEDICINE

## 2022-02-10 RX ORDER — CLOPIDOGREL BISULFATE 75 MG/1
75 TABLET ORAL DAILY
COMMUNITY
End: 2022-03-21

## 2022-02-10 NOTE — PROGRESS NOTES
Subjective:    Patient ID:  Mercedez Purvis is a 67 y.o. female who presents for follow-up of CAD    HPI     The patient is a 67 year old female with CAD, hypertension,  hyperlipidemia, diabetes and obesity.She presented to [facility] in 2016 with chest pain and had a PCI to RCA ans OM2. She presented 4/18/19 with SOB and had a PCI to Cfx and a staged PCI to Cfx and mid RCA. An echo at [facility] 10/3/19 was normal with EF 50-55%. She admitted as Obs 10/30/21with a complaint of chest pain . The Tn was normal and no EKG changes was note. She was discharged of OP nuclear stress which was not done. She has had no further chest pain. An echo was done which suggest a decrease ion EF for 2019.  Summary 10/30/21    · The estimated ejection fraction is 40-45%.  · The quantitatively derived ejection fraction is 42-45%.  · The left ventricle is normal in size with eccentric hypertrophy and mildly decreased systolic function.  · Grade I left ventricular diastolic dysfunction.  · There is mild aortic valve stenosis.  · Aortic valve area is 1.59 cm2; peak velocity is 1.74 m/s; mean gradient is 6 mmHg.  · Mild tricuspid regurgitation.  · Trivial anterior pericardial effusion.  · Normal right ventricular size with normal right ventricular systolic function.  · There are segmental left ventricular wall motion abnormalities.       Lab Results   Component Value Date     12/16/2021    K 4.1 12/16/2021     12/16/2021    CO2 25 12/16/2021    BUN 11 12/16/2021    CREATININE 1.1 12/16/2021     (H) 12/16/2021    HGBA1C 9.1 (H) 12/16/2021    MG 2.3 10/30/2021    AST 16 12/16/2021    ALT 11 12/16/2021    ALBUMIN 3.2 (L) 12/16/2021    PROT 7.1 12/16/2021    BILITOT 0.3 12/16/2021    WBC 7.73 10/30/2021    HGB 11.3 (L) 10/30/2021    HCT 37.0 10/30/2021    MCV 72 (L) 10/30/2021     10/30/2021    INR 1.0 08/27/2010    TSH 2.301 07/13/2020         Lab Results   Component Value Date    CHOL 86 (L) 12/16/2021    HDL 34 (L)  12/16/2021    TRIG 93 12/16/2021       Lab Results   Component Value Date    LDLCALC 33.4 (L) 12/16/2021       Past Medical History:   Diagnosis Date    Chronic headache     Diabetes     Heart attack 2004    8 stents    Heart failure     History of heart artery stent     Hyperlipemia     Hypertension     Obesity (BMI 30-39.9)     Yeast infection        Current Outpatient Medications:     amitriptyline (ELAVIL) 75 MG tablet, Take 1 tablet (75 mg total) by mouth every evening., Disp: 90 tablet, Rfl: 3    amLODIPine (NORVASC) 5 MG tablet, Take 1 tablet (5 mg total) by mouth once daily., Disp: 90 tablet, Rfl: 3    atorvastatin (LIPITOR) 80 MG tablet, Take 1 tablet (80 mg total) by mouth every evening., Disp: 90 tablet, Rfl: 3    blood sugar diagnostic Strp, 1 each by Misc.(Non-Drug; Combo Route) route 4 (four) times daily., Disp: 200 each, Rfl: 3    blood-glucose sensor (DEXCOM G6 SENSOR) Roberta, 1 each by Misc.(Non-Drug; Combo Route) route every 10 days. Apply/change sensor to skin every 10 days., Disp: 10 each, Rfl: 3    blood-glucose transmitter (DEXCOM G6 TRANSMITTER) Roberta, 1 each by Misc.(Non-Drug; Combo Route) route Daily. Use transmitter in sensor with G6 system. Change new transmitter every 3 months., Disp: 1 each, Rfl: 3    clopidogreL (PLAVIX) 75 mg tablet, Take 75 mg by mouth once daily., Disp: , Rfl:     clotrimazole-betamethasone 1-0.05% (LOTRISONE) cream, Apply topically 2 (two) times daily., Disp: 45 g, Rfl: 1    cyclobenzaprine (FLEXERIL) 10 MG tablet, Take 1 tablet (10 mg total) by mouth 3 (three) times a week., Disp: 36 tablet, Rfl: 3    diclofenac sodium (VOLTAREN) 1 % Gel, Apply 1 application topically., Disp: , Rfl:     dulaglutide (TRULICITY) 3 mg/0.5 mL pen injector, Inject 3 mg into the skin once a week., Disp: 4 pen, Rfl: 11    empagliflozin (JARDIANCE) 25 mg tablet, Take 1 tablet (25 mg total) by mouth once daily., Disp: 30 tablet, Rfl: 4    evolocumab (REPATHA SURECLICK)  "140 mg/mL PnIj, Inject 1 mL (140 mg total) into the skin every 14 (fourteen) days., Disp: 2 mL, Rfl: 11    furosemide (LASIX) 40 MG tablet, Take 1 tablet (40 mg total) by mouth once daily., Disp: 90 tablet, Rfl: 3    gabapentin (NEURONTIN) 300 MG capsule, TAKE 1 CAPSULE(300 MG) BY MOUTH THREE TIMES DAILY, Disp: 270 capsule, Rfl: 1    insulin lispro (HUMALOG U-100 INSULIN) 100 unit/mL injection, Inject 100 Units into the skin continuous. Gives up to 100 units daily via Omnipod. Do not directly inject - only use within pods., Disp: 40 mL, Rfl: 11    lisinopriL (PRINIVIL,ZESTRIL) 40 MG tablet, Take 1 tablet (40 mg total) by mouth once daily., Disp: 90 tablet, Rfl: 3    metFORMIN (GLUCOPHAGE-XR) 500 MG XR 24hr tablet, Take 2 tablets (1,000 mg total) by mouth 2 (two) times daily with meals., Disp: 360 tablet, Rfl: 3    metoprolol succinate (TOPROL-XL) 200 MG 24 hr tablet, Take 1 tablet (200 mg total) by mouth once daily., Disp: 90 tablet, Rfl: 3    metoprolol succinate (TOPROL-XL) 200 MG 24 hr tablet, Take 1 tablet (200 mg total) by mouth once daily., Disp: 90 tablet, Rfl: 3    pen needle, diabetic (PEN NEEDLE) 32 gauge x 5/32" Ndle, 1 each by Misc.(Non-Drug; Combo Route) route 4 (four) times daily. ICD 10 E11.42, Disp: 400 each, Rfl: 3    alcohol swabs (ALCOHOL WIPES) PadM, Apply 1 each topically 3 (three) times daily. ICD 10 code E 11.42, monitor blood sugar TID, per insurance coverage, Disp: 300 each, Rfl: 3    blood pressure monitor ([email or web address] EASE) ST size, by Other route as needed for High Blood Pressure., Disp: 1 each, Rfl: 0    blood-glucose meter,continuous (DEXCOM G6 ) Misc, 1 each by Misc.(Non-Drug; Combo Route) route Daily. Use as directed with Dexcom G6 transmitter and sensor, Disp: 1 each, Rfl: 0    clopidogreL (PLAVIX) 75 mg tablet, Take 1 tablet (75 mg total) by mouth once daily., Disp: 90 tablet, Rfl: 3    lancets Misc, Inject 1 each into the skin 3 (three) times daily. ICD 10 code E " 11.42, monitor blood sugar TID, Disp: 300 each, Rfl: 3    LIDOcaine-prilocaine (EMLA) cream, Apply topically 2 (two) times a day., Disp: 30 g, Rfl: 1    meclizine (ANTIVERT) 12.5 mg tablet, Take 2 tablets (25 mg total) by mouth 2 (two) times daily as needed for Dizziness., Disp: 30 tablet, Rfl: 1    spironolactone (ALDACTONE) 50 MG tablet, Take 1 tablet (50 mg total) by mouth once daily., Disp: 90 tablet, Rfl: 1          Review of Systems   Constitutional: Negative for decreased appetite, diaphoresis, fever, malaise/fatigue, weight gain and weight loss.   HENT: Negative for congestion, ear discharge, ear pain and nosebleeds.    Eyes: Negative for blurred vision, double vision and visual disturbance.   Cardiovascular: Negative for chest pain, claudication, cyanosis, dyspnea on exertion, irregular heartbeat, leg swelling, near-syncope, orthopnea, palpitations, paroxysmal nocturnal dyspnea and syncope.   Respiratory: Negative for cough, hemoptysis, shortness of breath, sleep disturbances due to breathing, snoring, sputum production and wheezing.    Endocrine: Negative for polydipsia, polyphagia and polyuria.   Hematologic/Lymphatic: Negative for adenopathy and bleeding problem. Does not bruise/bleed easily.   Skin: Negative for color change, nail changes, poor wound healing and rash.   Musculoskeletal: Negative for muscle cramps and muscle weakness.   Gastrointestinal: Negative for abdominal pain, anorexia, change in bowel habit, hematochezia, nausea and vomiting.   Genitourinary: Negative for dysuria, frequency and hematuria.   Neurological: Negative for brief paralysis, difficulty with concentration, excessive daytime sleepiness, dizziness, focal weakness, headaches, light-headedness, seizures, vertigo and weakness.   Psychiatric/Behavioral: Negative for altered mental status and depression.   Allergic/Immunologic: Negative for persistent infections.        Objective:BP (!) 140/80   Pulse 90   Ht 5' (1.524 m)    Wt 86.4 kg (190 lb 9.4 oz)   SpO2 98%   BMI 37.22 kg/m²             Physical Exam  Vitals reviewed.   Constitutional:       Appearance: She is well-developed and well-nourished. She is obese.   HENT:      Head: Normocephalic.      Right Ear: External ear normal.      Left Ear: External ear normal.      Nose: Nose normal.        Comments: Inspection of lips, teeth and gums normalEyes:      General: No scleral icterus.     Extraocular Movements: EOM normal.      Conjunctiva/sclera: Conjunctivae normal.      Pupils: Pupils are equal, round, and reactive to light.   Neck:      Thyroid: No thyromegaly.      Vascular: No JVD.      Trachea: No tracheal deviation.   Cardiovascular:      Rate and Rhythm: Normal rate and regular rhythm.      Pulses: Intact distal pulses.           Carotid pulses are 2+ on the right side and 2+ on the left side.       Dorsalis pedis pulses are 1+ on the right side and 1+ on the left side.      Heart sounds: Normal heart sounds. No murmur heard.  No friction rub. No gallop.    Pulmonary:      Effort: Pulmonary effort is normal. No respiratory distress.      Breath sounds: Normal breath sounds. No wheezing or rales.   Chest:      Chest wall: No tenderness.   Abdominal:      General: Bowel sounds are normal. There is no distension.      Palpations: Abdomen is soft. There is no hepatosplenomegaly.      Tenderness: There is no abdominal tenderness. There is no guarding.   Musculoskeletal:         General: No tenderness or edema. Normal range of motion.      Cervical back: Normal range of motion.   Lymphadenopathy:      Comments: Palpation of lymph nodes of neck and groin normal   Skin:     General: Skin is warm and dry.      Coloration: Skin is not pale.      Findings: No erythema or rash.      Comments: Palpation of skin normal   Neurological:      Mental Status: She is alert and oriented to person, place, and time.      Cranial Nerves: No cranial nerve deficit.      Motor: No abnormal muscle  tone.      Coordination: Coordination normal.   Psychiatric:         Mood and Affect: Mood and affect normal.         Behavior: Behavior normal.         Thought Content: Thought content normal.         Judgment: Judgment normal.           Assessment:       1. Coronary artery disease of native artery of native heart with stable angina pectoris    2. Systolic dysfunction    3. History of PTCA 2    4. Essential hypertension    5. Class 2 obesity with body mass index (BMI) of 35.0 to 35.9 in adult, unspecified obesity type, unspecified whether serious comorbidity present    6. Type 2 diabetes mellitus without complication, with long-term current use of insulin         Plan:       Mercedez was seen today for coronary artery disease.    Diagnoses and all orders for this visit:    Coronary artery disease of native artery of native heart with stable angina pectoris    Systolic dysfunction    History of PTCA 2    Essential hypertension    Class 2 obesity with body mass index (BMI) of 35.0 to 35.9 in adult, unspecified obesity type, unspecified whether serious comorbidity present    Type 2 diabetes mellitus without complication, with long-term current use of insulin    Other orders  -     clopidogreL (PLAVIX) 75 mg tablet; Take 75 mg by mouth once daily.

## 2022-03-03 ENCOUNTER — SPECIALTY PHARMACY (OUTPATIENT)
Dept: PHARMACY | Facility: CLINIC | Age: 67
End: 2022-03-03
Payer: MEDICARE

## 2022-03-03 NOTE — TELEPHONE ENCOUNTER
Specialty Pharmacy - Refill Coordination    Specialty Medication Orders Linked to Encounter    Flowsheet Row Most Recent Value   Medication #1 evolocumab (REPATHA SURECLICK) 140 mg/mL PnIj (Order#[id number], Rx#[id number])          Refill Questions - Documented Responses    Flowsheet Row Most Recent Value   Patient Availability and HIPAA Verification    Does patient want to proceed with activity? Yes   HIPAA/medical authority confirmed? Yes   Relationship to patient of person spoken to? Self   Refill Screening Questions    Would patient like to speak to a pharmacist? No   When does the patient need to receive the medication? 03/04/22   Refill Delivery Questions    How will the patient receive the medication? Delivery Sherri   When does the patient need to receive the medication? 03/04/22   Shipping Address Home   Address in [facility] confirmed and updated if neccessary? Yes   Expected Copay ($) 0   Is the patient able to afford the medication copay? Yes   Payment Method zero copay   Days supply of Refill 84   Supplies needed? No supplies needed   Refill activity completed? Yes   Refill activity plan Refill scheduled   Shipment/Pickup Date: 03/04/22          Current Outpatient Medications   Medication Sig    alcohol swabs (ALCOHOL WIPES) PadM Apply 1 each topically 3 (three) times daily. ICD 10 code E 11.42, monitor blood sugar TID, per insurance coverage    amitriptyline (ELAVIL) 75 MG tablet Take 1 tablet (75 mg total) by mouth every evening.    amLODIPine (NORVASC) 5 MG tablet Take 1 tablet (5 mg total) by mouth once daily.    atorvastatin (LIPITOR) 80 MG tablet Take 1 tablet (80 mg total) by mouth every evening.    blood sugar diagnostic Strp 1 each by Misc.(Non-Drug; Combo Route) route 4 (four) times daily.    blood-glucose meter,continuous (DEXCOM G6 ) Misc 1 each by Misc.(Non-Drug; Combo Route) route Daily. Use as directed with Dexcom G6 transmitter and sensor    blood-glucose sensor  (DEXCOM G6 SENSOR) Roberta 1 each by Misc.(Non-Drug; Combo Route) route every 10 days. Apply/change sensor to skin every 10 days.    blood-glucose transmitter (DEXCOM G6 TRANSMITTER) Roberta 1 each by Misc.(Non-Drug; Combo Route) route Daily. Use transmitter in sensor with G6 system. Change new transmitter every 3 months.    clopidogreL (PLAVIX) 75 mg tablet Take 1 tablet (75 mg total) by mouth once daily.    clopidogreL (PLAVIX) 75 mg tablet Take 75 mg by mouth once daily.    clotrimazole-betamethasone 1-0.05% (LOTRISONE) cream Apply topically 2 (two) times daily.    cyclobenzaprine (FLEXERIL) 10 MG tablet Take 1 tablet (10 mg total) by mouth 3 (three) times a week.    diclofenac sodium (VOLTAREN) 1 % Gel Apply 1 application topically.    dulaglutide (TRULICITY) 3 mg/0.5 mL pen injector Inject 3 mg into the skin once a week.    empagliflozin (JARDIANCE) 25 mg tablet Take 1 tablet (25 mg total) by mouth once daily.    evolocumab (REPATHA SURECLICK) 140 mg/mL PnIj Inject 1 mL (140 mg total) into the skin every 14 (fourteen) days.    furosemide (LASIX) 40 MG tablet Take 1 tablet (40 mg total) by mouth once daily.    gabapentin (NEURONTIN) 300 MG capsule TAKE 1 CAPSULE(300 MG) BY MOUTH THREE TIMES DAILY    insulin lispro (HUMALOG U-100 INSULIN) 100 unit/mL injection Inject 100 Units into the skin continuous. Gives up to 100 units daily via Omnipod. Do not directly inject - only use within pods.    LIDOcaine-prilocaine (EMLA) cream Apply topically 2 (two) times a day.    lisinopriL (PRINIVIL,ZESTRIL) 40 MG tablet Take 1 tablet (40 mg total) by mouth once daily.    meclizine (ANTIVERT) 12.5 mg tablet Take 2 tablets (25 mg total) by mouth 2 (two) times daily as needed for Dizziness.    metFORMIN (GLUCOPHAGE-XR) 500 MG XR 24hr tablet Take 2 tablets (1,000 mg total) by mouth 2 (two) times daily with meals.    metoprolol succinate (TOPROL-XL) 200 MG 24 hr tablet Take 1 tablet (200 mg total) by mouth once daily.  "   metoprolol succinate (TOPROL-XL) 200 MG 24 hr tablet Take 1 tablet (200 mg total) by mouth once daily.    pen needle, diabetic (PEN NEEDLE) 32 gauge x 5/32" Ndle 1 each by Misc.(Non-Drug; Combo Route) route 4 (four) times daily. ICD 10 E11.42    spironolactone (ALDACTONE) 50 MG tablet Take 1 tablet (50 mg total) by mouth once daily.   Last reviewed on 2/10/2022 10:55 AM by Coretta Jara MA    Review of patient's allergies indicates:  No Known Allergies Last reviewed on  2/10/2022 10:53 AM by Coretta Jara      Tasks added this encounter   5/19/2022 - Refill Call (Auto Added)   Tasks due within next 3 months   No tasks due.     Marlen Jerome, PharmD  [facility]  [address] [facility] [address]  Phone: [phone]  Fax: [phone]      "

## 2022-03-16 DIAGNOSIS — E11.42 TYPE 2 DIABETES MELLITUS WITH DIABETIC POLYNEUROPATHY, WITH LONG-TERM CURRENT USE OF INSULIN: Primary | ICD-10-CM

## 2022-03-16 DIAGNOSIS — Z79.4 TYPE 2 DIABETES MELLITUS WITH DIABETIC POLYNEUROPATHY, WITH LONG-TERM CURRENT USE OF INSULIN: Primary | ICD-10-CM

## 2022-03-16 DIAGNOSIS — I50.22 CHRONIC SYSTOLIC HEART FAILURE: ICD-10-CM

## 2022-03-16 RX ORDER — FUROSEMIDE 40 MG/1
40 TABLET ORAL DAILY
Qty: 90 TABLET | Refills: 3 | Status: SHIPPED | OUTPATIENT
Start: 2022-03-16 | End: 2023-04-18 | Stop reason: SDUPTHER

## 2022-03-16 RX ORDER — BLOOD-GLUCOSE TRANSMITTER
1 EACH MISCELLANEOUS DAILY
Qty: 1 EACH | Refills: 3 | Status: SHIPPED | OUTPATIENT
Start: 2022-03-16 | End: 2022-03-16 | Stop reason: SDUPTHER

## 2022-03-16 RX ORDER — BLOOD-GLUCOSE SENSOR
1 EACH MISCELLANEOUS
Qty: 10 EACH | Refills: 3 | Status: SHIPPED | OUTPATIENT
Start: 2022-03-16 | End: 2022-03-16 | Stop reason: SDUPTHER

## 2022-03-16 RX ORDER — BLOOD-GLUCOSE SENSOR
1 EACH MISCELLANEOUS
Qty: 3 EACH | Refills: 11 | Status: SHIPPED | OUTPATIENT
Start: 2022-03-16 | End: 2023-05-04

## 2022-03-16 RX ORDER — BLOOD-GLUCOSE TRANSMITTER
1 EACH MISCELLANEOUS DAILY
Qty: 1 EACH | Refills: 3 | Status: SHIPPED | OUTPATIENT
Start: 2022-03-16 | End: 2023-05-04

## 2022-03-16 NOTE — TELEPHONE ENCOUNTER
Patient had requested refills for her dexcom transmitter and sensors - but to send to [facility], I went ahead and authorized the refill -  She does have [facility] though - so this may need to go to DME - if you can let her know?.     Thanks,  leyla

## 2022-03-16 NOTE — TELEPHONE ENCOUNTER
No new care gaps identified.  Powered by [employer]. Reference number: [id number].   3/16/2022 11:06:45 AM CDT

## 2022-03-21 ENCOUNTER — OFFICE VISIT (OUTPATIENT)
Dept: PRIMARY CARE CLINIC | Facility: CLINIC | Age: 67
End: 2022-03-21
Payer: MEDICARE

## 2022-03-21 ENCOUNTER — LAB VISIT (OUTPATIENT)
Dept: LAB | Facility: HOSPITAL | Age: 67
End: 2022-03-21
Attending: NURSE PRACTITIONER
Payer: MEDICARE

## 2022-03-21 VITALS
WEIGHT: 194.88 LBS | HEART RATE: 78 BPM | HEIGHT: 60 IN | SYSTOLIC BLOOD PRESSURE: 158 MMHG | OXYGEN SATURATION: 99 % | DIASTOLIC BLOOD PRESSURE: 88 MMHG | TEMPERATURE: 98 F | BODY MASS INDEX: 38.26 KG/M2

## 2022-03-21 DIAGNOSIS — B37.9 YEAST INFECTION: ICD-10-CM

## 2022-03-21 DIAGNOSIS — E11.9 ENCOUNTER FOR DIABETIC FOOT EXAM: ICD-10-CM

## 2022-03-21 DIAGNOSIS — I50.22 CHRONIC SYSTOLIC HEART FAILURE: ICD-10-CM

## 2022-03-21 DIAGNOSIS — H81.12 BPPV (BENIGN PAROXYSMAL POSITIONAL VERTIGO), LEFT: ICD-10-CM

## 2022-03-21 DIAGNOSIS — I25.2 HISTORY OF HEART ATTACK: ICD-10-CM

## 2022-03-21 DIAGNOSIS — E11.3212 TYPE 2 DIABETES MELLITUS WITH LEFT EYE AFFECTED BY MILD NONPROLIFERATIVE RETINOPATHY AND MACULAR EDEMA, WITH LONG-TERM CURRENT USE OF INSULIN: ICD-10-CM

## 2022-03-21 DIAGNOSIS — L28.0 LICHEN SIMPLEX CHRONICUS: ICD-10-CM

## 2022-03-21 DIAGNOSIS — E11.69 HYPERLIPIDEMIA ASSOCIATED WITH TYPE 2 DIABETES MELLITUS: ICD-10-CM

## 2022-03-21 DIAGNOSIS — G47.33 OSA (OBSTRUCTIVE SLEEP APNEA): ICD-10-CM

## 2022-03-21 DIAGNOSIS — Z79.4 TYPE 2 DIABETES MELLITUS WITH LEFT EYE AFFECTED BY MILD NONPROLIFERATIVE RETINOPATHY AND MACULAR EDEMA, WITH LONG-TERM CURRENT USE OF INSULIN: ICD-10-CM

## 2022-03-21 DIAGNOSIS — Z12.11 SCREENING FOR MALIGNANT NEOPLASM OF COLON: ICD-10-CM

## 2022-03-21 DIAGNOSIS — E11.42 TYPE 2 DIABETES MELLITUS WITH DIABETIC POLYNEUROPATHY, WITH LONG-TERM CURRENT USE OF INSULIN: ICD-10-CM

## 2022-03-21 DIAGNOSIS — I15.2 HYPERTENSION ASSOCIATED WITH DIABETES: ICD-10-CM

## 2022-03-21 DIAGNOSIS — Z95.5 HISTORY OF HEART ARTERY STENT: ICD-10-CM

## 2022-03-21 DIAGNOSIS — I25.118 CORONARY ARTERY DISEASE OF NATIVE ARTERY OF NATIVE HEART WITH STABLE ANGINA PECTORIS: Primary | ICD-10-CM

## 2022-03-21 DIAGNOSIS — E78.5 HYPERLIPIDEMIA ASSOCIATED WITH TYPE 2 DIABETES MELLITUS: ICD-10-CM

## 2022-03-21 DIAGNOSIS — K21.9 GASTROESOPHAGEAL REFLUX DISEASE WITHOUT ESOPHAGITIS: ICD-10-CM

## 2022-03-21 DIAGNOSIS — E66.01 CLASS 2 SEVERE OBESITY DUE TO EXCESS CALORIES WITH SERIOUS COMORBIDITY AND BODY MASS INDEX (BMI) OF 38.0 TO 38.9 IN ADULT: ICD-10-CM

## 2022-03-21 DIAGNOSIS — Z79.4 TYPE 2 DIABETES MELLITUS WITH DIABETIC POLYNEUROPATHY, WITH LONG-TERM CURRENT USE OF INSULIN: ICD-10-CM

## 2022-03-21 DIAGNOSIS — E55.9 VITAMIN D DEFICIENCY: ICD-10-CM

## 2022-03-21 DIAGNOSIS — Z79.4 TYPE 2 DIABETES MELLITUS WITH RIGHT EYE AFFECTED BY MILD NONPROLIFERATIVE RETINOPATHY WITHOUT MACULAR EDEMA, WITH LONG-TERM CURRENT USE OF INSULIN: ICD-10-CM

## 2022-03-21 DIAGNOSIS — Z01.00 DIABETIC EYE EXAM: ICD-10-CM

## 2022-03-21 DIAGNOSIS — Z23 NEED FOR VACCINATION FOR STREP PNEUMONIAE: ICD-10-CM

## 2022-03-21 DIAGNOSIS — E11.3291 TYPE 2 DIABETES MELLITUS WITH RIGHT EYE AFFECTED BY MILD NONPROLIFERATIVE RETINOPATHY WITHOUT MACULAR EDEMA, WITH LONG-TERM CURRENT USE OF INSULIN: ICD-10-CM

## 2022-03-21 DIAGNOSIS — M79.18 CHRONIC MUSCULOSKELETAL PAIN: ICD-10-CM

## 2022-03-21 DIAGNOSIS — E11.59 HYPERTENSION ASSOCIATED WITH DIABETES: ICD-10-CM

## 2022-03-21 DIAGNOSIS — G44.229 CHRONIC TENSION-TYPE HEADACHE, NOT INTRACTABLE: ICD-10-CM

## 2022-03-21 DIAGNOSIS — E11.9 DIABETIC EYE EXAM: ICD-10-CM

## 2022-03-21 DIAGNOSIS — E26.9 HYPERALDOSTERONISM: ICD-10-CM

## 2022-03-21 DIAGNOSIS — G89.29 CHRONIC MUSCULOSKELETAL PAIN: ICD-10-CM

## 2022-03-21 DIAGNOSIS — E11.36 DIABETIC CATARACT OF BOTH EYES: ICD-10-CM

## 2022-03-21 DIAGNOSIS — G56.03 BILATERAL CARPAL TUNNEL SYNDROME: ICD-10-CM

## 2022-03-21 PROBLEM — E66.812 CLASS 2 SEVERE OBESITY DUE TO EXCESS CALORIES WITH SERIOUS COMORBIDITY AND BODY MASS INDEX (BMI) OF 38.0 TO 38.9 IN ADULT: Status: ACTIVE | Noted: 2020-11-23

## 2022-03-21 LAB
ALBUMIN SERPL BCP-MCNC: 3.3 G/DL (ref 3.5–5.2)
ALP SERPL-CCNC: 107 U/L (ref 55–135)
ALT SERPL W/O P-5'-P-CCNC: 11 U/L (ref 10–44)
ANION GAP SERPL CALC-SCNC: 9 MMOL/L (ref 8–16)
AST SERPL-CCNC: 16 U/L (ref 10–40)
BILIRUB SERPL-MCNC: 0.3 MG/DL (ref 0.1–1)
BUN SERPL-MCNC: 9 MG/DL (ref 8–23)
CALCIUM SERPL-MCNC: 9.2 MG/DL (ref 8.7–10.5)
CHLORIDE SERPL-SCNC: 103 MMOL/L (ref 95–110)
CHOLEST SERPL-MCNC: 89 MG/DL (ref 120–199)
CHOLEST/HDLC SERPL: 2.3 {RATIO} (ref 2–5)
CO2 SERPL-SCNC: 28 MMOL/L (ref 23–29)
CREAT SERPL-MCNC: 1 MG/DL (ref 0.5–1.4)
EST. GFR  (AFRICAN AMERICAN): >60 ML/MIN/1.73 M^2
EST. GFR  (NON AFRICAN AMERICAN): 58 ML/MIN/1.73 M^2
ESTIMATED AVG GLUCOSE: 240 MG/DL (ref 68–131)
GLUCOSE SERPL-MCNC: 189 MG/DL (ref 70–110)
HBA1C MFR BLD: 10 % (ref 4–5.6)
HDLC SERPL-MCNC: 38 MG/DL (ref 40–75)
HDLC SERPL: 42.7 % (ref 20–50)
LDLC SERPL CALC-MCNC: 23.6 MG/DL (ref 63–159)
NONHDLC SERPL-MCNC: 51 MG/DL
POTASSIUM SERPL-SCNC: 4.3 MMOL/L (ref 3.5–5.1)
PROT SERPL-MCNC: 7.2 G/DL (ref 6–8.4)
SODIUM SERPL-SCNC: 140 MMOL/L (ref 136–145)
TRIGL SERPL-MCNC: 137 MG/DL (ref 30–150)

## 2022-03-21 PROCEDURE — 3079F DIAST BP 80-89 MM HG: CPT | Mod: CPTII,S$GLB,, | Performed by: FAMILY MEDICINE

## 2022-03-21 PROCEDURE — 3288F FALL RISK ASSESSMENT DOCD: CPT | Mod: CPTII,S$GLB,, | Performed by: FAMILY MEDICINE

## 2022-03-21 PROCEDURE — 83036 HEMOGLOBIN GLYCOSYLATED A1C: CPT | Performed by: NURSE PRACTITIONER

## 2022-03-21 PROCEDURE — 80053 COMPREHEN METABOLIC PANEL: CPT | Performed by: NURSE PRACTITIONER

## 2022-03-21 PROCEDURE — 3046F PR MOST RECENT HEMOGLOBIN A1C LEVEL > 9.0%: ICD-10-PCS | Mod: CPTII,S$GLB,, | Performed by: FAMILY MEDICINE

## 2022-03-21 PROCEDURE — 3288F PR FALLS RISK ASSESSMENT DOCUMENTED: ICD-10-PCS | Mod: CPTII,S$GLB,, | Performed by: FAMILY MEDICINE

## 2022-03-21 PROCEDURE — 3008F BODY MASS INDEX DOCD: CPT | Mod: CPTII,S$GLB,, | Performed by: FAMILY MEDICINE

## 2022-03-21 PROCEDURE — 1159F MED LIST DOCD IN RCRD: CPT | Mod: CPTII,S$GLB,, | Performed by: FAMILY MEDICINE

## 2022-03-21 PROCEDURE — 99499 RISK ADDL DX/OHS AUDIT: ICD-10-PCS | Mod: S$GLB,,, | Performed by: FAMILY MEDICINE

## 2022-03-21 PROCEDURE — 3046F HEMOGLOBIN A1C LEVEL >9.0%: CPT | Mod: CPTII,S$GLB,, | Performed by: FAMILY MEDICINE

## 2022-03-21 PROCEDURE — 1160F RVW MEDS BY RX/DR IN RCRD: CPT | Mod: CPTII,S$GLB,, | Performed by: FAMILY MEDICINE

## 2022-03-21 PROCEDURE — 99999 PR PBB SHADOW E&M-EST. PATIENT-LVL V: CPT | Mod: PBBFAC,,, | Performed by: FAMILY MEDICINE

## 2022-03-21 PROCEDURE — 3079F PR MOST RECENT DIASTOLIC BLOOD PRESSURE 80-89 MM HG: ICD-10-PCS | Mod: CPTII,S$GLB,, | Performed by: FAMILY MEDICINE

## 2022-03-21 PROCEDURE — 3077F SYST BP >= 140 MM HG: CPT | Mod: CPTII,S$GLB,, | Performed by: FAMILY MEDICINE

## 2022-03-21 PROCEDURE — 99215 OFFICE O/P EST HI 40 MIN: CPT | Mod: S$GLB,,, | Performed by: FAMILY MEDICINE

## 2022-03-21 PROCEDURE — 80061 LIPID PANEL: CPT | Performed by: NURSE PRACTITIONER

## 2022-03-21 PROCEDURE — 1101F PR PT FALLS ASSESS DOC 0-1 FALLS W/OUT INJ PAST YR: ICD-10-PCS | Mod: CPTII,S$GLB,, | Performed by: FAMILY MEDICINE

## 2022-03-21 PROCEDURE — 99499 UNLISTED E&M SERVICE: CPT | Mod: S$GLB,,, | Performed by: FAMILY MEDICINE

## 2022-03-21 PROCEDURE — 1126F PR PAIN SEVERITY QUANTIFIED, NO PAIN PRESENT: ICD-10-PCS | Mod: CPTII,S$GLB,, | Performed by: FAMILY MEDICINE

## 2022-03-21 PROCEDURE — 3008F PR BODY MASS INDEX (BMI) DOCUMENTED: ICD-10-PCS | Mod: CPTII,S$GLB,, | Performed by: FAMILY MEDICINE

## 2022-03-21 PROCEDURE — 36415 COLL VENOUS BLD VENIPUNCTURE: CPT | Mod: PN | Performed by: NURSE PRACTITIONER

## 2022-03-21 PROCEDURE — 1160F PR REVIEW ALL MEDS BY PRESCRIBER/CLIN PHARMACIST DOCUMENTED: ICD-10-PCS | Mod: CPTII,S$GLB,, | Performed by: FAMILY MEDICINE

## 2022-03-21 PROCEDURE — 3077F PR MOST RECENT SYSTOLIC BLOOD PRESSURE >= 140 MM HG: ICD-10-PCS | Mod: CPTII,S$GLB,, | Performed by: FAMILY MEDICINE

## 2022-03-21 PROCEDURE — 1101F PT FALLS ASSESS-DOCD LE1/YR: CPT | Mod: CPTII,S$GLB,, | Performed by: FAMILY MEDICINE

## 2022-03-21 PROCEDURE — 1159F PR MEDICATION LIST DOCUMENTED IN MEDICAL RECORD: ICD-10-PCS | Mod: CPTII,S$GLB,, | Performed by: FAMILY MEDICINE

## 2022-03-21 PROCEDURE — 1126F AMNT PAIN NOTED NONE PRSNT: CPT | Mod: CPTII,S$GLB,, | Performed by: FAMILY MEDICINE

## 2022-03-21 PROCEDURE — 99999 PR PBB SHADOW E&M-EST. PATIENT-LVL V: ICD-10-PCS | Mod: PBBFAC,,, | Performed by: FAMILY MEDICINE

## 2022-03-21 PROCEDURE — 99215 PR OFFICE/OUTPT VISIT, EST, LEVL V, 40-54 MIN: ICD-10-PCS | Mod: S$GLB,,, | Performed by: FAMILY MEDICINE

## 2022-03-21 RX ORDER — FLUCONAZOLE 150 MG/1
150 TABLET ORAL ONCE
Qty: 1 TABLET | Refills: 2 | Status: SHIPPED | OUTPATIENT
Start: 2022-03-21 | End: 2022-08-22 | Stop reason: SDUPTHER

## 2022-03-21 RX ORDER — SPIRONOLACTONE 50 MG/1
50 TABLET, FILM COATED ORAL DAILY
Qty: 90 TABLET | Refills: 3 | Status: SHIPPED | OUTPATIENT
Start: 2022-03-21 | End: 2023-04-18 | Stop reason: SDUPTHER

## 2022-03-21 RX ORDER — CLOTRIMAZOLE AND BETAMETHASONE DIPROPIONATE 10; .64 MG/G; MG/G
CREAM TOPICAL 2 TIMES DAILY
Qty: 45 G | Refills: 3 | Status: SHIPPED | OUTPATIENT
Start: 2022-03-21 | End: 2023-04-18 | Stop reason: SDUPTHER

## 2022-03-21 NOTE — PROGRESS NOTES
Subjective:       Patient ID: Mercedez Purvis is a 67 y.o. female.    Chief Complaint: Annual Exam      68 yo female has a PMH of CAD, MI, stent placement, heart failure, Type 2 DM, HTN, HLd, obesity, TIKA, GERD, BPPV, bilateral carpal tunnel syndrome, chronic musculoskeletal pain, Headache, yeast infection, Lichen simplex chronicus, vitamin D deficiency.    She is not exercising. She follows with our diabetic specialist for uncontrolled diabetes. Request handicap placard due to mobility issues due to chronic musculoskeletal pain.    She would like to see podiatry within [facility].     She is not up to date on Colonoscopy.    The following portions of the patient's history were reviewed and updated as appropriate: allergies, current medications, past family history, past medical history, past social history, past surgical history and problem list.    Review of Systems   Constitutional: Negative for activity change, appetite change, chills, diaphoresis, fatigue, fever and unexpected weight change.   HENT: Negative for nasal congestion, dental problem, facial swelling, hearing loss, nosebleeds, postnasal drip, rhinorrhea, sore throat, tinnitus and trouble swallowing.    Eyes: Positive for visual disturbance. Negative for pain, discharge and itching.   Respiratory: Positive for apnea. Negative for chest tightness, shortness of breath, wheezing and stridor.    Cardiovascular: Negative for chest pain, palpitations and leg swelling.   Gastrointestinal: Negative for abdominal distention, abdominal pain, blood in stool, constipation, diarrhea, nausea, rectal pain and vomiting.   Endocrine: Negative for cold intolerance, heat intolerance, polydipsia and polyuria.   Genitourinary: Negative for difficulty urinating, dysuria, frequency, hematuria, menstrual problem and urgency.   Musculoskeletal: Positive for arthralgias, gait problem and neck pain. Negative for joint swelling, myalgias and neck stiffness.   Integumentary:   Negative for color change and rash.   Neurological: Positive for vertigo. Negative for dizziness, tremors, seizures, syncope, facial asymmetry, weakness and headaches.   Hematological: Negative for adenopathy. Does not bruise/bleed easily.   Psychiatric/Behavioral: Negative for agitation, confusion, dysphoric mood, hallucinations, self-injury and suicidal ideas. The patient is not hyperactive.           Objective:       Vitals:    03/21/22 0910 03/21/22 1012   BP: (!) 160/88 (!) 158/88   BP Location:  Left arm   Patient Position:  Sitting   BP Method:  Large (Manual)   Pulse: 78    Temp: 97.9 °F (36.6 °C)    TempSrc: Oral    SpO2: 99%    Weight: 88.4 kg (194 lb 14.2 oz)    Height: 5' (1.524 m)      Physical Exam  Constitutional:       General: She is not in acute distress.     Appearance: She is well-developed. She is not diaphoretic.   HENT:      Head: Normocephalic and atraumatic.      Right Ear: External ear normal.      Left Ear: External ear normal.   Eyes:      General: No scleral icterus.        Right eye: No discharge.         Left eye: No discharge.      Conjunctiva/sclera: Conjunctivae normal.      Pupils: Pupils are equal, round, and reactive to light.   Neck:      Thyroid: No thyromegaly.   Cardiovascular:      Rate and Rhythm: Normal rate and regular rhythm.      Heart sounds: Normal heart sounds. No murmur heard.    No friction rub. No gallop.   Pulmonary:      Effort: Pulmonary effort is normal. No respiratory distress.      Breath sounds: Normal breath sounds.   Chest:      Chest wall: No tenderness.   Abdominal:      General: Bowel sounds are normal. There is no distension.      Palpations: Abdomen is soft. There is no mass.      Tenderness: There is no abdominal tenderness. There is no guarding or rebound.   Musculoskeletal:         General: Normal range of motion.      Cervical back: Normal range of motion and neck supple.   Lymphadenopathy:      Cervical: No cervical adenopathy.   Skin:      General: Skin is warm.      Capillary Refill: Capillary refill takes less than 2 seconds.      Findings: No rash.   Neurological:      Mental Status: She is alert and oriented to person, place, and time.      Cranial Nerves: No cranial nerve deficit.      Motor: No abnormal muscle tone.      Coordination: Coordination normal.      Gait: Gait abnormal.      Deep Tendon Reflexes: Reflexes normal.   Psychiatric:         Thought Content: Thought content normal.         Judgment: Judgment normal.           10/30/21  ECHO  · The estimated ejection fraction is 40-45%.  · The quantitatively derived ejection fraction is 42-45%.  · The left ventricle is normal in size with eccentric hypertrophy and mildly decreased systolic function.  · Grade I left ventricular diastolic dysfunction.  · There is mild aortic valve stenosis.  · Aortic valve area is 1.59 cm2; peak velocity is 1.74 m/s; mean gradient is 6 mmHg.  · Mild tricuspid regurgitation.  · Trivial anterior pericardial effusion.  · Normal right ventricular size with normal right ventricular systolic function.  · There are segmental left ventricular wall motion abnormalities.         Assessment:       1. Coronary artery disease of native artery of native heart with stable angina pectoris    2. History of heart attack    3. History of heart artery stent    4. Chronic systolic heart failure    5. Type 2 diabetes mellitus with diabetic polyneuropathy, with long-term current use of insulin    6. 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 Diabetic eye exam    9. Diabetic cataract of both eyes    10. Encounter for diabetic foot exam    11. Hypertension associated with diabetes    12. Hyperlipidemia associated with type 2  diabetes mellitus    13. Class 2 severe obesity due to excess calories with serious comorbidity and body mass index (BMI) of 38.0 to 38.9 in adult    14. Hyperaldosteronism    15. TIKA (obstructive sleep apnea)    16. Gastroesophageal reflux disease without esophagitis    17. BPPV (benign paroxysmal positional vertigo), left    18. Bilateral carpal tunnel syndrome    19. Chronic musculoskeletal pain    20. Chronic tension-type headache, not intractable    21. Yeast infection    22. Lichen simplex chronicus    23. Screening for malignant neoplasm of colon    24. Vitamin D deficiency    25. Need for vaccination for Strep pneumoniae        Plan:       1. Coronary artery disease of native artery of native heart with stable angina pectoris  2. History of heart attack  3. History of heart artery stent  Need better blood pressure and diabetes control. Start exercising. Follow up with cardiology    4. Chronic systolic heart failure  -     spironolactone (ALDACTONE) 50 MG tablet; Take 1 tablet (50 mg total) by mouth once daily.  Dispense: 90 tablet; Refill: 3  On Beta blocker, ACE inhibitor and statin  EF improved.     5. Type 2 diabetes mellitus with diabetic polyneuropathy, with long-term current use of insulin  6. 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 Type 2 diabetes mellitus with right eye affected by mild nonproliferative retinopathy without macular edema, with long-term current use of insulin  Follows with our diabetic specialist.    8. Diabetic eye exam  -     Ambulatory referral/consult to Optometry; Future; Expected date: 03/28/2022    9. Diabetic cataract of both eyes  Follow with eye specialist.    10. Encounter for diabetic foot exam  -     Ambulatory referral/consult to Podiatry; Future; Expected date: 03/28/2022    11. Hypertension associated with diabetes  On 4 different blood pressure medication and  admits to missing dosages- continue amlodipine, metoprolol, spironolactone and lisinopril    12. Hyperlipidemia associated with type 2 diabetes mellitus  On atorvastatin    13. Class 2 severe obesity due to excess calories with serious comorbidity and body mass index (BMI) of 38.0 to 38.9 in adult  The importance of optimum diet & exercise discussed. The goal for weight management is 5-10 % of total body weight reduction in 3 months.     14. Hyperaldosteronism  On spironolactone    15. TIKA (obstructive sleep apnea)  Treat sleep apnea    16. Gastroesophageal reflux disease without esophagitis  Weight loss advised.     17. BPPV (benign paroxysmal positional vertigo), left  Consider vestibular exercises for worsening symptoms.    18. Bilateral carpal tunnel syndrome  Wrist splints.    19. Chronic musculoskeletal pain  Encouraged to exercise. Disability placard request granted for temporary mobility issues due to chronic musculoskeletal complaints.    20. Chronic tension-type headache, not intractable  No alarm symptoms requiring acute imaging    21. Yeast infection  -     fluconazole (DIFLUCAN) 150 MG Tab; Take 1 tablet (150 mg total) by mouth once. May repeat after 72 hrs for yeast infection. for 1 dose  Dispense: 1 tablet; Refill: 2    22. Lichen simplex chronicus  -     clotrimazole-betamethasone 1-0.05% (LOTRISONE) cream; Apply topically 2 (two) times daily.  Dispense: 45 g; Refill: 3    23. Screening for malignant neoplasm of colon  -     Cologuard Screening (Multitarget Stool DNA); Future; Expected date: 03/21/2022    24. Vitamin D deficiency  OTC vitamin D supplementation for heavy bones.    25. Need for vaccination for Strep pneumoniae  Nursing staff to make arrangements.    This was a 40 minute visit, 30 mins of which were spent counseling and coordinating care.    Disclaimer: This note has been generated using voice-recognition software. There may be typographical errors that have been missed during  proof-reading

## 2022-03-28 ENCOUNTER — PATIENT MESSAGE (OUTPATIENT)
Dept: ADMINISTRATIVE | Facility: OTHER | Age: 67
End: 2022-03-28
Payer: MEDICARE

## 2022-03-28 ENCOUNTER — PATIENT OUTREACH (OUTPATIENT)
Dept: ADMINISTRATIVE | Facility: OTHER | Age: 67
End: 2022-03-28
Payer: MEDICARE

## 2022-03-28 ENCOUNTER — OFFICE VISIT (OUTPATIENT)
Dept: PRIMARY CARE CLINIC | Facility: CLINIC | Age: 67
End: 2022-03-28
Payer: MEDICARE

## 2022-03-28 VITALS
WEIGHT: 191.81 LBS | OXYGEN SATURATION: 99 % | TEMPERATURE: 97 F | DIASTOLIC BLOOD PRESSURE: 76 MMHG | BODY MASS INDEX: 37.66 KG/M2 | SYSTOLIC BLOOD PRESSURE: 140 MMHG | RESPIRATION RATE: 13 BRPM | HEIGHT: 60 IN | HEART RATE: 82 BPM

## 2022-03-28 DIAGNOSIS — I25.118 CORONARY ARTERY DISEASE OF NATIVE ARTERY OF NATIVE HEART WITH STABLE ANGINA PECTORIS: ICD-10-CM

## 2022-03-28 DIAGNOSIS — E11.3393 MODERATE NONPROLIFERATIVE DIABETIC RETINOPATHY OF BOTH EYES WITHOUT MACULAR EDEMA ASSOCIATED WITH TYPE 2 DIABETES MELLITUS: Primary | ICD-10-CM

## 2022-03-28 DIAGNOSIS — E11.42 TYPE 2 DIABETES MELLITUS WITH DIABETIC POLYNEUROPATHY, WITH LONG-TERM CURRENT USE OF INSULIN: ICD-10-CM

## 2022-03-28 DIAGNOSIS — E11.69 OBESITY, DIABETES, AND HYPERTENSION SYNDROME: ICD-10-CM

## 2022-03-28 DIAGNOSIS — Z79.4 TYPE 2 DIABETES MELLITUS WITH LEFT EYE AFFECTED BY MILD NONPROLIFERATIVE RETINOPATHY AND MACULAR EDEMA, WITH LONG-TERM CURRENT USE OF INSULIN: ICD-10-CM

## 2022-03-28 DIAGNOSIS — E11.59 HYPERTENSION ASSOCIATED WITH DIABETES: ICD-10-CM

## 2022-03-28 DIAGNOSIS — E11.3212 TYPE 2 DIABETES MELLITUS WITH LEFT EYE AFFECTED BY MILD NONPROLIFERATIVE RETINOPATHY AND MACULAR EDEMA, WITH LONG-TERM CURRENT USE OF INSULIN: ICD-10-CM

## 2022-03-28 DIAGNOSIS — I10 ESSENTIAL HYPERTENSION: ICD-10-CM

## 2022-03-28 DIAGNOSIS — E11.69 HYPERLIPIDEMIA ASSOCIATED WITH TYPE 2 DIABETES MELLITUS: ICD-10-CM

## 2022-03-28 DIAGNOSIS — E78.5 HYPERLIPIDEMIA ASSOCIATED WITH TYPE 2 DIABETES MELLITUS: ICD-10-CM

## 2022-03-28 DIAGNOSIS — E66.01 CLASS 2 SEVERE OBESITY DUE TO EXCESS CALORIES WITH SERIOUS COMORBIDITY AND BODY MASS INDEX (BMI) OF 38.0 TO 38.9 IN ADULT: ICD-10-CM

## 2022-03-28 DIAGNOSIS — Z79.4 TYPE 2 DIABETES MELLITUS WITH DIABETIC POLYNEUROPATHY, WITH LONG-TERM CURRENT USE OF INSULIN: ICD-10-CM

## 2022-03-28 DIAGNOSIS — I15.2 OBESITY, DIABETES, AND HYPERTENSION SYNDROME: ICD-10-CM

## 2022-03-28 DIAGNOSIS — E11.59 OBESITY, DIABETES, AND HYPERTENSION SYNDROME: ICD-10-CM

## 2022-03-28 DIAGNOSIS — I15.2 HYPERTENSION ASSOCIATED WITH DIABETES: ICD-10-CM

## 2022-03-28 DIAGNOSIS — I50.22 CHRONIC SYSTOLIC HEART FAILURE: ICD-10-CM

## 2022-03-28 DIAGNOSIS — E66.9 OBESITY, DIABETES, AND HYPERTENSION SYNDROME: ICD-10-CM

## 2022-03-28 PROCEDURE — 1126F PR PAIN SEVERITY QUANTIFIED, NO PAIN PRESENT: ICD-10-PCS | Mod: CPTII,S$GLB,, | Performed by: NURSE PRACTITIONER

## 2022-03-28 PROCEDURE — 99999 PR PBB SHADOW E&M-EST. PATIENT-LVL V: ICD-10-PCS | Mod: PBBFAC,,, | Performed by: NURSE PRACTITIONER

## 2022-03-28 PROCEDURE — 4010F PR ACE/ARB THEARPY RXD/TAKEN: ICD-10-PCS | Mod: CPTII,S$GLB,, | Performed by: NURSE PRACTITIONER

## 2022-03-28 PROCEDURE — 3066F PR DOCUMENTATION OF TREATMENT FOR NEPHROPATHY: ICD-10-PCS | Mod: CPTII,S$GLB,, | Performed by: NURSE PRACTITIONER

## 2022-03-28 PROCEDURE — 3008F BODY MASS INDEX DOCD: CPT | Mod: CPTII,S$GLB,, | Performed by: NURSE PRACTITIONER

## 2022-03-28 PROCEDURE — 3046F HEMOGLOBIN A1C LEVEL >9.0%: CPT | Mod: CPTII,S$GLB,, | Performed by: NURSE PRACTITIONER

## 2022-03-28 PROCEDURE — 3288F FALL RISK ASSESSMENT DOCD: CPT | Mod: CPTII,S$GLB,, | Performed by: NURSE PRACTITIONER

## 2022-03-28 PROCEDURE — 99999 PR PBB SHADOW E&M-EST. PATIENT-LVL V: CPT | Mod: PBBFAC,,, | Performed by: NURSE PRACTITIONER

## 2022-03-28 PROCEDURE — 4010F ACE/ARB THERAPY RXD/TAKEN: CPT | Mod: CPTII,S$GLB,, | Performed by: NURSE PRACTITIONER

## 2022-03-28 PROCEDURE — 3077F PR MOST RECENT SYSTOLIC BLOOD PRESSURE >= 140 MM HG: ICD-10-PCS | Mod: CPTII,S$GLB,, | Performed by: NURSE PRACTITIONER

## 2022-03-28 PROCEDURE — 3061F PR NEG MICROALBUMINURIA RESULT DOCUMENTED/REVIEW: ICD-10-PCS | Mod: CPTII,S$GLB,, | Performed by: NURSE PRACTITIONER

## 2022-03-28 PROCEDURE — 99214 OFFICE O/P EST MOD 30 MIN: CPT | Mod: S$GLB,,, | Performed by: NURSE PRACTITIONER

## 2022-03-28 PROCEDURE — 1101F PT FALLS ASSESS-DOCD LE1/YR: CPT | Mod: CPTII,S$GLB,, | Performed by: NURSE PRACTITIONER

## 2022-03-28 PROCEDURE — 3077F SYST BP >= 140 MM HG: CPT | Mod: CPTII,S$GLB,, | Performed by: NURSE PRACTITIONER

## 2022-03-28 PROCEDURE — 3046F PR MOST RECENT HEMOGLOBIN A1C LEVEL > 9.0%: ICD-10-PCS | Mod: CPTII,S$GLB,, | Performed by: NURSE PRACTITIONER

## 2022-03-28 PROCEDURE — 99499 UNLISTED E&M SERVICE: CPT | Mod: S$GLB,,, | Performed by: NURSE PRACTITIONER

## 2022-03-28 PROCEDURE — 1126F AMNT PAIN NOTED NONE PRSNT: CPT | Mod: CPTII,S$GLB,, | Performed by: NURSE PRACTITIONER

## 2022-03-28 PROCEDURE — 3078F DIAST BP <80 MM HG: CPT | Mod: CPTII,S$GLB,, | Performed by: NURSE PRACTITIONER

## 2022-03-28 PROCEDURE — 95251 PR GLUCOSE MONITOR, 72 HOUR, PHYS INTERP: ICD-10-PCS | Mod: S$GLB,,, | Performed by: NURSE PRACTITIONER

## 2022-03-28 PROCEDURE — 1160F PR REVIEW ALL MEDS BY PRESCRIBER/CLIN PHARMACIST DOCUMENTED: ICD-10-PCS | Mod: CPTII,S$GLB,, | Performed by: NURSE PRACTITIONER

## 2022-03-28 PROCEDURE — 1159F MED LIST DOCD IN RCRD: CPT | Mod: CPTII,S$GLB,, | Performed by: NURSE PRACTITIONER

## 2022-03-28 PROCEDURE — 3288F PR FALLS RISK ASSESSMENT DOCUMENTED: ICD-10-PCS | Mod: CPTII,S$GLB,, | Performed by: NURSE PRACTITIONER

## 2022-03-28 PROCEDURE — 3008F PR BODY MASS INDEX (BMI) DOCUMENTED: ICD-10-PCS | Mod: CPTII,S$GLB,, | Performed by: NURSE PRACTITIONER

## 2022-03-28 PROCEDURE — 3066F NEPHROPATHY DOC TX: CPT | Mod: CPTII,S$GLB,, | Performed by: NURSE PRACTITIONER

## 2022-03-28 PROCEDURE — 1160F RVW MEDS BY RX/DR IN RCRD: CPT | Mod: CPTII,S$GLB,, | Performed by: NURSE PRACTITIONER

## 2022-03-28 PROCEDURE — 3061F NEG MICROALBUMINURIA REV: CPT | Mod: CPTII,S$GLB,, | Performed by: NURSE PRACTITIONER

## 2022-03-28 PROCEDURE — 3078F PR MOST RECENT DIASTOLIC BLOOD PRESSURE < 80 MM HG: ICD-10-PCS | Mod: CPTII,S$GLB,, | Performed by: NURSE PRACTITIONER

## 2022-03-28 PROCEDURE — 99499 RISK ADDL DX/OHS AUDIT: ICD-10-PCS | Mod: S$GLB,,, | Performed by: NURSE PRACTITIONER

## 2022-03-28 PROCEDURE — 95251 CONT GLUC MNTR ANALYSIS I&R: CPT | Mod: S$GLB,,, | Performed by: NURSE PRACTITIONER

## 2022-03-28 PROCEDURE — 1101F PR PT FALLS ASSESS DOC 0-1 FALLS W/OUT INJ PAST YR: ICD-10-PCS | Mod: CPTII,S$GLB,, | Performed by: NURSE PRACTITIONER

## 2022-03-28 PROCEDURE — 1159F PR MEDICATION LIST DOCUMENTED IN MEDICAL RECORD: ICD-10-PCS | Mod: CPTII,S$GLB,, | Performed by: NURSE PRACTITIONER

## 2022-03-28 PROCEDURE — 99214 PR OFFICE/OUTPT VISIT, EST, LEVL IV, 30-39 MIN: ICD-10-PCS | Mod: S$GLB,,, | Performed by: NURSE PRACTITIONER

## 2022-03-28 RX ORDER — ATORVASTATIN CALCIUM 40 MG/1
40 TABLET, FILM COATED ORAL NIGHTLY
Qty: 90 TABLET | Refills: 3 | Status: SHIPPED | OUTPATIENT
Start: 2022-03-28 | End: 2023-04-18 | Stop reason: SDUPTHER

## 2022-03-28 RX ORDER — DULAGLUTIDE 4.5 MG/.5ML
4.5 INJECTION, SOLUTION SUBCUTANEOUS
Qty: 4 PEN | Refills: 11 | Status: SHIPPED | OUTPATIENT
Start: 2022-03-28 | End: 2023-04-18 | Stop reason: SDUPTHER

## 2022-03-28 RX ORDER — METFORMIN HYDROCHLORIDE 500 MG/1
1000 TABLET, EXTENDED RELEASE ORAL EVERY MORNING
Qty: 180 TABLET | Refills: 3 | Status: SHIPPED | OUTPATIENT
Start: 2022-03-28 | End: 2023-04-18 | Stop reason: SDUPTHER

## 2022-03-28 RX ORDER — EMPAGLIFLOZIN 25 MG/1
25 TABLET, FILM COATED ORAL DAILY
Qty: 30 TABLET | Refills: 4 | Status: SHIPPED | OUTPATIENT
Start: 2022-03-28 | End: 2022-12-06 | Stop reason: SDUPTHER

## 2022-03-28 RX ORDER — LISINOPRIL 40 MG/1
40 TABLET ORAL DAILY
Qty: 90 TABLET | Refills: 3 | Status: SHIPPED | OUTPATIENT
Start: 2022-03-28 | End: 2023-04-18 | Stop reason: SDUPTHER

## 2022-03-28 RX ORDER — MECLIZINE HCL 12.5 MG 12.5 MG/1
25 TABLET ORAL 2 TIMES DAILY PRN
Qty: 30 TABLET | Refills: 1 | Status: SHIPPED | OUTPATIENT
Start: 2022-03-28

## 2022-03-28 NOTE — PROGRESS NOTES
67 y.o. female, here for 4 month follow up for management of T2dm -   Last seen in November 2021 - those notes are below.     a1c back elevated to 10%.   She continues on metformin 1000mg daily. (she tried to do 2 times per day but couldn't tolerate due to gi s/e's)  Also on jardiance 25mg tablet daily.   On Trulicity 3mg every week.   Brother, father, and stepmother - all passed away - recently.   So increased stress.     She is on omnipod - downloaded - see scanned in [employer] report.   Total daily dose  - 54.2 units of insulin per day.   53% is coming from the basal rate.   47% is coming from the bolusing.   She is giving preset boluses -   10, 12 or 14 units with her meals.   Changing pod about every 3 days.   She has 2 unit preset for a snack or drink -   Active insulin time 4 hours.   Rate is at 1.3 units/hour.   ISF is at 1: 50   bg target is at 100 -   ICR (carb ratio) at 15 - but she is not using carbs/not counting carbs.   Notices glucoses are more elevated lately - boluses for meals, not always with snacks.     Using dexcom g6 - downloaded and reviewed today-   Average glucose 241 -   15% time in range -   0% lows.   46% highs.   39% very highs.         Last visit notes from November 2021 as follows:   66 y.o. female here for 4 month follow up visit for management of T2dm -   Last seen in August 2021 - those notes are below.     a1c is up again from 7.8% to 9.1%.   She continues on omnipod. States her pods are expiring before 3 days and she is running out of insulin too early.   Still using metformin 1000 mg daily in morning.   Also on jardiance 25mg tablet daily in morning.   Continues on Trulicity 3mg every week.     She continues on omnipod - I was unable to download her Dash pod to [employer] due to technical errors today on our computer.   However did review a 14 day history of bolusing and her settings:   She is using humalog insulin -   Active insulin time is at 4 hours.   isf is set at 1: 50   Basal  rate is at 1.3 units/hour.   Total daily dose of insulin is 47.4 units/day.   59% of insulin coming from the basal rate.   41% is coming from the bolusing.     She uses preset boluses -   I had increased both her basal rate and presets at last visit.   Presets are 10 units, 12 units, 14 units  - 2 units, 4 units   She usually gives just 12 units with meals.   This is even with HIGH carb meals, not just based on the size.     Dexcom g6 downloaded - reviewed -   See scanned in  -   Average glucose is 195 -   42% time in range.   <1% lows.   42% highs.   15% extreme highs.     She admits, she has been eating bad - not following Ada diet.   Fried foods mostly.   She is doing boluses before meals, sometimes forgetting on occasion.   As in this morning she ate breakfast, but did not give a bolus.   Last night she ate dinner, did not give a bolus -   However sugar today in clinic is at 112.   She has not checked her glucose in 3 days as she is out of test strips, and is awaiting her next shipment of dexcom supplies.   Requests Rx for contour next test strips.     Of note - she went to ER on 10/29/2021 - was having chest discomfort. Was found to be in [facility].   Amlodipine and chlorthalidone was stopped/held.   She is going to follow up with cardiology in February 2021.   She ran out of her beta blocker medication states 2 weeks ago, and requests refill.   Her blood pressure is running on higher end today at 140/78 -   She is feeling well, with no acute complaints today.         Last visit notes from August 2021 as follows:   66 y.o. female, here for follow up visit for management for diabetes management   Last seen in May 2021. Those notes are below.     a1c down from 10% to 7.8%.   Much improved.   Her life has been very stressful since last visit -   Her son got injured severely from a shooting and was hospitalized -   So she's gotten off track, and only give 2 meal time boluses per day with her Omnipod.   Also  stopped using her dexcom.     Current meds:   trulicity 3mg every week (increased from last visit from 1.5 to 3 mg every week).   jardiance 25mg tablet daily  Metformin 1000 in morning  She is on omnipod with humalog insulin continuous - [employer] report reviewed today  basal rate from 1.2 to 1.3 units/hour at last visit.      presets for meals increased at last visit -   Small 8---> increased to 10 units.   Medium 10 ---> increased to 12 units.   Large 12 ---> increased to 14 units.   Small snack - 2 units. No change  Added interval of Large snack - 4 snacks.   Correction bolus 2 units if bg greater than 180     Total daily dose of insulin - 45.2 units/day on average.   58% is coming from the basal rate.   42% is coming from the bolusing -   Active insulin time is at 4 hours.   Carb ratio is at 15 grams -  But she uses presets.   ISF is at 1: 50   bg target is set at 100.     Also was on dexcom G6 -   She ran out of her supplies/got off track again - so she is not using her dexcom right now.   She is willing to restart.       Last visit notes as follows from 5/17/2021:   66 y.o. female, here for 3 month follow up for management of t2dm.   Last seen in February 2021 - those notes below.     No new labs for today's visit, her a1c was last @ 10.2%.   I had switched her off of MDI onto VGO which seemed to help slightly.   Still was not getting enough insulin on VGO 40 patch (max dose) - so switched her to Omnipod insulin pump for higher capacity.   She trained/transitioned with diabetic educator on 3/3/2021 -   Her basal rate was then increased on 3/9/2021 from 1.1 to 1.2 units/hour     Downloaded her omnipod brunilda via [employer] today -   Her current rate is at 1.2 units/hour.   Presets increased to Small: 8 units, Medium: 10 units, Large: 12 units; will continue 2 units for snack and 2 units for BG over 180 for meals.  Insulin time is set at 4 hours.   Total daily dose = 46.2 units/day.   She averages about 2 boluses per day  - often missing breakfast bolus.   57% of her insulin comes from basal rate.   43% of her insulin comes from her bolusing.     Continues on metformin 1000 bid. States takes in morning time, but not every night.   Also on trulicity 1.5 every week.   Also on jardiance, higher dose (increase from 10 to 25mg last visit) - no side effects.   Lipids elevated - I had prescribed her to start taking repatha - they have been high despite max dose statin and zetia, but she has not started repatha yet.    dexcom downloaded and reviewed -   Average glucose is 199 -   Estimated a1c @ 8.1%.   37% time in range  47% highs.   16% extreme highs.   0% lows.       Last visit notes from February 2021 as follows:   66 y.o. female, here for rtc visit per routine for management of T2dm.   Last seen 11/2020 - those notes below.     a1c was > 14%, current @ 10.2%.   Continues on metformin 1000 bid.   trulicity 1.5   jardiance 10   vgo 40 - 6 clicks with meals.   Patient tells me/admits she had not been good about eating or following ada diet.   She has also not been taking her metformin or jardiance regularly.   History of MI, HTN, and CHF.   HLd - remains above goal despite max dose statin and add on of zetia 10mg last visit.     dexcom - reviewed and downloaded today - see  -   15% time in range.   0% lows.   37% highs.   48% extreme highs.       Last visit notes from 11/2020 as follows:   65 y.o. female, here for 2 month follow up for T2dm.   Last seen 9/22/2020 - those notes below.     History of CHF, HTN, HLd - current bp 140 - 150's systolic. Has not taken bp meds yet this morning.   Denies cp, palpitations, shortness of breath.   HLD - stable on current meds. On zetia and statin.   History of MI in past.     Doing well/much improved with diabetes management.   Last Hgba1c was undetectable at > 14%.   Now improved at 9%.   Her bg's are much more controlled.     She continues on metformin 1000 bid.   Also on trulicity 1.5  "every week.   Taking jardiance 10 mg tablet daily in am now - tolerating well. No s/e's.   Continue on VGO 40 - 6 clicks with breakfast - 5 clicks with lunch and dinner.   Uses 1 - 2 clicks for a snack in the evening if needed.   Feeling well - and denies any hypoglycemic episodes.     dexcom G6 interpreted/downloaded today - she is using a reader.   Average Bg is 173.   63% time in range.   0% lows.   33% highs.   4% extreme highs.     C/o feet "tingling" - painful itching type sensation to her feet.   Has been using gabapentin 100mg 3 times per day, but not sure if helping much.   Also reports using clobetasol cream to her feet prn which helps.   Reports history of lower back issues/not too painful at present. Just hurts to stoop/lean forward.   Not interested in seeing pain management at this time.     C/o dizziness - happens every day. Has been alleviated by meclizine in the past. Requesting refill.   Comes and goes, usually daily, often accompanied by nausea and sometimes emesis.   Has not seen ENT for this. Denies any vision changes.       Last visit notes from 9/22/2020:   65 y.o. female, here for 1 month check up for management of type 2 diabetes.   Previous a1c's at 14% on MDI -   Switched her to vgo, and put her on a dexcom.   Average 's. So much improved.     Continues on Trulicity 1.5mg sc weekly.   Also on metformin 1000mg po bid.   Taking humalog insulin via VGO 40 - taking 5 clicks of insulin with meals, 1 with a snack.   Often eats yogurt at bedtime and notices sugar high at nighttime.   Previous on levemir 45 units bid and novolog 25 units tid meals - so previously on total daily dose of 165 units daily.   Large improvement. Now on about 70 units total daily insulin.     Wearing Dexcom G6 - downloaded and interpreted today -   Average BG is 189   42% time in range.   48% highs.   9% very highs.   No lows.     She has multiple comorbidites including heart failure and CAD, with history of " multiple stents.   On statin, zetia, bp meds, diuretics and plavix. Requesting refills.   No acute complaints today's visit.     Last visit notes from August 18th, 2020 as follows:   Very pleasant, 65 y.o. female, here for 4 week follow up - managing her type 2 diabetes.   I first saw her July 20th, 2020 -   She was on MDI at that time, with very high sugars.     In the interim, I switched her from MDI to vgo 40 and dexcom.   She is enjoying using both - she is much more aware of her sugars.   She is trying to eat better, and now is learning what foods increase her sugars.     She is using humalog in her vgo 40 - 3 clicks with meals, 4 clicks if sugar is greater than 200 going into meal.   Then 1 click with a snack.     She often snacks in afternoon and before bed - usually a yogurt or/and apple before bed.   Taking in less sweets.   States primarily is doing 3 clicks with meals for most of the day.   Still continues on trulicity 1.5mg sc weekly.   Continues on metformin 1000 metformin XR bid.     Dexcom G6 download interpreted today -   Average BG is 221.   Estimated a1c is 8.6%.   19% time in range.   56% highs.   25% very high.   0% lows.     Her last a1c on mdi was nondetectable >> 14%.   So she has improved for sure down to average BG of 220.   Still not to goal, but large improvement now that she is on continuous insulin.       Last Office visit notes as follows:   HPI: Mercedez Purvis is a 67 y.o.  female c/I for visit to address Diabetes Type 2   This is the first time I am seeing her, she is a patient of Dr. Vasquez's for primary care needs.   She also saw general endocrinology - [facility], Dr. Olivo just last week to address her uncontrolled diabetes.   She is unsure why she is here with me today as well as she just saw a specialist for diabetes management.     She was diagnosed with T2DM about 10 years ago.   Taking metformin 500mg XR - 2 tablets bid. This dose was just increased last week. She  "is taking.   Also on Trulicity 1.5mg sc weekly - has been taking for about 6 months.   Also on MDI - for 10 years.   Levemir 37 units BID, was advised to increase to 45 units bid, but hasn't done yet/hasn't changed yet.   and novolog 25 units tid ac meals - was advised to increase to 30 units tid meals at last visit, but hasn't done yet.   Patient denies skipping dose of insulin.   She takes insulin injections 4x/daily.   Was hospitalized with what she recalls as a "diabetic coma" in 2010 - that was when she was initially diagnosed with diabetes, and insulin was begun right away.   Has not been hospitalized for diabetes since that time.   Checking sugars 4x/daily - not writing down.   Today's blood sugars 2 hours post prandial is 309 on fingerstick in my office. Patient took her 25 units of novolog this am, but did not take the levemir yet for her morning dose.    Past medical History:   Past Medical History:   Diagnosis Date    Chronic headache     Diabetes     Heart attack 2004    8 stents    Heart failure     History of heart artery stent     Hyperlipemia     Hypertension     Obesity (BMI 30-39.9)     Yeast infection       Family hx: No family history on file.   Current meds:   Current Outpatient Medications:     amitriptyline (ELAVIL) 75 MG tablet, Take 1 tablet (75 mg total) by mouth every evening., Disp: 90 tablet, Rfl: 3    amLODIPine (NORVASC) 5 MG tablet, Take 1 tablet (5 mg total) by mouth once daily., Disp: 90 tablet, Rfl: 3    blood sugar diagnostic Strp, 1 each by Misc.(Non-Drug; Combo Route) route 4 (four) times daily., Disp: 200 each, Rfl: 3    blood-glucose sensor (DEXCOM G6 SENSOR) Robreta, 1 each by Misc.(Non-Drug; Combo Route) route every 10 days. Apply/change sensor to skin every 10 days., Disp: 3 each, Rfl: 11    blood-glucose transmitter (DEXCOM G6 TRANSMITTER) Roberta, 1 each by Misc.(Non-Drug; Combo Route) route Daily. Use transmitter in sensor with G6 system. Change new transmitter " "every 3 months., Disp: 1 each, Rfl: 3    clopidogreL (PLAVIX) 75 mg tablet, Take 1 tablet (75 mg total) by mouth once daily., Disp: 90 tablet, Rfl: 3    clotrimazole-betamethasone 1-0.05% (LOTRISONE) cream, Apply topically 2 (two) times daily., Disp: 45 g, Rfl: 3    cyclobenzaprine (FLEXERIL) 10 MG tablet, Take 1 tablet (10 mg total) by mouth 3 (three) times a week., Disp: 36 tablet, Rfl: 3    diclofenac sodium (VOLTAREN) 1 % Gel, Apply 1 application topically., Disp: , Rfl:     evolocumab (REPATHA SURECLICK) 140 mg/mL PnIj, Inject 1 mL (140 mg total) into the skin every 14 (fourteen) days., Disp: 2 mL, Rfl: 11    furosemide (LASIX) 40 MG tablet, Take 1 tablet (40 mg total) by mouth once daily., Disp: 90 tablet, Rfl: 3    gabapentin (NEURONTIN) 300 MG capsule, TAKE 1 CAPSULE(300 MG) BY MOUTH THREE TIMES DAILY, Disp: 270 capsule, Rfl: 1    insulin lispro (HUMALOG U-100 INSULIN) 100 unit/mL injection, Inject 100 Units into the skin continuous. Gives up to 100 units daily via Omnipod. Do not directly inject - only use within pods., Disp: 40 mL, Rfl: 11    metoprolol succinate (TOPROL-XL) 200 MG 24 hr tablet, Take 1 tablet (200 mg total) by mouth once daily., Disp: 90 tablet, Rfl: 3    OMNIPOD DASH INSULIN POD Crtg, CHANGE POD EVERY 48 HOURS AS DIRECTED, Disp: 45 each, Rfl: 3    pen needle, diabetic (PEN NEEDLE) 32 gauge x 5/32" Ndle, 1 each by Misc.(Non-Drug; Combo Route) route 4 (four) times daily. ICD 10 E11.42, Disp: 400 each, Rfl: 3    spironolactone (ALDACTONE) 50 MG tablet, Take 1 tablet (50 mg total) by mouth once daily., Disp: 90 tablet, Rfl: 3    atorvastatin (LIPITOR) 40 MG tablet, Take 1 tablet (40 mg total) by mouth every evening., Disp: 90 tablet, Rfl: 3    blood-glucose meter,continuous (DEXCOM G6 ) Misc, 1 each by Misc.(Non-Drug; Combo Route) route Daily. Use as directed with Dexcom G6 transmitter and sensor, Disp: 1 each, Rfl: 0    dulaglutide (TRULICITY) 4.5 mg/0.5 mL pen " injector, Inject 4.5 mg (one pen) into the skin every 7 days., Disp: 4 pen, Rfl: 11    empagliflozin (JARDIANCE) 25 mg tablet, Take 1 tablet (25 mg total) by mouth once daily., Disp: 30 tablet, Rfl: 4    LIDOcaine-prilocaine (EMLA) cream, Apply topically 2 (two) times a day., Disp: 30 g, Rfl: 1    lisinopriL (PRINIVIL,ZESTRIL) 40 MG tablet, Take 1 tablet (40 mg total) by mouth once daily., Disp: 90 tablet, Rfl: 3    meclizine (ANTIVERT) 12.5 mg tablet, Take 2 tablets (25 mg total) by mouth 2 (two) times daily as needed for Dizziness., Disp: 30 tablet, Rfl: 1    metFORMIN (GLUCOPHAGE-XR) 500 MG ER 24hr tablet, Take 2 tablets (1,000 mg total) by mouth every morning., Disp: 180 tablet, Rfl: 3     Social:   Lives at home by herself.  Does have family visit her with social distancing.   Has 5 children, 3 are still living. 13 grandchildren, 11 great-grandchildren.   Diet: not always following ADA diet   Meals: 3 per day and snacks   Breakfast - grits, eggs, sausage, boiled eggs.   Lunch - salad, fish, chicken breast.   Dinner -steaks, green beans, small portions of potatoe salad. Fried shrimp and fried catfish.   Snacks - fruits, apples/oranges, or nuts/pistachios or almonds.  Grapes.   Exercise: none. But does work in yard  Activities: not working.     Current Diabetes medications:   humalog insulin via Omnipod - humalog   GLP1:  Trulicity 3mg every week.   Oral meds: meformin 500 xr - 2 tabs just in morning.   jardiance 25 daily.     Medications Tried and Failed:   levemir 45 units bid.   Novolog 25 units tid ac meals.   (switched to vgo 40)  VGO 40 insulin patch.- 6 clicks with breakfast, 5 clicks with lunch and dinner.   1 - 2 clicks with a snack.   trulicity  Metformin     Glucose Monitoring:   CGM: Dexcom G6   Gets supplies from : [employer]  Has glucometer at home.     Review of Pertinent co-morbidities/risk factors:   CV: history of MI 2004 - with stents placed in 2010 and also in 2019.    CAD: yes   Takes  aspirin and plavix daily.  BP: has history of HTN  Statin: Taking  ACE/ARB: Taking    Vital Signs  BP (!) 140/76 (BP Location: Left arm, Patient Position: Sitting, BP Method: Medium (Manual))   Pulse 82   Temp 97 °F (36.1 °C) (Temporal)   Resp 13   Ht 5' (1.524 m)   Wt 87 kg (191 lb 12.8 oz)   SpO2 99%   BMI 37.46 kg/m²     Pertinent Labs:   Hgba1c   Lab Results   Component Value Date    HGBA1C 10.0 (H) 03/21/2022     Lipid panel   Lab Results   Component Value Date    CHOL 89 (L) 03/21/2022    CHOL 86 (L) 12/16/2021    CHOL 141 08/17/2021     Lab Results   Component Value Date    HDL 38 (L) 03/21/2022    HDL 34 (L) 12/16/2021    HDL 43 08/17/2021     Lab Results   Component Value Date    LDLCALC 23.6 (L) 03/21/2022    LDLCALC 33.4 (L) 12/16/2021    LDLCALC 81.2 08/17/2021     Lab Results   Component Value Date    TRIG 137 03/21/2022    TRIG 93 12/16/2021    TRIG 84 08/17/2021     Lab Results   Component Value Date    CHOLHDL 42.7 03/21/2022    CHOLHDL 39.5 12/16/2021    CHOLHDL 30.5 08/17/2021      CMP  Glucose   Date Value Ref Range Status   03/21/2022 189 (H) 70 - 110 mg/dL Final     BUN   Date Value Ref Range Status   03/21/2022 9 8 - 23 mg/dL Final     Creatinine   Date Value Ref Range Status   03/21/2022 1.0 0.5 - 1.4 mg/dL Final     eGFR if    Date Value Ref Range Status   03/21/2022 >60.0 >60 mL/min/1.73 m^2 Final     eGFR if non    Date Value Ref Range Status   03/21/2022 58.0 (A) >60 mL/min/1.73 m^2 Final     Comment:     Calculation used to obtain the estimated glomerular filtration  rate (eGFR) is the CKD-EPI equation.         Microalbumin creatinine ratio:   Lab Results   Component Value Date    MICALBCREAT 20.0 03/21/2022       Social History     Tobacco Use   Smoking Status Never Smoker   Smokeless Tobacco Never Used      Standards of care:   Eyes: .: 02/19/2021  Foot exam: : 09/03/2020 and done today.   Diabetes education: yes    Review Of Systems:   Gen:  Appetite good, no weight gain or loss, denies fatigue and weakness. Denies polydipsia.   Skin: Skin is intact and heals well, no rashes, no hair changes  Eyes: Denies acute visual disturbances, or blurred vision  Resp: no SOB or Dyspnea on exertion, denies cough.   Cardiac: Denies chest pain, palpitations, or swelling. Denies tremors.   GI: Denies abdominal pain, nausea or vomiting, diarrhea, constipation.   /GYN: no nocturia, Previous yeast infection. None in the past year.   MS/Neuro: Denies numbness/ tingling in BLE; taking neurontin 300 tid which helps with her leg and foot neuropathy.   Gait steady, speech clear  Psych: Denies drug/ETOH abuse, no hx of depression. Anxiety lately as r/t her son's injuries/hospitalization.   Other systems: negative.    Physical Exam:   GENERAL: Well developed, well nourished in appearance.   PSYCH: AAOx3, appropriate mood and affect, pleasant expression, conversant, appears relaxed, well groomed.   EYES: PERRL, Conjunctiva and corneas clear  NECK: Soft and Supple, trachea midline   CHEST: Even, regular, and unlabored respirations  ABDOMEN: Soft, non-tender, non-distended   VASCULAR: pedal pulses palpable bilaterally, no edema.  NEURO:  cranial nerves II - XII intact   MUSCULOSKELETAL: Good ROM, steady gait.   SKIN: Skin warm, dry, and intact     Assessment and Plan of Care:     Mercedez was seen today for diabetes and follow-up.    Diagnoses and all orders for this visit:    Moderate nonproliferative diabetic retinopathy of both eyes without macular edema associated with type 2 diabetes mellitus    Type 2 diabetes mellitus with diabetic polyneuropathy, with long-term current use of insulin  -     metFORMIN (GLUCOPHAGE-XR) 500 MG ER 24hr tablet; Take 2 tablets (1,000 mg total) by mouth every morning.    Obesity, diabetes, and hypertension syndrome    Class 2 severe obesity due to excess calories with serious comorbidity and body mass index (BMI) of 38.0 to 38.9 in  adult    Hypertension associated with diabetes    Hyperlipidemia associated with type 2 diabetes mellitus    Coronary artery disease of native artery of native heart with stable angina pectoris  -     atorvastatin (LIPITOR) 40 MG tablet; Take 1 tablet (40 mg total) by mouth every evening.    Essential hypertension  -     lisinopriL (PRINIVIL,ZESTRIL) 40 MG tablet; Take 1 tablet (40 mg total) by mouth once daily.    Chronic systolic heart failure  -     atorvastatin (LIPITOR) 40 MG tablet; Take 1 tablet (40 mg total) by mouth every evening.    Other orders  -     meclizine (ANTIVERT) 12.5 mg tablet; Take 2 tablets (25 mg total) by mouth 2 (two) times daily as needed for Dizziness.  -     empagliflozin (JARDIANCE) 25 mg tablet; Take 1 tablet (25 mg total) by mouth once daily.  -     dulaglutide (TRULICITY) 4.5 mg/0.5 mL pen injector; Inject 4.5 mg (one pen) into the skin every 7 days.       1. T2DM with hyperglycemia- Hgba1c goal is 7.5% or less -Hgba1c was > 14%-->  9% ---> 10.2%---> 7.8% --> 9.1% --> 10%    Continue omnipod - increased basal rate from 1.2 to 1.3 units/hour at last visit. Today increased basal rate to 1.5 units/hour (15 % increase).   Continue to bolus - presets for meals increased at last visit. No changes were made today.   Small 8---> increased to 10 units. --> increased to 12 units  Medium 10 ---> increased to 12 units--> increased to 14 units.  Large 12 ---> increased to 14 units---> increased to 16 units.   Small snack - 2 units-- increased this to 3 units.   Added interval of Large snack - 4 snacks-- increased this to 5 units  Correction bolus 2 units if bg greater than 180   (previously on 165 units of insulin per day with MDI, now more controlled on less units of insulin per day).   Continue metformin 1000 every morning.cannot tolerate bid due to gi side effects.   Continue trulicity -3mg every week.   advsied to call me if any side effects.   Continue Jardiance 25mg daily.   Discussed MOA,  possible side effects including yeast infection, UTI, dehydration and ketoacidosis, importance of maintaining hydration and avoiding No carb diets. Good use hygiene. Notify my office if any side effects. Will monitor renal function closely. Stable at present.   Continue using Dexcom G6 cgm - interpreted today - see scanned in report.   Titrated clicks/increased based on CGM interpretation).   Continues to check sugars 4 times per day.   Continues on insulin injections 4 times per day via Omnipod.   discussed DM, progression of disease, long term complications, CV risk factors and tx options.   She already has established CAD and history of MI, so at risk for repeat event.   Advise compliance with ADA diet and encourage exercise  Reviewed  hypoglycemia, s/s and appropriate tx.    Instructed to monitor Blood glucose 4x/day and bring meter/ log to every clinic visit.   Eyes - dilated retinal exam 7/15/2020 - no diabetic retinopathy but with follow up recommended in 1 month with ophthalmology.   Had exam done 9/2/2020 with opthalmology  Podiatry - had check up recently. Foot exam negative today. Advised on proper foot care. - Dr. Carrera 2020. Refilled clotrimazole/betamethasone.  Neuropathy - increased gabapentin from 100 to 300 tid. Recommended consult with pain management doctor as I think some of this might be related to her back, but she declines for now.     2. HTN- controlled, continue meds as previously prescribed and monitor.   meds refilled. \has not taken bp meds yet this morning. Encouraged to take.     3. HLD - LDL goal < 100. Not at goal on max dose statin - On atorvastatin 80mg every Hs and zetia 10.   Stop zetia.   repatha not approved, switch to praluent.     4. Weight - BMI Body mass index is 37.46 kg/m².   Encourage Ada diet and exercise.    continue Trulicity 3 mg every week.     5. GERD - previously on nexium but stable.   No longer taking.     6. TIKA - stable on cpap    7. Heart failure - stable -  refilled BB and diuretics at last visit.     8. CAD - continues on aspirin and plavix, s/p 6 stents.         F/u in 6 weeks.

## 2022-03-28 NOTE — PATIENT INSTRUCTIONS
Continue metformin 1000mg tablet every morning.   Continue jardiance 25mg tablet every morning.   Continue trulicity every week - increase dose from 3mg to 4.5mg every week.     Continue omnipod - I have increased your preset boluses - make sure you give a bolus with your snacks.   I have also increased your basal rate.     For low blood sugar - remember to keep glucose tablets on hand. You can purchase these at the pharmacy check out desk/over the counter.   If blood sugar is less than 70, take/eat 2 - 3 tablets quickly to bring your sugar up.   Always keep 15 grams of Quick acting carbohydrates on hand to eat/drink if your sugar is low - examples are 1/2 cup of juice, coke, or crackers, granola bars.   Remember to eat meals frequently to prevent low blood blood sugars.      Remember for a back up plan - you cannot just stop taking your insulin.   If your pods ran out/or you didn't have refills -   You can always go back to using your levemir 45 units daily injection.   You can give the short acting insulin - humalog or novolog with a meal or snack - 10 - 12 untis before each meal.   You would do this in an emergency.

## 2022-03-28 NOTE — PROGRESS NOTES
Care Everywhere: updated  Immunization: updated  Health Maintenance: updated  Media Review: review for outside mammogram and colon cancer report   Legacy Review:   DIS:no profile in portal   Order placed:   Upcoming appts:optometry 7.27  EFAX:  Task Tickets:Mammogram scheduling ticket sent to patient's portal   Referrals:

## 2022-03-29 ENCOUNTER — OFFICE VISIT (OUTPATIENT)
Dept: PODIATRY | Facility: CLINIC | Age: 67
End: 2022-03-29
Payer: MEDICARE

## 2022-03-29 VITALS
SYSTOLIC BLOOD PRESSURE: 151 MMHG | BODY MASS INDEX: 37.5 KG/M2 | WEIGHT: 191 LBS | HEIGHT: 60 IN | DIASTOLIC BLOOD PRESSURE: 82 MMHG | HEART RATE: 80 BPM

## 2022-03-29 DIAGNOSIS — E11.42 TYPE 2 DIABETES MELLITUS WITH DIABETIC POLYNEUROPATHY, UNSPECIFIED WHETHER LONG TERM INSULIN USE: ICD-10-CM

## 2022-03-29 DIAGNOSIS — E11.9 ENCOUNTER FOR DIABETIC FOOT EXAM: ICD-10-CM

## 2022-03-29 DIAGNOSIS — B35.1 ONYCHOMYCOSIS DUE TO DERMATOPHYTE: Primary | ICD-10-CM

## 2022-03-29 PROCEDURE — 3079F PR MOST RECENT DIASTOLIC BLOOD PRESSURE 80-89 MM HG: ICD-10-PCS | Mod: CPTII,S$GLB,, | Performed by: PODIATRIST

## 2022-03-29 PROCEDURE — 1101F PR PT FALLS ASSESS DOC 0-1 FALLS W/OUT INJ PAST YR: ICD-10-PCS | Mod: CPTII,S$GLB,, | Performed by: PODIATRIST

## 2022-03-29 PROCEDURE — 99214 OFFICE O/P EST MOD 30 MIN: CPT | Mod: 25,S$GLB,, | Performed by: PODIATRIST

## 2022-03-29 PROCEDURE — 11721 PR DEBRIDEMENT OF NAILS, 6 OR MORE: ICD-10-PCS | Mod: Q9,S$GLB,, | Performed by: PODIATRIST

## 2022-03-29 PROCEDURE — 3077F PR MOST RECENT SYSTOLIC BLOOD PRESSURE >= 140 MM HG: ICD-10-PCS | Mod: CPTII,S$GLB,, | Performed by: PODIATRIST

## 2022-03-29 PROCEDURE — 1160F RVW MEDS BY RX/DR IN RCRD: CPT | Mod: CPTII,S$GLB,, | Performed by: PODIATRIST

## 2022-03-29 PROCEDURE — 1125F AMNT PAIN NOTED PAIN PRSNT: CPT | Mod: CPTII,S$GLB,, | Performed by: PODIATRIST

## 2022-03-29 PROCEDURE — 1159F MED LIST DOCD IN RCRD: CPT | Mod: CPTII,S$GLB,, | Performed by: PODIATRIST

## 2022-03-29 PROCEDURE — 3288F PR FALLS RISK ASSESSMENT DOCUMENTED: ICD-10-PCS | Mod: CPTII,S$GLB,, | Performed by: PODIATRIST

## 2022-03-29 PROCEDURE — 1125F PR PAIN SEVERITY QUANTIFIED, PAIN PRESENT: ICD-10-PCS | Mod: CPTII,S$GLB,, | Performed by: PODIATRIST

## 2022-03-29 PROCEDURE — 99214 PR OFFICE/OUTPT VISIT, EST, LEVL IV, 30-39 MIN: ICD-10-PCS | Mod: 25,S$GLB,, | Performed by: PODIATRIST

## 2022-03-29 PROCEDURE — 3079F DIAST BP 80-89 MM HG: CPT | Mod: CPTII,S$GLB,, | Performed by: PODIATRIST

## 2022-03-29 PROCEDURE — 3066F PR DOCUMENTATION OF TREATMENT FOR NEPHROPATHY: ICD-10-PCS | Mod: CPTII,S$GLB,, | Performed by: PODIATRIST

## 2022-03-29 PROCEDURE — 3061F PR NEG MICROALBUMINURIA RESULT DOCUMENTED/REVIEW: ICD-10-PCS | Mod: CPTII,S$GLB,, | Performed by: PODIATRIST

## 2022-03-29 PROCEDURE — 3008F BODY MASS INDEX DOCD: CPT | Mod: CPTII,S$GLB,, | Performed by: PODIATRIST

## 2022-03-29 PROCEDURE — 3008F PR BODY MASS INDEX (BMI) DOCUMENTED: ICD-10-PCS | Mod: CPTII,S$GLB,, | Performed by: PODIATRIST

## 2022-03-29 PROCEDURE — 3046F HEMOGLOBIN A1C LEVEL >9.0%: CPT | Mod: CPTII,S$GLB,, | Performed by: PODIATRIST

## 2022-03-29 PROCEDURE — 1159F PR MEDICATION LIST DOCUMENTED IN MEDICAL RECORD: ICD-10-PCS | Mod: CPTII,S$GLB,, | Performed by: PODIATRIST

## 2022-03-29 PROCEDURE — 3046F PR MOST RECENT HEMOGLOBIN A1C LEVEL > 9.0%: ICD-10-PCS | Mod: CPTII,S$GLB,, | Performed by: PODIATRIST

## 2022-03-29 PROCEDURE — 99999 PR PBB SHADOW E&M-EST. PATIENT-LVL V: ICD-10-PCS | Mod: PBBFAC,,, | Performed by: PODIATRIST

## 2022-03-29 PROCEDURE — 3066F NEPHROPATHY DOC TX: CPT | Mod: CPTII,S$GLB,, | Performed by: PODIATRIST

## 2022-03-29 PROCEDURE — 99999 PR PBB SHADOW E&M-EST. PATIENT-LVL V: CPT | Mod: PBBFAC,,, | Performed by: PODIATRIST

## 2022-03-29 PROCEDURE — 3077F SYST BP >= 140 MM HG: CPT | Mod: CPTII,S$GLB,, | Performed by: PODIATRIST

## 2022-03-29 PROCEDURE — 4010F ACE/ARB THERAPY RXD/TAKEN: CPT | Mod: CPTII,S$GLB,, | Performed by: PODIATRIST

## 2022-03-29 PROCEDURE — 3288F FALL RISK ASSESSMENT DOCD: CPT | Mod: CPTII,S$GLB,, | Performed by: PODIATRIST

## 2022-03-29 PROCEDURE — 11721 DEBRIDE NAIL 6 OR MORE: CPT | Mod: Q9,S$GLB,, | Performed by: PODIATRIST

## 2022-03-29 PROCEDURE — 3061F NEG MICROALBUMINURIA REV: CPT | Mod: CPTII,S$GLB,, | Performed by: PODIATRIST

## 2022-03-29 PROCEDURE — 1101F PT FALLS ASSESS-DOCD LE1/YR: CPT | Mod: CPTII,S$GLB,, | Performed by: PODIATRIST

## 2022-03-29 PROCEDURE — 1160F PR REVIEW ALL MEDS BY PRESCRIBER/CLIN PHARMACIST DOCUMENTED: ICD-10-PCS | Mod: CPTII,S$GLB,, | Performed by: PODIATRIST

## 2022-03-29 PROCEDURE — 4010F PR ACE/ARB THEARPY RXD/TAKEN: ICD-10-PCS | Mod: CPTII,S$GLB,, | Performed by: PODIATRIST

## 2022-03-29 RX ORDER — CICLOPIROX 80 MG/ML
SOLUTION TOPICAL NIGHTLY
Qty: 6.6 ML | Refills: 11 | Status: SHIPPED | OUTPATIENT
Start: 2022-03-29 | End: 2023-04-18 | Stop reason: SDUPTHER

## 2022-03-29 RX ORDER — LIDOCAINE HYDROCHLORIDE 20 MG/ML
JELLY TOPICAL
Qty: 30 ML | Refills: 2 | Status: SHIPPED | OUTPATIENT
Start: 2022-03-29 | End: 2023-06-28

## 2022-03-29 NOTE — PROGRESS NOTES
Subjective:      Patient ID: Mercedez Purvis is a 67 y.o. female.    Chief Complaint: Diabetic Foot Exam (PCP Juli Mejia, NP 3/28/2022)    Diabetes, increased risk amputation needing evaluation/management/optomization of foot care.    cc2 thick discolored misshapen toenails all 10 toes. Gradual onset, worsening over past several weeks, aggravated by increased weight bearing, shoe gear, pressure.  No previous medical treatment.  OTC pain med not helping.         Review of Systems   Constitutional: Negative for chills, diaphoresis, fever, malaise/fatigue and night sweats.   Cardiovascular: Negative for claudication, cyanosis, leg swelling and syncope.   Skin: Positive for nail changes. Negative for color change, dry skin, rash, suspicious lesions and unusual hair distribution.   Musculoskeletal: Negative for falls, joint pain, joint swelling, muscle cramps, muscle weakness and stiffness.   Gastrointestinal: Negative for constipation, diarrhea, nausea and vomiting.   Neurological: Positive for paresthesias and sensory change. Negative for brief paralysis, disturbances in coordination, focal weakness, numbness and tremors.           Objective:      Physical Exam  Constitutional:       General: She is not in acute distress.     Appearance: She is well-developed. She is not diaphoretic.   Cardiovascular:      Pulses:           Popliteal pulses are 2+ on the right side and 2+ on the left side.        Dorsalis pedis pulses are 2+ on the right side and 2+ on the left side.        Posterior tibial pulses are 2+ on the right side and 2+ on the left side.      Comments: Capillary refill 3 seconds all toes/distal feet, all toes/both feet warm to touch.      Negative lymphadenopathy bilateral popliteal fossa and tarsal tunnel.      Negavie lower extremity edema bilateral.    Musculoskeletal:      Right ankle: No swelling, deformity, ecchymosis or lacerations. Normal range of motion. Normal pulse.      Right Achilles  Tendon: Normal. No defects. Cortez's test negative.      Comments: Normal angle, base, station of gait. All ten toes without clubbing, cyanosis, or signs of ischemia.  No pain to palpation bilateral lower extremities.  Range of motion, stability, muscle strength, and muscle tone normal bilateral feet and legs.    Lymphadenopathy:      Lower Body: No right inguinal adenopathy. No left inguinal adenopathy.      Comments: Negative lymphadenopathy bilateral popliteal fossa and tarsal tunnel.    Negative lymphangitic streaking bilateral feet/ankles/legs.   Skin:     General: Skin is warm and dry.      Capillary Refill: Capillary refill takes 2 to 3 seconds.      Coloration: Skin is not pale.      Findings: No abrasion, bruising, burn, ecchymosis, erythema, laceration, lesion or rash.      Nails: There is no clubbing.      Comments:   Skin is normal age and health appropriate color, turgor, texture, and temperature bilateral lower extremities without ulceration, hyperpigmentation, discoloration, masses nodules or cords palpated.  No ecchymosis, erythema, edema, or cardinal signs of infection bilateral lower extremities.    Toenails 1st, 2nd, 3rd, 4th, 5th  bilateral are hypertrophic thickened 2-3 mm, dystrophic, discolored tanish brown with tan, gray crumbly subungual debris.  Tender to distal nail plate pressure, without periungual skin abnormality of each.     Neurological:      Mental Status: She is alert and oriented to person, place, and time.      Sensory: Sensory deficit present.      Motor: No tremor, atrophy or abnormal muscle tone.      Gait: Gait normal.      Deep Tendon Reflexes:      Reflex Scores:       Patellar reflexes are 2+ on the right side and 2+ on the left side.       Achilles reflexes are 2+ on the right side and 2+ on the left side.     Comments: Paresthesias, and burning bilateral feet with no clearly identified trigger or source.    Negative tinel sign to percussion sural, superficial peroneal,  deep peroneal, saphenous, and posterior tibial nerves right and left ankles and feet.    Decreased/absent vibratory sensation bilateral feet to 128Hz tuning fork.    Psychiatric:         Behavior: Behavior is cooperative.               Assessment:       Encounter Diagnoses   Name Primary?    Encounter for diabetic foot exam     Onychomycosis due to dermatophyte Yes    Type 2 diabetes mellitus with diabetic polyneuropathy, unspecified whether long term insulin use          Plan:       Mercedez was seen today for diabetic foot exam.    Diagnoses and all orders for this visit:    Onychomycosis due to dermatophyte    Encounter for diabetic foot exam  -     Ambulatory referral/consult to Podiatry    Type 2 diabetes mellitus with diabetic polyneuropathy, unspecified whether long term insulin use      I counseled the patient on her conditions, their implications and medical management.        The patient has received literature on basic diabetic foot care.  Patient will inspect feet daily, wear protective shoe gear when ambulatory, and apply moisturizer to skin as needed to maintain elasticity and help prevent ulceration.    With the patient's permission, I debrided all ten toenails with a sterile nipper and curette, removing all offending nail and debris.  Patient tolerated the procedure well and related significant relief.    penlac    Lidocaine gel.          No follow-ups on file.

## 2022-04-08 ENCOUNTER — PES CALL (OUTPATIENT)
Dept: ADMINISTRATIVE | Facility: CLINIC | Age: 67
End: 2022-04-08
Payer: MEDICARE

## 2022-04-18 ENCOUNTER — PATIENT MESSAGE (OUTPATIENT)
Dept: ADMINISTRATIVE | Facility: OTHER | Age: 67
End: 2022-04-18
Payer: MEDICARE

## 2022-04-20 LAB — NONINV COLON CA DNA+OCC BLD SCRN STL QL: NEGATIVE

## 2022-04-22 ENCOUNTER — TELEPHONE (OUTPATIENT)
Dept: PRIMARY CARE CLINIC | Facility: CLINIC | Age: 67
End: 2022-04-22
Payer: MEDICARE

## 2022-04-22 ENCOUNTER — PATIENT OUTREACH (OUTPATIENT)
Dept: ADMINISTRATIVE | Facility: HOSPITAL | Age: 67
End: 2022-04-22
Payer: MEDICARE

## 2022-04-22 ENCOUNTER — PATIENT MESSAGE (OUTPATIENT)
Dept: ADMINISTRATIVE | Facility: HOSPITAL | Age: 67
End: 2022-04-22
Payer: MEDICARE

## 2022-04-22 NOTE — TELEPHONE ENCOUNTER
----- Message from Jackie Chavez MD sent at 4/21/2022  6:57 AM CDT -----  Hello!    Your stool testing with Cologuard is NORMAL    Repeat in 3 years indicated.    Take care.     Message sent to portal

## 2022-04-22 NOTE — PROGRESS NOTES
PHN non compliance outreach for diabetic eye exam, colon cancer screening and breast cancer screening.    Scheduled for eye exam 7/27/2022  Completed colon cancer screening 4/2022  Mammogram scheduling task sent

## 2022-05-19 ENCOUNTER — SPECIALTY PHARMACY (OUTPATIENT)
Dept: PHARMACY | Facility: CLINIC | Age: 67
End: 2022-05-19
Payer: MEDICARE

## 2022-05-19 NOTE — TELEPHONE ENCOUNTER
Specialty Pharmacy - Refill Coordination    Specialty Medication Orders Linked to Encounter    Flowsheet Row Most Recent Value   Medication #1 evolocumab (REPATHA SURECLICK) 140 mg/mL PnIj (Order#[id number], Rx#[id number])          Refill Questions - Documented Responses    Flowsheet Row Most Recent Value   Patient Availability and HIPAA Verification    Does patient want to proceed with activity? Yes   HIPAA/medical authority confirmed? Yes   Relationship to patient of person spoken to? Self   Refill Screening Questions    Would patient like to speak to a pharmacist? No   When does the patient need to receive the medication? 05/29/22   Refill Delivery Questions    How will the patient receive the medication? Delivery Sherri   When does the patient need to receive the medication? 05/29/22   Shipping Address Home   Address in [facility] confirmed and updated if neccessary? Yes   Expected Copay ($) 0   Is the patient able to afford the medication copay? No   Payment Method zero copay   Days supply of Refill 84   Supplies needed? No supplies needed   Refill activity completed? Yes   Refill activity plan Refill scheduled   Shipment/Pickup Date: 05/25/22          Current Outpatient Medications   Medication Sig    amitriptyline (ELAVIL) 75 MG tablet Take 1 tablet (75 mg total) by mouth every evening.    amLODIPine (NORVASC) 5 MG tablet Take 1 tablet (5 mg total) by mouth once daily.    atorvastatin (LIPITOR) 40 MG tablet Take 1 tablet (40 mg total) by mouth every evening.    blood sugar diagnostic Strp 1 each by Misc.(Non-Drug; Combo Route) route 4 (four) times daily.    blood-glucose meter,continuous (DEXCOM G6 ) Misc 1 each by Misc.(Non-Drug; Combo Route) route Daily. Use as directed with Dexcom G6 transmitter and sensor    blood-glucose sensor (DEXCOM G6 SENSOR) Roberta 1 each by Misc.(Non-Drug; Combo Route) route every 10 days. Apply/change sensor to skin every 10 days.    blood-glucose transmitter  (DEXCOM G6 TRANSMITTER) Roberta 1 each by Misc.(Non-Drug; Combo Route) route Daily. Use transmitter in sensor with G6 system. Change new transmitter every 3 months.    ciclopirox (PENLAC) 8 % Soln Apply topically nightly.    clopidogreL (PLAVIX) 75 mg tablet Take 1 tablet (75 mg total) by mouth once daily.    clotrimazole-betamethasone 1-0.05% (LOTRISONE) cream Apply topically 2 (two) times daily.    cyclobenzaprine (FLEXERIL) 10 MG tablet Take 1 tablet (10 mg total) by mouth 3 (three) times a week.    diclofenac sodium (VOLTAREN) 1 % Gel Apply 1 application topically.    dulaglutide (TRULICITY) 4.5 mg/0.5 mL pen injector Inject 4.5 mg (one pen) into the skin every 7 days.    empagliflozin (JARDIANCE) 25 mg tablet Take 1 tablet (25 mg total) by mouth once daily.    evolocumab (REPATHA SURECLICK) 140 mg/mL PnIj Inject 1 mL (140 mg total) into the skin every 14 (fourteen) days.    furosemide (LASIX) 40 MG tablet Take 1 tablet (40 mg total) by mouth once daily.    gabapentin (NEURONTIN) 300 MG capsule TAKE 1 CAPSULE(300 MG) BY MOUTH THREE TIMES DAILY    insulin lispro (HUMALOG U-100 INSULIN) 100 unit/mL injection Inject 100 Units into the skin continuous. Gives up to 100 units daily via Omnipod. Do not directly inject - only use within pods.    LIDOcaine HCL 2% (XYLOCAINE) 2 % jelly Apply topically as needed. Apply topically once nightly to affected part of foot/feet.    LIDOcaine-prilocaine (EMLA) cream Apply topically 2 (two) times a day.    lisinopriL (PRINIVIL,ZESTRIL) 40 MG tablet Take 1 tablet (40 mg total) by mouth once daily.    meclizine (ANTIVERT) 12.5 mg tablet Take 2 tablets (25 mg total) by mouth 2 (two) times daily as needed for Dizziness.    metFORMIN (GLUCOPHAGE-XR) 500 MG ER 24hr tablet Take 2 tablets (1,000 mg total) by mouth every morning.    metoprolol succinate (TOPROL-XL) 200 MG 24 hr tablet Take 1 tablet (200 mg total) by mouth once daily.    OMNIPOD DASH INSULIN POD Crtg CHANGE  "POD EVERY 48 HOURS AS DIRECTED    pen needle, diabetic (PEN NEEDLE) 32 gauge x 5/32" Ndle 1 each by Misc.(Non-Drug; Combo Route) route 4 (four) times daily. ICD 10 E11.42    spironolactone (ALDACTONE) 50 MG tablet Take 1 tablet (50 mg total) by mouth once daily.   Last reviewed on 3/29/2022  9:53 AM by Mychal Regalado DPM    Review of patient's allergies indicates:  No Known Allergies Last reviewed on  3/29/2022 9:53 AM by Mychal Regalado      Tasks added this encounter   5/19/2022 - Referral Authorization - Refill Call  8/10/2022 - Refill Call (Auto Added)   Tasks due within next 3 months   No tasks due.     [facility]  [address] [facility] [address]  Phone: [phone]  Fax: [phone]      "

## 2022-05-24 ENCOUNTER — PES CALL (OUTPATIENT)
Dept: ADMINISTRATIVE | Facility: CLINIC | Age: 67
End: 2022-05-24
Payer: MEDICARE

## 2022-05-26 ENCOUNTER — TELEPHONE (OUTPATIENT)
Dept: ADMINISTRATIVE | Facility: CLINIC | Age: 67
End: 2022-05-26
Payer: MEDICARE

## 2022-05-26 PROBLEM — R51.9 NOCTURNAL HEADACHES: Status: ACTIVE | Noted: 2019-02-11

## 2022-05-30 ENCOUNTER — PES CALL (OUTPATIENT)
Dept: ADMINISTRATIVE | Facility: CLINIC | Age: 67
End: 2022-05-30
Payer: MEDICARE

## 2022-05-30 ENCOUNTER — PATIENT MESSAGE (OUTPATIENT)
Dept: ADMINISTRATIVE | Facility: HOSPITAL | Age: 67
End: 2022-05-30
Payer: MEDICARE

## 2022-06-17 ENCOUNTER — PATIENT MESSAGE (OUTPATIENT)
Dept: ADMINISTRATIVE | Facility: HOSPITAL | Age: 67
End: 2022-06-17
Payer: MEDICARE

## 2022-06-17 ENCOUNTER — PATIENT OUTREACH (OUTPATIENT)
Dept: ADMINISTRATIVE | Facility: HOSPITAL | Age: 67
End: 2022-06-17
Payer: MEDICARE

## 2022-07-05 ENCOUNTER — TELEPHONE (OUTPATIENT)
Dept: ADMINISTRATIVE | Facility: CLINIC | Age: 67
End: 2022-07-05
Payer: MEDICARE

## 2022-07-06 ENCOUNTER — OFFICE VISIT (OUTPATIENT)
Dept: INTERNAL MEDICINE | Facility: CLINIC | Age: 67
End: 2022-07-06
Payer: MEDICARE

## 2022-07-06 VITALS
OXYGEN SATURATION: 99 % | SYSTOLIC BLOOD PRESSURE: 132 MMHG | DIASTOLIC BLOOD PRESSURE: 76 MMHG | WEIGHT: 189.38 LBS | HEART RATE: 81 BPM | BODY MASS INDEX: 36.98 KG/M2

## 2022-07-06 DIAGNOSIS — I50.20 SYSTOLIC HEART FAILURE, UNSPECIFIED HF CHRONICITY: ICD-10-CM

## 2022-07-06 DIAGNOSIS — E66.01 CLASS 2 SEVERE OBESITY DUE TO EXCESS CALORIES WITH SERIOUS COMORBIDITY AND BODY MASS INDEX (BMI) OF 36.0 TO 36.9 IN ADULT: ICD-10-CM

## 2022-07-06 DIAGNOSIS — Z00.00 ENCOUNTER FOR PREVENTIVE HEALTH EXAMINATION: Primary | ICD-10-CM

## 2022-07-06 DIAGNOSIS — I15.2 OBESITY, DIABETES, AND HYPERTENSION SYNDROME: ICD-10-CM

## 2022-07-06 DIAGNOSIS — E11.42 TYPE 2 DIABETES MELLITUS WITH DIABETIC POLYNEUROPATHY, WITH LONG-TERM CURRENT USE OF INSULIN: ICD-10-CM

## 2022-07-06 DIAGNOSIS — E11.59 OBESITY, DIABETES, AND HYPERTENSION SYNDROME: ICD-10-CM

## 2022-07-06 DIAGNOSIS — E66.9 OBESITY, DIABETES, AND HYPERTENSION SYNDROME: ICD-10-CM

## 2022-07-06 DIAGNOSIS — E11.3393 MODERATE NONPROLIFERATIVE DIABETIC RETINOPATHY OF BOTH EYES WITHOUT MACULAR EDEMA ASSOCIATED WITH TYPE 2 DIABETES MELLITUS: ICD-10-CM

## 2022-07-06 DIAGNOSIS — E26.9 HYPERALDOSTERONISM: ICD-10-CM

## 2022-07-06 DIAGNOSIS — E11.40 NEUROPATHY DUE TO TYPE 2 DIABETES MELLITUS: ICD-10-CM

## 2022-07-06 DIAGNOSIS — Z79.4 TYPE 2 DIABETES MELLITUS WITH DIABETIC POLYNEUROPATHY, WITH LONG-TERM CURRENT USE OF INSULIN: ICD-10-CM

## 2022-07-06 DIAGNOSIS — N18.30 STAGE 3 CHRONIC KIDNEY DISEASE, UNSPECIFIED WHETHER STAGE 3A OR 3B CKD: ICD-10-CM

## 2022-07-06 DIAGNOSIS — E11.69 OBESITY, DIABETES, AND HYPERTENSION SYNDROME: ICD-10-CM

## 2022-07-06 DIAGNOSIS — I25.118 CORONARY ARTERY DISEASE OF NATIVE ARTERY OF NATIVE HEART WITH STABLE ANGINA PECTORIS: ICD-10-CM

## 2022-07-06 DIAGNOSIS — Z12.31 SCREENING MAMMOGRAM FOR BREAST CANCER: ICD-10-CM

## 2022-07-06 PROCEDURE — 3066F NEPHROPATHY DOC TX: CPT | Mod: CPTII,S$GLB,, | Performed by: NURSE PRACTITIONER

## 2022-07-06 PROCEDURE — G0439 PR MEDICARE ANNUAL WELLNESS SUBSEQUENT VISIT: ICD-10-PCS | Mod: S$GLB,,, | Performed by: NURSE PRACTITIONER

## 2022-07-06 PROCEDURE — 3075F SYST BP GE 130 - 139MM HG: CPT | Mod: CPTII,S$GLB,, | Performed by: NURSE PRACTITIONER

## 2022-07-06 PROCEDURE — 3288F PR FALLS RISK ASSESSMENT DOCUMENTED: ICD-10-PCS | Mod: CPTII,S$GLB,, | Performed by: NURSE PRACTITIONER

## 2022-07-06 PROCEDURE — 99999 PR PBB SHADOW E&M-EST. PATIENT-LVL IV: CPT | Mod: PBBFAC,,, | Performed by: NURSE PRACTITIONER

## 2022-07-06 PROCEDURE — 1160F PR REVIEW ALL MEDS BY PRESCRIBER/CLIN PHARMACIST DOCUMENTED: ICD-10-PCS | Mod: CPTII,S$GLB,, | Performed by: NURSE PRACTITIONER

## 2022-07-06 PROCEDURE — 99499 UNLISTED E&M SERVICE: CPT | Mod: S$GLB,,, | Performed by: NURSE PRACTITIONER

## 2022-07-06 PROCEDURE — 3066F PR DOCUMENTATION OF TREATMENT FOR NEPHROPATHY: ICD-10-PCS | Mod: CPTII,S$GLB,, | Performed by: NURSE PRACTITIONER

## 2022-07-06 PROCEDURE — 3008F PR BODY MASS INDEX (BMI) DOCUMENTED: ICD-10-PCS | Mod: CPTII,S$GLB,, | Performed by: NURSE PRACTITIONER

## 2022-07-06 PROCEDURE — 1126F PR PAIN SEVERITY QUANTIFIED, NO PAIN PRESENT: ICD-10-PCS | Mod: CPTII,S$GLB,, | Performed by: NURSE PRACTITIONER

## 2022-07-06 PROCEDURE — 4010F ACE/ARB THERAPY RXD/TAKEN: CPT | Mod: CPTII,S$GLB,, | Performed by: NURSE PRACTITIONER

## 2022-07-06 PROCEDURE — 1126F AMNT PAIN NOTED NONE PRSNT: CPT | Mod: CPTII,S$GLB,, | Performed by: NURSE PRACTITIONER

## 2022-07-06 PROCEDURE — 3046F PR MOST RECENT HEMOGLOBIN A1C LEVEL > 9.0%: ICD-10-PCS | Mod: CPTII,S$GLB,, | Performed by: NURSE PRACTITIONER

## 2022-07-06 PROCEDURE — 1160F RVW MEDS BY RX/DR IN RCRD: CPT | Mod: CPTII,S$GLB,, | Performed by: NURSE PRACTITIONER

## 2022-07-06 PROCEDURE — 3061F PR NEG MICROALBUMINURIA RESULT DOCUMENTED/REVIEW: ICD-10-PCS | Mod: CPTII,S$GLB,, | Performed by: NURSE PRACTITIONER

## 2022-07-06 PROCEDURE — 1159F PR MEDICATION LIST DOCUMENTED IN MEDICAL RECORD: ICD-10-PCS | Mod: CPTII,S$GLB,, | Performed by: NURSE PRACTITIONER

## 2022-07-06 PROCEDURE — 3008F BODY MASS INDEX DOCD: CPT | Mod: CPTII,S$GLB,, | Performed by: NURSE PRACTITIONER

## 2022-07-06 PROCEDURE — 3061F NEG MICROALBUMINURIA REV: CPT | Mod: CPTII,S$GLB,, | Performed by: NURSE PRACTITIONER

## 2022-07-06 PROCEDURE — 3046F HEMOGLOBIN A1C LEVEL >9.0%: CPT | Mod: CPTII,S$GLB,, | Performed by: NURSE PRACTITIONER

## 2022-07-06 PROCEDURE — 1170F PR FUNCTIONAL STATUS ASSESSED: ICD-10-PCS | Mod: CPTII,S$GLB,, | Performed by: NURSE PRACTITIONER

## 2022-07-06 PROCEDURE — 1101F PR PT FALLS ASSESS DOC 0-1 FALLS W/OUT INJ PAST YR: ICD-10-PCS | Mod: CPTII,S$GLB,, | Performed by: NURSE PRACTITIONER

## 2022-07-06 PROCEDURE — 1101F PT FALLS ASSESS-DOCD LE1/YR: CPT | Mod: CPTII,S$GLB,, | Performed by: NURSE PRACTITIONER

## 2022-07-06 PROCEDURE — 3075F PR MOST RECENT SYSTOLIC BLOOD PRESS GE 130-139MM HG: ICD-10-PCS | Mod: CPTII,S$GLB,, | Performed by: NURSE PRACTITIONER

## 2022-07-06 PROCEDURE — 99499 RISK ADDL DX/OHS AUDIT: ICD-10-PCS | Mod: S$GLB,,, | Performed by: NURSE PRACTITIONER

## 2022-07-06 PROCEDURE — 99999 PR PBB SHADOW E&M-EST. PATIENT-LVL IV: ICD-10-PCS | Mod: PBBFAC,,, | Performed by: NURSE PRACTITIONER

## 2022-07-06 PROCEDURE — 3078F PR MOST RECENT DIASTOLIC BLOOD PRESSURE < 80 MM HG: ICD-10-PCS | Mod: CPTII,S$GLB,, | Performed by: NURSE PRACTITIONER

## 2022-07-06 PROCEDURE — 1170F FXNL STATUS ASSESSED: CPT | Mod: CPTII,S$GLB,, | Performed by: NURSE PRACTITIONER

## 2022-07-06 PROCEDURE — 3288F FALL RISK ASSESSMENT DOCD: CPT | Mod: CPTII,S$GLB,, | Performed by: NURSE PRACTITIONER

## 2022-07-06 PROCEDURE — G0439 PPPS, SUBSEQ VISIT: HCPCS | Mod: S$GLB,,, | Performed by: NURSE PRACTITIONER

## 2022-07-06 PROCEDURE — 3078F DIAST BP <80 MM HG: CPT | Mod: CPTII,S$GLB,, | Performed by: NURSE PRACTITIONER

## 2022-07-06 PROCEDURE — 1159F MED LIST DOCD IN RCRD: CPT | Mod: CPTII,S$GLB,, | Performed by: NURSE PRACTITIONER

## 2022-07-06 PROCEDURE — 4010F PR ACE/ARB THEARPY RXD/TAKEN: ICD-10-PCS | Mod: CPTII,S$GLB,, | Performed by: NURSE PRACTITIONER

## 2022-07-06 NOTE — PROGRESS NOTES
Mercedez Purvis presented for a  Medicare AWV and comprehensive Health Risk Assessment today. The following components were reviewed and updated:    · Medical history  · Family History  · Social history  · Allergies and Current Medications  · Health Risk Assessment  · Health Maintenance  · Care Team         ** See Completed Assessments for Annual Wellness Visit within the encounter summary.**         The following assessments were completed:  · Living Situation  · CAGE  · Depression Screening  · Timed Get Up and Go  · Whisper Test - not done due to sensorineural hearing loss  · Cognitive Function Screening - patient refused test  · Nutrition Screening  · ADL Screening  · PAQ Screening        Vitals:    07/06/22 1016   BP: 132/76   Pulse: 81   SpO2: 99%   Weight: 85.9 kg (189 lb 6 oz)     Body mass index is 36.98 kg/m².  Physical Exam  Vitals and nursing note reviewed.   Constitutional:       Appearance: She is well-developed. She is obese.   HENT:      Head: Normocephalic and atraumatic.      Right Ear: External ear normal.      Left Ear: External ear normal.      Nose: Nose normal.   Eyes:      Pupils: Pupils are equal, round, and reactive to light.   Cardiovascular:      Rate and Rhythm: Normal rate and regular rhythm.      Heart sounds: Normal heart sounds.   Pulmonary:      Effort: Pulmonary effort is normal.      Breath sounds: Normal breath sounds.   Abdominal:      General: Bowel sounds are normal.      Palpations: Abdomen is soft.   Musculoskeletal:         General: Normal range of motion.      Cervical back: Normal range of motion.   Skin:     General: Skin is warm and dry.   Neurological:      Mental Status: She is alert and oriented to person, place, and time.               Diagnoses and health risks identified today and associated recommendations/orders:    1. Encounter for preventive health examination  Health Maintenance updated   Records reviewed   Exam done     2. Neuropathy due to type 2 diabetes  mellitus  Stable and chronic. Continue current medications. Followed by PCP and endocrine.   - Hemoglobin A1C; Future    3. Screening mammogram for breast cancer  - Mammo Digital Screening Bilat w/ Martinez; Future    4. Obesity, diabetes, and hypertension syndrome  Stable and chronic. Continue current medications. Followed by PCP and endocrine.     5. Type 2 diabetes mellitus with diabetic polyneuropathy, with long-term current use of insulin  Stable and chronic. Continue current medications. Followed by PCP and endocrine.     6. Hyperaldosteronism  Stable and chronic. Continue current medications. Followed by PCP and endocrine.     7. Moderate nonproliferative diabetic retinopathy of both eyes without macular edema associated with type 2 diabetes mellitus  Stable and chronic. Continue current medications. Followed by PCP and endocrine.     8. Coronary artery disease of native artery of native heart with stable angina pectoris  Stable and chronic. Continue current medications. Followed by PCP and cardiology.     9. Systolic heart failure, unspecified HF chronicity  Stable and chronic. Continue current medications. Followed by PCP and cardiology.     10. Stage 3 chronic kidney disease, unspecified whether stage 3a or 3b CKD  Intermittent on labs. Followed by PCP.     11. Class 2 severe obesity due to excess calories with serious comorbidity and body mass index (BMI) of 36.0 to 36.9 in adult  BMI reviewed. Discussed diet and exercise.     Counseling and Referral of Other Preventative  (Italic type indicates deductible and co-insurance are waived)    Patient Name: Mercedez Purvis  Today's Date: 7/6/2022    Health Maintenance       Date Due Completion Date    Mammogram 09/25/2021 9/25/2020    Eye Exam 02/19/2022 2/19/2021    COVID-19 Vaccine (4 - Booster for Pfizer series) 05/02/2022 1/2/2022    Hemoglobin A1c 06/21/2022 3/21/2022    TETANUS VACCINE 05/26/2023 (Originally 11/29/2021) 11/29/2011    Shingles Vaccine (1 of 2)  07/06/2023 (Originally 1/28/2004) ---    Influenza Vaccine (1) 09/01/2022 11/23/2020    Diabetes Urine Screening 03/21/2023 3/21/2022    Pneumococcal Vaccines (Age 65+) (2 - PCV) 03/21/2023 3/21/2022    Lipid Panel 03/21/2023 3/21/2022    Foot Exam 03/29/2023 3/29/2022    Aspirin/Antiplatelet Therapy 07/06/2023 7/6/2022    Colorectal Cancer Screening 04/15/2025 4/15/2022        Orders Placed This Encounter   Procedures    Mammo Digital Screening Bilat w/ Martinez    Hemoglobin A1C     Provided Mercedez with a 5-10 year written screening schedule and personal prevention plan. Recommendations were developed using the USPSTF age appropriate recommendations. Education, counseling, and referrals were provided as needed. After Visit Summary printed and given to patient which includes a list of additional screenings\tests needed.    Follow up in about 1 year (around 7/6/2023) for medicare annual wellness visit.    Marilee Ordoñez, NP  I offered to discuss advanced care planning, including how to pick a person who would make decisions for you if you were unable to make them for yourself, called a health care power of , and what kind of decisions you might make such as use of life sustaining treatments such as ventilators and tube feeding when faced with a life limiting illness recorded on a living will that they will need to know. (How you want to be cared for as you near the end of your natural life)     X  Patient is unwilling to engage in a discussion regarding advance directives at this time.

## 2022-07-06 NOTE — PATIENT INSTRUCTIONS
Counseling and Referral of Other Preventative  (Italic type indicates deductible and co-insurance are waived)    Patient Name: Mercedez Purvis  Today's Date: 7/6/2022    Health Maintenance       Date Due Completion Date    Aspirin/Antiplatelet Therapy Never done ---    Mammogram 09/25/2021 9/25/2020    Eye Exam 02/19/2022 2/19/2021    COVID-19 Vaccine (4 - Booster for Pfizer series) 05/02/2022 1/2/2022    Hemoglobin A1c 06/21/2022 3/21/2022    TETANUS VACCINE 05/26/2023 (Originally 11/29/2021) 11/29/2011    Shingles Vaccine (1 of 2) 07/06/2023 (Originally 1/28/2004) ---    Influenza Vaccine (1) 09/01/2022 11/23/2020    Diabetes Urine Screening 03/21/2023 3/21/2022    Pneumococcal Vaccines (Age 65+) (2 - PCV) 03/21/2023 3/21/2022    Lipid Panel 03/21/2023 3/21/2022    Foot Exam 03/29/2023 3/29/2022    Colorectal Cancer Screening 04/15/2025 4/15/2022        Orders Placed This Encounter   Procedures    Mammo Digital Screening Bilat w/ Martinez    Hemoglobin A1C     The following information is provided to all patients.  This information is to help you find resources for any of the problems found today that may be affecting your health:                Living healthy guide: www.[facility].[place].gov      Understanding Diabetes: www.diabetes.org      Eating healthy: www.cdc.gov/healthyweight      CDC home safety checklist: www.cdc.gov/steadi/patient.html      Agency on Aging: www.goea.[place].[facility]      Alcoholics anonymous (AA): www.aa.org      Physical Activity: www.nick.nih.gov/dm0kdyi      Tobacco use: www.quitwithusla.org

## 2022-07-12 ENCOUNTER — HOSPITAL ENCOUNTER (OUTPATIENT)
Dept: RADIOLOGY | Facility: HOSPITAL | Age: 67
Discharge: HOME | End: 2022-07-12
Attending: NURSE PRACTITIONER
Payer: MEDICARE

## 2022-07-12 VITALS — HEIGHT: 60 IN | BODY MASS INDEX: 37.11 KG/M2 | WEIGHT: 189 LBS

## 2022-07-12 DIAGNOSIS — Z12.31 SCREENING MAMMOGRAM FOR BREAST CANCER: ICD-10-CM

## 2022-07-12 PROCEDURE — 77063 MAMMO DIGITAL SCREENING BILAT WITH TOMO: ICD-10-PCS | Mod: 26,,, | Performed by: RADIOLOGY

## 2022-07-12 PROCEDURE — 77063 BREAST TOMOSYNTHESIS BI: CPT | Mod: TC

## 2022-07-12 PROCEDURE — 77067 SCR MAMMO BI INCL CAD: CPT | Mod: 26,,, | Performed by: RADIOLOGY

## 2022-07-12 PROCEDURE — 77063 BREAST TOMOSYNTHESIS BI: CPT | Mod: 26,,, | Performed by: RADIOLOGY

## 2022-07-12 PROCEDURE — 77067 MAMMO DIGITAL SCREENING BILAT WITH TOMO: ICD-10-PCS | Mod: 26,,, | Performed by: RADIOLOGY

## 2022-07-12 PROCEDURE — 77067 SCR MAMMO BI INCL CAD: CPT | Mod: TC

## 2022-07-27 ENCOUNTER — OFFICE VISIT (OUTPATIENT)
Dept: OPTOMETRY | Facility: CLINIC | Age: 67
End: 2022-07-27
Payer: MEDICARE

## 2022-07-27 DIAGNOSIS — H52.4 HYPEROPIA WITH PRESBYOPIA OF RIGHT EYE: ICD-10-CM

## 2022-07-27 DIAGNOSIS — H25.13 NS (NUCLEAR SCLEROSIS), BILATERAL: ICD-10-CM

## 2022-07-27 DIAGNOSIS — E11.9 DIABETIC EYE EXAM: ICD-10-CM

## 2022-07-27 DIAGNOSIS — E11.59 HYPERTENSION ASSOCIATED WITH DIABETES: ICD-10-CM

## 2022-07-27 DIAGNOSIS — H52.01 HYPEROPIA WITH PRESBYOPIA OF RIGHT EYE: ICD-10-CM

## 2022-07-27 DIAGNOSIS — E11.3393 MODERATE NONPROLIFERATIVE DIABETIC RETINOPATHY OF BOTH EYES WITHOUT MACULAR EDEMA ASSOCIATED WITH TYPE 2 DIABETES MELLITUS: ICD-10-CM

## 2022-07-27 DIAGNOSIS — H52.02 HYPEROPIA WITH ASTIGMATISM AND PRESBYOPIA, LEFT: ICD-10-CM

## 2022-07-27 DIAGNOSIS — E11.3212 TYPE 2 DIABETES MELLITUS WITH MILD NONPROLIFERATIVE RETINOPATHY OF LEFT EYE AND MACULAR EDEMA, UNSPECIFIED WHETHER LONG TERM INSULIN USE: Primary | ICD-10-CM

## 2022-07-27 DIAGNOSIS — H52.202 HYPEROPIA WITH ASTIGMATISM AND PRESBYOPIA, LEFT: ICD-10-CM

## 2022-07-27 DIAGNOSIS — I15.2 HYPERTENSION ASSOCIATED WITH DIABETES: ICD-10-CM

## 2022-07-27 DIAGNOSIS — H04.123 DRY EYE SYNDROME OF BOTH EYES: ICD-10-CM

## 2022-07-27 DIAGNOSIS — H52.4 HYPEROPIA WITH ASTIGMATISM AND PRESBYOPIA, LEFT: ICD-10-CM

## 2022-07-27 DIAGNOSIS — Z01.00 DIABETIC EYE EXAM: ICD-10-CM

## 2022-07-27 PROCEDURE — 3052F PR MOST RECENT HEMOGLOBIN A1C LEVEL 8.0 - < 9.0%: ICD-10-PCS | Mod: CPTII,S$GLB,, | Performed by: OPTOMETRIST

## 2022-07-27 PROCEDURE — 99999 PR PBB SHADOW E&M-EST. PATIENT-LVL IV: CPT | Mod: PBBFAC,,, | Performed by: OPTOMETRIST

## 2022-07-27 PROCEDURE — 92014 COMPRE OPH EXAM EST PT 1/>: CPT | Mod: S$GLB,,, | Performed by: OPTOMETRIST

## 2022-07-27 PROCEDURE — 99999 PR PBB SHADOW E&M-EST. PATIENT-LVL IV: ICD-10-PCS | Mod: PBBFAC,,, | Performed by: OPTOMETRIST

## 2022-07-27 PROCEDURE — 92015 PR REFRACTION: ICD-10-PCS | Mod: S$GLB,,, | Performed by: OPTOMETRIST

## 2022-07-27 PROCEDURE — 3052F HG A1C>EQUAL 8.0%<EQUAL 9.0%: CPT | Mod: CPTII,S$GLB,, | Performed by: OPTOMETRIST

## 2022-07-27 PROCEDURE — 1126F AMNT PAIN NOTED NONE PRSNT: CPT | Mod: CPTII,S$GLB,, | Performed by: OPTOMETRIST

## 2022-07-27 PROCEDURE — 3066F NEPHROPATHY DOC TX: CPT | Mod: CPTII,S$GLB,, | Performed by: OPTOMETRIST

## 2022-07-27 PROCEDURE — 92134 CPTRZ OPH DX IMG PST SGM RTA: CPT | Mod: S$GLB,,, | Performed by: OPTOMETRIST

## 2022-07-27 PROCEDURE — 1101F PR PT FALLS ASSESS DOC 0-1 FALLS W/OUT INJ PAST YR: ICD-10-PCS | Mod: CPTII,S$GLB,, | Performed by: OPTOMETRIST

## 2022-07-27 PROCEDURE — 92015 DETERMINE REFRACTIVE STATE: CPT | Mod: S$GLB,,, | Performed by: OPTOMETRIST

## 2022-07-27 PROCEDURE — 1126F PR PAIN SEVERITY QUANTIFIED, NO PAIN PRESENT: ICD-10-PCS | Mod: CPTII,S$GLB,, | Performed by: OPTOMETRIST

## 2022-07-27 PROCEDURE — 4010F PR ACE/ARB THEARPY RXD/TAKEN: ICD-10-PCS | Mod: CPTII,S$GLB,, | Performed by: OPTOMETRIST

## 2022-07-27 PROCEDURE — 1159F MED LIST DOCD IN RCRD: CPT | Mod: CPTII,S$GLB,, | Performed by: OPTOMETRIST

## 2022-07-27 PROCEDURE — 3288F FALL RISK ASSESSMENT DOCD: CPT | Mod: CPTII,S$GLB,, | Performed by: OPTOMETRIST

## 2022-07-27 PROCEDURE — 3288F PR FALLS RISK ASSESSMENT DOCUMENTED: ICD-10-PCS | Mod: CPTII,S$GLB,, | Performed by: OPTOMETRIST

## 2022-07-27 PROCEDURE — 3066F PR DOCUMENTATION OF TREATMENT FOR NEPHROPATHY: ICD-10-PCS | Mod: CPTII,S$GLB,, | Performed by: OPTOMETRIST

## 2022-07-27 PROCEDURE — 92014 PR EYE EXAM, EST PATIENT,COMPREHESV: ICD-10-PCS | Mod: S$GLB,,, | Performed by: OPTOMETRIST

## 2022-07-27 PROCEDURE — 1101F PT FALLS ASSESS-DOCD LE1/YR: CPT | Mod: CPTII,S$GLB,, | Performed by: OPTOMETRIST

## 2022-07-27 PROCEDURE — 4010F ACE/ARB THERAPY RXD/TAKEN: CPT | Mod: CPTII,S$GLB,, | Performed by: OPTOMETRIST

## 2022-07-27 PROCEDURE — 3061F NEG MICROALBUMINURIA REV: CPT | Mod: CPTII,S$GLB,, | Performed by: OPTOMETRIST

## 2022-07-27 PROCEDURE — 1159F PR MEDICATION LIST DOCUMENTED IN MEDICAL RECORD: ICD-10-PCS | Mod: CPTII,S$GLB,, | Performed by: OPTOMETRIST

## 2022-07-27 PROCEDURE — 92134 POSTERIOR SEGMENT OCT RETINA (OCULAR COHERENCE TOMOGRAPHY)-BOTH EYES: ICD-10-PCS | Mod: S$GLB,,, | Performed by: OPTOMETRIST

## 2022-07-27 PROCEDURE — 3061F PR NEG MICROALBUMINURIA RESULT DOCUMENTED/REVIEW: ICD-10-PCS | Mod: CPTII,S$GLB,, | Performed by: OPTOMETRIST

## 2022-07-27 NOTE — PROGRESS NOTES
HPI     DLS: 2/19/21 Dr Alanis, 11/27/20 Dr Akins    LBSL: 175 this morning    No eye surgery   No eyedrops    Pt here for diabetic eye exam.  Pt states blurry VA OU x 1 month.  Pt   denies flashes, floaters, headaches or eye pain OU.  Pt denies itching,   tearing or burning OU.       Hemoglobin A1C       Date                     Value               Ref Range             Status                07/06/2022               8.6 (H)             4.0 - 5.6 %           Final                     03/21/2022               10.0 (H)            4.0 - 5.6 %           Final                          12/16/2021               9.1 (H)             4.0 - 5.6 %           Final                   Last edited by Rosemary Ulloa MA on 7/27/2022  9:26 AM.   (History)            Assessment /Plan     For exam results, see Encounter Report.    Type 2 diabetes mellitus with mild nonproliferative retinopathy of left eye and macular edema, unspecified whether long term insulin use  -     Posterior Segment OCT Retina-Both eyes: no macular edema today  Diabetic eye exam  Moderate nonproliferative diabetic retinopathy of both eyes without macular edema associated with type 2 diabetes mellitus  Hypertension associated with diabetes    Dry eye syndrome of both eyes   Use systane complete BID OU    NS (nuclear sclerosis), bilateral   Mild, monitor  Rx specs    Return to Dr. Alanis in 6 mo

## 2022-08-10 ENCOUNTER — SPECIALTY PHARMACY (OUTPATIENT)
Dept: PHARMACY | Facility: CLINIC | Age: 67
End: 2022-08-10
Payer: MEDICARE

## 2022-09-06 ENCOUNTER — OFFICE VISIT (OUTPATIENT)
Dept: PODIATRY | Facility: CLINIC | Age: 67
End: 2022-09-06
Payer: MEDICARE

## 2022-09-06 VITALS
BODY MASS INDEX: 37.23 KG/M2 | WEIGHT: 189.63 LBS | SYSTOLIC BLOOD PRESSURE: 168 MMHG | DIASTOLIC BLOOD PRESSURE: 77 MMHG | HEIGHT: 60 IN | HEART RATE: 81 BPM

## 2022-09-06 DIAGNOSIS — E11.42 TYPE 2 DIABETES MELLITUS WITH DIABETIC POLYNEUROPATHY, UNSPECIFIED WHETHER LONG TERM INSULIN USE: ICD-10-CM

## 2022-09-06 DIAGNOSIS — B35.1 ONYCHOMYCOSIS DUE TO DERMATOPHYTE: ICD-10-CM

## 2022-09-06 DIAGNOSIS — E11.9 ENCOUNTER FOR DIABETIC FOOT EXAM: Primary | ICD-10-CM

## 2022-09-06 PROCEDURE — 1101F PT FALLS ASSESS-DOCD LE1/YR: CPT | Mod: CPTII,S$GLB,, | Performed by: PODIATRIST

## 2022-09-06 PROCEDURE — 1159F PR MEDICATION LIST DOCUMENTED IN MEDICAL RECORD: ICD-10-PCS | Mod: CPTII,S$GLB,, | Performed by: PODIATRIST

## 2022-09-06 PROCEDURE — 99499 NO LOS: ICD-10-PCS | Mod: S$GLB,,, | Performed by: PODIATRIST

## 2022-09-06 PROCEDURE — 3061F NEG MICROALBUMINURIA REV: CPT | Mod: CPTII,S$GLB,, | Performed by: PODIATRIST

## 2022-09-06 PROCEDURE — 3008F PR BODY MASS INDEX (BMI) DOCUMENTED: ICD-10-PCS | Mod: CPTII,S$GLB,, | Performed by: PODIATRIST

## 2022-09-06 PROCEDURE — 3066F NEPHROPATHY DOC TX: CPT | Mod: CPTII,S$GLB,, | Performed by: PODIATRIST

## 2022-09-06 PROCEDURE — 11721 PR DEBRIDEMENT OF NAILS, 6 OR MORE: ICD-10-PCS | Mod: Q9,S$GLB,, | Performed by: PODIATRIST

## 2022-09-06 PROCEDURE — 3066F PR DOCUMENTATION OF TREATMENT FOR NEPHROPATHY: ICD-10-PCS | Mod: CPTII,S$GLB,, | Performed by: PODIATRIST

## 2022-09-06 PROCEDURE — 3061F PR NEG MICROALBUMINURIA RESULT DOCUMENTED/REVIEW: ICD-10-PCS | Mod: CPTII,S$GLB,, | Performed by: PODIATRIST

## 2022-09-06 PROCEDURE — 3288F PR FALLS RISK ASSESSMENT DOCUMENTED: ICD-10-PCS | Mod: CPTII,S$GLB,, | Performed by: PODIATRIST

## 2022-09-06 PROCEDURE — 3077F SYST BP >= 140 MM HG: CPT | Mod: CPTII,S$GLB,, | Performed by: PODIATRIST

## 2022-09-06 PROCEDURE — 99999 PR PBB SHADOW E&M-EST. PATIENT-LVL III: CPT | Mod: PBBFAC,,, | Performed by: PODIATRIST

## 2022-09-06 PROCEDURE — 1125F AMNT PAIN NOTED PAIN PRSNT: CPT | Mod: CPTII,S$GLB,, | Performed by: PODIATRIST

## 2022-09-06 PROCEDURE — 3052F PR MOST RECENT HEMOGLOBIN A1C LEVEL 8.0 - < 9.0%: ICD-10-PCS | Mod: CPTII,S$GLB,, | Performed by: PODIATRIST

## 2022-09-06 PROCEDURE — 1125F PR PAIN SEVERITY QUANTIFIED, PAIN PRESENT: ICD-10-PCS | Mod: CPTII,S$GLB,, | Performed by: PODIATRIST

## 2022-09-06 PROCEDURE — 3052F HG A1C>EQUAL 8.0%<EQUAL 9.0%: CPT | Mod: CPTII,S$GLB,, | Performed by: PODIATRIST

## 2022-09-06 PROCEDURE — 3078F DIAST BP <80 MM HG: CPT | Mod: CPTII,S$GLB,, | Performed by: PODIATRIST

## 2022-09-06 PROCEDURE — 3288F FALL RISK ASSESSMENT DOCD: CPT | Mod: CPTII,S$GLB,, | Performed by: PODIATRIST

## 2022-09-06 PROCEDURE — 11721 DEBRIDE NAIL 6 OR MORE: CPT | Mod: Q9,S$GLB,, | Performed by: PODIATRIST

## 2022-09-06 PROCEDURE — 4010F ACE/ARB THERAPY RXD/TAKEN: CPT | Mod: CPTII,S$GLB,, | Performed by: PODIATRIST

## 2022-09-06 PROCEDURE — 99999 PR PBB SHADOW E&M-EST. PATIENT-LVL III: ICD-10-PCS | Mod: PBBFAC,,, | Performed by: PODIATRIST

## 2022-09-06 PROCEDURE — 3078F PR MOST RECENT DIASTOLIC BLOOD PRESSURE < 80 MM HG: ICD-10-PCS | Mod: CPTII,S$GLB,, | Performed by: PODIATRIST

## 2022-09-06 PROCEDURE — 3008F BODY MASS INDEX DOCD: CPT | Mod: CPTII,S$GLB,, | Performed by: PODIATRIST

## 2022-09-06 PROCEDURE — 1101F PR PT FALLS ASSESS DOC 0-1 FALLS W/OUT INJ PAST YR: ICD-10-PCS | Mod: CPTII,S$GLB,, | Performed by: PODIATRIST

## 2022-09-06 PROCEDURE — 1159F MED LIST DOCD IN RCRD: CPT | Mod: CPTII,S$GLB,, | Performed by: PODIATRIST

## 2022-09-06 PROCEDURE — 4010F PR ACE/ARB THEARPY RXD/TAKEN: ICD-10-PCS | Mod: CPTII,S$GLB,, | Performed by: PODIATRIST

## 2022-09-06 PROCEDURE — 99499 UNLISTED E&M SERVICE: CPT | Mod: S$GLB,,, | Performed by: PODIATRIST

## 2022-09-06 PROCEDURE — 3077F PR MOST RECENT SYSTOLIC BLOOD PRESSURE >= 140 MM HG: ICD-10-PCS | Mod: CPTII,S$GLB,, | Performed by: PODIATRIST

## 2022-09-06 NOTE — PROGRESS NOTES
Subjective:      Patient ID: Mercedez Purvis is a 67 y.o. female.    Chief Complaint: Foot Pain (Bilateral foot) and Nail Care    Diabetes, increased risk amputation needing evaluation/management/optomization of foot care.    cc2 thick discolored misshapen toenails all 10 toes. Gradual onset, worsening over past several weeks, aggravated by increased weight bearing, shoe gear, pressure.  No previous medical treatment.  OTC pain med not helping.         Review of Systems   Constitutional: Negative for chills, diaphoresis, fever, malaise/fatigue and night sweats.   Cardiovascular:  Negative for claudication, cyanosis, leg swelling and syncope.   Skin:  Positive for nail changes. Negative for color change, dry skin, rash, suspicious lesions and unusual hair distribution.   Musculoskeletal:  Negative for falls, joint pain, joint swelling, muscle cramps, muscle weakness and stiffness.   Gastrointestinal:  Negative for constipation, diarrhea, nausea and vomiting.   Neurological:  Positive for paresthesias and sensory change. Negative for brief paralysis, disturbances in coordination, focal weakness, numbness and tremors.         Objective:      Physical Exam  Constitutional:       General: She is not in acute distress.     Appearance: She is well-developed. She is not diaphoretic.   Cardiovascular:      Pulses:           Popliteal pulses are 2+ on the right side and 2+ on the left side.        Dorsalis pedis pulses are 2+ on the right side and 2+ on the left side.        Posterior tibial pulses are 2+ on the right side and 2+ on the left side.      Comments: Capillary refill 3 seconds all toes/distal feet, all toes/both feet warm to touch.      Negative lymphadenopathy bilateral popliteal fossa and tarsal tunnel.      Negavie lower extremity edema bilateral.    Musculoskeletal:      Right ankle: No swelling, deformity, ecchymosis or lacerations. Normal range of motion. Normal pulse.      Right Achilles Tendon:  Normal. No defects. Cortez's test negative.      Comments: Normal angle, base, station of gait. All ten toes without clubbing, cyanosis, or signs of ischemia.  No pain to palpation bilateral lower extremities.  Range of motion, stability, muscle strength, and muscle tone normal bilateral feet and legs.    Lymphadenopathy:      Lower Body: No right inguinal adenopathy. No left inguinal adenopathy.      Comments: Negative lymphadenopathy bilateral popliteal fossa and tarsal tunnel.    Negative lymphangitic streaking bilateral feet/ankles/legs.   Skin:     General: Skin is warm and dry.      Capillary Refill: Capillary refill takes 2 to 3 seconds.      Coloration: Skin is not pale.      Findings: No abrasion, bruising, burn, ecchymosis, erythema, laceration, lesion or rash.      Nails: There is no clubbing.      Comments:   Skin is normal age and health appropriate color, turgor, texture, and temperature bilateral lower extremities without ulceration, hyperpigmentation, discoloration, masses nodules or cords palpated.  No ecchymosis, erythema, edema, or cardinal signs of infection bilateral lower extremities.    Toenails 1st, 2nd, 3rd, 4th, 5th  bilateral are hypertrophic thickened 2-3 mm, dystrophic, discolored tanish brown with tan, gray crumbly subungual debris.  Tender to distal nail plate pressure, without periungual skin abnormality of each.     Neurological:      Mental Status: She is alert and oriented to person, place, and time.      Sensory: Sensory deficit present.      Motor: No tremor, atrophy or abnormal muscle tone.      Gait: Gait normal.      Deep Tendon Reflexes:      Reflex Scores:       Patellar reflexes are 2+ on the right side and 2+ on the left side.       Achilles reflexes are 2+ on the right side and 2+ on the left side.     Comments: Paresthesias, and burning bilateral feet with no clearly identified trigger or source.    Negative tinel sign to percussion sural, superficial peroneal, deep  peroneal, saphenous, and posterior tibial nerves right and left ankles and feet.    Decreased/absent vibratory sensation bilateral feet to 128Hz tuning fork.    Psychiatric:         Behavior: Behavior is cooperative.             Assessment:       Encounter Diagnoses   Name Primary?    Encounter for diabetic foot exam Yes    Type 2 diabetes mellitus with diabetic polyneuropathy, unspecified whether long term insulin use     Onychomycosis due to dermatophyte          Plan:       Mercedez was seen today for foot pain and nail care.    Diagnoses and all orders for this visit:    Encounter for diabetic foot exam    Type 2 diabetes mellitus with diabetic polyneuropathy, unspecified whether long term insulin use    Onychomycosis due to dermatophyte    I counseled the patient on her conditions, their implications and medical management.        The patient has received literature on basic diabetic foot care.  Patient will inspect feet daily, wear protective shoe gear when ambulatory, and apply moisturizer to skin as needed to maintain elasticity and help prevent ulceration.    With the patient's permission, I debrided all ten toenails with a sterile nipper and curette, removing all offending nail and debris.  Patient tolerated the procedure well and related significant relief.    Continue penlac            No follow-ups on file.

## 2022-10-04 RX ORDER — INSULIN LISPRO 100 [IU]/ML
100 INJECTION, SOLUTION INTRAVENOUS; SUBCUTANEOUS CONTINUOUS
Qty: 40 ML | Refills: 11 | Status: SHIPPED | OUTPATIENT
Start: 2022-10-04 | End: 2023-10-13 | Stop reason: SDUPTHER

## 2022-10-05 ENCOUNTER — SPECIALTY PHARMACY (OUTPATIENT)
Dept: PHARMACY | Facility: CLINIC | Age: 67
End: 2022-10-05
Payer: MEDICARE

## 2022-10-06 RX ORDER — EVOLOCUMAB 140 MG/ML
140 INJECTION, SOLUTION SUBCUTANEOUS
Qty: 2 ML | Refills: 11 | Status: SHIPPED | OUTPATIENT
Start: 2022-10-06 | End: 2023-04-18 | Stop reason: SDUPTHER

## 2022-10-17 NOTE — TELEPHONE ENCOUNTER
Specialty Pharmacy - Refill Coordination    Specialty Medication Orders Linked to Encounter      Flowsheet Row Most Recent Value   Medication #1 evolocumab (REPATHA SURECLICK) 140 mg/mL PnIj (Order#[id number], Rx#[id number])            Refill Questions - Documented Responses      Flowsheet Row Most Recent Value   Patient Availability and HIPAA Verification    Does patient want to proceed with activity? Yes   HIPAA/medical authority confirmed? Yes   Relationship to patient of person spoken to? Self   Refill Screening Questions    Would patient like to speak to a pharmacist? No   When does the patient need to receive the medication? 10/18/22   Refill Delivery Questions    How will the patient receive the medication? [facility]   When does the patient need to receive the medication? 10/18/22   Shipping Address Home   Address in [facility] confirmed and updated if neccessary? Yes   Expected Copay ($) 0   Is the patient able to afford the medication copay? Yes   Payment Method zero copay   Days supply of Refill 28   Supplies needed? No supplies needed   Refill activity completed? Yes   Refill activity plan Refill scheduled   Shipment/Pickup Date: 10/18/22            Current Outpatient Medications   Medication Sig    amitriptyline (ELAVIL) 75 MG tablet Take 1 tablet (75 mg total) by mouth every evening.    amLODIPine (NORVASC) 5 MG tablet Take 1 tablet (5 mg total) by mouth once daily.    atorvastatin (LIPITOR) 40 MG tablet Take 1 tablet (40 mg total) by mouth every evening.    blood sugar diagnostic Strp 1 each by Misc.(Non-Drug; Combo Route) route 4 (four) times daily.    blood-glucose meter,continuous (DEXCOM G6 ) Misc 1 each by Misc.(Non-Drug; Combo Route) route Daily. Use as directed with Dexcom G6 transmitter and sensor    blood-glucose sensor (DEXCOM G6 SENSOR) Roberta 1 each by Misc.(Non-Drug; Combo Route) route every 10 days. Apply/change sensor to skin every 10 days.    blood-glucose transmitter (DEXCOM  G6 TRANSMITTER) Roberta 1 each by Misc.(Non-Drug; Combo Route) route Daily. Use transmitter in sensor with G6 system. Change new transmitter every 3 months.    ciclopirox (PENLAC) 8 % Soln Apply topically nightly.    clopidogreL (PLAVIX) 75 mg tablet Take 1 tablet (75 mg total) by mouth once daily.    clotrimazole-betamethasone 1-0.05% (LOTRISONE) cream Apply topically 2 (two) times daily.    cyclobenzaprine (FLEXERIL) 10 MG tablet Take 1 tablet (10 mg total) by mouth 3 (three) times a week.    diclofenac sodium (VOLTAREN) 1 % Gel Apply 1 application topically.    dulaglutide (TRULICITY) 4.5 mg/0.5 mL pen injector Inject 4.5 mg (one pen) into the skin every 7 days.    empagliflozin (JARDIANCE) 25 mg tablet Take 1 tablet (25 mg total) by mouth once daily.    evolocumab (REPATHA SURECLICK) 140 mg/mL PnIj Inject 1 mL (140 mg total) into the skin every 14 (fourteen) days.    fluconazole (DIFLUCAN) 150 MG Tab Take 1 tablet (150 mg total) by mouth once daily. May repeat after 72 hrs for yeast infection.    furosemide (LASIX) 40 MG tablet Take 1 tablet (40 mg total) by mouth once daily.    gabapentin (NEURONTIN) 300 MG capsule Take 1 capsule (300 mg total) by mouth 3 (three) times daily.    insulin detemir U-100 (LEVEMIR FLEXTOUCH U-100 INSULN) 100 unit/mL (3 mL) InPn pen Inject 40 Units into the skin once daily. FOR EMERGENCY USE ONLY - BACK UP INSULIN, IF OFF OF YOUR INSULIN PUMP - USE 40 UNITS INJECTION DAILY UNTIL GETTING BACK ON PUMP.    insulin lispro (HUMALOG U-100 INSULIN) 100 unit/mL injection Inject 100 Units into the skin continuous. Gives up to 100 units daily via Omnipod. Do not directly inject - only use within pods.    LIDOcaine HCL 2% (XYLOCAINE) 2 % jelly Apply topically as needed. Apply topically once nightly to affected part of foot/feet.    LIDOcaine-prilocaine (EMLA) cream Apply topically 2 (two) times a day.    lisinopriL (PRINIVIL,ZESTRIL) 40 MG tablet Take 1 tablet (40 mg total) by mouth once daily.     "meclizine (ANTIVERT) 12.5 mg tablet Take 2 tablets (25 mg total) by mouth 2 (two) times daily as needed for Dizziness.    metFORMIN (GLUCOPHAGE-XR) 500 MG ER 24hr tablet Take 2 tablets (1,000 mg total) by mouth every morning.    metoprolol succinate (TOPROL-XL) 200 MG 24 hr tablet Take 1 tablet (200 mg total) by mouth once daily.    pen needle, diabetic (PEN NEEDLE) 32 gauge x 5/32" Ndle 1 each by Misc.(Non-Drug; Combo Route) route 4 (four) times daily. ICD 10 E11.42    spironolactone (ALDACTONE) 50 MG tablet Take 1 tablet (50 mg total) by mouth once daily.   Last reviewed on 9/6/2022 10:33 AM by Renee Robertson MA    Review of patient's allergies indicates:  No Known Allergies Last reviewed on  9/6/2022 10:32 AM by Renee Robertson      Tasks added this encounter   11/8/2022 - Refill Call (Auto Added)   Tasks due within next 3 months   No tasks due.     Tanvi Rodney, PharmD  [facility]  [address] [facility] [address]  Phone: [phone]  Fax: [phone]        "

## 2022-11-11 ENCOUNTER — SPECIALTY PHARMACY (OUTPATIENT)
Dept: PHARMACY | Facility: CLINIC | Age: 67
End: 2022-11-11
Payer: MEDICARE

## 2022-11-11 NOTE — TELEPHONE ENCOUNTER
Specialty Pharmacy - Refill Coordination    Specialty Medication Orders Linked to Encounter      Flowsheet Row Most Recent Value   Medication #1 evolocumab (REPATHA SURECLICK) 140 mg/mL PnIj (Order#[id number], Rx#[id number])     Pt requested a 3 month supply. Informed pt to store medication appropriately as soon as possible as [facility] will not be able to replace medication. Approved arthur Martin.        Refill Questions - Documented Responses      Flowsheet Row Most Recent Value   Patient Availability and HIPAA Verification    Does patient want to proceed with activity? Yes   HIPAA/medical authority confirmed? Yes   Relationship to patient of person spoken to? Self   Refill Screening Questions    Would patient like to speak to a pharmacist? No   When does the patient need to receive the medication? 11/20/22   Refill Delivery Questions    How will the patient receive the medication? [facility]   When does the patient need to receive the medication? 11/20/22   Shipping Address Home   Address in [facility] confirmed and updated if neccessary? Yes   Expected Copay ($) 0   Is the patient able to afford the medication copay? Yes   Payment Method zero copay   Days supply of Refill 84   Supplies needed? No supplies needed   Refill activity completed? Yes   Refill activity plan Refill scheduled   Shipment/Pickup Date: 11/16/22            Current Outpatient Medications   Medication Sig    amitriptyline (ELAVIL) 75 MG tablet Take 1 tablet (75 mg total) by mouth every evening.    amLODIPine (NORVASC) 5 MG tablet Take 1 tablet (5 mg total) by mouth once daily.    atorvastatin (LIPITOR) 40 MG tablet Take 1 tablet (40 mg total) by mouth every evening.    blood sugar diagnostic Strp 1 each by Misc.(Non-Drug; Combo Route) route 4 (four) times daily.    blood-glucose meter,continuous (DEXCOM G6 ) Misc 1 each by Misc.(Non-Drug; Combo Route) route Daily. Use as directed with Dexcom G6 transmitter and sensor    blood-glucose sensor  (DEXCOM G6 SENSOR) Roberta 1 each by Misc.(Non-Drug; Combo Route) route every 10 days. Apply/change sensor to skin every 10 days.    blood-glucose transmitter (DEXCOM G6 TRANSMITTER) Roberta 1 each by Misc.(Non-Drug; Combo Route) route Daily. Use transmitter in sensor with G6 system. Change new transmitter every 3 months.    ciclopirox (PENLAC) 8 % Soln Apply topically nightly.    clopidogreL (PLAVIX) 75 mg tablet Take 1 tablet (75 mg total) by mouth once daily.    clotrimazole-betamethasone 1-0.05% (LOTRISONE) cream Apply topically 2 (two) times daily.    cyclobenzaprine (FLEXERIL) 10 MG tablet Take 1 tablet (10 mg total) by mouth 3 (three) times a week.    diclofenac sodium (VOLTAREN) 1 % Gel Apply 1 application topically.    dulaglutide (TRULICITY) 4.5 mg/0.5 mL pen injector Inject 4.5 mg (one pen) into the skin every 7 days.    empagliflozin (JARDIANCE) 25 mg tablet Take 1 tablet (25 mg total) by mouth once daily.    evolocumab (REPATHA SURECLICK) 140 mg/mL PnIj Inject 1 mL (140 mg total) into the skin every 14 (fourteen) days.    fluconazole (DIFLUCAN) 150 MG Tab Take 1 tablet (150 mg total) by mouth once daily. May repeat after 72 hrs for yeast infection.    furosemide (LASIX) 40 MG tablet Take 1 tablet (40 mg total) by mouth once daily.    gabapentin (NEURONTIN) 300 MG capsule Take 1 capsule (300 mg total) by mouth 3 (three) times daily.    insulin detemir U-100 (LEVEMIR FLEXTOUCH U-100 INSULN) 100 unit/mL (3 mL) InPn pen Inject 40 Units into the skin once daily. FOR EMERGENCY USE ONLY - BACK UP INSULIN, IF OFF OF YOUR INSULIN PUMP - USE 40 UNITS INJECTION DAILY UNTIL GETTING BACK ON PUMP.    insulin lispro (HUMALOG U-100 INSULIN) 100 unit/mL injection Inject 100 Units into the skin continuous. Gives up to 100 units daily via Omnipod. Do not directly inject - only use within pods.    LIDOcaine HCL 2% (XYLOCAINE) 2 % jelly Apply topically as needed. Apply topically once nightly to affected part of foot/feet.     "LIDOcaine-prilocaine (EMLA) cream Apply topically 2 (two) times a day.    lisinopriL (PRINIVIL,ZESTRIL) 40 MG tablet Take 1 tablet (40 mg total) by mouth once daily.    meclizine (ANTIVERT) 12.5 mg tablet Take 2 tablets (25 mg total) by mouth 2 (two) times daily as needed for Dizziness.    metFORMIN (GLUCOPHAGE-XR) 500 MG ER 24hr tablet Take 2 tablets (1,000 mg total) by mouth every morning.    metoprolol succinate (TOPROL-XL) 200 MG 24 hr tablet Take 1 tablet (200 mg total) by mouth once daily.    pen needle, diabetic (PEN NEEDLE) 32 gauge x 5/32" Ndle 1 each by Misc.(Non-Drug; Combo Route) route 4 (four) times daily. ICD 10 E11.42    spironolactone (ALDACTONE) 50 MG tablet Take 1 tablet (50 mg total) by mouth once daily.   Last reviewed on 9/6/2022 10:33 AM by Renee Robertson MA    Review of patient's allergies indicates:  No Known Allergies Last reviewed on  9/6/2022 10:32 AM by Renee Robertson      Tasks added this encounter   2/2/2023 - Refill Call (Auto Added)   Tasks due within next 3 months   No tasks due.     Temitope Gu, Patient Care Assistant  [facility]  [address]  [facility] [address]  Phone: [phone]  Fax: [phone]        "

## 2022-12-16 ENCOUNTER — LAB VISIT (OUTPATIENT)
Dept: LAB | Facility: HOSPITAL | Age: 67
End: 2022-12-16
Payer: MEDICARE

## 2022-12-16 DIAGNOSIS — E78.5 HYPERLIPIDEMIA ASSOCIATED WITH TYPE 2 DIABETES MELLITUS: ICD-10-CM

## 2022-12-16 DIAGNOSIS — E11.42 TYPE 2 DIABETES MELLITUS WITH DIABETIC POLYNEUROPATHY, WITH LONG-TERM CURRENT USE OF INSULIN: ICD-10-CM

## 2022-12-16 DIAGNOSIS — Z79.4 TYPE 2 DIABETES MELLITUS WITH DIABETIC POLYNEUROPATHY, WITH LONG-TERM CURRENT USE OF INSULIN: ICD-10-CM

## 2022-12-16 DIAGNOSIS — E11.69 HYPERLIPIDEMIA ASSOCIATED WITH TYPE 2 DIABETES MELLITUS: ICD-10-CM

## 2022-12-16 LAB
ALBUMIN SERPL BCP-MCNC: 3.2 G/DL (ref 3.5–5.2)
ALP SERPL-CCNC: 111 U/L (ref 55–135)
ALT SERPL W/O P-5'-P-CCNC: 26 U/L (ref 10–44)
ANION GAP SERPL CALC-SCNC: 7 MMOL/L (ref 8–16)
AST SERPL-CCNC: 26 U/L (ref 10–40)
BILIRUB SERPL-MCNC: 0.3 MG/DL (ref 0.1–1)
BUN SERPL-MCNC: 13 MG/DL (ref 8–23)
CALCIUM SERPL-MCNC: 8.8 MG/DL (ref 8.7–10.5)
CHLORIDE SERPL-SCNC: 102 MMOL/L (ref 95–110)
CHOLEST SERPL-MCNC: 113 MG/DL (ref 120–199)
CHOLEST/HDLC SERPL: 2.9 {RATIO} (ref 2–5)
CO2 SERPL-SCNC: 29 MMOL/L (ref 23–29)
CREAT SERPL-MCNC: 0.9 MG/DL (ref 0.5–1.4)
EST. GFR  (NO RACE VARIABLE): >60 ML/MIN/1.73 M^2
ESTIMATED AVG GLUCOSE: 174 MG/DL (ref 68–131)
GLUCOSE SERPL-MCNC: 155 MG/DL (ref 70–110)
HBA1C MFR BLD: 7.7 % (ref 4–5.6)
HDLC SERPL-MCNC: 39 MG/DL (ref 40–75)
HDLC SERPL: 34.5 % (ref 20–50)
LDLC SERPL CALC-MCNC: 28.2 MG/DL (ref 63–159)
NONHDLC SERPL-MCNC: 74 MG/DL
POTASSIUM SERPL-SCNC: 3.8 MMOL/L (ref 3.5–5.1)
PROT SERPL-MCNC: 7 G/DL (ref 6–8.4)
SODIUM SERPL-SCNC: 138 MMOL/L (ref 136–145)
TRIGL SERPL-MCNC: 229 MG/DL (ref 30–150)

## 2022-12-16 PROCEDURE — 83036 HEMOGLOBIN GLYCOSYLATED A1C: CPT | Performed by: NURSE PRACTITIONER

## 2022-12-16 PROCEDURE — 80061 LIPID PANEL: CPT | Performed by: NURSE PRACTITIONER

## 2022-12-16 PROCEDURE — 80053 COMPREHEN METABOLIC PANEL: CPT | Performed by: NURSE PRACTITIONER

## 2022-12-16 PROCEDURE — 36415 COLL VENOUS BLD VENIPUNCTURE: CPT | Performed by: NURSE PRACTITIONER

## 2023-01-09 RX ORDER — AMLODIPINE BESYLATE 5 MG/1
5 TABLET ORAL DAILY
Qty: 90 TABLET | Refills: 2 | Status: SHIPPED | OUTPATIENT
Start: 2023-01-09 | End: 2023-04-18 | Stop reason: SDUPTHER

## 2023-01-10 DIAGNOSIS — R51.9 CHRONIC NONINTRACTABLE HEADACHE, UNSPECIFIED HEADACHE TYPE: ICD-10-CM

## 2023-01-10 DIAGNOSIS — G89.29 CHRONIC NONINTRACTABLE HEADACHE, UNSPECIFIED HEADACHE TYPE: ICD-10-CM

## 2023-01-10 RX ORDER — AMITRIPTYLINE HYDROCHLORIDE 75 MG/1
75 TABLET ORAL NIGHTLY
Qty: 90 TABLET | Refills: 3 | OUTPATIENT
Start: 2023-01-10 | End: 2024-01-10

## 2023-01-16 NOTE — PROGRESS NOTES
INTERNAL MEDICINE CLINIC - SAME DAY APPOINTMENT  Progress Note    PRESENTING HISTORY     PCP: Jackie Chavez MD    Chief Complaint/Reason for Visit:   No chief complaint on file.     History of Present Illness & ROS : Ms. Mercedez Purvis is a 67 y.o. female.    Same day apt.   New to me and practice site.    No longer has a primary care provider, but emphasis need to be seen due to leaving for [place] to spend 4 weeks with her son, whom she has not seen in 4 years due to being in the Service.   ` Request for medication refills on routine medications until she gets in with new Primary team in 02/2023  ` pain to both hands, knots to 'knuckles and since December 2 of fingers locked up'  ` chronic issues with sleeping and the Elavil helps along with discomforts related to neuropathy and chronic muscle spasms   No chest pain, SOB, headaches, dizziness or recent changes in vision. No urinary symptoms, related to dysuria or cystitis endorsed.     *She will be establishing medical care with new Primary care.     Review of Systems:   Eyes: denies visual changes at this time denies floaters   ENT: no nasal congestion or sore throat  Respiratory: no cough or shorness of breath  Cardiovascular: no chest pain or palpitations  Gastrointestinal: no nausea or vomiting, no abdominal pain or change in bowel habits  Genitourinary: no hematuria or dysuria; denies frequency  Hematologic/Lymphatic: no easy bruising or lymphadenopathy  Neurological: no seizures or tremors  Endocrine: no heat or cold intolerance      PAST HISTORY:     Past Medical History:   Diagnosis Date    Chronic headache     Diabetes     Heart attack 2004    8 stents    Heart failure     History of heart artery stent     Hyperlipemia     Hypertension     Obesity (BMI 30-39.9)     Yeast infection        No past surgical history on file.    No family history on file.    Social History     Socioeconomic History    Marital status: Single   Tobacco Use     Smoking status: Never    Smokeless tobacco: Never     Social Determinants of Health     Financial Resource Strain: Low Risk     Difficulty of Paying Living Expenses: Not hard at all   Food Insecurity: No Food Insecurity    Worried About Running Out of Food in the Last Year: Never true    Ran Out of Food in the Last Year: Never true   Transportation Needs: No Transportation Needs    Lack of Transportation (Medical): No    Lack of Transportation (Non-Medical): No   Physical Activity: Insufficiently Active    Days of Exercise per Week: 1 day    Minutes of Exercise per Session: 30 min   Stress: No Stress Concern Present    Feeling of Stress : Not at all   Social Connections: Unknown    Frequency of Communication with Friends and Family: More than three times a week    Frequency of Social Gatherings with Friends and Family: More than three times a week    Active Member of Clubs or Organizations: No    Attends Club or Organization Meetings: Never    Marital Status:    Housing Stability: Low Risk     Unable to Pay for Housing in the Last Year: No    Number of Places Lived in the Last Year: 1    Unstable Housing in the Last Year: No       MEDICATIONS & ALLERGIES:     Current Outpatient Medications on File Prior to Visit   Medication Sig Dispense Refill    amitriptyline (ELAVIL) 75 MG tablet Take 1 tablet (75 mg total) by mouth every evening. 90 tablet 3    amLODIPine (NORVASC) 5 MG tablet Take 1 tablet (5 mg total) by mouth once daily. 90 tablet 2    atorvastatin (LIPITOR) 40 MG tablet Take 1 tablet (40 mg total) by mouth every evening. 90 tablet 3    blood sugar diagnostic Strp 1 each by Misc.(Non-Drug; Combo Route) route 4 (four) times daily. 200 each 3    blood-glucose meter,continuous (DEXCOM G6 ) Misc 1 each by Misc.(Non-Drug; Combo Route) route Daily. Use as directed with Dexcom G6 transmitter and sensor 1 each 0    blood-glucose sensor (DEXCOM G6 SENSOR) Roberta 1 each by Misc.(Non-Drug; Combo Route)  route every 10 days. Apply/change sensor to skin every 10 days. 3 each 11    blood-glucose transmitter (DEXCOM G6 TRANSMITTER) Roberta 1 each by Misc.(Non-Drug; Combo Route) route Daily. Use transmitter in sensor with G6 system. Change new transmitter every 3 months. 1 each 3    ciclopirox (PENLAC) 8 % Soln Apply topically nightly. 6.6 mL 11    clopidogreL (PLAVIX) 75 mg tablet Take 1 tablet (75 mg total) by mouth once daily. 90 tablet 3    clotrimazole-betamethasone 1-0.05% (LOTRISONE) cream Apply topically 2 (two) times daily. 45 g 3    cyclobenzaprine (FLEXERIL) 10 MG tablet Take 1 tablet (10 mg total) by mouth 3 (three) times a week. 36 tablet 3    diclofenac sodium (VOLTAREN) 1 % Gel Apply 1 application topically.      dulaglutide (TRULICITY) 4.5 mg/0.5 mL pen injector Inject 4.5 mg (one pen) into the skin every 7 days. 4 pen 11    empagliflozin (JARDIANCE) 25 mg tablet Take 1 tablet (25 mg total) by mouth once daily. 30 tablet 3    evolocumab (REPATHA SURECLICK) 140 mg/mL PnIj Inject 1 mL (140 mg total) into the skin every 14 (fourteen) days. 2 mL 11    fluconazole (DIFLUCAN) 150 MG Tab Take 1 tablet (150 mg total) by mouth once daily. May repeat after 72 hrs for yeast infection. 2 tablet 1    furosemide (LASIX) 40 MG tablet Take 1 tablet (40 mg total) by mouth once daily. 90 tablet 3    gabapentin (NEURONTIN) 300 MG capsule Take 1 capsule (300 mg total) by mouth 3 (three) times daily. 270 capsule 1    insulin detemir U-100 (LEVEMIR FLEXTOUCH U-100 INSULN) 100 unit/mL (3 mL) InPn pen Inject 40 Units into the skin once daily. FOR EMERGENCY USE ONLY - BACK UP INSULIN, IF OFF OF YOUR INSULIN PUMP - USE 40 UNITS INJECTION DAILY UNTIL GETTING BACK ON PUMP. 15 mL 1    insulin lispro (HUMALOG U-100 INSULIN) 100 unit/mL injection Inject 100 Units into the skin continuous. Gives up to 100 units daily via Omnipod. Do not directly inject - only use within pods. 40 mL 11    LIDOcaine HCL 2% (XYLOCAINE) 2 % jelly Apply  "topically as needed. Apply topically once nightly to affected part of foot/feet. 30 mL 2    LIDOcaine-prilocaine (EMLA) cream Apply topically 2 (two) times a day. 30 g 1    lisinopriL (PRINIVIL,ZESTRIL) 40 MG tablet Take 1 tablet (40 mg total) by mouth once daily. 90 tablet 3    meclizine (ANTIVERT) 12.5 mg tablet Take 2 tablets (25 mg total) by mouth 2 (two) times daily as needed for Dizziness. 30 tablet 1    metFORMIN (GLUCOPHAGE-XR) 500 MG ER 24hr tablet Take 2 tablets (1,000 mg total) by mouth every morning. 180 tablet 3    metoprolol succinate (TOPROL-XL) 200 MG 24 hr tablet Take 1 tablet (200 mg total) by mouth once daily. 90 tablet 3    pen needle, diabetic (PEN NEEDLE) 32 gauge x 5/32" Ndle 1 each by Misc.(Non-Drug; Combo Route) route 4 (four) times daily. ICD 10 E11.42 400 each 3    spironolactone (ALDACTONE) 50 MG tablet Take 1 tablet (50 mg total) by mouth once daily. 90 tablet 3    [DISCONTINUED] insulin aspart U-100 (NOVOLOG) 100 unit/mL (3 mL) InPn pen Inject 25 Units into the skin 3 (three) times daily with meals. 69 mL 3     No current facility-administered medications on file prior to visit.        Review of patient's allergies indicates:  No Known Allergies    Medications Reconciliation:   I have reconciled the patient's home medications with the patient/family. I have updated all changes.  Refer to After-Visit Medication List.    OBJECTIVE:     Vital Signs:  There were no vitals filed for this visit.  Wt Readings from Last 3 Encounters:   09/06/22 1032 86 kg (189 lb 9.5 oz)   08/22/22 1115 86 kg (189 lb 9.5 oz)   07/12/22 0822 85.7 kg (189 lb)     There is no height or weight on file to calculate BMI.   Wt Readings from Last 3 Encounters:   01/19/23 84.8 kg (187 lb)   09/06/22 86 kg (189 lb 9.5 oz)   08/22/22 86 kg (189 lb 9.5 oz)     Temp Readings from Last 3 Encounters:   08/22/22 97.6 °F (36.4 °C) (Temporal)   03/28/22 97 °F (36.1 °C) (Temporal)   03/21/22 97.9 °F (36.6 °C) (Oral)     BP " Readings from Last 3 Encounters:   01/19/23 125/80   09/06/22 (!) 168/77   08/22/22 136/78     Pulse Readings from Last 3 Encounters:   09/06/22 81   08/22/22 73   07/06/22 81       Physical Exam:  (Focused Exam)  General: Well developed, well nourished. No distress.  HEENT: Head is normocephalic, atraumatic  Eyes: Clear conjunctiva.  Neck: Supple, symmetrical neck; trachea midline.  Lungs: Clear to auscultation bilaterally and normal respiratory effort.  Cardiovascular: Heart with regular rate and rhythm. No murmurs, gallops or rubs  Extremities: No LE edema. Pulses 2+ and symmetric.   Skin: Skin color, texture, turgor normal. No rashes.  Musculoskeletal: Normal gait.   (See photos below)  Neurologic:No focal numbness or weakness.       Laboratory  Lab Results   Component Value Date    WBC 7.73 10/30/2021    HGB 11.3 (L) 10/30/2021    HCT 37.0 10/30/2021     10/30/2021    CHOL 113 (L) 12/16/2022    TRIG 229 (H) 12/16/2022    HDL 39 (L) 12/16/2022    ALT 26 12/16/2022    AST 26 12/16/2022     12/16/2022    K 3.8 12/16/2022     12/16/2022    CREATININE 0.9 12/16/2022    BUN 13 12/16/2022    CO2 29 12/16/2022    TSH 2.301 07/13/2020    INR 1.0 08/27/2010    HGBA1C 7.7 (H) 12/16/2022       ASSESSMENT & PLAN:     Same day apt    Encounter for medication refill    Chronic nonintractable headache, unspecified headache type  -     amitriptyline (ELAVIL) 75 MG tablet; Take 1 tablet (75 mg total) by mouth every evening.  Dispense: 90 tablet; Refill: 0  -     RHEUMATOID FACTOR; Future; Expected date: 01/19/2023  -     LUISA; Future; Expected date: 01/19/2023    Chronic musculoskeletal pain  -     cyclobenzaprine (FLEXERIL) 10 MG tablet; Take 1 tablet (10 mg total) by mouth 3 (three) times a week.  Dispense: 36 tablet; Refill: 0  -     LUISA; Future; Expected date: 01/19/2023  -     RHEUMATOID FACTOR; Future; Expected date: 01/19/2023    Trigger finger, unspecified finger, unspecified laterality  -      Ambulatory referral/consult to Orthopedics; Future; Expected date: 01/26/2023  -     X-Ray Hand 3 View Bilateral; Future; Expected date: 01/19/2023  -     X-Ray Finger 2 or More Views Right; Future; Expected date: 01/19/2023  -     X-Ray Finger 2 or More Views Left; Future;     Alondra's nodes (with arthropathy)  -     LUISA; Future; Expected date: 01/19/2023  -     RHEUMATOID FACTOR; Future; Expected date: 01/19/2023    Heberden's nodes (with arthropathy)  -     LUISA; Future; Expected date: 01/19/2023  -     RHEUMATOID FACTOR; Future; Expected date: 01/19/2023    Chronic diabetic Neuropathy:   Elavil    DM II:   Lab Results   Component Value Date    HGBA1C 7.7 (H) 12/16/2022   ` Trulicity  ` Jardiance  ` Levemir  ` Humalog  ` Metformin   ` defer to Endocrine team    CAD / HLP / History of Stent and MI:  Agree with Dr. Patino and need to be seen by [facility]  ` Plavix   ` Lipitor   ` Lasix  ` Lopressor  ` Lisinopril   ` defer to Cardiology team (Labs per KATEY Mejia NP with done in 12/2022; appt with DR. Sweeney was in 2/2022 with another noted follow up in 2/2023)    HTN:   Today 126/80  BP with Dr. Sweeney in 2/2022: 140/80   ` Norvasc  ` Lopressor  ` Aldactone  ` Lisinopril     HyperAldosteronism  ` Aldactone    HLP:   ` Lipitor       *Encouraged to keep follow ups with her New Primary Care Physician as scheduled     *She has follow up with YANELI Mejia NP at [facility] on behalf of her PCP in 02/2023.     Future Appointments   Date Time Provider Department Center   1/19/2023  3:30 PM [facility] 485 LB LIMIT [facility]   1/19/2023  3:45 PM [facility] 485 LB LIMIT [facility]   1/19/2023  4:10 PM LAB, APPOINTMENT [facility] INTMED [facility]   2/23/2023  9:15 AM Rona Lema MD [facility] HAND [facility]   2/28/2023  2:30 PM Juli Mejia NP [facility] IM [place]   4/18/2023 10:00 AM Dottie Merritt MD [facility]        Medication List            Accurate as of January 19,  2023  3:15 PM. If you have any questions, ask your nurse or doctor.                CONTINUE taking these medications      amitriptyline 75 MG tablet  Commonly known as: ELAVIL  Take 1 tablet (75 mg total) by mouth every evening.     amLODIPine 5 MG tablet  Commonly known as: NORVASC  Take 1 tablet (5 mg total) by mouth once daily.     atorvastatin 40 MG tablet  Commonly known as: LIPITOR  Take 1 tablet (40 mg total) by mouth every evening.     blood sugar diagnostic Strp  1 each by Misc.(Non-Drug; Combo Route) route 4 (four) times daily.     ciclopirox 8 % Soln  Commonly known as: PENLAC  Apply topically nightly.     clopidogreL 75 mg tablet  Commonly known as: PLAVIX  Take 1 tablet (75 mg total) by mouth once daily.     clotrimazole-betamethasone 1-0.05% cream  Commonly known as: LOTRISONE  Apply topically 2 (two) times daily.     cyclobenzaprine 10 MG tablet  Commonly known as: FLEXERIL  Take 1 tablet (10 mg total) by mouth 3 (three) times a week.  Start taking on: January 20, 2023     DEXCOM G6  Misc  Generic drug: blood-glucose meter,continuous  1 each by Misc.(Non-Drug; Combo Route) route Daily. Use as directed with Dexcom G6 transmitter and sensor     DEXCOM G6 SENSOR Roberta  Generic drug: blood-glucose sensor  1 each by Misc.(Non-Drug; Combo Route) route every 10 days. Apply/change sensor to skin every 10 days.     DEXCOM G6 TRANSMITTER Roberta  Generic drug: blood-glucose transmitter  1 each by Misc.(Non-Drug; Combo Route) route Daily. Use transmitter in sensor with G6 system. Change new transmitter every 3 months.     diclofenac sodium 1 % Gel  Commonly known as: VOLTAREN     fluconazole 150 MG Tab  Commonly known as: DIFLUCAN  Take 1 tablet (150 mg total) by mouth once daily. May repeat after 72 hrs for yeast infection.     furosemide 40 MG tablet  Commonly known as: LASIX  Take 1 tablet (40 mg total) by mouth once daily.     gabapentin 300 MG capsule  Commonly known as: NEURONTIN  Take 1 capsule (300  "mg total) by mouth 3 (three) times daily.     HumaLOG U-100 Insulin 100 unit/mL injection  Generic drug: insulin lispro  Inject 100 Units into the skin continuous. Gives up to 100 units daily via Omnipod. Do not directly inject - only use within pods.     JARDIANCE 25 mg tablet  Generic drug: empagliflozin  Take 1 tablet (25 mg total) by mouth once daily.     LEVEMIR FLEXTOUCH U-100 INSULN 100 unit/mL (3 mL) Inpn pen  Generic drug: insulin detemir U-100  Inject 40 Units into the skin once daily. FOR EMERGENCY USE ONLY - BACK UP INSULIN, IF OFF OF YOUR INSULIN PUMP - USE 40 UNITS INJECTION DAILY UNTIL GETTING BACK ON PUMP.     LIDOcaine HCL 2% 2 % jelly  Commonly known as: XYLOCAINE  Apply topically as needed. Apply topically once nightly to affected part of foot/feet.     LIDOcaine-prilocaine cream  Commonly known as: EMLA  Apply topically 2 (two) times a day.     lisinopriL 40 MG tablet  Commonly known as: PRINIVIL,ZESTRIL  Take 1 tablet (40 mg total) by mouth once daily.     meclizine 12.5 mg tablet  Commonly known as: ANTIVERT  Take 2 tablets (25 mg total) by mouth 2 (two) times daily as needed for Dizziness.     metFORMIN 500 MG ER 24hr tablet  Commonly known as: GLUCOPHAGE-XR  Take 2 tablets (1,000 mg total) by mouth every morning.     metoprolol succinate 200 MG 24 hr tablet  Commonly known as: TOPROL-XL  Take 1 tablet (200 mg total) by mouth once daily.     pen needle, diabetic 32 gauge x 5/32" Ndle  Commonly known as: PEN NEEDLE  1 each by Misc.(Non-Drug; Combo Route) route 4 (four) times daily. ICD 10 E11.42     REPATHA SURECLICK 140 mg/mL Pnij  Generic drug: evolocumab  Inject 1 mL (140 mg total) into the skin every 14 (fourteen) days.     spironolactone 50 MG tablet  Commonly known as: ALDACTONE  Take 1 tablet (50 mg total) by mouth once daily.     TRULICITY 4.5 mg/0.5 mL pen injector  Generic drug: dulaglutide  Inject 4.5 mg (one pen) into the skin every 7 days.               Where to Get Your " Medications        These medications were sent to [facility]  [address] [address]      Hours: Mon-Fri, 8a-5:30p Phone: [phone]   amitriptyline 75 MG tablet  cyclobenzaprine 10 MG tablet         Signing Physician:  SEAN Ng

## 2023-01-19 ENCOUNTER — HOSPITAL ENCOUNTER (OUTPATIENT)
Dept: RADIOLOGY | Facility: HOSPITAL | Age: 68
Discharge: HOME | End: 2023-01-19
Attending: NURSE PRACTITIONER
Payer: MEDICARE

## 2023-01-19 ENCOUNTER — OFFICE VISIT (OUTPATIENT)
Dept: INTERNAL MEDICINE | Facility: CLINIC | Age: 68
End: 2023-01-19
Payer: MEDICARE

## 2023-01-19 VITALS
SYSTOLIC BLOOD PRESSURE: 125 MMHG | WEIGHT: 187 LBS | HEIGHT: 60 IN | DIASTOLIC BLOOD PRESSURE: 80 MMHG | BODY MASS INDEX: 36.71 KG/M2

## 2023-01-19 DIAGNOSIS — G89.29 CHRONIC MUSCULOSKELETAL PAIN: ICD-10-CM

## 2023-01-19 DIAGNOSIS — M65.30 TRIGGER FINGER, UNSPECIFIED FINGER, UNSPECIFIED LATERALITY: ICD-10-CM

## 2023-01-19 DIAGNOSIS — G89.29 CHRONIC NONINTRACTABLE HEADACHE, UNSPECIFIED HEADACHE TYPE: ICD-10-CM

## 2023-01-19 DIAGNOSIS — R51.9 CHRONIC NONINTRACTABLE HEADACHE, UNSPECIFIED HEADACHE TYPE: ICD-10-CM

## 2023-01-19 DIAGNOSIS — Z76.0 ENCOUNTER FOR MEDICATION REFILL: Primary | ICD-10-CM

## 2023-01-19 DIAGNOSIS — Z76.0 ENCOUNTER FOR MEDICATION REFILL: ICD-10-CM

## 2023-01-19 DIAGNOSIS — M15.1 HEBERDEN'S NODES (WITH ARTHROPATHY): ICD-10-CM

## 2023-01-19 DIAGNOSIS — M15.2 BOUCHARD'S NODES (WITH ARTHROPATHY): ICD-10-CM

## 2023-01-19 DIAGNOSIS — M79.18 CHRONIC MUSCULOSKELETAL PAIN: ICD-10-CM

## 2023-01-19 PROCEDURE — 4010F ACE/ARB THERAPY RXD/TAKEN: CPT | Mod: CPTII,S$GLB,, | Performed by: NURSE PRACTITIONER

## 2023-01-19 PROCEDURE — 1160F PR REVIEW ALL MEDS BY PRESCRIBER/CLIN PHARMACIST DOCUMENTED: ICD-10-PCS | Mod: CPTII,S$GLB,, | Performed by: NURSE PRACTITIONER

## 2023-01-19 PROCEDURE — 99999 PR PBB SHADOW E&M-EST. PATIENT-LVL V: ICD-10-PCS | Mod: PBBFAC,,, | Performed by: NURSE PRACTITIONER

## 2023-01-19 PROCEDURE — 3079F DIAST BP 80-89 MM HG: CPT | Mod: CPTII,S$GLB,, | Performed by: NURSE PRACTITIONER

## 2023-01-19 PROCEDURE — 73130 X-RAY EXAM OF HAND: CPT | Mod: TC,50

## 2023-01-19 PROCEDURE — 99214 OFFICE O/P EST MOD 30 MIN: CPT | Mod: S$GLB,,, | Performed by: NURSE PRACTITIONER

## 2023-01-19 PROCEDURE — 3288F FALL RISK ASSESSMENT DOCD: CPT | Mod: CPTII,S$GLB,, | Performed by: NURSE PRACTITIONER

## 2023-01-19 PROCEDURE — 73130 XR HAND COMPLETE 3 VIEWS BILATERAL: ICD-10-PCS | Mod: 26,50,, | Performed by: RADIOLOGY

## 2023-01-19 PROCEDURE — 1101F PR PT FALLS ASSESS DOC 0-1 FALLS W/OUT INJ PAST YR: ICD-10-PCS | Mod: CPTII,S$GLB,, | Performed by: NURSE PRACTITIONER

## 2023-01-19 PROCEDURE — 3008F PR BODY MASS INDEX (BMI) DOCUMENTED: ICD-10-PCS | Mod: CPTII,S$GLB,, | Performed by: NURSE PRACTITIONER

## 2023-01-19 PROCEDURE — 3288F PR FALLS RISK ASSESSMENT DOCUMENTED: ICD-10-PCS | Mod: CPTII,S$GLB,, | Performed by: NURSE PRACTITIONER

## 2023-01-19 PROCEDURE — 1159F MED LIST DOCD IN RCRD: CPT | Mod: CPTII,S$GLB,, | Performed by: NURSE PRACTITIONER

## 2023-01-19 PROCEDURE — 3074F PR MOST RECENT SYSTOLIC BLOOD PRESSURE < 130 MM HG: ICD-10-PCS | Mod: CPTII,S$GLB,, | Performed by: NURSE PRACTITIONER

## 2023-01-19 PROCEDURE — 1125F PR PAIN SEVERITY QUANTIFIED, PAIN PRESENT: ICD-10-PCS | Mod: CPTII,S$GLB,, | Performed by: NURSE PRACTITIONER

## 2023-01-19 PROCEDURE — 4010F PR ACE/ARB THEARPY RXD/TAKEN: ICD-10-PCS | Mod: CPTII,S$GLB,, | Performed by: NURSE PRACTITIONER

## 2023-01-19 PROCEDURE — 73140 XR FINGER 2 OR MORE VIEWS RIGHT: ICD-10-PCS | Mod: 26,XS,RT, | Performed by: RADIOLOGY

## 2023-01-19 PROCEDURE — 3079F PR MOST RECENT DIASTOLIC BLOOD PRESSURE 80-89 MM HG: ICD-10-PCS | Mod: CPTII,S$GLB,, | Performed by: NURSE PRACTITIONER

## 2023-01-19 PROCEDURE — 99499 RISK ADDL DX/OHS AUDIT: ICD-10-PCS | Mod: S$GLB,,, | Performed by: NURSE PRACTITIONER

## 2023-01-19 PROCEDURE — 73140 XR FINGER 2 OR MORE VIEWS LEFT: ICD-10-PCS | Mod: 26,XS,LT, | Performed by: RADIOLOGY

## 2023-01-19 PROCEDURE — 73130 X-RAY EXAM OF HAND: CPT | Mod: 26,50,, | Performed by: RADIOLOGY

## 2023-01-19 PROCEDURE — 99499 UNLISTED E&M SERVICE: CPT | Mod: S$GLB,,, | Performed by: NURSE PRACTITIONER

## 2023-01-19 PROCEDURE — 3008F BODY MASS INDEX DOCD: CPT | Mod: CPTII,S$GLB,, | Performed by: NURSE PRACTITIONER

## 2023-01-19 PROCEDURE — 73140 X-RAY EXAM OF FINGER(S): CPT | Mod: 26,XS,RT, | Performed by: RADIOLOGY

## 2023-01-19 PROCEDURE — 1160F RVW MEDS BY RX/DR IN RCRD: CPT | Mod: CPTII,S$GLB,, | Performed by: NURSE PRACTITIONER

## 2023-01-19 PROCEDURE — 1125F AMNT PAIN NOTED PAIN PRSNT: CPT | Mod: CPTII,S$GLB,, | Performed by: NURSE PRACTITIONER

## 2023-01-19 PROCEDURE — 99999 PR PBB SHADOW E&M-EST. PATIENT-LVL V: CPT | Mod: PBBFAC,,, | Performed by: NURSE PRACTITIONER

## 2023-01-19 PROCEDURE — 73140 X-RAY EXAM OF FINGER(S): CPT | Mod: TC,LT

## 2023-01-19 PROCEDURE — 3074F SYST BP LT 130 MM HG: CPT | Mod: CPTII,S$GLB,, | Performed by: NURSE PRACTITIONER

## 2023-01-19 PROCEDURE — 1159F PR MEDICATION LIST DOCUMENTED IN MEDICAL RECORD: ICD-10-PCS | Mod: CPTII,S$GLB,, | Performed by: NURSE PRACTITIONER

## 2023-01-19 PROCEDURE — 99214 PR OFFICE/OUTPT VISIT, EST, LEVL IV, 30-39 MIN: ICD-10-PCS | Mod: S$GLB,,, | Performed by: NURSE PRACTITIONER

## 2023-01-19 PROCEDURE — 73140 X-RAY EXAM OF FINGER(S): CPT | Mod: TC,RT

## 2023-01-19 PROCEDURE — 73140 X-RAY EXAM OF FINGER(S): CPT | Mod: 26,XS,LT, | Performed by: RADIOLOGY

## 2023-01-19 PROCEDURE — 1101F PT FALLS ASSESS-DOCD LE1/YR: CPT | Mod: CPTII,S$GLB,, | Performed by: NURSE PRACTITIONER

## 2023-01-19 RX ORDER — AMITRIPTYLINE HYDROCHLORIDE 75 MG/1
75 TABLET ORAL NIGHTLY
Qty: 90 TABLET | Refills: 0 | Status: SHIPPED | OUTPATIENT
Start: 2023-01-19 | End: 2023-04-18 | Stop reason: SDUPTHER

## 2023-01-19 RX ORDER — CYCLOBENZAPRINE HCL 10 MG
10 TABLET ORAL
Qty: 36 TABLET | Refills: 0 | Status: SHIPPED | OUTPATIENT
Start: 2023-01-20 | End: 2023-04-18 | Stop reason: SDUPTHER

## 2023-01-25 ENCOUNTER — PATIENT MESSAGE (OUTPATIENT)
Dept: ADMINISTRATIVE | Facility: HOSPITAL | Age: 68
End: 2023-01-25
Payer: MEDICARE

## 2023-02-02 ENCOUNTER — PATIENT MESSAGE (OUTPATIENT)
Dept: PHARMACY | Facility: CLINIC | Age: 68
End: 2023-02-02
Payer: MEDICARE

## 2023-02-10 ENCOUNTER — TELEPHONE (OUTPATIENT)
Dept: ORTHOPEDICS | Facility: CLINIC | Age: 68
End: 2023-02-10
Payer: MEDICARE

## 2023-02-10 NOTE — TELEPHONE ENCOUNTER
LVM for patient to return our phone call to confirm which hand it is bc xrays are needed prior to the appointment

## 2023-02-13 ENCOUNTER — SPECIALTY PHARMACY (OUTPATIENT)
Dept: PHARMACY | Facility: CLINIC | Age: 68
End: 2023-02-13
Payer: MEDICARE

## 2023-02-13 NOTE — TELEPHONE ENCOUNTER
Patient is currently with her daughter in [place]. Unsure when she will be back in [place]. Requested transfer of Repatha to [facility] at [address], [place] [address] [[phone]]. Transferred prescription and confirmed that pharmacy can order Repatha [facility].     Patient will reach out to [facility] when she is back in [facility] to fill Repatha. Closing out at this time.

## 2023-03-16 ENCOUNTER — OFFICE VISIT (OUTPATIENT)
Dept: ORTHOPEDICS | Facility: CLINIC | Age: 68
End: 2023-03-16
Payer: MEDICARE

## 2023-03-16 VITALS
WEIGHT: 187 LBS | DIASTOLIC BLOOD PRESSURE: 92 MMHG | BODY MASS INDEX: 36.71 KG/M2 | SYSTOLIC BLOOD PRESSURE: 166 MMHG | HEART RATE: 98 BPM | HEIGHT: 60 IN

## 2023-03-16 DIAGNOSIS — M65.30 TRIGGER FINGER, UNSPECIFIED FINGER, UNSPECIFIED LATERALITY: ICD-10-CM

## 2023-03-16 PROCEDURE — 3080F DIAST BP >= 90 MM HG: CPT | Mod: CPTII,S$GLB,, | Performed by: ORTHOPAEDIC SURGERY

## 2023-03-16 PROCEDURE — 3077F PR MOST RECENT SYSTOLIC BLOOD PRESSURE >= 140 MM HG: ICD-10-PCS | Mod: CPTII,S$GLB,, | Performed by: ORTHOPAEDIC SURGERY

## 2023-03-16 PROCEDURE — 3288F PR FALLS RISK ASSESSMENT DOCUMENTED: ICD-10-PCS | Mod: CPTII,S$GLB,, | Performed by: ORTHOPAEDIC SURGERY

## 2023-03-16 PROCEDURE — 1101F PR PT FALLS ASSESS DOC 0-1 FALLS W/OUT INJ PAST YR: ICD-10-PCS | Mod: CPTII,S$GLB,, | Performed by: ORTHOPAEDIC SURGERY

## 2023-03-16 PROCEDURE — 1159F PR MEDICATION LIST DOCUMENTED IN MEDICAL RECORD: ICD-10-PCS | Mod: CPTII,S$GLB,, | Performed by: ORTHOPAEDIC SURGERY

## 2023-03-16 PROCEDURE — 99999 PR PBB SHADOW E&M-EST. PATIENT-LVL III: CPT | Mod: PBBFAC,,, | Performed by: ORTHOPAEDIC SURGERY

## 2023-03-16 PROCEDURE — 99205 OFFICE O/P NEW HI 60 MIN: CPT | Mod: S$GLB,,, | Performed by: ORTHOPAEDIC SURGERY

## 2023-03-16 PROCEDURE — 3008F PR BODY MASS INDEX (BMI) DOCUMENTED: ICD-10-PCS | Mod: CPTII,S$GLB,, | Performed by: ORTHOPAEDIC SURGERY

## 2023-03-16 PROCEDURE — 3077F SYST BP >= 140 MM HG: CPT | Mod: CPTII,S$GLB,, | Performed by: ORTHOPAEDIC SURGERY

## 2023-03-16 PROCEDURE — 1159F MED LIST DOCD IN RCRD: CPT | Mod: CPTII,S$GLB,, | Performed by: ORTHOPAEDIC SURGERY

## 2023-03-16 PROCEDURE — 99205 PR OFFICE/OUTPT VISIT, NEW, LEVL V, 60-74 MIN: ICD-10-PCS | Mod: S$GLB,,, | Performed by: ORTHOPAEDIC SURGERY

## 2023-03-16 PROCEDURE — 3008F BODY MASS INDEX DOCD: CPT | Mod: CPTII,S$GLB,, | Performed by: ORTHOPAEDIC SURGERY

## 2023-03-16 PROCEDURE — 4010F ACE/ARB THERAPY RXD/TAKEN: CPT | Mod: CPTII,S$GLB,, | Performed by: ORTHOPAEDIC SURGERY

## 2023-03-16 PROCEDURE — 1125F PR PAIN SEVERITY QUANTIFIED, PAIN PRESENT: ICD-10-PCS | Mod: CPTII,S$GLB,, | Performed by: ORTHOPAEDIC SURGERY

## 2023-03-16 PROCEDURE — 1125F AMNT PAIN NOTED PAIN PRSNT: CPT | Mod: CPTII,S$GLB,, | Performed by: ORTHOPAEDIC SURGERY

## 2023-03-16 PROCEDURE — 99999 PR PBB SHADOW E&M-EST. PATIENT-LVL III: ICD-10-PCS | Mod: PBBFAC,,, | Performed by: ORTHOPAEDIC SURGERY

## 2023-03-16 PROCEDURE — 4010F PR ACE/ARB THEARPY RXD/TAKEN: ICD-10-PCS | Mod: CPTII,S$GLB,, | Performed by: ORTHOPAEDIC SURGERY

## 2023-03-16 PROCEDURE — 3080F PR MOST RECENT DIASTOLIC BLOOD PRESSURE >= 90 MM HG: ICD-10-PCS | Mod: CPTII,S$GLB,, | Performed by: ORTHOPAEDIC SURGERY

## 2023-03-16 PROCEDURE — 3288F FALL RISK ASSESSMENT DOCD: CPT | Mod: CPTII,S$GLB,, | Performed by: ORTHOPAEDIC SURGERY

## 2023-03-16 PROCEDURE — 1101F PT FALLS ASSESS-DOCD LE1/YR: CPT | Mod: CPTII,S$GLB,, | Performed by: ORTHOPAEDIC SURGERY

## 2023-03-16 NOTE — H&P (VIEW-ONLY)
Subjective:      Patient ID: Mercedez Purvis is a 68 y.o. female.    Chief Complaint: Pain of the Left Hand and Pain of the Right Hand      HPI  Mercedez Purvis is a right hand dominant 68 y.o. female presenting today for pain and stiffness in her right middle finger and the left ring finger. She states there was no trauma to her fingers but noticed her right middle finger became stuck in flexion a week after Thanksgiving 2022. Her left ring finger began hurting in December 2022. She is unable to straighten her right middle finger. Pain is 10/10 right middle finger and 8/10 on her left ring finger. The pain keeps her up at  night    Her long finger and right side will get stuck and she actually has to manually open it up it is very painful to her the ring finger is just starting      Review of patient's allergies indicates:  No Known Allergies      Current Outpatient Medications   Medication Sig Dispense Refill    amitriptyline (ELAVIL) 75 MG tablet Take 1 tablet (75 mg total) by mouth every evening. 90 tablet 0    amLODIPine (NORVASC) 5 MG tablet Take 1 tablet (5 mg total) by mouth once daily. 90 tablet 2    atorvastatin (LIPITOR) 40 MG tablet Take 1 tablet (40 mg total) by mouth every evening. 90 tablet 3    blood sugar diagnostic Strp 1 each by Misc.(Non-Drug; Combo Route) route 4 (four) times daily. 200 each 3    blood-glucose sensor (DEXCOM G6 SENSOR) Roberta 1 each by Misc.(Non-Drug; Combo Route) route every 10 days. Apply/change sensor to skin every 10 days. 3 each 11    blood-glucose transmitter (DEXCOM G6 TRANSMITTER) Roberta 1 each by Misc.(Non-Drug; Combo Route) route Daily. Use transmitter in sensor with G6 system. Change new transmitter every 3 months. 1 each 3    ciclopirox (PENLAC) 8 % Soln Apply topically nightly. 6.6 mL 11    clotrimazole-betamethasone 1-0.05% (LOTRISONE) cream Apply topically 2 (two) times daily. 45 g 3    cyclobenzaprine (FLEXERIL) 10 MG tablet Take 1 tablet (10 mg total)  "by mouth 3 (three) times a week. 36 tablet 0    diclofenac sodium (VOLTAREN) 1 % Gel Apply 1 application topically.      dulaglutide (TRULICITY) 4.5 mg/0.5 mL pen injector Inject 4.5 mg (one pen) into the skin every 7 days. 4 pen 11    empagliflozin (JARDIANCE) 25 mg tablet Take 1 tablet (25 mg total) by mouth once daily. 30 tablet 3    evolocumab (REPATHA SURECLICK) 140 mg/mL PnIj Inject 1 mL (140 mg total) into the skin every 14 (fourteen) days. 2 mL 11    fluconazole (DIFLUCAN) 150 MG Tab Take 1 tablet (150 mg total) by mouth once daily. May repeat after 72 hrs for yeast infection. 2 tablet 1    furosemide (LASIX) 40 MG tablet Take 1 tablet (40 mg total) by mouth once daily. 90 tablet 3    gabapentin (NEURONTIN) 300 MG capsule Take 1 capsule (300 mg total) by mouth 3 (three) times daily. 270 capsule 1    insulin detemir U-100 (LEVEMIR FLEXTOUCH U-100 INSULN) 100 unit/mL (3 mL) InPn pen Inject 40 Units into the skin once daily. FOR EMERGENCY USE ONLY - BACK UP INSULIN, IF OFF OF YOUR INSULIN PUMP - USE 40 UNITS INJECTION DAILY UNTIL GETTING BACK ON PUMP. 15 mL 1    insulin lispro (HUMALOG U-100 INSULIN) 100 unit/mL injection Inject 100 Units into the skin continuous. Gives up to 100 units daily via Omnipod. Do not directly inject - only use within pods. 40 mL 11    LIDOcaine HCL 2% (XYLOCAINE) 2 % jelly Apply topically as needed. Apply topically once nightly to affected part of foot/feet. 30 mL 2    LIDOcaine-prilocaine (EMLA) cream Apply topically 2 (two) times a day. 30 g 1    lisinopriL (PRINIVIL,ZESTRIL) 40 MG tablet Take 1 tablet (40 mg total) by mouth once daily. 90 tablet 3    meclizine (ANTIVERT) 12.5 mg tablet Take 2 tablets (25 mg total) by mouth 2 (two) times daily as needed for Dizziness. 30 tablet 1    metFORMIN (GLUCOPHAGE-XR) 500 MG ER 24hr tablet Take 2 tablets (1,000 mg total) by mouth every morning. 180 tablet 3    pen needle, diabetic (PEN NEEDLE) 32 gauge x 5/32" Ndle 1 each by Misc.(Non-Drug; " Combo Route) route 4 (four) times daily. ICD 10 E11.42 400 each 3    spironolactone (ALDACTONE) 50 MG tablet Take 1 tablet (50 mg total) by mouth once daily. 90 tablet 3    blood-glucose meter,continuous (DEXCOM G6 ) Misc 1 each by Misc.(Non-Drug; Combo Route) route Daily. Use as directed with Dexcom G6 transmitter and sensor 1 each 0    clopidogreL (PLAVIX) 75 mg tablet Take 1 tablet (75 mg total) by mouth once daily. 90 tablet 3    metoprolol succinate (TOPROL-XL) 200 MG 24 hr tablet Take 1 tablet (200 mg total) by mouth once daily. 90 tablet 3     No current facility-administered medications for this visit.       Past Medical History:   Diagnosis Date    Chronic headache     Diabetes     Heart attack 2004    8 stents    Heart failure     History of heart artery stent     Hyperlipemia     Hypertension     Obesity (BMI 30-39.9)     Yeast infection        History reviewed. No pertinent surgical history.    Review of Systems:  Constitutional: Negative for chills and fever.   Respiratory: Negative for cough and shortness of breath.    Gastrointestinal: Negative for nausea and vomiting.   Skin: Negative for rash.   Neurological: Negative for dizziness and headaches.   Psychiatric/Behavioral: Negative for depression.   MSK as in HPI       OBJECTIVE:     PHYSICAL EXAM:  BP (!) 166/92   Pulse 98   Ht 5' (1.524 m)   Wt 84.8 kg (187 lb)   BMI 36.52 kg/m²     GEN:  NAD, well-developed, well-groomed.  NEURO: Awake, alert, and oriented. Normal attention and concentration.    PSYCH: Normal mood and affect. Behavior is normal.  HEENT: No cervical lymphadenopathy noted.  CARDIOVASCULAR: Radial pulses 2+ bilaterally. No LE edema noted.  PULMONARY: Breath sounds normal. No respiratory distress.  SKIN: Intact, no rashes.      MSK:     RUE:  Middle finger stuck in flexion 105 degrees.  AIN/PIN/Radial/Median/Ulnar Nerves assessed in isolation without deficit. Radial & Ulnar arteries palpated 2+. Capillary Refill <3s.ROM  WNL of her wrist. No erythema noted. Mild swelling at the PIP joint, tender on the PIP joint.  Tender palpation over the A1 pulley right middle finger it was locked manually I could straighten it out  LUE:  Good active ROM of the wrist and fingers. AIN/PIN/Radial/Median/Ulnar Nerves assessed in isolation without deficit. Radial & Ulnar arteries palpated 2+. Capillary Refill <3s. Pain in the ring finger over the PIP joint. No  swelling noted. Extension in the ring in limited tender to palpation over the A1 pulley ring finger      RADIOGRAPHS:  No fracture dislocation  Comments: I have personally reviewed the imaging and I agree with the above radiologist's report.    ASSESSMENT/PLAN:       ICD-10-CM ICD-9-CM   1. Trigger finger, unspecified finger, unspecified laterality  M65.30 727.03          No orders of the defined types were placed in this encounter.       Plan:   Risks and benefits were explained to the patient in clinic at this point because of her long finger is so significant with triggering we will proceed with surgery at the time surgery will proceed with an injection Pulley ring finger left side

## 2023-03-21 ENCOUNTER — ANESTHESIA EVENT (OUTPATIENT)
Dept: SURGERY | Facility: HOSPITAL | Age: 68
End: 2023-03-21
Payer: MEDICARE

## 2023-03-21 DIAGNOSIS — M65.30 TRIGGER FINGER, UNSPECIFIED FINGER, UNSPECIFIED LATERALITY: Primary | ICD-10-CM

## 2023-03-21 NOTE — PRE-PROCEDURE INSTRUCTIONS
PAT notified of add-on to surgery schedule. Chart reviewed. Discussed with Anesthesia team, patient is ok to proceed with surgery at [place].

## 2023-03-23 ENCOUNTER — TELEPHONE (OUTPATIENT)
Dept: ORTHOPEDICS | Facility: CLINIC | Age: 68
End: 2023-03-23
Payer: MEDICARE

## 2023-03-23 DIAGNOSIS — M65.30 TRIGGER FINGER, UNSPECIFIED FINGER, UNSPECIFIED LATERALITY: Primary | ICD-10-CM

## 2023-03-23 RX ORDER — TRAMADOL HYDROCHLORIDE 50 MG/1
50 TABLET ORAL EVERY 6 HOURS PRN
Qty: 20 TABLET | Refills: 0 | Status: SHIPPED | OUTPATIENT
Start: 2023-03-23 | End: 2023-04-18

## 2023-03-23 RX ORDER — MUPIROCIN 20 MG/G
OINTMENT TOPICAL
Status: CANCELLED | OUTPATIENT
Start: 2023-03-23

## 2023-03-23 NOTE — TELEPHONE ENCOUNTER
Spoke c pt. Informed pt of 6;00 a.m. arrival time for 03/23/23 surgery at the [facility]. Reminded pt of NPO status & PO appt. Pt expressed understanding & was thankful.

## 2023-03-24 ENCOUNTER — ANESTHESIA (OUTPATIENT)
Dept: SURGERY | Facility: HOSPITAL | Age: 68
End: 2023-03-24
Payer: MEDICARE

## 2023-03-24 ENCOUNTER — HOSPITAL ENCOUNTER (OUTPATIENT)
Facility: HOSPITAL | Age: 68
Discharge: HOME | End: 2023-03-24
Attending: ORTHOPAEDIC SURGERY | Admitting: ORTHOPAEDIC SURGERY
Payer: MEDICARE

## 2023-03-24 VITALS
SYSTOLIC BLOOD PRESSURE: 150 MMHG | OXYGEN SATURATION: 100 % | BODY MASS INDEX: 38.68 KG/M2 | TEMPERATURE: 98 F | HEART RATE: 79 BPM | WEIGHT: 197 LBS | RESPIRATION RATE: 20 BRPM | HEIGHT: 60 IN | DIASTOLIC BLOOD PRESSURE: 88 MMHG

## 2023-03-24 DIAGNOSIS — M65.30 TRIGGER FINGER, UNSPECIFIED FINGER, UNSPECIFIED LATERALITY: ICD-10-CM

## 2023-03-24 LAB
POCT GLUCOSE: 174 MG/DL (ref 70–110)
POCT GLUCOSE: 182 MG/DL (ref 70–110)

## 2023-03-24 PROCEDURE — D9220A PRA ANESTHESIA: ICD-10-PCS | Mod: ANES,,, | Performed by: ANESTHESIOLOGY

## 2023-03-24 PROCEDURE — 20550 PR INJECT TENDON SHEATH/LIGAMENT: ICD-10-PCS | Mod: 59,LT,, | Performed by: ORTHOPAEDIC SURGERY

## 2023-03-24 PROCEDURE — 94761 N-INVAS EAR/PLS OXIMETRY MLT: CPT

## 2023-03-24 PROCEDURE — 82962 GLUCOSE BLOOD TEST: CPT | Performed by: ORTHOPAEDIC SURGERY

## 2023-03-24 PROCEDURE — 26055 PR INCISE FINGER TENDON SHEATH: ICD-10-PCS | Mod: F7,,, | Performed by: ORTHOPAEDIC SURGERY

## 2023-03-24 PROCEDURE — 27201423 OPTIME MED/SURG SUP & DEVICES STERILE SUPPLY: Performed by: ORTHOPAEDIC SURGERY

## 2023-03-24 PROCEDURE — 71000015 HC POSTOP RECOV 1ST HR: Performed by: ORTHOPAEDIC SURGERY

## 2023-03-24 PROCEDURE — D9220A PRA ANESTHESIA: Mod: CRNA,,, | Performed by: NURSE ANESTHETIST, CERTIFIED REGISTERED

## 2023-03-24 PROCEDURE — 27000221 HC OXYGEN, UP TO 24 HOURS

## 2023-03-24 PROCEDURE — D9220A PRA ANESTHESIA: ICD-10-PCS | Mod: CRNA,,, | Performed by: NURSE ANESTHETIST, CERTIFIED REGISTERED

## 2023-03-24 PROCEDURE — 37000009 HC ANESTHESIA EA ADD 15 MINS: Performed by: ORTHOPAEDIC SURGERY

## 2023-03-24 PROCEDURE — 25000003 PHARM REV CODE 250: Performed by: NURSE ANESTHETIST, CERTIFIED REGISTERED

## 2023-03-24 PROCEDURE — 63600175 PHARM REV CODE 636 W HCPCS: Performed by: STUDENT IN AN ORGANIZED HEALTH CARE EDUCATION/TRAINING PROGRAM

## 2023-03-24 PROCEDURE — 82962 GLUCOSE BLOOD TEST: CPT | Mod: 91 | Performed by: ORTHOPAEDIC SURGERY

## 2023-03-24 PROCEDURE — 26055 INCISE FINGER TENDON SHEATH: CPT | Mod: F7,,, | Performed by: ORTHOPAEDIC SURGERY

## 2023-03-24 PROCEDURE — 63600175 PHARM REV CODE 636 W HCPCS: Performed by: NURSE ANESTHETIST, CERTIFIED REGISTERED

## 2023-03-24 PROCEDURE — 36000707: Performed by: ORTHOPAEDIC SURGERY

## 2023-03-24 PROCEDURE — D9220A PRA ANESTHESIA: Mod: ANES,,, | Performed by: ANESTHESIOLOGY

## 2023-03-24 PROCEDURE — 37000008 HC ANESTHESIA 1ST 15 MINUTES: Performed by: ORTHOPAEDIC SURGERY

## 2023-03-24 PROCEDURE — 63600175 PHARM REV CODE 636 W HCPCS: Performed by: ORTHOPAEDIC SURGERY

## 2023-03-24 PROCEDURE — 25000003 PHARM REV CODE 250: Performed by: STUDENT IN AN ORGANIZED HEALTH CARE EDUCATION/TRAINING PROGRAM

## 2023-03-24 PROCEDURE — 71000033 HC RECOVERY, INTIAL HOUR: Performed by: ORTHOPAEDIC SURGERY

## 2023-03-24 PROCEDURE — 36000706: Performed by: ORTHOPAEDIC SURGERY

## 2023-03-24 PROCEDURE — 25000003 PHARM REV CODE 250: Performed by: ORTHOPAEDIC SURGERY

## 2023-03-24 PROCEDURE — 20550 NJX 1 TENDON SHEATH/LIGAMENT: CPT | Mod: 59,LT,, | Performed by: ORTHOPAEDIC SURGERY

## 2023-03-24 PROCEDURE — 99900035 HC TECH TIME PER 15 MIN (STAT)

## 2023-03-24 RX ORDER — ACETAMINOPHEN 500 MG
1000 TABLET ORAL ONCE
Status: COMPLETED | OUTPATIENT
Start: 2023-03-24 | End: 2023-03-24

## 2023-03-24 RX ORDER — PROPOFOL 10 MG/ML
VIAL (ML) INTRAVENOUS CONTINUOUS PRN
Status: DISCONTINUED | OUTPATIENT
Start: 2023-03-24 | End: 2023-03-24

## 2023-03-24 RX ORDER — LIDOCAINE HYDROCHLORIDE 20 MG/ML
INJECTION INTRAVENOUS
Status: DISCONTINUED | OUTPATIENT
Start: 2023-03-24 | End: 2023-03-24

## 2023-03-24 RX ORDER — BUPIVACAINE HYDROCHLORIDE 2.5 MG/ML
INJECTION, SOLUTION EPIDURAL; INFILTRATION; INTRACAUDAL
Status: DISCONTINUED | OUTPATIENT
Start: 2023-03-24 | End: 2023-03-24 | Stop reason: HOSPADM

## 2023-03-24 RX ORDER — FAMOTIDINE 10 MG/ML
INJECTION INTRAVENOUS
Status: DISCONTINUED | OUTPATIENT
Start: 2023-03-24 | End: 2023-03-24

## 2023-03-24 RX ORDER — MUPIROCIN 20 MG/G
OINTMENT TOPICAL
Status: DISCONTINUED | OUTPATIENT
Start: 2023-03-24 | End: 2023-03-24 | Stop reason: HOSPADM

## 2023-03-24 RX ORDER — OXYCODONE HYDROCHLORIDE 5 MG/1
5 TABLET ORAL
Status: DISCONTINUED | OUTPATIENT
Start: 2023-03-24 | End: 2023-03-24 | Stop reason: HOSPADM

## 2023-03-24 RX ORDER — LIDOCAINE HYDROCHLORIDE 10 MG/ML
INJECTION, SOLUTION EPIDURAL; INFILTRATION; INTRACAUDAL; PERINEURAL
Status: DISCONTINUED | OUTPATIENT
Start: 2023-03-24 | End: 2023-03-24 | Stop reason: HOSPADM

## 2023-03-24 RX ORDER — DEXAMETHASONE SODIUM PHOSPHATE 4 MG/ML
INJECTION, SOLUTION INTRA-ARTICULAR; INTRALESIONAL; INTRAMUSCULAR; INTRAVENOUS; SOFT TISSUE
Status: DISCONTINUED | OUTPATIENT
Start: 2023-03-24 | End: 2023-03-24 | Stop reason: HOSPADM

## 2023-03-24 RX ORDER — MIDAZOLAM HYDROCHLORIDE 1 MG/ML
INJECTION, SOLUTION INTRAMUSCULAR; INTRAVENOUS
Status: DISCONTINUED | OUTPATIENT
Start: 2023-03-24 | End: 2023-03-24

## 2023-03-24 RX ORDER — ONDANSETRON 2 MG/ML
INJECTION INTRAMUSCULAR; INTRAVENOUS
Status: DISCONTINUED | OUTPATIENT
Start: 2023-03-24 | End: 2023-03-24

## 2023-03-24 RX ORDER — METOCLOPRAMIDE HYDROCHLORIDE 5 MG/ML
10 INJECTION INTRAMUSCULAR; INTRAVENOUS EVERY 10 MIN PRN
Status: DISCONTINUED | OUTPATIENT
Start: 2023-03-24 | End: 2023-03-24 | Stop reason: HOSPADM

## 2023-03-24 RX ORDER — ONDANSETRON 4 MG/1
8 TABLET, ORALLY DISINTEGRATING ORAL EVERY 8 HOURS PRN
Status: DISCONTINUED | OUTPATIENT
Start: 2023-03-24 | End: 2023-03-24 | Stop reason: HOSPADM

## 2023-03-24 RX ORDER — FENTANYL CITRATE 50 UG/ML
25 INJECTION, SOLUTION INTRAMUSCULAR; INTRAVENOUS EVERY 5 MIN PRN
Status: DISCONTINUED | OUTPATIENT
Start: 2023-03-24 | End: 2023-03-24 | Stop reason: HOSPADM

## 2023-03-24 RX ORDER — SODIUM CHLORIDE 0.9 % (FLUSH) 0.9 %
10 SYRINGE (ML) INJECTION
Status: DISCONTINUED | OUTPATIENT
Start: 2023-03-24 | End: 2023-03-24 | Stop reason: HOSPADM

## 2023-03-24 RX ORDER — HALOPERIDOL 5 MG/ML
0.5 INJECTION INTRAMUSCULAR EVERY 10 MIN PRN
Status: DISCONTINUED | OUTPATIENT
Start: 2023-03-24 | End: 2023-03-24 | Stop reason: HOSPADM

## 2023-03-24 RX ORDER — DEXAMETHASONE SODIUM PHOSPHATE 4 MG/ML
INJECTION, SOLUTION INTRA-ARTICULAR; INTRALESIONAL; INTRAMUSCULAR; INTRAVENOUS; SOFT TISSUE
Status: DISCONTINUED | OUTPATIENT
Start: 2023-03-24 | End: 2023-03-24

## 2023-03-24 RX ORDER — BACITRACIN ZINC 500 UNIT/G
OINTMENT (GRAM) TOPICAL
Status: DISCONTINUED | OUTPATIENT
Start: 2023-03-24 | End: 2023-03-24 | Stop reason: HOSPADM

## 2023-03-24 RX ORDER — CARBOXYMETHYLCELLULOSE SODIUM 10 MG/ML
GEL OPHTHALMIC
Status: DISCONTINUED | OUTPATIENT
Start: 2023-03-24 | End: 2023-03-24

## 2023-03-24 RX ADMIN — SODIUM CHLORIDE: 9 INJECTION, SOLUTION INTRAVENOUS at 08:03

## 2023-03-24 RX ADMIN — CEFAZOLIN 2 G: 2 INJECTION, POWDER, FOR SOLUTION INTRAMUSCULAR; INTRAVENOUS at 08:03

## 2023-03-24 RX ADMIN — ONDANSETRON 4 MG: 2 INJECTION, SOLUTION INTRAMUSCULAR; INTRAVENOUS at 08:03

## 2023-03-24 RX ADMIN — MIDAZOLAM HYDROCHLORIDE 2 MG: 1 INJECTION, SOLUTION INTRAMUSCULAR; INTRAVENOUS at 08:03

## 2023-03-24 RX ADMIN — ACETAMINOPHEN 1000 MG: 500 TABLET ORAL at 08:03

## 2023-03-24 RX ADMIN — MUPIROCIN: 20 OINTMENT TOPICAL at 08:03

## 2023-03-24 RX ADMIN — FAMOTIDINE 20 MG: 10 INJECTION, SOLUTION INTRAVENOUS at 08:03

## 2023-03-24 RX ADMIN — PROPOFOL 50 MCG/KG/MIN: 10 INJECTION, EMULSION INTRAVENOUS at 08:03

## 2023-03-24 RX ADMIN — LIDOCAINE HYDROCHLORIDE 75 MG: 20 INJECTION INTRAVENOUS at 08:03

## 2023-03-24 RX ADMIN — DEXAMETHASONE SODIUM PHOSPHATE 4 MG: 4 INJECTION, SOLUTION INTRAMUSCULAR; INTRAVENOUS at 08:03

## 2023-03-24 RX ADMIN — CARBOXYMETHYLCELLULOSE SODIUM 4 DROP: 10 GEL OPHTHALMIC at 08:03

## 2023-03-24 NOTE — PLAN OF CARE
Pre op checklist complete. Pt resting in bed with call light in reach.All questions answered. Pt belongings at bedside and plan for friend to hold. Pt still needs PIV placement.

## 2023-03-24 NOTE — ANESTHESIA POSTPROCEDURE EVALUATION
Anesthesia Post Evaluation    Patient: Mercedez Purvis    Procedure(s) Performed: Procedure(s) (LRB):  RELEASE, TRIGGER FINGER, RIGHT LONG (Right)  INJECTION, STEROID,LEFT RING FINGER (Left)    Final Anesthesia Type: general      Patient location during evaluation: PACU  Patient participation: Yes- Able to Participate  Level of consciousness: awake and alert  Post-procedure vital signs: reviewed and stable  Pain management: adequate  Airway patency: patent    PONV status at discharge: No PONV  Anesthetic complications: no      Cardiovascular status: blood pressure returned to baseline  Respiratory status: unassisted  Hydration status: euvolemic  Follow-up not needed.          Vitals Value Taken Time   /88 03/24/23 0916   Temp 36.7 °C (98 °F) 03/24/23 0925   Pulse 82 03/24/23 0924   Resp 26 03/24/23 0923   SpO2 96 % 03/24/23 0924   Vitals shown include unvalidated device data.      Event Time   Out of Recovery 09:25:00         Pain/Phyllis Score: Pain Rating Prior to Med Admin: 8 (3/24/2023  8:07 AM)  Phyllis Score: 10 (3/24/2023  9:25 AM)

## 2023-03-24 NOTE — OP NOTE
Orthopaedic Surgery Operative Report     DATE OF PROCEDURE:    03/24/2023    PREOPERATIVE DIAGNOSIS:    Pre-Op Diagnosis Codes:    right  long finger trigger finger    Left ring finger trigger finger    POSTOPERATIVE DIAGNOSIS:   Post-Op Diagnosis Codes:     right  long finger trigger finger    Left ring finger trigger finger    PROCEDURE PERFORMED:   Right long finger trigger Finger Release  Left ring finger trigger finger injection    SURGEON: Rona Lema MD    ASSISTANT: Sly Sultana MD    ANESTHESIA: Local placed by surgeon and MAC    ESTIMATED BLOOD LOSS: none     INDICATIONS FOR PROCEDURE:    Mercedez Purvis is a 68 y.o. female with triggering of the right long finger and left ring finger. She has failed conservative treatment. Therefore, operative intervention was deemed necessary. Risks and benefits were explained to the patient in clinic since performed in clinic.      PROCEDURE IN DETAIL:   After correct site was marked with the patient's participation in the holding area, the patient was brought to the Operating Room, placed in supine position, underwent MAC anesthesia. A well-padded nonsterile tourniquet was placed on the right arm. A time-out was called for correct site, procedure and patient to be indicated. First, we proceeded with the left ring finger trigger finger injection. The hand was prepped and steroid was injected at the level of the A1 pulley.  Attention was then turned to the contralateral side. Under sterile conditions, an injection of lidocaine 1% was injected over the right long finger A1 pulley. The arm was prepped and draped in normal sterile fashion. The incision was marked out using Penny cardinal lines. The arm was exsanguinated using Esmarch. Tourniquet was insufflated to 250 mmHg and that is where it remained throughout the procedure. A 1 cm longitudinal incision was made of the long finger A1 pulley. The soft tissue was carefully dissected to the A1 pulley.  The neurovascular structures were retracted out of the way. The A1 pulley was identified. It was sharply incised. It was completely incised proximally and distally. A portion of the pulley was excised to reduce the risk of recurrence. The tendon was then retracted out of the tendon sheath using a right angle. The finger was placed through range of motion and showed it did not trigger. The area was irrigated with copious amounts of normal saline. 3-0 nylon was used to close the skin. Sterile dressing was applied. Tourniquet was deflated. Brisk capillary refill ensued. The patient was transported to the Recovery Room in stable condition.      PLAN FOR THE PATIENT: Pt is to keep the dressing clean, dry and intact. We will see them back in 2 weeks for stitch removal.

## 2023-03-24 NOTE — PLAN OF CARE
VSS.  Patient tolerating oral liquids without difficulty.   No apparent s&s of distress noted at this time, no complaints voiced at this time.   Discharge instructions reviewed with patient  with good verbal feedback received.   Post op medications delivered to bedside.  Patient ready for discharge.

## 2023-03-24 NOTE — BRIEF OP NOTE
[place] - Surgery ([facility])  Brief Operative Note    Surgery Date: 3/24/2023     Surgeon(s) and Role:     * Rona Lema MD - Primary    Assisting Surgeon: None    Pre-op Diagnosis:  Trigger finger, unspecified finger, unspecified laterality [M65.30]    Post-op Diagnosis:  Post-Op Diagnosis Codes:     * Trigger finger, unspecified finger, unspecified laterality [M65.30]    Procedure(s) (LRB):  RELEASE, TRIGGER FINGER, RIGHT LONG (Right)  INJECTION, STEROID,LEFT RING FINGER (Left)    Anesthesia: Local MAC    Operative Findings: See op note    Estimated Blood Loss: Minimal         Specimens:   Specimen (24h ago, onward)      None              Discharge Note    OUTCOME: Patient tolerated treatment/procedure well without complication and is now ready for discharge.    DISPOSITION: Home or Self Care    FINAL DIAGNOSIS:  Trigger finger    FOLLOWUP: In clinic    DISCHARGE INSTRUCTIONS:    Discharge Procedure Orders   Diet general     Call MD for:  temperature >100.4     Call MD for:  persistent nausea and vomiting     Call MD for:  severe uncontrolled pain     Call MD for:  difficulty breathing, headache or visual disturbances     Call MD for:  redness, tenderness, or signs of infection (pain, swelling, redness, odor or green/yellow discharge around incision site)     Call MD for:  hives     Call MD for:  persistent dizziness or light-headedness     Call MD for:  extreme fatigue     No driving, operating heavy equipment or signing legal documents while taking pain medication     Lifting restrictions     Keep surgical extremity elevated     Leave dressing on - Keep it clean, dry, and intact until clinic visit

## 2023-03-24 NOTE — ANESTHESIA PREPROCEDURE EVALUATION
03/24/2023  Pre-operative evaluation for Procedure(s) (LRB):  RELEASE, TRIGGER FINGER, RIGHT LONG (Right)  INJECTION, STEROID,LEFT RING FINGER (Left)    Mercedez Purvis is a 68 y.o. female     Patient Active Problem List   Diagnosis    Chronic headache    Type 2 diabetes mellitus with diabetic polyneuropathy, with long-term current use of insulin    History of heart artery stent    History of heart attack    Yeast infection    Heart failure    Coronary artery disease of native artery of native heart with stable angina pectoris    TIKA (obstructive sleep apnea)    Gastroesophageal reflux disease without esophagitis    Vitamin D deficiency    Benign paroxysmal positional vertigo due to bilateral vestibular disorder    Lichen simplex chronicus    Bilateral carpal tunnel syndrome    Chronic musculoskeletal pain    Moderate nonproliferative diabetic retinopathy of both eyes without macular edema associated with type 2 diabetes mellitus    Hypertension associated with diabetes    Hyperlipidemia associated with type 2 diabetes mellitus    Class 2 severe obesity due to excess calories with serious comorbidity and body mass index (BMI) of 38.0 to 38.9 in adult    Neuropathy due to type 2 diabetes mellitus    ALEXIS (acute kidney injury)    Chest pain, rule out acute myocardial infarction    Obesity, diabetes, and hypertension syndrome    Benign hypertensive heart disease with HF (heart failure)    Type 2 diabetes mellitus with left eye affected by mild nonproliferative retinopathy and macular edema, with long-term current use of insulin    Type 2 diabetes mellitus with right eye affected by mild nonproliferative retinopathy without macular edema, with long-term current use of insulin    Hyperaldosteronism    Nonischemic cardiomyopathy    Nocturnal headaches    Iron deficiency anemia     Hearing loss    Chronic systolic congestive heart failure    ERRONEOUS ENCOUNTER--DISREGARD       Review of patient's allergies indicates:  No Known Allergies    No current facility-administered medications on file prior to encounter.     Current Outpatient Medications on File Prior to Encounter   Medication Sig Dispense Refill    amitriptyline (ELAVIL) 75 MG tablet Take 1 tablet (75 mg total) by mouth every evening. 90 tablet 0    amLODIPine (NORVASC) 5 MG tablet Take 1 tablet (5 mg total) by mouth once daily. 90 tablet 2    atorvastatin (LIPITOR) 40 MG tablet Take 1 tablet (40 mg total) by mouth every evening. 90 tablet 3    blood sugar diagnostic Strp 1 each by Misc.(Non-Drug; Combo Route) route 4 (four) times daily. 200 each 3    blood-glucose meter,continuous (DEXCOM G6 ) Misc 1 each by Misc.(Non-Drug; Combo Route) route Daily. Use as directed with Dexcom G6 transmitter and sensor 1 each 0    blood-glucose sensor (DEXCOM G6 SENSOR) Roberta 1 each by Misc.(Non-Drug; Combo Route) route every 10 days. Apply/change sensor to skin every 10 days. 3 each 11    blood-glucose transmitter (DEXCOM G6 TRANSMITTER) Roberta 1 each by Misc.(Non-Drug; Combo Route) route Daily. Use transmitter in sensor with G6 system. Change new transmitter every 3 months. 1 each 3    ciclopirox (PENLAC) 8 % Soln Apply topically nightly. 6.6 mL 11    clopidogreL (PLAVIX) 75 mg tablet Take 1 tablet (75 mg total) by mouth once daily. 90 tablet 3    clotrimazole-betamethasone 1-0.05% (LOTRISONE) cream Apply topically 2 (two) times daily. 45 g 3    cyclobenzaprine (FLEXERIL) 10 MG tablet Take 1 tablet (10 mg total) by mouth 3 (three) times a week. 36 tablet 0    diclofenac sodium (VOLTAREN) 1 % Gel Apply 1 application topically.      dulaglutide (TRULICITY) 4.5 mg/0.5 mL pen injector Inject 4.5 mg (one pen) into the skin every 7 days. 4 pen 11    empagliflozin (JARDIANCE) 25 mg tablet Take 1 tablet (25 mg total) by mouth once  "daily. 30 tablet 3    evolocumab (REPATHA SURECLICK) 140 mg/mL PnIj Inject 1 mL (140 mg total) into the skin every 14 (fourteen) days. 2 mL 11    fluconazole (DIFLUCAN) 150 MG Tab Take 1 tablet (150 mg total) by mouth once daily. May repeat after 72 hrs for yeast infection. 2 tablet 1    furosemide (LASIX) 40 MG tablet Take 1 tablet (40 mg total) by mouth once daily. 90 tablet 3    gabapentin (NEURONTIN) 300 MG capsule Take 1 capsule (300 mg total) by mouth 3 (three) times daily. 270 capsule 1    insulin detemir U-100 (LEVEMIR FLEXTOUCH U-100 INSULN) 100 unit/mL (3 mL) InPn pen Inject 40 Units into the skin once daily. FOR EMERGENCY USE ONLY - BACK UP INSULIN, IF OFF OF YOUR INSULIN PUMP - USE 40 UNITS INJECTION DAILY UNTIL GETTING BACK ON PUMP. 15 mL 1    insulin lispro (HUMALOG U-100 INSULIN) 100 unit/mL injection Inject 100 Units into the skin continuous. Gives up to 100 units daily via Omnipod. Do not directly inject - only use within pods. 40 mL 11    LIDOcaine HCL 2% (XYLOCAINE) 2 % jelly Apply topically as needed. Apply topically once nightly to affected part of foot/feet. 30 mL 2    LIDOcaine-prilocaine (EMLA) cream Apply topically 2 (two) times a day. 30 g 1    lisinopriL (PRINIVIL,ZESTRIL) 40 MG tablet Take 1 tablet (40 mg total) by mouth once daily. 90 tablet 3    meclizine (ANTIVERT) 12.5 mg tablet Take 2 tablets (25 mg total) by mouth 2 (two) times daily as needed for Dizziness. 30 tablet 1    metFORMIN (GLUCOPHAGE-XR) 500 MG ER 24hr tablet Take 2 tablets (1,000 mg total) by mouth every morning. 180 tablet 3    metoprolol succinate (TOPROL-XL) 200 MG 24 hr tablet Take 1 tablet (200 mg total) by mouth once daily. 90 tablet 3    pen needle, diabetic (PEN NEEDLE) 32 gauge x 5/32" Ndle 1 each by Misc.(Non-Drug; Combo Route) route 4 (four) times daily. ICD 10 E11.42 400 each 3    spironolactone (ALDACTONE) 50 MG tablet Take 1 tablet (50 mg total) by mouth once daily. 90 tablet 3    " [DISCONTINUED] insulin aspart U-100 (NOVOLOG) 100 unit/mL (3 mL) InPn pen Inject 25 Units into the skin 3 (three) times daily with meals. 69 mL 3       No past surgical history on file.        CBC: No results for input(s): WBC, RBC, HGB, HCT, PLT, MCV, MCH, MCHC in the last 72 hours.    CMP: No results for input(s): NA, K, CL, CO2, BUN, CREATININE, GLU, MG, PHOS, CALCIUM, ALBUMIN, PROT, ALKPHOS, ALT, AST, BILITOT in the last 72 hours.    INR  No results for input(s): PT, INR, PROTIME, APTT in the last 72 hours.        Diagnostic Studies:      EKG:   Age and gender specific analysis   Sinus rhythm with 1st degree A-V block   Low voltage QRS   Borderline Abnormal ECG   When compared with ECG of 29-OCT-2021 12:42,   No significant change was found   Confirmed by Zac RIBEIRO MD ([id number]) on 10/29/2021 8:10:02 PM     2D Echo:  10/30/21   The estimated ejection fraction is 40-45%.   The quantitatively derived ejection fraction is 42-45%.   The left ventricle is normal in size with eccentric hypertrophy and mildly decreased systolic function.   Grade I left ventricular diastolic dysfunction.   There is mild aortic valve stenosis.   Aortic valve area is 1.59 cm2; peak velocity is 1.74 m/s; mean gradient is 6 mmHg.   Mild tricuspid regurgitation.   Trivial anterior pericardial effusion.   Normal right ventricular size with normal right ventricular systolic function.   There are segmental left ventricular wall motion abnormalities.         Pre-op Assessment    I have reviewed the Patient Summary Reports.     I have reviewed the Nursing Notes. I have reviewed the NPO Status.      Review of Systems  Cardiovascular:   Hypertension Past MI CAD  CABG/stent  CHF    Pulmonary:   Sleep Apnea    Hepatic/GI:   GERD    Neurological:   Neuromuscular Disease, Headaches   Peripheral Neuropathy    Endocrine:   Diabetes  Obesity / BMI > 30      Physical Exam  General: Well nourished, Cooperative and Alert    Airway:  Mallampati: II    Mouth Opening: Normal  TM Distance: Normal  Neck ROM: Normal ROM        Anesthesia Plan  Type of Anesthesia, risks & benefits discussed:    Anesthesia Type: Gen Natural Airway, MAC  Intra-op Monitoring Plan: Standard ASA Monitors  Post Op Pain Control Plan: multimodal analgesia and IV/PO Opioids PRN  Induction:  IV  Informed Consent: Informed consent signed with the Patient and all parties understand the risks and agree with anesthesia plan.  All questions answered.   ASA Score: 3  Day of Surgery Review of History & Physical: H&P Update referred to the surgeon/provider.    Ready For Surgery From Anesthesia Perspective.     .

## 2023-03-24 NOTE — TRANSFER OF CARE
Anesthesia Transfer of Care Note    Patient: Mercedez Purvis    Procedure(s) Performed: Procedure(s) (LRB):  RELEASE, TRIGGER FINGER, RIGHT LONG (Right)  INJECTION, STEROID,LEFT RING FINGER (Left)    Patient location: PACU    Anesthesia Type: general    Transport from OR: Transported from OR on 6-10 L/min O2 by face mask with adequate spontaneous ventilation    Post pain: adequate analgesia    Post assessment: no apparent anesthetic complications    Post vital signs: stable    Level of consciousness: sedated    Nausea/Vomiting: no nausea/vomiting    Complications: none    Transfer of care protocol was followed      Last vitals:   Visit Vitals  BP (!) 173/100 (BP Location: Left arm, Patient Position: Lying)   Pulse 89   Temp 36.7 °C (98.1 °F) (Oral)   Resp 18   Ht 5' (1.524 m)   Wt 89.4 kg (197 lb)   LMP 01/01/2020 (Approximate)   SpO2 99%   Breastfeeding No   BMI 38.47 kg/m²

## 2023-04-06 ENCOUNTER — TELEPHONE (OUTPATIENT)
Dept: ORTHOPEDICS | Facility: CLINIC | Age: 68
End: 2023-04-06
Payer: MEDICARE

## 2023-04-10 ENCOUNTER — OFFICE VISIT (OUTPATIENT)
Dept: ORTHOPEDICS | Facility: CLINIC | Age: 68
End: 2023-04-10
Payer: MEDICARE

## 2023-04-10 VITALS
DIASTOLIC BLOOD PRESSURE: 91 MMHG | BODY MASS INDEX: 38.68 KG/M2 | WEIGHT: 197 LBS | SYSTOLIC BLOOD PRESSURE: 162 MMHG | HEART RATE: 101 BPM | HEIGHT: 60 IN

## 2023-04-10 DIAGNOSIS — Z98.890 POST-OPERATIVE STATE: Primary | ICD-10-CM

## 2023-04-10 PROCEDURE — 3077F SYST BP >= 140 MM HG: CPT | Mod: CPTII,S$GLB,, | Performed by: PHYSICIAN ASSISTANT

## 2023-04-10 PROCEDURE — 99999 PR PBB SHADOW E&M-EST. PATIENT-LVL III: ICD-10-PCS | Mod: PBBFAC,,, | Performed by: PHYSICIAN ASSISTANT

## 2023-04-10 PROCEDURE — 1101F PR PT FALLS ASSESS DOC 0-1 FALLS W/OUT INJ PAST YR: ICD-10-PCS | Mod: CPTII,S$GLB,, | Performed by: PHYSICIAN ASSISTANT

## 2023-04-10 PROCEDURE — 99999 PR PBB SHADOW E&M-EST. PATIENT-LVL III: CPT | Mod: PBBFAC,,, | Performed by: PHYSICIAN ASSISTANT

## 2023-04-10 PROCEDURE — 1159F PR MEDICATION LIST DOCUMENTED IN MEDICAL RECORD: ICD-10-PCS | Mod: CPTII,S$GLB,, | Performed by: PHYSICIAN ASSISTANT

## 2023-04-10 PROCEDURE — 99024 POSTOP FOLLOW-UP VISIT: CPT | Mod: S$GLB,,, | Performed by: PHYSICIAN ASSISTANT

## 2023-04-10 PROCEDURE — 3288F FALL RISK ASSESSMENT DOCD: CPT | Mod: CPTII,S$GLB,, | Performed by: PHYSICIAN ASSISTANT

## 2023-04-10 PROCEDURE — 3077F PR MOST RECENT SYSTOLIC BLOOD PRESSURE >= 140 MM HG: ICD-10-PCS | Mod: CPTII,S$GLB,, | Performed by: PHYSICIAN ASSISTANT

## 2023-04-10 PROCEDURE — 3288F PR FALLS RISK ASSESSMENT DOCUMENTED: ICD-10-PCS | Mod: CPTII,S$GLB,, | Performed by: PHYSICIAN ASSISTANT

## 2023-04-10 PROCEDURE — 3080F DIAST BP >= 90 MM HG: CPT | Mod: CPTII,S$GLB,, | Performed by: PHYSICIAN ASSISTANT

## 2023-04-10 PROCEDURE — 1101F PT FALLS ASSESS-DOCD LE1/YR: CPT | Mod: CPTII,S$GLB,, | Performed by: PHYSICIAN ASSISTANT

## 2023-04-10 PROCEDURE — 99024 PR POST-OP FOLLOW-UP VISIT: ICD-10-PCS | Mod: S$GLB,,, | Performed by: PHYSICIAN ASSISTANT

## 2023-04-10 PROCEDURE — 1125F AMNT PAIN NOTED PAIN PRSNT: CPT | Mod: CPTII,S$GLB,, | Performed by: PHYSICIAN ASSISTANT

## 2023-04-10 PROCEDURE — 4010F PR ACE/ARB THEARPY RXD/TAKEN: ICD-10-PCS | Mod: CPTII,S$GLB,, | Performed by: PHYSICIAN ASSISTANT

## 2023-04-10 PROCEDURE — 3008F PR BODY MASS INDEX (BMI) DOCUMENTED: ICD-10-PCS | Mod: CPTII,S$GLB,, | Performed by: PHYSICIAN ASSISTANT

## 2023-04-10 PROCEDURE — 4010F ACE/ARB THERAPY RXD/TAKEN: CPT | Mod: CPTII,S$GLB,, | Performed by: PHYSICIAN ASSISTANT

## 2023-04-10 PROCEDURE — 1159F MED LIST DOCD IN RCRD: CPT | Mod: CPTII,S$GLB,, | Performed by: PHYSICIAN ASSISTANT

## 2023-04-10 PROCEDURE — 3008F BODY MASS INDEX DOCD: CPT | Mod: CPTII,S$GLB,, | Performed by: PHYSICIAN ASSISTANT

## 2023-04-10 PROCEDURE — 3080F PR MOST RECENT DIASTOLIC BLOOD PRESSURE >= 90 MM HG: ICD-10-PCS | Mod: CPTII,S$GLB,, | Performed by: PHYSICIAN ASSISTANT

## 2023-04-10 PROCEDURE — 1125F PR PAIN SEVERITY QUANTIFIED, PAIN PRESENT: ICD-10-PCS | Mod: CPTII,S$GLB,, | Performed by: PHYSICIAN ASSISTANT

## 2023-04-10 NOTE — PROGRESS NOTES
Ms. Purvis is here today for a post-operative visit.  She is 14 days status post right long trigger finger release by Dr. Lema on 3/24/23. She reports that she is doing okay. Pain is intermittent. She is not taking pain medication. She reports pain is better than pre op but also complains of burning throughout the hand. She does have neck pain too. She denies fever, chills, and sweats since the time of the surgery.     She also received left ring trigger finger injection at time of surgery.     Physical exam:    Vitals:    04/10/23 0855   BP: (!) 162/91   Pulse: 101   Weight: 89.4 kg (197 lb)   Height: 5' (1.524 m)   PainSc:   8     Vital signs are stable, patient is afebrile.  Patient is well dressed and well groomed, no acute distress.  Alert and oriented to person, place, and time.  Post op dressing taken down.  Incision is clean, dry and intact.  There is no erythema or exudate.  There is no sign of any infection. She is NVI. Sutures removed without difficulty.  Minimal stiffness with full finger flexion. Negative tinels at the carpal tunnel.     Assessment: status post right long trigger finger release by Dr. Lema on 3/24/23    Plan:  Mercedez was seen today for post-op evaluation.    Diagnoses and all orders for this visit:    Post-operative state  -     Ambulatory referral/consult to Physical/Occupational Therapy; Future        PO instruction reviewed and provided to patient  Therapy ordered  RTC 4 wks if needed

## 2023-04-18 ENCOUNTER — LAB VISIT (OUTPATIENT)
Dept: LAB | Facility: HOSPITAL | Age: 68
End: 2023-04-18
Attending: INTERNAL MEDICINE
Payer: MEDICARE

## 2023-04-18 ENCOUNTER — OFFICE VISIT (OUTPATIENT)
Dept: PRIMARY CARE CLINIC | Facility: CLINIC | Age: 68
End: 2023-04-18
Payer: MEDICARE

## 2023-04-18 VITALS
WEIGHT: 196.19 LBS | DIASTOLIC BLOOD PRESSURE: 90 MMHG | TEMPERATURE: 98 F | BODY MASS INDEX: 38.52 KG/M2 | OXYGEN SATURATION: 99 % | RESPIRATION RATE: 18 BRPM | HEIGHT: 60 IN | SYSTOLIC BLOOD PRESSURE: 165 MMHG | HEART RATE: 72 BPM

## 2023-04-18 DIAGNOSIS — I50.22 CHRONIC SYSTOLIC CONGESTIVE HEART FAILURE: ICD-10-CM

## 2023-04-18 DIAGNOSIS — G89.29 OTHER CHRONIC PAIN: ICD-10-CM

## 2023-04-18 DIAGNOSIS — N18.30 TYPE 2 DIABETES MELLITUS WITH STAGE 3 CHRONIC KIDNEY DISEASE, UNSPECIFIED WHETHER LONG TERM INSULIN USE, UNSPECIFIED WHETHER STAGE 3A OR 3B CKD: ICD-10-CM

## 2023-04-18 DIAGNOSIS — I25.118 CORONARY ARTERY DISEASE OF NATIVE ARTERY OF NATIVE HEART WITH STABLE ANGINA PECTORIS: ICD-10-CM

## 2023-04-18 DIAGNOSIS — E11.22 TYPE 2 DIABETES MELLITUS WITH STAGE 3 CHRONIC KIDNEY DISEASE, UNSPECIFIED WHETHER LONG TERM INSULIN USE, UNSPECIFIED WHETHER STAGE 3A OR 3B CKD: ICD-10-CM

## 2023-04-18 DIAGNOSIS — Z79.4 TYPE 2 DIABETES MELLITUS WITH DIABETIC POLYNEUROPATHY, WITH LONG-TERM CURRENT USE OF INSULIN: ICD-10-CM

## 2023-04-18 DIAGNOSIS — I50.9 ACUTE ON CHRONIC CONGESTIVE HEART FAILURE, UNSPECIFIED HEART FAILURE TYPE: ICD-10-CM

## 2023-04-18 DIAGNOSIS — M79.18 CHRONIC MUSCULOSKELETAL PAIN: ICD-10-CM

## 2023-04-18 DIAGNOSIS — Z00.00 ROUTINE GENERAL MEDICAL EXAMINATION AT A HEALTH CARE FACILITY: Primary | ICD-10-CM

## 2023-04-18 DIAGNOSIS — E11.69 HYPERLIPIDEMIA ASSOCIATED WITH TYPE 2 DIABETES MELLITUS: ICD-10-CM

## 2023-04-18 DIAGNOSIS — E11.42 TYPE 2 DIABETES MELLITUS WITH DIABETIC POLYNEUROPATHY, WITH LONG-TERM CURRENT USE OF INSULIN: ICD-10-CM

## 2023-04-18 DIAGNOSIS — E11.3393 MODERATE NONPROLIFERATIVE DIABETIC RETINOPATHY OF BOTH EYES WITHOUT MACULAR EDEMA ASSOCIATED WITH TYPE 2 DIABETES MELLITUS: ICD-10-CM

## 2023-04-18 DIAGNOSIS — G89.29 CHRONIC NONINTRACTABLE HEADACHE, UNSPECIFIED HEADACHE TYPE: ICD-10-CM

## 2023-04-18 DIAGNOSIS — G89.29 CHRONIC MUSCULOSKELETAL PAIN: ICD-10-CM

## 2023-04-18 DIAGNOSIS — I15.2 HYPERTENSION ASSOCIATED WITH DIABETES: ICD-10-CM

## 2023-04-18 DIAGNOSIS — E78.5 HYPERLIPIDEMIA ASSOCIATED WITH TYPE 2 DIABETES MELLITUS: ICD-10-CM

## 2023-04-18 DIAGNOSIS — E11.59 HYPERTENSION ASSOCIATED WITH DIABETES: ICD-10-CM

## 2023-04-18 DIAGNOSIS — I42.8 NONISCHEMIC CARDIOMYOPATHY: ICD-10-CM

## 2023-04-18 DIAGNOSIS — E26.9 HYPERALDOSTERONISM: ICD-10-CM

## 2023-04-18 DIAGNOSIS — R30.0 DYSURIA: ICD-10-CM

## 2023-04-18 DIAGNOSIS — I77.9 DISORDER OF ARTERIES AND ARTERIOLES, UNSPECIFIED: ICD-10-CM

## 2023-04-18 DIAGNOSIS — R51.9 CHRONIC NONINTRACTABLE HEADACHE, UNSPECIFIED HEADACHE TYPE: ICD-10-CM

## 2023-04-18 DIAGNOSIS — E66.01 CLASS 2 SEVERE OBESITY DUE TO EXCESS CALORIES WITH SERIOUS COMORBIDITY AND BODY MASS INDEX (BMI) OF 38.0 TO 38.9 IN ADULT: ICD-10-CM

## 2023-04-18 DIAGNOSIS — N18.30 STAGE 3 CHRONIC KIDNEY DISEASE, UNSPECIFIED WHETHER STAGE 3A OR 3B CKD: ICD-10-CM

## 2023-04-18 DIAGNOSIS — I10 ESSENTIAL HYPERTENSION: ICD-10-CM

## 2023-04-18 DIAGNOSIS — G47.33 OSA (OBSTRUCTIVE SLEEP APNEA): ICD-10-CM

## 2023-04-18 DIAGNOSIS — H81.13 BENIGN PAROXYSMAL POSITIONAL VERTIGO DUE TO BILATERAL VESTIBULAR DISORDER: ICD-10-CM

## 2023-04-18 DIAGNOSIS — I50.22 CHRONIC SYSTOLIC HEART FAILURE: ICD-10-CM

## 2023-04-18 DIAGNOSIS — D68.69 OTHER THROMBOPHILIA: ICD-10-CM

## 2023-04-18 LAB — BNP SERPL-MCNC: 519 PG/ML (ref 0–99)

## 2023-04-18 PROCEDURE — 1159F MED LIST DOCD IN RCRD: CPT | Mod: CPTII,S$GLB,, | Performed by: INTERNAL MEDICINE

## 2023-04-18 PROCEDURE — 3077F PR MOST RECENT SYSTOLIC BLOOD PRESSURE >= 140 MM HG: ICD-10-PCS | Mod: CPTII,S$GLB,, | Performed by: INTERNAL MEDICINE

## 2023-04-18 PROCEDURE — 99999 PR PBB SHADOW E&M-EST. PATIENT-LVL V: CPT | Mod: PBBFAC,,, | Performed by: INTERNAL MEDICINE

## 2023-04-18 PROCEDURE — 83880 ASSAY OF NATRIURETIC PEPTIDE: CPT | Performed by: INTERNAL MEDICINE

## 2023-04-18 PROCEDURE — 3008F PR BODY MASS INDEX (BMI) DOCUMENTED: ICD-10-PCS | Mod: CPTII,S$GLB,, | Performed by: INTERNAL MEDICINE

## 2023-04-18 PROCEDURE — 1126F AMNT PAIN NOTED NONE PRSNT: CPT | Mod: CPTII,S$GLB,, | Performed by: INTERNAL MEDICINE

## 2023-04-18 PROCEDURE — 36415 COLL VENOUS BLD VENIPUNCTURE: CPT | Mod: PN | Performed by: INTERNAL MEDICINE

## 2023-04-18 PROCEDURE — 1101F PR PT FALLS ASSESS DOC 0-1 FALLS W/OUT INJ PAST YR: ICD-10-PCS | Mod: CPTII,S$GLB,, | Performed by: INTERNAL MEDICINE

## 2023-04-18 PROCEDURE — 4010F ACE/ARB THERAPY RXD/TAKEN: CPT | Mod: CPTII,S$GLB,, | Performed by: INTERNAL MEDICINE

## 2023-04-18 PROCEDURE — 1126F PR PAIN SEVERITY QUANTIFIED, NO PAIN PRESENT: ICD-10-PCS | Mod: CPTII,S$GLB,, | Performed by: INTERNAL MEDICINE

## 2023-04-18 PROCEDURE — 99397 PER PM REEVAL EST PAT 65+ YR: CPT | Mod: GZ,S$GLB,, | Performed by: INTERNAL MEDICINE

## 2023-04-18 PROCEDURE — 3080F PR MOST RECENT DIASTOLIC BLOOD PRESSURE >= 90 MM HG: ICD-10-PCS | Mod: CPTII,S$GLB,, | Performed by: INTERNAL MEDICINE

## 2023-04-18 PROCEDURE — 99397 PR PREVENTIVE VISIT,EST,65 & OVER: ICD-10-PCS | Mod: GZ,S$GLB,, | Performed by: INTERNAL MEDICINE

## 2023-04-18 PROCEDURE — 3288F FALL RISK ASSESSMENT DOCD: CPT | Mod: CPTII,S$GLB,, | Performed by: INTERNAL MEDICINE

## 2023-04-18 PROCEDURE — 1159F PR MEDICATION LIST DOCUMENTED IN MEDICAL RECORD: ICD-10-PCS | Mod: CPTII,S$GLB,, | Performed by: INTERNAL MEDICINE

## 2023-04-18 PROCEDURE — 1101F PT FALLS ASSESS-DOCD LE1/YR: CPT | Mod: CPTII,S$GLB,, | Performed by: INTERNAL MEDICINE

## 2023-04-18 PROCEDURE — 3080F DIAST BP >= 90 MM HG: CPT | Mod: CPTII,S$GLB,, | Performed by: INTERNAL MEDICINE

## 2023-04-18 PROCEDURE — 3008F BODY MASS INDEX DOCD: CPT | Mod: CPTII,S$GLB,, | Performed by: INTERNAL MEDICINE

## 2023-04-18 PROCEDURE — 3077F SYST BP >= 140 MM HG: CPT | Mod: CPTII,S$GLB,, | Performed by: INTERNAL MEDICINE

## 2023-04-18 PROCEDURE — 3288F PR FALLS RISK ASSESSMENT DOCUMENTED: ICD-10-PCS | Mod: CPTII,S$GLB,, | Performed by: INTERNAL MEDICINE

## 2023-04-18 PROCEDURE — 4010F PR ACE/ARB THEARPY RXD/TAKEN: ICD-10-PCS | Mod: CPTII,S$GLB,, | Performed by: INTERNAL MEDICINE

## 2023-04-18 PROCEDURE — 99999 PR PBB SHADOW E&M-EST. PATIENT-LVL V: ICD-10-PCS | Mod: PBBFAC,,, | Performed by: INTERNAL MEDICINE

## 2023-04-18 RX ORDER — GABAPENTIN 300 MG/1
CAPSULE ORAL
Qty: 270 CAPSULE | Refills: 1 | Status: SHIPPED | OUTPATIENT
Start: 2023-04-18 | End: 2023-10-13 | Stop reason: SDUPTHER

## 2023-04-18 RX ORDER — METOPROLOL SUCCINATE 200 MG/1
200 TABLET, EXTENDED RELEASE ORAL DAILY
Qty: 90 TABLET | Refills: 3 | Status: SHIPPED | OUTPATIENT
Start: 2023-04-18 | End: 2024-04-21

## 2023-04-18 RX ORDER — DICLOFENAC SODIUM 10 MG/G
GEL TOPICAL DAILY
Qty: 100 G | Refills: 0 | Status: SHIPPED | OUTPATIENT
Start: 2023-04-18

## 2023-04-18 RX ORDER — DULAGLUTIDE 4.5 MG/.5ML
4.5 INJECTION, SOLUTION SUBCUTANEOUS
Qty: 4 PEN | Refills: 11 | Status: SHIPPED | OUTPATIENT
Start: 2023-04-18 | End: 2023-05-15

## 2023-04-18 RX ORDER — CYCLOBENZAPRINE HCL 10 MG
10 TABLET ORAL
Qty: 90 TABLET | Refills: 0 | Status: SHIPPED | OUTPATIENT
Start: 2023-04-19

## 2023-04-18 RX ORDER — EVOLOCUMAB 140 MG/ML
140 INJECTION, SOLUTION SUBCUTANEOUS
Qty: 2 ML | Refills: 11 | Status: SHIPPED | OUTPATIENT
Start: 2023-04-18 | End: 2023-04-18

## 2023-04-18 RX ORDER — INSULIN PUMP CONTROLLER
EACH MISCELLANEOUS
COMMUNITY
Start: 2023-01-12 | End: 2023-05-04 | Stop reason: SDUPTHER

## 2023-04-18 RX ORDER — METFORMIN HYDROCHLORIDE 500 MG/1
1000 TABLET, EXTENDED RELEASE ORAL EVERY MORNING
Qty: 180 TABLET | Refills: 3 | Status: SHIPPED | OUTPATIENT
Start: 2023-04-18 | End: 2023-10-18 | Stop reason: SDUPTHER

## 2023-04-18 RX ORDER — SPIRONOLACTONE 50 MG/1
50 TABLET, FILM COATED ORAL DAILY
Qty: 90 TABLET | Refills: 3 | Status: SHIPPED | OUTPATIENT
Start: 2023-04-18

## 2023-04-18 RX ORDER — CLOTRIMAZOLE AND BETAMETHASONE DIPROPIONATE 10; .64 MG/G; MG/G
CREAM TOPICAL 2 TIMES DAILY
Qty: 45 G | Refills: 3 | Status: SHIPPED | OUTPATIENT
Start: 2023-04-18

## 2023-04-18 RX ORDER — ATORVASTATIN CALCIUM 40 MG/1
40 TABLET, FILM COATED ORAL NIGHTLY
Qty: 90 TABLET | Refills: 3 | Status: SHIPPED | OUTPATIENT
Start: 2023-04-18 | End: 2023-06-08

## 2023-04-18 RX ORDER — AMLODIPINE BESYLATE 10 MG/1
10 TABLET ORAL DAILY
Qty: 90 TABLET | Refills: 3 | Status: SHIPPED | OUTPATIENT
Start: 2023-04-18 | End: 2024-04-21

## 2023-04-18 RX ORDER — CLOPIDOGREL BISULFATE 75 MG/1
75 TABLET ORAL DAILY
Qty: 90 TABLET | Refills: 3 | Status: SHIPPED | OUTPATIENT
Start: 2023-04-18 | End: 2024-04-21

## 2023-04-18 RX ORDER — FUROSEMIDE 10 MG/ML
20 INJECTION INTRAMUSCULAR; INTRAVENOUS
Status: DISCONTINUED | OUTPATIENT
Start: 2023-04-18 | End: 2023-10-18

## 2023-04-18 RX ORDER — FUROSEMIDE 40 MG/1
40 TABLET ORAL DAILY
Qty: 90 TABLET | Refills: 3 | Status: SHIPPED | OUTPATIENT
Start: 2023-04-18

## 2023-04-18 RX ORDER — CICLOPIROX 80 MG/ML
SOLUTION TOPICAL NIGHTLY
Qty: 6.6 ML | Refills: 11 | Status: SHIPPED | OUTPATIENT
Start: 2023-04-18 | End: 2023-06-28

## 2023-04-18 RX ORDER — GABAPENTIN 300 MG/1
300 CAPSULE ORAL 3 TIMES DAILY
Qty: 270 CAPSULE | Refills: 1 | Status: SHIPPED | OUTPATIENT
Start: 2023-04-18 | End: 2023-04-18 | Stop reason: SDUPTHER

## 2023-04-18 RX ORDER — AMITRIPTYLINE HYDROCHLORIDE 75 MG/1
75 TABLET ORAL NIGHTLY
Qty: 90 TABLET | Refills: 0 | Status: SHIPPED | OUTPATIENT
Start: 2023-04-18 | End: 2023-07-20 | Stop reason: SDUPTHER

## 2023-04-18 RX ORDER — LISINOPRIL 40 MG/1
40 TABLET ORAL DAILY
Qty: 90 TABLET | Refills: 3 | Status: SHIPPED | OUTPATIENT
Start: 2023-04-18 | End: 2023-07-17

## 2023-04-18 NOTE — PATIENT INSTRUCTIONS
Monitor BP at home.  Please call the office with BP log or return for nurse BP check in 2 wks.    6 months for well visit or sooner if needed.

## 2023-04-18 NOTE — PROGRESS NOTES
Subjective:      Patient ID: Mercedez Purvis is a 68 y.o. female.    Chief Complaint: Annual Exam and Establish Care      Mercedez Purvis is a 68 y.o. female with PMH significant for CAD, MI s/p sten placement,T2DM, diabetic neuropathy, obesity, HTN, HLD, hyperaldosteronism on spironolactone, diabetic retinopathy, CAD with stable angina, sCHF, CKD3, GERD, BPPV, TIKA, migraines.   Presenting today to establish care. Date of last annual is 3/21/2022    Acute sCHF exacerbation: Note increase work of breathing over the past several days. Note increased weight. Denies associated CP. She has taken 40mg of lasix this morning and is compliant with her medications. Check BNP, give IV 20mg lasix today. Instruct patient to double up on lasix for the next 3 days, take 80mg total/day.     Echo 10/2021  The estimated ejection fraction is 40-45%.  The quantitatively derived ejection fraction is 42-45%.  The left ventricle is normal in size with eccentric hypertrophy and mildly decreased systolic function.  Grade I left ventricular diastolic dysfunction.  There is mild aortic valve stenosis.  Aortic valve area is 1.59 cm2; peak velocity is 1.74 m/s; mean gradient is 6 mmHg.  Mild tricuspid regurgitation.  Trivial anterior pericardial effusion.  Normal right ventricular size with normal right ventricular systolic function.  There are segmental left ventricular wall motion abnormalities.     T2DM: Was previously working with Margarita Mejia NP but has been having issues scheduling appointments. Will make referral for NP diabetes today. Continue current regimen of trulicy,jardiance, metformin and humalog. Last A1c 7.7%.    HTN: Office BP is 165/90 . No CP/SOB/lightheadedness/dizziness/palpitations. Increase amlodipine from 5 to 10mg today    HLD: Was previously on repatha for LDL goal <100. Review of recent lipid profile show her LDL being well controlled. She has been out of repatha and has not used this medication in  several weeks. Recheck lipid panel.    Denies any chest pain, shortness of breath, nausea vomiting constipation diarrhea, blood in stool, heartburn    Review of Systems   Constitutional:  Negative for chills, fever and weight loss.   HENT:  Negative for congestion, ear pain and sore throat.    Eyes:  Negative for double vision.   Respiratory:  Negative for cough and shortness of breath.    Cardiovascular:  Negative for chest pain, palpitations and leg swelling.   Gastrointestinal:  Negative for abdominal pain, heartburn, nausea and vomiting.   Skin:  Negative for rash.   Neurological:  Negative for dizziness, tingling and headaches.   Psychiatric/Behavioral:  Negative for depression.         Current Outpatient Medications:     blood sugar diagnostic Strp, 1 each by Misc.(Non-Drug; Combo Route) route 4 (four) times daily., Disp: 200 each, Rfl: 3    blood-glucose meter,continuous (DEXCOM G6 ) Misc, 1 each by Misc.(Non-Drug; Combo Route) route Daily. Use as directed with Dexcom G6 transmitter and sensor, Disp: 1 each, Rfl: 0    blood-glucose sensor (DEXCOM G6 SENSOR) Roberta, 1 each by Misc.(Non-Drug; Combo Route) route every 10 days. Apply/change sensor to skin every 10 days., Disp: 3 each, Rfl: 11    blood-glucose transmitter (DEXCOM G6 TRANSMITTER) Roberta, 1 each by Misc.(Non-Drug; Combo Route) route Daily. Use transmitter in sensor with G6 system. Change new transmitter every 3 months., Disp: 1 each, Rfl: 3    fluconazole (DIFLUCAN) 150 MG Tab, Take 1 tablet (150 mg total) by mouth once daily. May repeat after 72 hrs for yeast infection., Disp: 2 tablet, Rfl: 1    insulin detemir U-100 (LEVEMIR FLEXTOUCH U-100 INSULN) 100 unit/mL (3 mL) InPn pen, Inject 40 Units into the skin once daily. FOR EMERGENCY USE ONLY - BACK UP INSULIN, IF OFF OF YOUR INSULIN PUMP - USE 40 UNITS INJECTION DAILY UNTIL GETTING BACK ON PUMP., Disp: 15 mL, Rfl: 1    insulin lispro (HUMALOG U-100 INSULIN) 100 unit/mL injection, Inject 100  "Units into the skin continuous. Gives up to 100 units daily via Omnipod. Do not directly inject - only use within pods., Disp: 40 mL, Rfl: 11    LIDOcaine HCL 2% (XYLOCAINE) 2 % jelly, Apply topically as needed. Apply topically once nightly to affected part of foot/feet., Disp: 30 mL, Rfl: 2    LIDOcaine-prilocaine (EMLA) cream, Apply topically 2 (two) times a day., Disp: 30 g, Rfl: 1    meclizine (ANTIVERT) 12.5 mg tablet, Take 2 tablets (25 mg total) by mouth 2 (two) times daily as needed for Dizziness., Disp: 30 tablet, Rfl: 1    OMNIPOD DASH PODS, GEN 4, Crtg, SMARTSIG:SUB-Q Every Other Day, Disp: , Rfl:     pen needle, diabetic (PEN NEEDLE) 32 gauge x 5/32" Ndle, 1 each by Misc.(Non-Drug; Combo Route) route 4 (four) times daily. ICD 10 E11.42, Disp: 400 each, Rfl: 3    amitriptyline (ELAVIL) 75 MG tablet, Take 1 tablet (75 mg total) by mouth every evening., Disp: 90 tablet, Rfl: 0    amLODIPine (NORVASC) 10 MG tablet, Take 1 tablet (10 mg total) by mouth once daily., Disp: 90 tablet, Rfl: 3    atorvastatin (LIPITOR) 40 MG tablet, Take 1 tablet (40 mg total) by mouth every evening., Disp: 90 tablet, Rfl: 3    ciclopirox (PENLAC) 8 % Soln, Apply topically nightly., Disp: 6.6 mL, Rfl: 11    clopidogreL (PLAVIX) 75 mg tablet, Take 1 tablet (75 mg total) by mouth once daily., Disp: 90 tablet, Rfl: 3    clotrimazole-betamethasone 1-0.05% (LOTRISONE) cream, Apply topically 2 (two) times daily., Disp: 45 g, Rfl: 3    [START ON 4/19/2023] cyclobenzaprine (FLEXERIL) 10 MG tablet, Take 1 tablet (10 mg total) by mouth 3 (three) times a week., Disp: 90 tablet, Rfl: 0    diclofenac sodium (VOLTAREN) 1 % Gel, Apply topically once daily., Disp: 100 g, Rfl: 0    dulaglutide (TRULICITY) 4.5 mg/0.5 mL pen injector, Inject 4.5 mg (one pen) into the skin every 7 days., Disp: 4 pen, Rfl: 11    empagliflozin (JARDIANCE) 25 mg tablet, Take 1 tablet (25 mg total) by mouth once daily., Disp: 30 tablet, Rfl: 3    furosemide (LASIX) 40 MG " tablet, Take 1 tablet (40 mg total) by mouth once daily., Disp: 90 tablet, Rfl: 3    gabapentin (NEURONTIN) 300 MG capsule, Take 1 capsule (300 mg total) by mouth 2 (two) times daily AND 2 capsules (600 mg total) every evening., Disp: 270 capsule, Rfl: 1    lisinopriL (PRINIVIL,ZESTRIL) 40 MG tablet, Take 1 tablet (40 mg total) by mouth once daily., Disp: 90 tablet, Rfl: 3    metFORMIN (GLUCOPHAGE-XR) 500 MG ER 24hr tablet, Take 2 tablets (1,000 mg total) by mouth every morning., Disp: 180 tablet, Rfl: 3    metoprolol succinate (TOPROL-XL) 200 MG 24 hr tablet, Take 1 tablet (200 mg total) by mouth once daily., Disp: 90 tablet, Rfl: 3    spironolactone (ALDACTONE) 50 MG tablet, Take 1 tablet (50 mg total) by mouth once daily., Disp: 90 tablet, Rfl: 3    Current Facility-Administered Medications:     furosemide injection 20 mg, 20 mg, Intravenous, 1 time in Clinic/HOD, Dottie Merritt MD    Lab Results   Component Value Date    HGBA1C 7.7 (H) 12/16/2022    HGBA1C 8.6 (H) 07/06/2022    HGBA1C 10.0 (H) 03/21/2022     Lab Results   Component Value Date    MICALBCREAT 20.0 03/21/2022     Lab Results   Component Value Date    LDLCALC 28.2 (L) 12/16/2022    LDLCALC 23.6 (L) 03/21/2022    CHOL 113 (L) 12/16/2022    HDL 39 (L) 12/16/2022    TRIG 229 (H) 12/16/2022       Lab Results   Component Value Date     12/16/2022    K 3.8 12/16/2022     12/16/2022    CO2 29 12/16/2022     (H) 12/16/2022    BUN 13 12/16/2022    CREATININE 0.9 12/16/2022    CALCIUM 8.8 12/16/2022    PROT 7.0 12/16/2022    ALBUMIN 3.2 (L) 12/16/2022    BILITOT 0.3 12/16/2022    ALKPHOS 111 12/16/2022    AST 26 12/16/2022    ALT 26 12/16/2022    ANIONGAP 7 (L) 12/16/2022    ESTGFRAFRICA >60.0 03/21/2022    EGFRNONAA 58.0 (A) 03/21/2022    WBC 7.73 10/30/2021    HGB 11.3 (L) 10/30/2021    HGB 11.8 (L) 10/29/2021    HCT 37.0 10/30/2021    MCV 72 (L) 10/30/2021     10/30/2021    TSH 2.301 07/13/2020    HEPCAB Negative 10/29/2021        Lab Results   Component Value Date    VVAQTAXO56CK 18 (L) 07/13/2020    PXVTPKKG42PV 16 (L) 09/23/2010         Past Medical History:   Diagnosis Date    Chronic headache     Diabetes     Heart attack 2004    8 stents    Heart failure     History of heart artery stent     Hyperlipemia     Hypertension     Obesity (BMI 30-39.9)     Yeast infection      Past Surgical History:   Procedure Laterality Date    CARDIAC CATHETERIZATION      INJECTION OF STEROID Left 3/24/2023    Procedure: INJECTION, STEROID,LEFT RING FINGER;  Surgeon: Rona Lema MD;  Location: [facility] OR;  Service: Orthopedics;  Laterality: Left;    TRIGGER FINGER RELEASE Right 3/24/2023    Procedure: RELEASE, TRIGGER FINGER, RIGHT LONG;  Surgeon: Rona Lema MD;  Location: [facility] OR;  Service: Orthopedics;  Laterality: Right;     Social History     Social History Narrative    Not on file     History reviewed. No pertinent family history.  Vitals:    04/18/23 1018   BP: (!) 165/90   Pulse: 72   Resp: 18   Temp: 98 °F (36.7 °C)   SpO2: 99%   Weight: 89 kg (196 lb 3.4 oz)   Height: 5' (1.524 m)   PainSc: 0-No pain     Objective:   Physical Exam  Vitals reviewed.   Constitutional:       Appearance: Normal appearance.   HENT:      Head: Normocephalic.      Right Ear: Tympanic membrane, ear canal and external ear normal.      Left Ear: Tympanic membrane, ear canal and external ear normal.      Nose: Nose normal.      Mouth/Throat:      Mouth: Mucous membranes are moist.      Pharynx: Oropharynx is clear.   Eyes:      Conjunctiva/sclera: Conjunctivae normal.      Pupils: Pupils are equal, round, and reactive to light.   Cardiovascular:      Rate and Rhythm: Normal rate and regular rhythm.      Pulses: Normal pulses.   Pulmonary:      Effort: Pulmonary effort is normal.      Breath sounds: Normal breath sounds.   Abdominal:      General: Abdomen is flat. Bowel sounds are normal.      Palpations: Abdomen is soft.   Musculoskeletal:       Cervical back: Neck supple.   Skin:     General: Skin is warm.   Neurological:      General: No focal deficit present.      Mental Status: She is alert.   Psychiatric:         Mood and Affect: Mood normal.     Assessment/Plan     Mercedez Purvis is a 68 y.o.female with:    Routine general medical examination at a health care facility  -     Hemoglobin A1C; Future; Expected date: 04/18/2023  -     CBC Auto Differential; Future; Expected date: 04/18/2023  -     Comprehensive Metabolic Panel; Future; Expected date: 04/18/2023  -     TSH; Future; Expected date: 04/18/2023  -     Lipid Panel; Future; Expected date: 04/18/2023    Other thrombophilia    Disorder of arteries and arterioles, unspecified  -     Lipid Panel; Future; Expected date: 04/18/2023    Stage 3 chronic kidney disease, unspecified whether stage 3a or 3b CKD  -     dulaglutide (TRULICITY) 4.5 mg/0.5 mL pen injector; Inject 4.5 mg (one pen) into the skin every 7 days.  Dispense: 4 pen; Refill: 11  -     empagliflozin (JARDIANCE) 25 mg tablet; Take 1 tablet (25 mg total) by mouth once daily.  Dispense: 30 tablet; Refill: 3  -     Discontinue: evolocumab (REPATHA SURECLICK) 140 mg/mL PnIj; Inject 1 mL (140 mg total) into the skin every 14 (fourteen) days.  Dispense: 2 mL; Refill: 11  -     CBC Auto Differential; Future; Expected date: 04/18/2023  -     Comprehensive Metabolic Panel; Future; Expected date: 04/18/2023    Class 2 severe obesity due to excess calories with serious comorbidity and body mass index (BMI) of 38.0 to 38.9 in adult    Chronic systolic congestive heart failure    Coronary artery disease of native artery of native heart with stable angina pectoris  -     atorvastatin (LIPITOR) 40 MG tablet; Take 1 tablet (40 mg total) by mouth every evening.  Dispense: 90 tablet; Refill: 3  -     clopidogreL (PLAVIX) 75 mg tablet; Take 1 tablet (75 mg total) by mouth once daily.  Dispense: 90 tablet; Refill: 3  -     metoprolol succinate  (TOPROL-XL) 200 MG 24 hr tablet; Take 1 tablet (200 mg total) by mouth once daily.  Dispense: 90 tablet; Refill: 3  -     Comprehensive Metabolic Panel; Future; Expected date: 04/18/2023  -     Lipid Panel; Future; Expected date: 04/18/2023    Moderate nonproliferative diabetic retinopathy of both eyes without macular edema associated with type 2 diabetes mellitus    Benign paroxysmal positional vertigo due to bilateral vestibular disorder    Hypertension associated with diabetes  -     amLODIPine (NORVASC) 10 MG tablet; Take 1 tablet (10 mg total) by mouth once daily.  Dispense: 90 tablet; Refill: 3  -     Ambulatory referral/consult to Diabetic Advanced Practice Providers (Medical Management); Future; Expected date: 04/25/2023  -     TSH; Future; Expected date: 04/18/2023    Hyperlipidemia associated with type 2 diabetes mellitus  -     Lipid Panel; Future; Expected date: 04/18/2023    Nonischemic cardiomyopathy    Hyperaldosteronism  -     Comprehensive Metabolic Panel; Future; Expected date: 04/18/2023    TIKA (obstructive sleep apnea)    Chronic nonintractable headache, unspecified headache type  -     amitriptyline (ELAVIL) 75 MG tablet; Take 1 tablet (75 mg total) by mouth every evening.  Dispense: 90 tablet; Refill: 0    Essential hypertension  -     lisinopriL (PRINIVIL,ZESTRIL) 40 MG tablet; Take 1 tablet (40 mg total) by mouth once daily.  Dispense: 90 tablet; Refill: 3  -     metoprolol succinate (TOPROL-XL) 200 MG 24 hr tablet; Take 1 tablet (200 mg total) by mouth once daily.  Dispense: 90 tablet; Refill: 3    Chronic systolic heart failure  -     atorvastatin (LIPITOR) 40 MG tablet; Take 1 tablet (40 mg total) by mouth every evening.  Dispense: 90 tablet; Refill: 3  -     furosemide (LASIX) 40 MG tablet; Take 1 tablet (40 mg total) by mouth once daily.  Dispense: 90 tablet; Refill: 3  -     spironolactone (ALDACTONE) 50 MG tablet; Take 1 tablet (50 mg total) by mouth once daily.  Dispense: 90 tablet;  Refill: 3    Chronic musculoskeletal pain  -     cyclobenzaprine (FLEXERIL) 10 MG tablet; Take 1 tablet (10 mg total) by mouth 3 (three) times a week.  Dispense: 90 tablet; Refill: 0  -     diclofenac sodium (VOLTAREN) 1 % Gel; Apply topically once daily.  Dispense: 100 g; Refill: 0    Type 2 diabetes mellitus with diabetic polyneuropathy, with long-term current use of insulin  -     Discontinue: gabapentin (NEURONTIN) 300 MG capsule; Take 1 capsule (300 mg total) by mouth 3 (three) times daily.  Dispense: 270 capsule; Refill: 1  -     metFORMIN (GLUCOPHAGE-XR) 500 MG ER 24hr tablet; Take 2 tablets (1,000 mg total) by mouth every morning.  Dispense: 180 tablet; Refill: 3  -     gabapentin (NEURONTIN) 300 MG capsule; Take 1 capsule (300 mg total) by mouth 2 (two) times daily AND 2 capsules (600 mg total) every evening.  Dispense: 270 capsule; Refill: 1  -     Hemoglobin A1C; Future; Expected date: 04/18/2023    Type 2 diabetes mellitus with stage 3 chronic kidney disease, unspecified whether long term insulin use, unspecified whether stage 3a or 3b CKD    Other chronic pain  -     Ambulatory referral/consult to Pain Clinic; Future; Expected date: 04/25/2023    Acute on chronic congestive heart failure, unspecified heart failure type  -     furosemide injection 20 mg  -     B-TYPE NATRIURETIC PEPTIDE; Future; Expected date: 04/18/2023    Dysuria  -     Urinalysis, Reflex to Urine Culture Urine, Clean Catch  -     POCT URINALYSIS    Other orders  -     clotrimazole-betamethasone 1-0.05% (LOTRISONE) cream; Apply topically 2 (two) times daily.  Dispense: 45 g; Refill: 3  -     ciclopirox (PENLAC) 8 % Soln; Apply topically nightly.  Dispense: 6.6 mL; Refill: 11         Chronic conditions status updated as per HPI.  Other than changes above, cont current medications and maintain follow up with specialists.  Return to clinic in Follow up in about 6 months (around 10/18/2023).      Dottie Merritt MD  [facility]    Patient Instructions   Monitor BP at home.  Please call the office with BP log or return for nurse BP check in 2 wks.    6 months for well visit or sooner if needed.   Tests to Keep You Healthy    Mammogram: Met on 7/12/2022  Eye Exam: Met on 7/27/2022  Colon Cancer Screening: Met on 4/15/2022  Last Blood Pressure <= 139/89 (4/18/2023): NO  Last HbA1c < 8 (12/16/2022): Yes

## 2023-04-19 NOTE — PROGRESS NOTES
BNP shows that you are overloaded, as we discussed in the office. Please be sure to take DOUBLE dose of lasix (to total 80mg) for the next 3 days as we discussed already.

## 2023-04-21 ENCOUNTER — LAB VISIT (OUTPATIENT)
Dept: LAB | Facility: HOSPITAL | Age: 68
End: 2023-04-21
Payer: MEDICARE

## 2023-04-21 DIAGNOSIS — Z79.4 TYPE 2 DIABETES MELLITUS WITH DIABETIC POLYNEUROPATHY, WITH LONG-TERM CURRENT USE OF INSULIN: ICD-10-CM

## 2023-04-21 DIAGNOSIS — N18.30 STAGE 3 CHRONIC KIDNEY DISEASE, UNSPECIFIED WHETHER STAGE 3A OR 3B CKD: ICD-10-CM

## 2023-04-21 DIAGNOSIS — E11.69 HYPERLIPIDEMIA ASSOCIATED WITH TYPE 2 DIABETES MELLITUS: ICD-10-CM

## 2023-04-21 DIAGNOSIS — E26.9 HYPERALDOSTERONISM: ICD-10-CM

## 2023-04-21 DIAGNOSIS — E11.59 HYPERTENSION ASSOCIATED WITH DIABETES: ICD-10-CM

## 2023-04-21 DIAGNOSIS — E11.42 TYPE 2 DIABETES MELLITUS WITH DIABETIC POLYNEUROPATHY, WITH LONG-TERM CURRENT USE OF INSULIN: ICD-10-CM

## 2023-04-21 DIAGNOSIS — I77.9 DISORDER OF ARTERIES AND ARTERIOLES, UNSPECIFIED: ICD-10-CM

## 2023-04-21 DIAGNOSIS — E78.5 HYPERLIPIDEMIA ASSOCIATED WITH TYPE 2 DIABETES MELLITUS: ICD-10-CM

## 2023-04-21 DIAGNOSIS — I25.118 CORONARY ARTERY DISEASE OF NATIVE ARTERY OF NATIVE HEART WITH STABLE ANGINA PECTORIS: ICD-10-CM

## 2023-04-21 DIAGNOSIS — Z00.00 ROUTINE GENERAL MEDICAL EXAMINATION AT A HEALTH CARE FACILITY: ICD-10-CM

## 2023-04-21 DIAGNOSIS — I15.2 HYPERTENSION ASSOCIATED WITH DIABETES: ICD-10-CM

## 2023-04-21 LAB
ALBUMIN SERPL BCP-MCNC: 3.2 G/DL (ref 3.5–5.2)
ALP SERPL-CCNC: 100 U/L (ref 55–135)
ALT SERPL W/O P-5'-P-CCNC: 12 U/L (ref 10–44)
ANION GAP SERPL CALC-SCNC: 11 MMOL/L (ref 8–16)
AST SERPL-CCNC: 11 U/L (ref 10–40)
BASOPHILS # BLD AUTO: 0.03 K/UL (ref 0–0.2)
BASOPHILS NFR BLD: 0.4 % (ref 0–1.9)
BILIRUB SERPL-MCNC: 0.4 MG/DL (ref 0.1–1)
BUN SERPL-MCNC: 13 MG/DL (ref 8–23)
CALCIUM SERPL-MCNC: 8.8 MG/DL (ref 8.7–10.5)
CHLORIDE SERPL-SCNC: 102 MMOL/L (ref 95–110)
CHOLEST SERPL-MCNC: 161 MG/DL (ref 120–199)
CHOLEST/HDLC SERPL: 4.4 {RATIO} (ref 2–5)
CO2 SERPL-SCNC: 27 MMOL/L (ref 23–29)
CREAT SERPL-MCNC: 1.1 MG/DL (ref 0.5–1.4)
DIFFERENTIAL METHOD: ABNORMAL
EOSINOPHIL # BLD AUTO: 0.1 K/UL (ref 0–0.5)
EOSINOPHIL NFR BLD: 1.3 % (ref 0–8)
ERYTHROCYTE [DISTWIDTH] IN BLOOD BY AUTOMATED COUNT: 19.1 % (ref 11.5–14.5)
EST. GFR  (NO RACE VARIABLE): 54.4 ML/MIN/1.73 M^2
ESTIMATED AVG GLUCOSE: 169 MG/DL (ref 68–131)
GLUCOSE SERPL-MCNC: 100 MG/DL (ref 70–110)
HBA1C MFR BLD: 7.5 % (ref 4–5.6)
HCT VFR BLD AUTO: 44.2 % (ref 37–48.5)
HDLC SERPL-MCNC: 37 MG/DL (ref 40–75)
HDLC SERPL: 23 % (ref 20–50)
HGB BLD-MCNC: 12.7 G/DL (ref 12–16)
IMM GRANULOCYTES # BLD AUTO: 0.01 K/UL (ref 0–0.04)
IMM GRANULOCYTES NFR BLD AUTO: 0.1 % (ref 0–0.5)
LDLC SERPL CALC-MCNC: 103.8 MG/DL (ref 63–159)
LYMPHOCYTES # BLD AUTO: 2.9 K/UL (ref 1–4.8)
LYMPHOCYTES NFR BLD: 40.6 % (ref 18–48)
MCH RBC QN AUTO: 21.2 PG (ref 27–31)
MCHC RBC AUTO-ENTMCNC: 28.7 G/DL (ref 32–36)
MCV RBC AUTO: 74 FL (ref 82–98)
MONOCYTES # BLD AUTO: 0.7 K/UL (ref 0.3–1)
MONOCYTES NFR BLD: 9.4 % (ref 4–15)
NEUTROPHILS # BLD AUTO: 3.4 K/UL (ref 1.8–7.7)
NEUTROPHILS NFR BLD: 48.2 % (ref 38–73)
NONHDLC SERPL-MCNC: 124 MG/DL
NRBC BLD-RTO: 0 /100 WBC
PLATELET # BLD AUTO: 320 K/UL (ref 150–450)
PMV BLD AUTO: 11.3 FL (ref 9.2–12.9)
POTASSIUM SERPL-SCNC: 4 MMOL/L (ref 3.5–5.1)
PROT SERPL-MCNC: 7.1 G/DL (ref 6–8.4)
RBC # BLD AUTO: 5.98 M/UL (ref 4–5.4)
SODIUM SERPL-SCNC: 140 MMOL/L (ref 136–145)
TRIGL SERPL-MCNC: 101 MG/DL (ref 30–150)
TSH SERPL DL<=0.005 MIU/L-ACNC: 1.52 UIU/ML (ref 0.4–4)
WBC # BLD AUTO: 7.1 K/UL (ref 3.9–12.7)

## 2023-04-21 PROCEDURE — 80053 COMPREHEN METABOLIC PANEL: CPT | Performed by: INTERNAL MEDICINE

## 2023-04-21 PROCEDURE — 85025 COMPLETE CBC W/AUTO DIFF WBC: CPT | Performed by: INTERNAL MEDICINE

## 2023-04-21 PROCEDURE — 84443 ASSAY THYROID STIM HORMONE: CPT | Performed by: INTERNAL MEDICINE

## 2023-04-21 PROCEDURE — 83036 HEMOGLOBIN GLYCOSYLATED A1C: CPT | Performed by: INTERNAL MEDICINE

## 2023-04-21 PROCEDURE — 80061 LIPID PANEL: CPT | Performed by: INTERNAL MEDICINE

## 2023-04-21 PROCEDURE — 36415 COLL VENOUS BLD VENIPUNCTURE: CPT | Performed by: INTERNAL MEDICINE

## 2023-04-24 NOTE — PROGRESS NOTES
Your blood count (CBC) is unremarkable.    Your sugar number (Glucose) is within normal limits.  Your A1c is 7.5%.   Rest of your electrolytes are unremarkable.    Your kidney (BUN, Creatinine and GFT) function is stable.  Your liver (AST, ALT) function is unremarkable.  These are the filters in your body for medicine, food and liquids that you ingest.    Your Cholesterol is NORMAL. Continue to focus on low fat, high fiber foods and aerobic exericse (huffing & puffing) for at least 20 minutes most days of the week.    Your Thyroid numbers are normal.

## 2023-05-04 ENCOUNTER — OFFICE VISIT (OUTPATIENT)
Dept: INTERNAL MEDICINE | Facility: CLINIC | Age: 68
End: 2023-05-04
Payer: MEDICARE

## 2023-05-04 VITALS
HEART RATE: 80 BPM | SYSTOLIC BLOOD PRESSURE: 128 MMHG | TEMPERATURE: 98 F | RESPIRATION RATE: 16 BRPM | BODY MASS INDEX: 37.23 KG/M2 | OXYGEN SATURATION: 98 % | HEIGHT: 60 IN | DIASTOLIC BLOOD PRESSURE: 82 MMHG | WEIGHT: 189.63 LBS

## 2023-05-04 DIAGNOSIS — N18.30 STAGE 3 CHRONIC KIDNEY DISEASE, UNSPECIFIED WHETHER STAGE 3A OR 3B CKD: ICD-10-CM

## 2023-05-04 DIAGNOSIS — I15.2 HYPERTENSION ASSOCIATED WITH DIABETES: ICD-10-CM

## 2023-05-04 DIAGNOSIS — E11.42 TYPE 2 DIABETES MELLITUS WITH DIABETIC POLYNEUROPATHY, WITH LONG-TERM CURRENT USE OF INSULIN: Primary | ICD-10-CM

## 2023-05-04 DIAGNOSIS — E11.59 HYPERTENSION ASSOCIATED WITH DIABETES: ICD-10-CM

## 2023-05-04 DIAGNOSIS — Z79.4 TYPE 2 DIABETES MELLITUS WITH DIABETIC POLYNEUROPATHY, WITH LONG-TERM CURRENT USE OF INSULIN: Primary | ICD-10-CM

## 2023-05-04 DIAGNOSIS — E66.01 CLASS 2 SEVERE OBESITY DUE TO EXCESS CALORIES WITH SERIOUS COMORBIDITY AND BODY MASS INDEX (BMI) OF 38.0 TO 38.9 IN ADULT: ICD-10-CM

## 2023-05-04 DIAGNOSIS — I25.118 CORONARY ARTERY DISEASE OF NATIVE ARTERY OF NATIVE HEART WITH STABLE ANGINA PECTORIS: ICD-10-CM

## 2023-05-04 DIAGNOSIS — E11.40 NEUROPATHY DUE TO TYPE 2 DIABETES MELLITUS: ICD-10-CM

## 2023-05-04 DIAGNOSIS — Z96.41 DIABETES MELLITUS TYPE 2, WITH COMPLICATION, ON LONG TERM INSULIN PUMP: ICD-10-CM

## 2023-05-04 DIAGNOSIS — E11.69 HYPERLIPIDEMIA ASSOCIATED WITH TYPE 2 DIABETES MELLITUS: ICD-10-CM

## 2023-05-04 DIAGNOSIS — E11.3393 MODERATE NONPROLIFERATIVE DIABETIC RETINOPATHY OF BOTH EYES WITHOUT MACULAR EDEMA ASSOCIATED WITH TYPE 2 DIABETES MELLITUS: ICD-10-CM

## 2023-05-04 DIAGNOSIS — E11.8 DIABETES MELLITUS TYPE 2, WITH COMPLICATION, ON LONG TERM INSULIN PUMP: ICD-10-CM

## 2023-05-04 DIAGNOSIS — E78.5 HYPERLIPIDEMIA ASSOCIATED WITH TYPE 2 DIABETES MELLITUS: ICD-10-CM

## 2023-05-04 PROCEDURE — 1159F MED LIST DOCD IN RCRD: CPT | Mod: CPTII,S$GLB,, | Performed by: NURSE PRACTITIONER

## 2023-05-04 PROCEDURE — 99215 OFFICE O/P EST HI 40 MIN: CPT | Mod: 25,S$GLB,, | Performed by: NURSE PRACTITIONER

## 2023-05-04 PROCEDURE — 99999 PR PBB SHADOW E&M-EST. PATIENT-LVL V: ICD-10-PCS | Mod: PBBFAC,,, | Performed by: NURSE PRACTITIONER

## 2023-05-04 PROCEDURE — 3079F DIAST BP 80-89 MM HG: CPT | Mod: CPTII,S$GLB,, | Performed by: NURSE PRACTITIONER

## 2023-05-04 PROCEDURE — 1160F PR REVIEW ALL MEDS BY PRESCRIBER/CLIN PHARMACIST DOCUMENTED: ICD-10-PCS | Mod: CPTII,S$GLB,, | Performed by: NURSE PRACTITIONER

## 2023-05-04 PROCEDURE — 4010F ACE/ARB THERAPY RXD/TAKEN: CPT | Mod: CPTII,S$GLB,, | Performed by: NURSE PRACTITIONER

## 2023-05-04 PROCEDURE — 3074F PR MOST RECENT SYSTOLIC BLOOD PRESSURE < 130 MM HG: ICD-10-PCS | Mod: CPTII,S$GLB,, | Performed by: NURSE PRACTITIONER

## 2023-05-04 PROCEDURE — 1125F AMNT PAIN NOTED PAIN PRSNT: CPT | Mod: CPTII,S$GLB,, | Performed by: NURSE PRACTITIONER

## 2023-05-04 PROCEDURE — 99215 PR OFFICE/OUTPT VISIT, EST, LEVL V, 40-54 MIN: ICD-10-PCS | Mod: 25,S$GLB,, | Performed by: NURSE PRACTITIONER

## 2023-05-04 PROCEDURE — 99999 PR PBB SHADOW E&M-EST. PATIENT-LVL V: CPT | Mod: PBBFAC,,, | Performed by: NURSE PRACTITIONER

## 2023-05-04 PROCEDURE — 3051F PR MOST RECENT HEMOGLOBIN A1C LEVEL 7.0 - < 8.0%: ICD-10-PCS | Mod: CPTII,S$GLB,, | Performed by: NURSE PRACTITIONER

## 2023-05-04 PROCEDURE — 3051F HG A1C>EQUAL 7.0%<8.0%: CPT | Mod: CPTII,S$GLB,, | Performed by: NURSE PRACTITIONER

## 2023-05-04 PROCEDURE — 1159F PR MEDICATION LIST DOCUMENTED IN MEDICAL RECORD: ICD-10-PCS | Mod: CPTII,S$GLB,, | Performed by: NURSE PRACTITIONER

## 2023-05-04 PROCEDURE — 99499 RISK ADDL DX/OHS AUDIT: ICD-10-PCS | Mod: S$GLB,,, | Performed by: NURSE PRACTITIONER

## 2023-05-04 PROCEDURE — 3079F PR MOST RECENT DIASTOLIC BLOOD PRESSURE 80-89 MM HG: ICD-10-PCS | Mod: CPTII,S$GLB,, | Performed by: NURSE PRACTITIONER

## 2023-05-04 PROCEDURE — 3008F PR BODY MASS INDEX (BMI) DOCUMENTED: ICD-10-PCS | Mod: CPTII,S$GLB,, | Performed by: NURSE PRACTITIONER

## 2023-05-04 PROCEDURE — 1101F PT FALLS ASSESS-DOCD LE1/YR: CPT | Mod: CPTII,S$GLB,, | Performed by: NURSE PRACTITIONER

## 2023-05-04 PROCEDURE — 4010F PR ACE/ARB THEARPY RXD/TAKEN: ICD-10-PCS | Mod: CPTII,S$GLB,, | Performed by: NURSE PRACTITIONER

## 2023-05-04 PROCEDURE — 95251 CONT GLUC MNTR ANALYSIS I&R: CPT | Mod: S$GLB,,, | Performed by: NURSE PRACTITIONER

## 2023-05-04 PROCEDURE — 3008F BODY MASS INDEX DOCD: CPT | Mod: CPTII,S$GLB,, | Performed by: NURSE PRACTITIONER

## 2023-05-04 PROCEDURE — 95251 PR GLUCOSE MONITOR, 72 HOUR, PHYS INTERP: ICD-10-PCS | Mod: S$GLB,,, | Performed by: NURSE PRACTITIONER

## 2023-05-04 PROCEDURE — 1160F RVW MEDS BY RX/DR IN RCRD: CPT | Mod: CPTII,S$GLB,, | Performed by: NURSE PRACTITIONER

## 2023-05-04 PROCEDURE — 1101F PR PT FALLS ASSESS DOC 0-1 FALLS W/OUT INJ PAST YR: ICD-10-PCS | Mod: CPTII,S$GLB,, | Performed by: NURSE PRACTITIONER

## 2023-05-04 PROCEDURE — 3288F FALL RISK ASSESSMENT DOCD: CPT | Mod: CPTII,S$GLB,, | Performed by: NURSE PRACTITIONER

## 2023-05-04 PROCEDURE — 99499 UNLISTED E&M SERVICE: CPT | Mod: S$GLB,,, | Performed by: NURSE PRACTITIONER

## 2023-05-04 PROCEDURE — 3074F SYST BP LT 130 MM HG: CPT | Mod: CPTII,S$GLB,, | Performed by: NURSE PRACTITIONER

## 2023-05-04 PROCEDURE — 3288F PR FALLS RISK ASSESSMENT DOCUMENTED: ICD-10-PCS | Mod: CPTII,S$GLB,, | Performed by: NURSE PRACTITIONER

## 2023-05-04 PROCEDURE — 1125F PR PAIN SEVERITY QUANTIFIED, PAIN PRESENT: ICD-10-PCS | Mod: CPTII,S$GLB,, | Performed by: NURSE PRACTITIONER

## 2023-05-04 RX ORDER — BLOOD-GLUCOSE SENSOR
1 EACH MISCELLANEOUS
Qty: 10 EACH | Refills: 3 | Status: SHIPPED | OUTPATIENT
Start: 2023-05-04 | End: 2023-05-04 | Stop reason: SDUPTHER

## 2023-05-04 RX ORDER — INSULIN PUMP CONTROLLER
1 EACH MISCELLANEOUS EVERY OTHER DAY
Qty: 45 EACH | Refills: 3 | Status: SHIPPED | OUTPATIENT
Start: 2023-05-04 | End: 2023-09-06 | Stop reason: SDUPTHER

## 2023-05-04 RX ORDER — BLOOD-GLUCOSE SENSOR
1 EACH MISCELLANEOUS
Qty: 10 EACH | Refills: 3 | Status: SHIPPED | OUTPATIENT
Start: 2023-05-04 | End: 2024-05-03

## 2023-05-04 NOTE — PROGRESS NOTES
68 y.o. female here for routine follow up visit for management of T2dm - last seen 2022 -   Those notes are below.     A1c is much more stable now at 7.5%. last year she was at 10%  She continues on metformin 1000mg daily in am.   Jardiance 25mg tablet in morning  Trulicity 3mg every week. (Up to 4.5mg every week?)  On omnipod dash - see scanned in glooko report.   She gets her dash pods from [facility]  Using humalog U100 insulin -   Tells me that she's been having some more low blood sugars - more than normal lately.   She wants to lose weight -     Total daily dose is at 63.3 units/day -   56% is basal rate,   44% is bolusing.   She is giving preset boluses -   12, 14, or 16 units with her meals.   Snack options.   -2 units for correction -   3 units for small snack.   5 units for large scan  Changing pod about every 3 days.   She has 2 unit preset for a snack or drink -   Active insulin time 4 hours.   Rate is at 1.5 units/hour (increased last visit). 3am until 4pm  Overnight is at 1.7 untis/hour - 4pm until 3am   ISF is at 1: 50   bg target is at 100 -   ICR (carb ratio) at 15 - but she is not using carbs/not counting carbs.     She is on dexcom g6 - she gets from [facility] right now -   Uses with a reader - she has a smartphone, but prefers  option   Average glucose is 162 -   71% time in range.   <1% lows.   0% very low.   28% highs.   <1% very highs.     Last visit notes from 2022:   67 y.o. female, here for 4 month follow up for management of T2dm -   Last seen in November 2021 - those notes are below.     a1c back elevated to 10%.   She continues on metformin 1000mg daily. (she tried to do 2 times per day but couldn't tolerate due to gi s/e's)  Also on jardiance 25mg tablet daily.   On Trulicity 3mg every week.   Brother, father, and stepmother - all passed away - recently.   So increased stress.     She is on omnipod - downloaded - see scanned in glooko report.    Total daily dose  - 54.2 units of insulin per day.   53% is coming from the basal rate.   47% is coming from the bolusing.   She is giving preset boluses -   10, 12 or 14 units with her meals.   Changing pod about every 3 days.   She has 2 unit preset for a snack or drink -   Active insulin time 4 hours.   Rate is at 1.3 units/hour.   ISF is at 1: 50   bg target is at 100 -   ICR (carb ratio) at 15 - but she is not using carbs/not counting carbs.   Notices glucoses are more elevated lately - boluses for meals, not always with snacks.     Using dexcom g6 - downloaded and reviewed today-   Average glucose 241 -   15% time in range -   0% lows.   46% highs.   39% very highs.         Last visit notes from November 2021 as follows:   66 y.o. female here for 4 month follow up visit for management of T2dm -   Last seen in August 2021 - those notes are below.     a1c is up again from 7.8% to 9.1%.   She continues on omnipod. States her pods are expiring before 3 days and she is running out of insulin too early.   Still using metformin 1000 mg daily in morning.   Also on jardiance 25mg tablet daily in morning.   Continues on Trulicity 3mg every week.     She continues on omnipod - I was unable to download her Dash pod to [employer] due to technical errors today on our computer.   However did review a 14 day history of bolusing and her settings:   She is using humalog insulin -   Active insulin time is at 4 hours.   isf is set at 1: 50   Basal rate is at 1.3 units/hour.   Total daily dose of insulin is 47.4 units/day.   59% of insulin coming from the basal rate.   41% is coming from the bolusing.     She uses preset boluses -   I had increased both her basal rate and presets at last visit.   Presets are 10 units, 12 units, 14 units  - 2 units, 4 units   She usually gives just 12 units with meals.   This is even with HIGH carb meals, not just based on the size.     Dexcom g6 downloaded - reviewed -   See scanned in   -   Average glucose is 195 -   42% time in range.   <1% lows.   42% highs.   15% extreme highs.     She admits, she has been eating bad - not following Ada diet.   Fried foods mostly.   She is doing boluses before meals, sometimes forgetting on occasion.   As in this morning she ate breakfast, but did not give a bolus.   Last night she ate dinner, did not give a bolus -   However sugar today in clinic is at 112.   She has not checked her glucose in 3 days as she is out of test strips, and is awaiting her next shipment of dexcom supplies.   Requests Rx for contour next test strips.     Of note - she went to ER on 10/29/2021 - was having chest discomfort. Was found to be in ALEXIS.   Amlodipine and chlorthalidone was stopped/held.   She is going to follow up with cardiology in February 2021.   She ran out of her beta blocker medication states 2 weeks ago, and requests refill.   Her blood pressure is running on higher end today at 140/78 -   She is feeling well, with no acute complaints today.         Last visit notes from August 2021 as follows:   66 y.o. female, here for follow up visit for management for diabetes management   Last seen in May 2021. Those notes are below.     a1c down from 10% to 7.8%.   Much improved.   Her life has been very stressful since last visit -   Her son got injured severely from a shooting and was hospitalized -   So she's gotten off track, and only give 2 meal time boluses per day with her Omnipod.   Also stopped using her dexcom.     Current meds:   trulicity 3mg every week (increased from last visit from 1.5 to 3 mg every week).   jardiance 25mg tablet daily  Metformin 1000 in morning  She is on omnipod with humalog insulin continuous - Glooko report reviewed today  basal rate from 1.2 to 1.3 units/hour at last visit.      presets for meals increased at last visit -   Small 8---> increased to 10 units.   Medium 10 ---> increased to 12 units.   Large 12 ---> increased to 14 units.   Small  snack - 2 units. No change  Added interval of Large snack - 4 snacks.   Correction bolus 2 units if bg greater than 180     Total daily dose of insulin - 45.2 units/day on average.   58% is coming from the basal rate.   42% is coming from the bolusing -   Active insulin time is at 4 hours.   Carb ratio is at 15 grams -  But she uses presets.   ISF is at 1: 50   bg target is set at 100.     Also was on dexcom G6 -   She ran out of her supplies/got off track again - so she is not using her dexcom right now.   She is willing to restart.       Last visit notes as follows from 5/17/2021:   66 y.o. female, here for 3 month follow up for management of t2dm.   Last seen in February 2021 - those notes below.     No new labs for today's visit, her a1c was last @ 10.2%.   I had switched her off of MDI onto VGO which seemed to help slightly.   Still was not getting enough insulin on VGO 40 patch (max dose) - so switched her to Omnipod insulin pump for higher capacity.   She trained/transitioned with diabetic educator on 3/3/2021 -   Her basal rate was then increased on 3/9/2021 from 1.1 to 1.2 units/hour     Downloaded her omnipod brunilda via [employer] today -   Her current rate is at 1.2 units/hour.   Presets increased to Small: 8 units, Medium: 10 units, Large: 12 units; will continue 2 units for snack and 2 units for BG over 180 for meals.  Insulin time is set at 4 hours.   Total daily dose = 46.2 units/day.   She averages about 2 boluses per day - often missing breakfast bolus.   57% of her insulin comes from basal rate.   43% of her insulin comes from her bolusing.     Continues on metformin 1000 bid. States takes in morning time, but not every night.   Also on trulicity 1.5 every week.   Also on jardiance, higher dose (increase from 10 to 25mg last visit) - no side effects.   Lipids elevated - I had prescribed her to start taking repatha - they have been high despite max dose statin and zetia, but she has not started repatha  "yet.    dexcom downloaded and reviewed -   Average glucose is 199 -   Estimated a1c @ 8.1%.   37% time in range  47% highs.   16% extreme highs.   0% lows.       Last visit notes from February 2021 as follows:   66 y.o. female, here for rtc visit per routine for management of T2dm.   Last seen 11/2020 - those notes below.     a1c was > 14%, current @ 10.2%.   Continues on metformin 1000 bid.   trulicity 1.5   jardiance 10   vgo 40 - 6 clicks with meals.   Patient tells me/admits she had not been good about eating or following ada diet.   She has also not been taking her metformin or jardiance regularly.   History of MI, HTN, and CHF.   HLd - remains above goal despite max dose statin and add on of zetia 10mg last visit.     dexcom - reviewed and downloaded today - see  -   15% time in range.   0% lows.   37% highs.   48% extreme highs.       Last visit notes from 11/2020 as follows:   65 y.o. female, here for 2 month follow up for T2dm.   Last seen 9/22/2020 - those notes below.     History of CHF, HTN, HLd - current bp 140 - 150's systolic. Has not taken bp meds yet this morning.   Denies cp, palpitations, shortness of breath.   HLD - stable on current meds. On zetia and statin.   History of MI in past.     Doing well/much improved with diabetes management.   Last Hgba1c was undetectable at > 14%.   Now improved at 9%.   Her bg's are much more controlled.     She continues on metformin 1000 bid.   Also on trulicity 1.5 every week.   Taking jardiance 10 mg tablet daily in am now - tolerating well. No s/e's.   Continue on VGO 40 - 6 clicks with breakfast - 5 clicks with lunch and dinner.   Uses 1 - 2 clicks for a snack in the evening if needed.   Feeling well - and denies any hypoglycemic episodes.     dexcom G6 interpreted/downloaded today - she is using a reader.   Average Bg is 173.   63% time in range.   0% lows.   33% highs.   4% extreme highs.     C/o feet "tingling" - painful itching type " sensation to her feet.   Has been using gabapentin 100mg 3 times per day, but not sure if helping much.   Also reports using clobetasol cream to her feet prn which helps.   Reports history of lower back issues/not too painful at present. Just hurts to stoop/lean forward.   Not interested in seeing pain management at this time.     C/o dizziness - happens every day. Has been alleviated by meclizine in the past. Requesting refill.   Comes and goes, usually daily, often accompanied by nausea and sometimes emesis.   Has not seen ENT for this. Denies any vision changes.       Last visit notes from 9/22/2020:   65 y.o. female, here for 1 month check up for management of type 2 diabetes.   Previous a1c's at 14% on MDI -   Switched her to vgo, and put her on a dexcom.   Average 's. So much improved.     Continues on Trulicity 1.5mg sc weekly.   Also on metformin 1000mg po bid.   Taking humalog insulin via VGO 40 - taking 5 clicks of insulin with meals, 1 with a snack.   Often eats yogurt at bedtime and notices sugar high at nighttime.   Previous on levemir 45 units bid and novolog 25 units tid meals - so previously on total daily dose of 165 units daily.   Large improvement. Now on about 70 units total daily insulin.     Wearing Dexcom G6 - downloaded and interpreted today -   Average BG is 189   42% time in range.   48% highs.   9% very highs.   No lows.     She has multiple comorbidites including heart failure and CAD, with history of multiple stents.   On statin, zetia, bp meds, diuretics and plavix. Requesting refills.   No acute complaints today's visit.     Last visit notes from August 18th, 2020 as follows:   Very pleasant, 65 y.o. female, here for 4 week follow up - managing her type 2 diabetes.   I first saw her July 20th, 2020 -   She was on MDI at that time, with very high sugars.     In the interim, I switched her from MDI to vgo 40 and dexcom.   She is enjoying using both - she is much more aware of her  "sugars.   She is trying to eat better, and now is learning what foods increase her sugars.     She is using humalog in her vgo 40 - 3 clicks with meals, 4 clicks if sugar is greater than 200 going into meal.   Then 1 click with a snack.     She often snacks in afternoon and before bed - usually a yogurt or/and apple before bed.   Taking in less sweets.   States primarily is doing 3 clicks with meals for most of the day.   Still continues on trulicity 1.5mg sc weekly.   Continues on metformin 1000 metformin XR bid.     Dexcom G6 download interpreted today -   Average BG is 221.   Estimated a1c is 8.6%.   19% time in range.   56% highs.   25% very high.   0% lows.     Her last a1c on mdi was nondetectable >> 14%.   So she has improved for sure down to average BG of 220.   Still not to goal, but large improvement now that she is on continuous insulin.       Last Office visit notes as follows:   HPI: Mercedez Purvis is a 68 y.o.  female c/I for visit to address Diabetes Type 2   This is the first time I am seeing her, she is a patient of Dr. Vasquez's for primary care needs.   She also saw general endocrinology - [facility], Dr. Olivo just last week to address her uncontrolled diabetes.   She is unsure why she is here with me today as well as she just saw a specialist for diabetes management.     She was diagnosed with T2DM about 10 years ago.   Taking metformin 500mg XR - 2 tablets bid. This dose was just increased last week. She is taking.   Also on Trulicity 1.5mg sc weekly - has been taking for about 6 months.   Also on MDI - for 10 years.   Levemir 37 units BID, was advised to increase to 45 units bid, but hasn't done yet/hasn't changed yet.   and novolog 25 units tid ac meals - was advised to increase to 30 units tid meals at last visit, but hasn't done yet.   Patient denies skipping dose of insulin.   She takes insulin injections 4x/daily.   Was hospitalized with what she recalls as a "diabetic coma" in " 2010 - that was when she was initially diagnosed with diabetes, and insulin was begun right away.   Has not been hospitalized for diabetes since that time.   Checking sugars 4x/daily - not writing down.   Today's blood sugars 2 hours post prandial is 309 on fingerstick in my office. Patient took her 25 units of novolog this am, but did not take the levemir yet for her morning dose.    Past medical History:   Past Medical History:   Diagnosis Date    Chronic headache     Diabetes     Heart attack 2004    8 stents    Heart failure     History of heart artery stent     Hyperlipemia     Hypertension     Obesity (BMI 30-39.9)     Yeast infection       Family hx: No family history on file.   Current meds:   Current Outpatient Medications:     amitriptyline (ELAVIL) 75 MG tablet, Take 1 tablet (75 mg total) by mouth every evening., Disp: 90 tablet, Rfl: 0    amLODIPine (NORVASC) 10 MG tablet, Take 1 tablet (10 mg total) by mouth once daily., Disp: 90 tablet, Rfl: 3    atorvastatin (LIPITOR) 40 MG tablet, Take 1 tablet (40 mg total) by mouth every evening., Disp: 90 tablet, Rfl: 3    blood sugar diagnostic Strp, 1 each by Misc.(Non-Drug; Combo Route) route 4 (four) times daily., Disp: 200 each, Rfl: 3    ciclopirox (PENLAC) 8 % Soln, Apply topically nightly., Disp: 6.6 mL, Rfl: 11    clopidogreL (PLAVIX) 75 mg tablet, Take 1 tablet (75 mg total) by mouth once daily., Disp: 90 tablet, Rfl: 3    clotrimazole-betamethasone 1-0.05% (LOTRISONE) cream, Apply topically 2 (two) times daily., Disp: 45 g, Rfl: 3    cyclobenzaprine (FLEXERIL) 10 MG tablet, Take 1 tablet (10 mg total) by mouth 3 (three) times a week., Disp: 90 tablet, Rfl: 0    diclofenac sodium (VOLTAREN) 1 % Gel, Apply topically once daily., Disp: 100 g, Rfl: 0    dulaglutide (TRULICITY) 4.5 mg/0.5 mL pen injector, Inject 4.5 mg (one pen) into the skin every 7 days., Disp: 4 pen, Rfl: 11    empagliflozin (JARDIANCE) 25 mg tablet, Take 1 tablet (25 mg total) by mouth  "once daily., Disp: 30 tablet, Rfl: 3    fluconazole (DIFLUCAN) 150 MG Tab, Take 1 tablet (150 mg total) by mouth once daily. May repeat after 72 hrs for yeast infection., Disp: 2 tablet, Rfl: 1    furosemide (LASIX) 40 MG tablet, Take 1 tablet (40 mg total) by mouth once daily., Disp: 90 tablet, Rfl: 3    gabapentin (NEURONTIN) 300 MG capsule, Take 1 capsule (300 mg total) by mouth 2 (two) times daily AND 2 capsules (600 mg total) every evening., Disp: 270 capsule, Rfl: 1    insulin detemir U-100 (LEVEMIR FLEXTOUCH U-100 INSULN) 100 unit/mL (3 mL) InPn pen, Inject 40 Units into the skin once daily. FOR EMERGENCY USE ONLY - BACK UP INSULIN, IF OFF OF YOUR INSULIN PUMP - USE 40 UNITS INJECTION DAILY UNTIL GETTING BACK ON PUMP., Disp: 15 mL, Rfl: 1    insulin lispro (HUMALOG U-100 INSULIN) 100 unit/mL injection, Inject 100 Units into the skin continuous. Gives up to 100 units daily via Omnipod. Do not directly inject - only use within pods., Disp: 40 mL, Rfl: 11    LIDOcaine HCL 2% (XYLOCAINE) 2 % jelly, Apply topically as needed. Apply topically once nightly to affected part of foot/feet., Disp: 30 mL, Rfl: 2    LIDOcaine-prilocaine (EMLA) cream, Apply topically 2 (two) times a day., Disp: 30 g, Rfl: 1    lisinopriL (PRINIVIL,ZESTRIL) 40 MG tablet, Take 1 tablet (40 mg total) by mouth once daily., Disp: 90 tablet, Rfl: 3    meclizine (ANTIVERT) 12.5 mg tablet, Take 2 tablets (25 mg total) by mouth 2 (two) times daily as needed for Dizziness., Disp: 30 tablet, Rfl: 1    metFORMIN (GLUCOPHAGE-XR) 500 MG ER 24hr tablet, Take 2 tablets (1,000 mg total) by mouth every morning., Disp: 180 tablet, Rfl: 3    metoprolol succinate (TOPROL-XL) 200 MG 24 hr tablet, Take 1 tablet (200 mg total) by mouth once daily., Disp: 90 tablet, Rfl: 3    pen needle, diabetic (PEN NEEDLE) 32 gauge x 5/32" Ndle, 1 each by Misc.(Non-Drug; Combo Route) route 4 (four) times daily. ICD 10 E11.42, Disp: 400 each, Rfl: 3    spironolactone (ALDACTONE) " 50 MG tablet, Take 1 tablet (50 mg total) by mouth once daily., Disp: 90 tablet, Rfl: 3    blood-glucose meter,continuous (DEXCOM ) Misc, 1 Device by Misc.(Non-Drug; Combo Route) route continuous., Disp: 1 each, Rfl: 0    blood-glucose sensor (DEXCOM G7 SENSOR) Roberta, 1 each by Misc.(Non-Drug; Combo Route) route every 10 days., Disp: 10 each, Rfl: 3    OMNIPOD DASH PODS, GEN 4, Crtg, Inject 1 each into the skin every other day., Disp: 45 each, Rfl: 3    Current Facility-Administered Medications:     furosemide injection 20 mg, 20 mg, Intravenous, 1 time in Clinic/HOD, Dottie Merritt MD     Social:   Lives at home by herself.  Does have family visit her with social distancing.   Has 5 children, 3 are still living. 13 grandchildren, 11 great-grandchildren.   Diet: not always following ADA diet   Meals: 3 per day and snacks   Breakfast - grits, eggs, sausage, boiled eggs.   Lunch - salad, fish, chicken breast.   Dinner -steaks, green beans, small portions of potatoe salad. Fried shrimp and fried catfish.   Snacks - fruits, apples/oranges, or nuts/pistachios or almonds.  Grapes.   Exercise: none. But does work in yard  Activities: not working.     Current Diabetes medications:   humalog insulin via Omnipod dash - humalog U100   GLP1:  Trulicity 4.5 mg every week.   Oral meds: meformin 500 xr - 2 tabs just in morning.   jardiance 25 daily.     Medications Tried and Failed:   levemir 45 units bid.   Novolog 25 units tid ac meals.   (switched to vgo 40)  VGO 40 insulin patch.- 6 clicks with breakfast, 5 clicks with lunch and dinner.   1 - 2 clicks with a snack.   trulicity  Metformin     Glucose Monitoring:   CGM: Dexcom G6   Gets supplies from : [email or web address]  Has glucometer at home.     Review of Pertinent co-morbidities/risk factors:   CV: history of MI 2004 - with stents placed in 2010 and also in 2019.    CAD: yes   Takes aspirin and plavix daily.  BP: has history of HTN  Statin: Taking  ACE/ARB: Taking    Vital  Signs  /82 (BP Location: Right arm, Patient Position: Sitting, BP Method: Medium (Manual))   Pulse 80   Temp 97.7 °F (36.5 °C) (Temporal)   Resp 16   Ht 5' (1.524 m)   Wt 86 kg (189 lb 9.5 oz)   LMP 01/01/2020 (Approximate)   SpO2 98%   BMI 37.03 kg/m²     Pertinent Labs:   Hgba1c   Lab Results   Component Value Date    HGBA1C 7.5 (H) 04/21/2023     Lipid panel   Lab Results   Component Value Date    CHOL 161 04/21/2023    CHOL 113 (L) 12/16/2022    CHOL 89 (L) 03/21/2022     Lab Results   Component Value Date    HDL 37 (L) 04/21/2023    HDL 39 (L) 12/16/2022    HDL 38 (L) 03/21/2022     Lab Results   Component Value Date    LDLCALC 103.8 04/21/2023    LDLCALC 28.2 (L) 12/16/2022    LDLCALC 23.6 (L) 03/21/2022     Lab Results   Component Value Date    TRIG 101 04/21/2023    TRIG 229 (H) 12/16/2022    TRIG 137 03/21/2022     Lab Results   Component Value Date    CHOLHDL 23.0 04/21/2023    CHOLHDL 34.5 12/16/2022    CHOLHDL 42.7 03/21/2022      CMP  Glucose   Date Value Ref Range Status   04/21/2023 100 70 - 110 mg/dL Final     BUN   Date Value Ref Range Status   04/21/2023 13 8 - 23 mg/dL Final     Creatinine   Date Value Ref Range Status   04/21/2023 1.1 0.5 - 1.4 mg/dL Final     eGFR if    Date Value Ref Range Status   03/21/2022 >60.0 >60 mL/min/1.73 m^2 Final     eGFR if non    Date Value Ref Range Status   03/21/2022 58.0 (A) >60 mL/min/1.73 m^2 Final     Comment:     Calculation used to obtain the estimated glomerular filtration  rate (eGFR) is the CKD-EPI equation.         Microalbumin creatinine ratio:   Lab Results   Component Value Date    MICALBCREAT 20.0 03/21/2022       Social History     Tobacco Use   Smoking Status Never   Smokeless Tobacco Never      Standards of care:   Eyes: .: 07/27/2022  Foot exam: : 03/29/2022 and done today.   Diabetes education: yes    Review Of Systems:   Gen: Appetite good, no weight gain or loss, denies fatigue and weakness.  Denies polydipsia.   Skin: Skin is intact and heals well, no rashes, no hair changes  Eyes: Denies acute visual disturbances, or blurred vision  Resp: no SOB or Dyspnea on exertion, denies cough.   Cardiac: Denies chest pain, palpitations, or swelling. Denies tremors.   GI: Denies abdominal pain, nausea or vomiting, diarrhea, constipation.   /GYN: no nocturia, Previous yeast infection. None in the past year.   MS/Neuro: Denies numbness/ tingling in BLE; taking neurontin 300 tid which helps with her leg and foot neuropathy.   Gait steady, speech clear  Psych: Denies drug/ETOH abuse, no hx of depression. Anxiety lately as r/t her son's injuries/hospitalization.   Other systems: negative.    Physical Exam:   GENERAL: Well developed, well nourished in appearance.   PSYCH: AAOx3, appropriate mood and affect, pleasant expression, conversant, appears relaxed, well groomed.   EYES: PERRL, Conjunctiva and corneas clear  NECK: Soft and Supple, trachea midline   CHEST: Even, regular, and unlabored respirations  ABDOMEN: Soft, non-tender, non-distended   VASCULAR: pedal pulses palpable bilaterally, no edema.  NEURO:  cranial nerves II - XII intact   MUSCULOSKELETAL: Good ROM, steady gait.   SKIN: Skin warm, dry, and intact     Assessment and Plan of Care:     Mercedez was seen today for diabetes and follow-up.    Diagnoses and all orders for this visit:    Type 2 diabetes mellitus with diabetic polyneuropathy, with long-term current use of insulin  -     OMNIPOD DASH PODS, GEN 4, Crtg; Inject 1 each into the skin every other day.  -     Discontinue: blood-glucose sensor (DEXCOM G7 SENSOR) Roberta; 1 each by Misc.(Non-Drug; Combo Route) route every 10 days.  -     Discontinue: blood-glucose meter,continuous (DEXCOM ) Misc; 1 Device by Misc.(Non-Drug; Combo Route) route continuous.  -     blood-glucose meter,continuous (DEXCOM ) Misc; 1 Device by Misc.(Non-Drug; Combo Route) route continuous.  -     blood-glucose  sensor (DEXCOM G7 SENSOR) Roberta; 1 each by Misc.(Non-Drug; Combo Route) route every 10 days.    Hypertension associated with diabetes  -     Ambulatory referral/consult to Diabetic Advanced Practice Providers (Medical Management)    Moderate nonproliferative diabetic retinopathy of both eyes without macular edema associated with type 2 diabetes mellitus    Coronary artery disease of native artery of native heart with stable angina pectoris    Hyperlipidemia associated with type 2 diabetes mellitus    Stage 3 chronic kidney disease, unspecified whether stage 3a or 3b CKD    Class 2 severe obesity due to excess calories with serious comorbidity and body mass index (BMI) of 38.0 to 38.9 in adult    Diabetes mellitus type 2, with complication, on long term insulin pump       1. T2DM with hyperglycemia- Hgba1c goal is 7.5% or less -Hgba1c was > 14%-->  9% ---> 10.2%---> 7.8% --> 9.1% --> 10% --> 8.6%--> 7.7%--> 7.5%    Continue omnipod dash - 1.5 units/hour during the day - 1.7 units/hour overnight 4pm - 3am.   Continue to bolus - presets for meals increased at last visit. No changes were made today.   Small 8---> increased to 10 units. --> increased to 12 units  Medium 10 ---> increased to 12 units--> increased to 14 units.  Large 12 ---> increased to 14 units---> increased to 16 units.   Small snack - 2 units-- increased this to 3 units.   Added interval of Large snack - 4 snacks-- increased this to 5 units  Correction bolus 2 units if bg greater than 180   (previously on 165 units of insulin per day with MDI, now more controlled on less units of insulin per day).   Continue metformin 1000 every morning.cannot tolerate bid due to gi side effects.   Continue trulicity -4.5 mg every week - getting free through patient assistance.   advsied to call me if any side effects.   Continue Jardiance 25mg daily - getting this free through patient assistance.   Discussed MOA, possible side effects including yeast infection, UTI,  dehydration and ketoacidosis, importance of maintaining hydration and avoiding No carb diets. Good use hygiene. Notify my office if any side effects. Will monitor renal function closely. Stable at present.   Continue using Dexcom - switch to G7 -   Titrated clicks/increased based on CGM interpretation).   Continues to check sugars 4 times per day.   Continues on insulin injections 4 times per day via Omnipod.   discussed DM, progression of disease, long term complications, CV risk factors and tx options.   She already has established CAD and history of MI, so at risk for repeat event.   Advise compliance with ADA diet and encourage exercise  Reviewed  hypoglycemia, s/s and appropriate tx.    Instructed to monitor Blood glucose 4x/day and bring meter/ log to every clinic visit.   Eyes - dilated retinal exam 7/15/2020 - no diabetic retinopathy but with follow up recommended in 1 month with ophthalmology.   Had exam done 9/2/2020 with opthalmology  Podiatry - had check up recently. Foot exam negative today. Advised on proper foot care. - Dr. Carrera 2020. Refilled clotrimazole/betamethasone.  Neuropathy - increased gabapentin from 100 to 300 tid. Recommended consult with pain management doctor as I think some of this might be related to her back, but she declines for now.     2. HTN- controlled, continue meds as previously prescribed and monitor.   meds refilled. \has not taken bp meds yet this morning. Encouraged to take.     3. HLD - LDL goal < 100. Not at goal on max dose statin - On atorvastatin 80mg every Hs and zetia 10.   Stop zetia. Was on repatha, pcp took her off of it - will monitor and then start if needed.     4. Weight - BMI Body mass index is 37.03 kg/m².   Encourage Ada diet and exercise.    continue Trulicity 4.5  mg every week. - gets through anne assist.     5. GERD - previously on nexium but stable.   No longer taking.     6. TIKA - stable on cpap    7. Heart failure - stable - refilled BB and  diuretics at last visit.     8. CAD - continues on aspirin and plavix, s/p 6 stents.         F/u in 4 months with OV and labs.

## 2023-05-04 NOTE — PATIENT INSTRUCTIONS
Continue on trulicity every week.   Continue jardiance tablet every morning -   Continue on omnipod dash -   Use preset boluses -     Continue on dexcom - change to dexcom g7     Keep your Levemir - back up insulin pen - 35 - 40 units every dqay if you are off of your pod - this is for emergency use only.     For low blood sugar - remember to keep glucose tablets on hand. You can purchase these at the pharmacy check out desk/over the counter.   If blood sugar is less than 70, take/eat 2 - 3 tablets quickly to bring your sugar up.   Always keep 15 grams of Quick acting carbohydrates on hand to eat/drink if your sugar is low - examples are 1/2 cup of juice, coke, or crackers, granola bars.   Remember to eat meals frequently to prevent low blood blood sugars.

## 2023-05-08 ENCOUNTER — TELEPHONE (OUTPATIENT)
Dept: INTERNAL MEDICINE | Facility: CLINIC | Age: 68
End: 2023-05-08
Payer: MEDICARE

## 2023-05-08 NOTE — TELEPHONE ENCOUNTER
----- Message from Moni Warren sent at 5/8/2023 10:49 AM CDT -----  Contact: [phone] Patient  Pt is requesting a call back from the office. Pt did not provide any other information.

## 2023-05-11 ENCOUNTER — TELEPHONE (OUTPATIENT)
Dept: INTERNAL MEDICINE | Facility: CLINIC | Age: 68
End: 2023-05-11
Payer: MEDICARE

## 2023-05-11 DIAGNOSIS — E11.42 TYPE 2 DIABETES MELLITUS WITH DIABETIC POLYNEUROPATHY, WITH LONG-TERM CURRENT USE OF INSULIN: Primary | ICD-10-CM

## 2023-05-11 DIAGNOSIS — Z79.4 TYPE 2 DIABETES MELLITUS WITH DIABETIC POLYNEUROPATHY, WITH LONG-TERM CURRENT USE OF INSULIN: Primary | ICD-10-CM

## 2023-05-11 NOTE — TELEPHONE ENCOUNTER
"Is this something that her marva Bustos discussed?  Per zara note from their recent visit "Continue trulicity -4.5 mg every week - getting free through patient assistance. "  So if she is getting the Trulicity from pharmacy assistance, I am not sure if she is going to be able to switch to Ozempic?   Is she willing to pay for ozempic ?  "

## 2023-05-11 NOTE — TELEPHONE ENCOUNTER
----- Message from Festus Emery sent at 5/11/2023 11:50 AM CDT -----  Contact: self [phone]  Patient is returning a phone call.  Who left a message for the patient: Job Knott MA   Does patient know what this is regarding:    Would you like a call back, or a response through your [facility] portal?:   call back  Comments:     Please call and advise

## 2023-05-15 RX ORDER — SEMAGLUTIDE 1.34 MG/ML
1 INJECTION, SOLUTION SUBCUTANEOUS WEEKLY
Qty: 1 EACH | Refills: 3 | Status: SHIPPED | OUTPATIENT
Start: 2023-05-15 | End: 2023-10-13 | Stop reason: SDUPTHER

## 2023-05-15 NOTE — TELEPHONE ENCOUNTER
Spoke with the pt, she says yes she wants to switch over to Ozempic, and for now until approval she will take the Trulicity she has.

## 2023-05-15 NOTE — TELEPHONE ENCOUNTER
"Will you call Mrs. Purvis today?   Just let her know I am happy to send in a prescription for ozempic for her to take the injection once per week (instead of trulicity) - if she prefers this - I would do 0.5mg every week -   We would apply for patient assistance, but no guarantees - it may take time/shipping etc.     I know she is getting the Trulicity through patient assistance for free right now - so I hate to switch her and risk the cost issue - that's the only downfall.   It is ultimately her choice.   We had discussed it at her visit - I think she was wanting to see if she could lose weight and decrease her insulin need - and we could try the ozempic instead of the trulicity to see if it would work better    Let me know if she is ok with this/and I will send.   Confirm with her once the trulicity is discontinued, she will stop receiving it for free - and we will not be able to re-enroll her in the future. At the present, she is "grandfathered in" to the current anne assist program.     Thanks,  Juli   "

## 2023-06-07 ENCOUNTER — TELEPHONE (OUTPATIENT)
Dept: CARDIOLOGY | Facility: CLINIC | Age: 68
End: 2023-06-07
Payer: MEDICARE

## 2023-06-07 DIAGNOSIS — R00.2 PALPITATIONS: Primary | ICD-10-CM

## 2023-06-08 ENCOUNTER — OFFICE VISIT (OUTPATIENT)
Dept: CARDIOLOGY | Facility: CLINIC | Age: 68
End: 2023-06-08
Payer: MEDICARE

## 2023-06-08 VITALS
SYSTOLIC BLOOD PRESSURE: 140 MMHG | HEART RATE: 81 BPM | BODY MASS INDEX: 38.18 KG/M2 | HEIGHT: 60 IN | DIASTOLIC BLOOD PRESSURE: 70 MMHG | OXYGEN SATURATION: 97 % | WEIGHT: 194.44 LBS

## 2023-06-08 DIAGNOSIS — I25.2 HISTORY OF HEART ATTACK: ICD-10-CM

## 2023-06-08 DIAGNOSIS — I50.22 CHRONIC SYSTOLIC HEART FAILURE: ICD-10-CM

## 2023-06-08 DIAGNOSIS — I42.8 NONISCHEMIC CARDIOMYOPATHY: ICD-10-CM

## 2023-06-08 DIAGNOSIS — I15.2 HYPERTENSION ASSOCIATED WITH DIABETES: ICD-10-CM

## 2023-06-08 DIAGNOSIS — I25.10 CORONARY ARTERY DISEASE INVOLVING NATIVE CORONARY ARTERY OF NATIVE HEART WITHOUT ANGINA PECTORIS: ICD-10-CM

## 2023-06-08 DIAGNOSIS — N18.30 STAGE 3 CHRONIC KIDNEY DISEASE, UNSPECIFIED WHETHER STAGE 3A OR 3B CKD: ICD-10-CM

## 2023-06-08 DIAGNOSIS — E11.42 TYPE 2 DIABETES MELLITUS WITH DIABETIC POLYNEUROPATHY, WITH LONG-TERM CURRENT USE OF INSULIN: ICD-10-CM

## 2023-06-08 DIAGNOSIS — E11.59 HYPERTENSION ASSOCIATED WITH DIABETES: ICD-10-CM

## 2023-06-08 DIAGNOSIS — I25.5 ISCHEMIC CARDIOMYOPATHY: Primary | ICD-10-CM

## 2023-06-08 DIAGNOSIS — G47.33 OSA (OBSTRUCTIVE SLEEP APNEA): ICD-10-CM

## 2023-06-08 DIAGNOSIS — G56.03 BILATERAL CARPAL TUNNEL SYNDROME: ICD-10-CM

## 2023-06-08 DIAGNOSIS — E78.00 PURE HYPERCHOLESTEROLEMIA: ICD-10-CM

## 2023-06-08 DIAGNOSIS — Z79.4 TYPE 2 DIABETES MELLITUS WITH DIABETIC POLYNEUROPATHY, WITH LONG-TERM CURRENT USE OF INSULIN: ICD-10-CM

## 2023-06-08 DIAGNOSIS — I50.22 CHRONIC SYSTOLIC CONGESTIVE HEART FAILURE: ICD-10-CM

## 2023-06-08 DIAGNOSIS — Z95.5 HISTORY OF HEART ARTERY STENT: ICD-10-CM

## 2023-06-08 DIAGNOSIS — I25.118 CORONARY ARTERY DISEASE OF NATIVE ARTERY OF NATIVE HEART WITH STABLE ANGINA PECTORIS: ICD-10-CM

## 2023-06-08 DIAGNOSIS — E11.40 NEUROPATHY DUE TO TYPE 2 DIABETES MELLITUS: ICD-10-CM

## 2023-06-08 PROCEDURE — 3078F PR MOST RECENT DIASTOLIC BLOOD PRESSURE < 80 MM HG: ICD-10-PCS | Mod: CPTII,S$GLB,, | Performed by: INTERNAL MEDICINE

## 2023-06-08 PROCEDURE — 3008F PR BODY MASS INDEX (BMI) DOCUMENTED: ICD-10-PCS | Mod: CPTII,S$GLB,, | Performed by: INTERNAL MEDICINE

## 2023-06-08 PROCEDURE — 4010F PR ACE/ARB THEARPY RXD/TAKEN: ICD-10-PCS | Mod: CPTII,S$GLB,, | Performed by: INTERNAL MEDICINE

## 2023-06-08 PROCEDURE — 1101F PT FALLS ASSESS-DOCD LE1/YR: CPT | Mod: CPTII,S$GLB,, | Performed by: INTERNAL MEDICINE

## 2023-06-08 PROCEDURE — 1126F AMNT PAIN NOTED NONE PRSNT: CPT | Mod: CPTII,S$GLB,, | Performed by: INTERNAL MEDICINE

## 2023-06-08 PROCEDURE — 3008F BODY MASS INDEX DOCD: CPT | Mod: CPTII,S$GLB,, | Performed by: INTERNAL MEDICINE

## 2023-06-08 PROCEDURE — 1159F PR MEDICATION LIST DOCUMENTED IN MEDICAL RECORD: ICD-10-PCS | Mod: CPTII,S$GLB,, | Performed by: INTERNAL MEDICINE

## 2023-06-08 PROCEDURE — 99999 PR PBB SHADOW E&M-EST. PATIENT-LVL III: CPT | Mod: PBBFAC,,, | Performed by: INTERNAL MEDICINE

## 2023-06-08 PROCEDURE — 3078F DIAST BP <80 MM HG: CPT | Mod: CPTII,S$GLB,, | Performed by: INTERNAL MEDICINE

## 2023-06-08 PROCEDURE — 1101F PR PT FALLS ASSESS DOC 0-1 FALLS W/OUT INJ PAST YR: ICD-10-PCS | Mod: CPTII,S$GLB,, | Performed by: INTERNAL MEDICINE

## 2023-06-08 PROCEDURE — 3077F PR MOST RECENT SYSTOLIC BLOOD PRESSURE >= 140 MM HG: ICD-10-PCS | Mod: CPTII,S$GLB,, | Performed by: INTERNAL MEDICINE

## 2023-06-08 PROCEDURE — 3051F HG A1C>EQUAL 7.0%<8.0%: CPT | Mod: CPTII,S$GLB,, | Performed by: INTERNAL MEDICINE

## 2023-06-08 PROCEDURE — 3077F SYST BP >= 140 MM HG: CPT | Mod: CPTII,S$GLB,, | Performed by: INTERNAL MEDICINE

## 2023-06-08 PROCEDURE — 99999 PR PBB SHADOW E&M-EST. PATIENT-LVL III: ICD-10-PCS | Mod: PBBFAC,,, | Performed by: INTERNAL MEDICINE

## 2023-06-08 PROCEDURE — 3051F PR MOST RECENT HEMOGLOBIN A1C LEVEL 7.0 - < 8.0%: ICD-10-PCS | Mod: CPTII,S$GLB,, | Performed by: INTERNAL MEDICINE

## 2023-06-08 PROCEDURE — 4010F ACE/ARB THERAPY RXD/TAKEN: CPT | Mod: CPTII,S$GLB,, | Performed by: INTERNAL MEDICINE

## 2023-06-08 PROCEDURE — 3288F PR FALLS RISK ASSESSMENT DOCUMENTED: ICD-10-PCS | Mod: CPTII,S$GLB,, | Performed by: INTERNAL MEDICINE

## 2023-06-08 PROCEDURE — 3288F FALL RISK ASSESSMENT DOCD: CPT | Mod: CPTII,S$GLB,, | Performed by: INTERNAL MEDICINE

## 2023-06-08 PROCEDURE — 1126F PR PAIN SEVERITY QUANTIFIED, NO PAIN PRESENT: ICD-10-PCS | Mod: CPTII,S$GLB,, | Performed by: INTERNAL MEDICINE

## 2023-06-08 PROCEDURE — 1159F MED LIST DOCD IN RCRD: CPT | Mod: CPTII,S$GLB,, | Performed by: INTERNAL MEDICINE

## 2023-06-08 PROCEDURE — 99213 OFFICE O/P EST LOW 20 MIN: CPT | Mod: S$GLB,,, | Performed by: INTERNAL MEDICINE

## 2023-06-08 PROCEDURE — 99213 PR OFFICE/OUTPT VISIT, EST, LEVL III, 20-29 MIN: ICD-10-PCS | Mod: S$GLB,,, | Performed by: INTERNAL MEDICINE

## 2023-06-08 RX ORDER — ATORVASTATIN CALCIUM 80 MG/1
80 TABLET, FILM COATED ORAL NIGHTLY
Qty: 90 TABLET | Refills: 3 | Status: SHIPPED | OUTPATIENT
Start: 2023-06-08 | End: 2024-06-02

## 2023-06-08 NOTE — PROGRESS NOTES
Subjective:    Patient ID:  Mercedez Purvis is a 68 y.o. female who presents for follow-up of CAD    HPI     The patient is a 68 year old female with CAD, hypertension,  hyperlipidemia, diabetes and obesity.She presented to [facility] in 2016 with chest pain and had a PCI to RCA and OM2. She presented 4/18/19 with SOB and had a PCI to Cfx and a staged PCI to Cfx and mid RCA. An echo at [facility] 10/3/19 was normal with EF 50-55%. She has no chest pain or ARREOLA but has not been walking    Summary 10/30/21    The estimated ejection fraction is 40-45%.  The quantitatively derived ejection fraction is 42-45%.  The left ventricle is normal in size with eccentric hypertrophy and mildly decreased systolic function.  Grade I left ventricular diastolic dysfunction.  There is mild aortic valve stenosis.  Aortic valve area is 1.59 cm2; peak velocity is 1.74 m/s; mean gradient is 6 mmHg.  Mild tricuspid regurgitation.  Trivial anterior pericardial effusion.  Normal right ventricular size with normal right ventricular systolic function.  There are segmental left ventricular wall motion abnormalities.     Lab Results   Component Value Date     04/21/2023    K 4.0 04/21/2023     04/21/2023    CO2 27 04/21/2023    BUN 13 04/21/2023    CREATININE 1.1 04/21/2023     04/21/2023    HGBA1C 7.5 (H) 04/21/2023    MG 2.3 10/30/2021    AST 11 04/21/2023    ALT 12 04/21/2023    ALBUMIN 3.2 (L) 04/21/2023    PROT 7.1 04/21/2023    BILITOT 0.4 04/21/2023    WBC 7.10 04/21/2023    HGB 12.7 04/21/2023    HCT 44.2 04/21/2023    MCV 74 (L) 04/21/2023     04/21/2023    INR 1.0 08/27/2010    TSH 1.525 04/21/2023         Lab Results   Component Value Date    CHOL 161 04/21/2023    HDL 37 (L) 04/21/2023    TRIG 101 04/21/2023       Lab Results   Component Value Date    LDLCALC 103.8 04/21/2023       Past Medical History:   Diagnosis Date    Chronic headache     Diabetes     Heart attack 2004    8 stents    Heart failure     History  of heart artery stent     Hyperlipemia     Hypertension     Obesity (BMI 30-39.9)     Yeast infection        Current Outpatient Medications:     amitriptyline (ELAVIL) 75 MG tablet, Take 1 tablet (75 mg total) by mouth every evening., Disp: 90 tablet, Rfl: 0    amLODIPine (NORVASC) 10 MG tablet, Take 1 tablet (10 mg total) by mouth once daily., Disp: 90 tablet, Rfl: 3    blood sugar diagnostic Strp, 1 each by Misc.(Non-Drug; Combo Route) route 4 (four) times daily., Disp: 200 each, Rfl: 3    blood-glucose meter,continuous (DEXCOM ) Misc, 1 Device by Misc.(Non-Drug; Combo Route) route continuous., Disp: 1 each, Rfl: 0    blood-glucose sensor (DEXCOM G7 SENSOR) Roberta, 1 each by Misc.(Non-Drug; Combo Route) route every 10 days., Disp: 10 each, Rfl: 3    ciclopirox (PENLAC) 8 % Soln, Apply topically nightly., Disp: 6.6 mL, Rfl: 11    clopidogreL (PLAVIX) 75 mg tablet, Take 1 tablet (75 mg total) by mouth once daily., Disp: 90 tablet, Rfl: 3    clotrimazole-betamethasone 1-0.05% (LOTRISONE) cream, Apply topically 2 (two) times daily., Disp: 45 g, Rfl: 3    cyclobenzaprine (FLEXERIL) 10 MG tablet, Take 1 tablet (10 mg total) by mouth 3 (three) times a week., Disp: 90 tablet, Rfl: 0    diclofenac sodium (VOLTAREN) 1 % Gel, Apply topically once daily., Disp: 100 g, Rfl: 0    empagliflozin (JARDIANCE) 25 mg tablet, Take 1 tablet (25 mg total) by mouth once daily., Disp: 30 tablet, Rfl: 3    fluconazole (DIFLUCAN) 150 MG Tab, Take 1 tablet (150 mg total) by mouth once daily. May repeat after 72 hrs for yeast infection., Disp: 2 tablet, Rfl: 1    furosemide (LASIX) 40 MG tablet, Take 1 tablet (40 mg total) by mouth once daily., Disp: 90 tablet, Rfl: 3    gabapentin (NEURONTIN) 300 MG capsule, Take 1 capsule (300 mg total) by mouth 2 (two) times daily AND 2 capsules (600 mg total) every evening., Disp: 270 capsule, Rfl: 1    insulin detemir U-100 (LEVEMIR FLEXTOUCH U-100 INSULN) 100 unit/mL (3 mL) InPn pen, Inject 40  "Units into the skin once daily. FOR EMERGENCY USE ONLY - BACK UP INSULIN, IF OFF OF YOUR INSULIN PUMP - USE 40 UNITS INJECTION DAILY UNTIL GETTING BACK ON PUMP., Disp: 15 mL, Rfl: 1    insulin lispro (HUMALOG U-100 INSULIN) 100 unit/mL injection, Inject 100 Units into the skin continuous. Gives up to 100 units daily via Omnipod. Do not directly inject - only use within pods., Disp: 40 mL, Rfl: 11    LIDOcaine HCL 2% (XYLOCAINE) 2 % jelly, Apply topically as needed. Apply topically once nightly to affected part of foot/feet., Disp: 30 mL, Rfl: 2    LIDOcaine-prilocaine (EMLA) cream, Apply topically 2 (two) times a day., Disp: 30 g, Rfl: 1    lisinopriL (PRINIVIL,ZESTRIL) 40 MG tablet, Take 1 tablet (40 mg total) by mouth once daily., Disp: 90 tablet, Rfl: 3    meclizine (ANTIVERT) 12.5 mg tablet, Take 2 tablets (25 mg total) by mouth 2 (two) times daily as needed for Dizziness., Disp: 30 tablet, Rfl: 1    metFORMIN (GLUCOPHAGE-XR) 500 MG ER 24hr tablet, Take 2 tablets (1,000 mg total) by mouth every morning., Disp: 180 tablet, Rfl: 3    metoprolol succinate (TOPROL-XL) 200 MG 24 hr tablet, Take 1 tablet (200 mg total) by mouth once daily., Disp: 90 tablet, Rfl: 3    OMNIPOD DASH PODS, GEN 4, Crtg, Inject 1 each into the skin every other day., Disp: 45 each, Rfl: 3    pen needle, diabetic (PEN NEEDLE) 32 gauge x 5/32" Ndle, 1 each by Misc.(Non-Drug; Combo Route) route 4 (four) times daily. ICD 10 E11.42, Disp: 400 each, Rfl: 3    semaglutide (OZEMPIC) 1 mg/dose (4 mg/3 mL), Inject 1 mg into the skin once a week., Disp: 1 each, Rfl: 3    spironolactone (ALDACTONE) 50 MG tablet, Take 1 tablet (50 mg total) by mouth once daily., Disp: 90 tablet, Rfl: 3    atorvastatin (LIPITOR) 80 MG tablet, Take 1 tablet (80 mg total) by mouth every evening., Disp: 90 tablet, Rfl: 3    Current Facility-Administered Medications:     furosemide injection 20 mg, 20 mg, Intravenous, 1 time in Clinic/HOD, Dottie Merritt MD          Review of " Systems   Constitutional: Negative for decreased appetite, diaphoresis, fever, malaise/fatigue, weight gain and weight loss.   HENT:  Negative for congestion, ear discharge, ear pain and nosebleeds.    Eyes:  Negative for blurred vision, double vision and visual disturbance.   Cardiovascular:  Negative for chest pain, claudication, cyanosis, dyspnea on exertion, irregular heartbeat, leg swelling, near-syncope, orthopnea, palpitations, paroxysmal nocturnal dyspnea and syncope.   Respiratory:  Negative for cough, hemoptysis, shortness of breath, sleep disturbances due to breathing, snoring, sputum production and wheezing.    Endocrine: Negative for polydipsia, polyphagia and polyuria.   Hematologic/Lymphatic: Negative for adenopathy and bleeding problem. Does not bruise/bleed easily.   Skin:  Negative for color change, nail changes, poor wound healing and rash.   Musculoskeletal:  Negative for muscle cramps and muscle weakness.   Gastrointestinal:  Negative for abdominal pain, anorexia, change in bowel habit, hematochezia, nausea and vomiting.   Genitourinary:  Negative for dysuria, frequency and hematuria.   Neurological:  Negative for brief paralysis, difficulty with concentration, excessive daytime sleepiness, dizziness, focal weakness, headaches, light-headedness, seizures, vertigo and weakness.   Psychiatric/Behavioral:  Negative for altered mental status and depression.    Allergic/Immunologic: Negative for persistent infections.      Objective:BP (!) 140/70   Pulse 81   Ht 5' (1.524 m)   Wt 88.2 kg (194 lb 7.1 oz)   LMP 01/01/2020 (Approximate)   SpO2 97%   BMI 37.98 kg/m²             Physical Exam  Constitutional:       Appearance: She is well-developed. She is obese.   HENT:      Head: Normocephalic.      Right Ear: External ear normal.      Left Ear: External ear normal.      Nose: Nose normal.   Eyes:      General: No scleral icterus.     Conjunctiva/sclera: Conjunctivae normal.      Pupils: Pupils  are equal, round, and reactive to light.   Neck:      Thyroid: No thyromegaly.      Vascular: No JVD.      Trachea: No tracheal deviation.   Cardiovascular:      Rate and Rhythm: Normal rate and regular rhythm.      Pulses: Intact distal pulses.           Carotid pulses are 2+ on the right side and 2+ on the left side.       Femoral pulses are 1+ on the right side and 1+ on the left side.       Dorsalis pedis pulses are 0 on the right side and 0 on the left side.        Posterior tibial pulses are 0 on the right side and 0 on the left side.      Heart sounds: Normal heart sounds. No murmur heard.    No friction rub. No gallop.   Pulmonary:      Effort: Pulmonary effort is normal. No respiratory distress.      Breath sounds: Normal breath sounds. No wheezing or rales.   Chest:      Chest wall: No tenderness.   Abdominal:      General: Bowel sounds are normal. There is no distension.      Palpations: Abdomen is soft.      Tenderness: There is no abdominal tenderness. There is no guarding.   Musculoskeletal:         General: No tenderness. Normal range of motion.      Cervical back: Normal range of motion.   Lymphadenopathy:      Comments: Palpation of lymph nodes of neck and groin normal   Skin:     General: Skin is warm and dry.      Coloration: Skin is not pale.      Findings: No erythema or rash.      Comments: Palpation of skin normal   Neurological:      Mental Status: She is alert and oriented to person, place, and time.      Cranial Nerves: No cranial nerve deficit.      Motor: No abnormal muscle tone.      Coordination: Coordination normal.   Psychiatric:         Behavior: Behavior normal.         Thought Content: Thought content normal.         Judgment: Judgment normal.         Assessment:       1. Ischemic cardiomyopathy    2. Neuropathy due to type 2 diabetes mellitus    3. Bilateral carpal tunnel syndrome    4. History of heart artery stent    5. History of heart attack    6. Hypertension associated with  diabetes    7. Nonischemic cardiomyopathy    8. Chronic systolic congestive heart failure    9. Stage 3 chronic kidney disease, unspecified whether stage 3a or 3b CKD    10. Type 2 diabetes mellitus with diabetic polyneuropathy, with long-term current use of insulin    11. TIKA (obstructive sleep apnea)    12. Coronary artery disease involving native coronary artery of native heart without angina pectoris    13. Coronary artery disease of native artery of native heart with stable angina pectoris    14. Chronic systolic heart failure    15. Pure hypercholesterolemia         Plan:       Mercedez was seen today for coronary artery disease.    Diagnoses and all orders for this visit:    Ischemic cardiomyopathy  -     Echo Saline Bubble? No; Future    Neuropathy due to type 2 diabetes mellitus    Bilateral carpal tunnel syndrome    History of heart artery stent    History of heart attack    Hypertension associated with diabetes    Nonischemic cardiomyopathy    Chronic systolic congestive heart failure    Stage 3 chronic kidney disease, unspecified whether stage 3a or 3b CKD    Type 2 diabetes mellitus with diabetic polyneuropathy, with long-term current use of insulin    TIKA (obstructive sleep apnea)    Coronary artery disease involving native coronary artery of native heart without angina pectoris    Coronary artery disease of native artery of native heart with stable angina pectoris  -     atorvastatin (LIPITOR) 80 MG tablet; Take 1 tablet (80 mg total) by mouth every evening.  -     Lipid Panel; Future; Expected date: 09/06/2023    Chronic systolic heart failure  -     atorvastatin (LIPITOR) 80 MG tablet; Take 1 tablet (80 mg total) by mouth every evening.    Pure hypercholesterolemia  -     atorvastatin (LIPITOR) 80 MG tablet; Take 1 tablet (80 mg total) by mouth every evening.  -     Lipid Panel; Future; Expected date: 09/06/2023

## 2023-06-13 ENCOUNTER — OFFICE VISIT (OUTPATIENT)
Dept: PAIN MEDICINE | Facility: CLINIC | Age: 68
End: 2023-06-13
Attending: ANESTHESIOLOGY
Payer: MEDICARE

## 2023-06-13 VITALS
DIASTOLIC BLOOD PRESSURE: 81 MMHG | BODY MASS INDEX: 38.31 KG/M2 | HEART RATE: 103 BPM | HEIGHT: 60 IN | WEIGHT: 195.13 LBS | SYSTOLIC BLOOD PRESSURE: 148 MMHG

## 2023-06-13 DIAGNOSIS — M79.18 MYOFASCIAL PAIN: ICD-10-CM

## 2023-06-13 DIAGNOSIS — G89.29 OTHER CHRONIC PAIN: ICD-10-CM

## 2023-06-13 DIAGNOSIS — M79.641 RIGHT HAND PAIN: Primary | ICD-10-CM

## 2023-06-13 DIAGNOSIS — Z98.890 S/P TRIGGER FINGER RELEASE: ICD-10-CM

## 2023-06-13 PROCEDURE — 4010F PR ACE/ARB THEARPY RXD/TAKEN: ICD-10-PCS | Mod: CPTII,S$GLB,, | Performed by: ANESTHESIOLOGY

## 2023-06-13 PROCEDURE — 99999 PR PBB SHADOW E&M-EST. PATIENT-LVL III: ICD-10-PCS | Mod: PBBFAC,,, | Performed by: ANESTHESIOLOGY

## 2023-06-13 PROCEDURE — 3288F PR FALLS RISK ASSESSMENT DOCUMENTED: ICD-10-PCS | Mod: CPTII,S$GLB,, | Performed by: ANESTHESIOLOGY

## 2023-06-13 PROCEDURE — 99203 PR OFFICE/OUTPT VISIT, NEW, LEVL III, 30-44 MIN: ICD-10-PCS | Mod: GC,S$GLB,, | Performed by: ANESTHESIOLOGY

## 2023-06-13 PROCEDURE — 99999 PR PBB SHADOW E&M-EST. PATIENT-LVL III: CPT | Mod: PBBFAC,,, | Performed by: ANESTHESIOLOGY

## 2023-06-13 PROCEDURE — 3008F BODY MASS INDEX DOCD: CPT | Mod: CPTII,S$GLB,, | Performed by: ANESTHESIOLOGY

## 2023-06-13 PROCEDURE — 3079F PR MOST RECENT DIASTOLIC BLOOD PRESSURE 80-89 MM HG: ICD-10-PCS | Mod: CPTII,S$GLB,, | Performed by: ANESTHESIOLOGY

## 2023-06-13 PROCEDURE — 3051F HG A1C>EQUAL 7.0%<8.0%: CPT | Mod: CPTII,S$GLB,, | Performed by: ANESTHESIOLOGY

## 2023-06-13 PROCEDURE — 99203 OFFICE O/P NEW LOW 30 MIN: CPT | Mod: GC,S$GLB,, | Performed by: ANESTHESIOLOGY

## 2023-06-13 PROCEDURE — 3008F PR BODY MASS INDEX (BMI) DOCUMENTED: ICD-10-PCS | Mod: CPTII,S$GLB,, | Performed by: ANESTHESIOLOGY

## 2023-06-13 PROCEDURE — 3079F DIAST BP 80-89 MM HG: CPT | Mod: CPTII,S$GLB,, | Performed by: ANESTHESIOLOGY

## 2023-06-13 PROCEDURE — 1159F MED LIST DOCD IN RCRD: CPT | Mod: CPTII,S$GLB,, | Performed by: ANESTHESIOLOGY

## 2023-06-13 PROCEDURE — 1160F PR REVIEW ALL MEDS BY PRESCRIBER/CLIN PHARMACIST DOCUMENTED: ICD-10-PCS | Mod: CPTII,S$GLB,, | Performed by: ANESTHESIOLOGY

## 2023-06-13 PROCEDURE — 4010F ACE/ARB THERAPY RXD/TAKEN: CPT | Mod: CPTII,S$GLB,, | Performed by: ANESTHESIOLOGY

## 2023-06-13 PROCEDURE — 1125F PR PAIN SEVERITY QUANTIFIED, PAIN PRESENT: ICD-10-PCS | Mod: CPTII,S$GLB,, | Performed by: ANESTHESIOLOGY

## 2023-06-13 PROCEDURE — 3077F SYST BP >= 140 MM HG: CPT | Mod: CPTII,S$GLB,, | Performed by: ANESTHESIOLOGY

## 2023-06-13 PROCEDURE — 1160F RVW MEDS BY RX/DR IN RCRD: CPT | Mod: CPTII,S$GLB,, | Performed by: ANESTHESIOLOGY

## 2023-06-13 PROCEDURE — 1159F PR MEDICATION LIST DOCUMENTED IN MEDICAL RECORD: ICD-10-PCS | Mod: CPTII,S$GLB,, | Performed by: ANESTHESIOLOGY

## 2023-06-13 PROCEDURE — 3051F PR MOST RECENT HEMOGLOBIN A1C LEVEL 7.0 - < 8.0%: ICD-10-PCS | Mod: CPTII,S$GLB,, | Performed by: ANESTHESIOLOGY

## 2023-06-13 PROCEDURE — 1101F PT FALLS ASSESS-DOCD LE1/YR: CPT | Mod: CPTII,S$GLB,, | Performed by: ANESTHESIOLOGY

## 2023-06-13 PROCEDURE — 1101F PR PT FALLS ASSESS DOC 0-1 FALLS W/OUT INJ PAST YR: ICD-10-PCS | Mod: CPTII,S$GLB,, | Performed by: ANESTHESIOLOGY

## 2023-06-13 PROCEDURE — 1125F AMNT PAIN NOTED PAIN PRSNT: CPT | Mod: CPTII,S$GLB,, | Performed by: ANESTHESIOLOGY

## 2023-06-13 PROCEDURE — 3288F FALL RISK ASSESSMENT DOCD: CPT | Mod: CPTII,S$GLB,, | Performed by: ANESTHESIOLOGY

## 2023-06-13 PROCEDURE — 3077F PR MOST RECENT SYSTOLIC BLOOD PRESSURE >= 140 MM HG: ICD-10-PCS | Mod: CPTII,S$GLB,, | Performed by: ANESTHESIOLOGY

## 2023-06-13 NOTE — PROGRESS NOTES
Subjective:      Patient ID: Mercedez Purvis is a 68 y.o. female.    Chief Complaint: Right hand pain and right sided neck pain    Referred by: Dottie Merritt MD     Patient presents today for evaluation of right sided, non-radiating neck pain that radiates into her right shoulder and upper arm. She describes the pain as a burning pain. She does have a history diabetic peripheral neuropathy but denies any new numbness, tingling, or weakness. She also is having continued right hand pain following a trigger finger release of her right long finger on 3/24/23. She was referred for OT, but she never scheduled an appointment. She is currently taking gabapentin for her neuropathy. She is not currently taking any pain medications.     Interventional Pain History  None    Past Medical History:   Diagnosis Date    Chronic headache     Diabetes     Heart attack 2004    8 stents    Heart failure     History of heart artery stent     Hyperlipemia     Hypertension     Obesity (BMI 30-39.9)     Yeast infection        Past Surgical History:   Procedure Laterality Date    CARDIAC CATHETERIZATION      INJECTION OF STEROID Left 3/24/2023    Procedure: INJECTION, STEROID,LEFT RING FINGER;  Surgeon: Rona Lema MD;  Location: [facility] OR;  Service: Orthopedics;  Laterality: Left;    TRIGGER FINGER RELEASE Right 3/24/2023    Procedure: RELEASE, TRIGGER FINGER, RIGHT LONG;  Surgeon: Rona Lema MD;  Location: [facility] OR;  Service: Orthopedics;  Laterality: Right;       Review of patient's allergies indicates:  No Known Allergies    Current Outpatient Medications   Medication Sig Dispense Refill    amitriptyline (ELAVIL) 75 MG tablet Take 1 tablet (75 mg total) by mouth every evening. 90 tablet 0    amLODIPine (NORVASC) 10 MG tablet Take 1 tablet (10 mg total) by mouth once daily. 90 tablet 3    atorvastatin (LIPITOR) 80 MG tablet Take 1 tablet (80 mg total) by mouth every evening. 90 tablet 3    blood sugar  diagnostic Strp 1 each by Misc.(Non-Drug; Combo Route) route 4 (four) times daily. 200 each 3    blood-glucose meter,continuous (DEXCOM ) Misc 1 Device by Misc.(Non-Drug; Combo Route) route continuous. 1 each 0    blood-glucose sensor (DEXCOM G7 SENSOR) Roberta 1 each by Misc.(Non-Drug; Combo Route) route every 10 days. 10 each 3    ciclopirox (PENLAC) 8 % Soln Apply topically nightly. 6.6 mL 11    clopidogreL (PLAVIX) 75 mg tablet Take 1 tablet (75 mg total) by mouth once daily. 90 tablet 3    clotrimazole-betamethasone 1-0.05% (LOTRISONE) cream Apply topically 2 (two) times daily. 45 g 3    cyclobenzaprine (FLEXERIL) 10 MG tablet Take 1 tablet (10 mg total) by mouth 3 (three) times a week. 90 tablet 0    diclofenac sodium (VOLTAREN) 1 % Gel Apply topically once daily. 100 g 0    empagliflozin (JARDIANCE) 25 mg tablet Take 1 tablet (25 mg total) by mouth once daily. 30 tablet 3    furosemide (LASIX) 40 MG tablet Take 1 tablet (40 mg total) by mouth once daily. 90 tablet 3    gabapentin (NEURONTIN) 300 MG capsule Take 1 capsule (300 mg total) by mouth 2 (two) times daily AND 2 capsules (600 mg total) every evening. 270 capsule 1    insulin detemir U-100 (LEVEMIR FLEXTOUCH U-100 INSULN) 100 unit/mL (3 mL) InPn pen Inject 40 Units into the skin once daily. FOR EMERGENCY USE ONLY - BACK UP INSULIN, IF OFF OF YOUR INSULIN PUMP - USE 40 UNITS INJECTION DAILY UNTIL GETTING BACK ON PUMP. 15 mL 1    insulin lispro (HUMALOG U-100 INSULIN) 100 unit/mL injection Inject 100 Units into the skin continuous. Gives up to 100 units daily via Omnipod. Do not directly inject - only use within pods. 40 mL 11    LIDOcaine HCL 2% (XYLOCAINE) 2 % jelly Apply topically as needed. Apply topically once nightly to affected part of foot/feet. 30 mL 2    LIDOcaine-prilocaine (EMLA) cream Apply topically 2 (two) times a day. 30 g 1    lisinopriL (PRINIVIL,ZESTRIL) 40 MG tablet Take 1 tablet (40 mg total) by mouth once daily. 90 tablet 3     "meclizine (ANTIVERT) 12.5 mg tablet Take 2 tablets (25 mg total) by mouth 2 (two) times daily as needed for Dizziness. 30 tablet 1    metFORMIN (GLUCOPHAGE-XR) 500 MG ER 24hr tablet Take 2 tablets (1,000 mg total) by mouth every morning. 180 tablet 3    metoprolol succinate (TOPROL-XL) 200 MG 24 hr tablet Take 1 tablet (200 mg total) by mouth once daily. 90 tablet 3    OMNIPOD DASH PODS, GEN 4, Crtg Inject 1 each into the skin every other day. 45 each 3    pen needle, diabetic (PEN NEEDLE) 32 gauge x 5/32" Ndle 1 each by Misc.(Non-Drug; Combo Route) route 4 (four) times daily. ICD 10 E11.42 400 each 3    semaglutide (OZEMPIC) 1 mg/dose (4 mg/3 mL) Inject 1 mg into the skin once a week. 1 each 3    spironolactone (ALDACTONE) 50 MG tablet Take 1 tablet (50 mg total) by mouth once daily. 90 tablet 3    fluconazole (DIFLUCAN) 150 MG Tab Take 1 tablet (150 mg total) by mouth once daily. May repeat after 72 hrs for yeast infection. (Patient not taking: Reported on 6/13/2023) 2 tablet 1     Current Facility-Administered Medications   Medication Dose Route Frequency Provider Last Rate Last Admin    furosemide injection 20 mg  20 mg Intravenous 1 time in Clinic/HOD Dottie Merritt MD           No family history on file.    Social History     Socioeconomic History    Marital status: Single   Tobacco Use    Smoking status: Never    Smokeless tobacco: Never   Substance and Sexual Activity    Alcohol use: Never    Drug use: Never     Social Determinants of Health     Financial Resource Strain: Low Risk     Difficulty of Paying Living Expenses: Not hard at all   Food Insecurity: No Food Insecurity    Worried About Running Out of Food in the Last Year: Never true    Ran Out of Food in the Last Year: Never true   Transportation Needs: No Transportation Needs    Lack of Transportation (Medical): No    Lack of Transportation (Non-Medical): No   Physical Activity: Insufficiently Active    Days of Exercise per Week: 7 days    Minutes of " Exercise per Session: 20 min   Stress: No Stress Concern Present    Feeling of Stress : Only a little   Social Connections: Unknown    Frequency of Communication with Friends and Family: More than three times a week    Frequency of Social Gatherings with Friends and Family: More than three times a week    Active Member of Clubs or Organizations: No    Attends Club or Organization Meetings: Never    Marital Status:    Housing Stability: Low Risk     Unable to Pay for Housing in the Last Year: No    Number of Places Lived in the Last Year: 1    Unstable Housing in the Last Year: No           Review of Systems   Musculoskeletal:  Positive for joint pain, myalgias and neck pain.   All other systems reviewed and are negative.        Objective:   BP (!) 148/81 (BP Location: Right arm, Patient Position: Sitting, BP Method: Small (Automatic))   Pulse 103   Ht 5' (1.524 m)   Wt 88.5 kg (195 lb 1.7 oz)   LMP 01/01/2020 (Approximate)   BMI 38.10 kg/m²   Pain Disability Index Review:  Last 3 PDI Scores 6/13/2023   Pain Disability Index (PDI) 24     Normocephalic.  Atraumatic.  Affect appropriate.  Breathing unlabored.  Extra ocular muscles intact.         General Musculoskeletal Exam   Gait: normal     Back (L-Spine & T-Spine) / Neck (C-Spine) Exam     Tenderness   The patient is tender to palpation of the right trapezial. Right paramedian tenderness of the Lower C-Spine.     Neck (C-Spine) Range of Motion   Flexion:      NormalLimited neck flexion numerical scale: Pain with flexion.  Extension:  Normal  Right Rotation: normalNeck rotation right: Pain with rotation.  Left Rotation: normal    Neck (C-Spine) Tests   Spurling's Test   Left:  Negative  Right: negative      Right Hand/Wrist Exam     Inspection   Scars:  Hand -  present    Pain   Hand - The patient exhibits pain of the middle IP, middle MCP and extensory musculature.    Tenderness   The patient is tender to palpation of the anderson area.    Comments:   Surgical incision well healed, thickened scar tissue present.  Flexion contracture noted of Right long finger.          Muscle Strength   Right Upper Extremity   Biceps: 5/5   Deltoid:  5/5  Triceps:  5/5  Wrist extension: 5/5   Wrist flexion: 5/5   Right finger flexion strength: Limited 2/2 pain.  Left Upper Extremity  Biceps: 5/5   Deltoid:  5/5  Triceps:  5/5  Wrist extension: 5/5   Wrist flexion: 5/5   Finger Flexors:  5/5    Reflexes     Left Side  Left Cates's Sign:  Absent    Right Side   Right Cates's Sign:  absent      Assessment:       Encounter Diagnoses   Name Primary?    Right hand pain Yes    Myofascial pain     S/P trigger finger release     Other chronic pain          Plan:   We discussed with the patient the assessment and recommendations. The following is the plan we agreed on:    Referral back to OT  Counseled her to schedule follow-up with Hand Clinic  Will consider TPI for her myofascial neck pain  Will reach out to Dr. Lema regarding further management  RTC 6 weeks        Diagnoses and all orders for this visit:    Right hand pain  -     Ambulatory referral/consult to Physical/Occupational Therapy; Future    Myofascial pain  -     Ambulatory referral/consult to Physical/Occupational Therapy; Future    S/P trigger finger release  -     Ambulatory referral/consult to Physical/Occupational Therapy; Future    Other chronic pain  -     Ambulatory referral/consult to Pain Clinic       Darren Jeffers MD  [facility], PGY-4  This encounter took at least 30 minutes spent in chart review, history, physical, image, assessment and plan discussion.    I have personally taken the history and examined this patient and agree with the resident's note as stated above.

## 2023-06-28 ENCOUNTER — OFFICE VISIT (OUTPATIENT)
Dept: PODIATRY | Facility: CLINIC | Age: 68
End: 2023-06-28
Payer: MEDICARE

## 2023-06-28 VITALS
DIASTOLIC BLOOD PRESSURE: 79 MMHG | BODY MASS INDEX: 38.31 KG/M2 | SYSTOLIC BLOOD PRESSURE: 138 MMHG | HEART RATE: 80 BPM | WEIGHT: 195.13 LBS | HEIGHT: 60 IN

## 2023-06-28 DIAGNOSIS — M20.40 HAMMER TOE, UNSPECIFIED LATERALITY: ICD-10-CM

## 2023-06-28 DIAGNOSIS — E11.9 ENCOUNTER FOR DIABETIC FOOT EXAM: Primary | ICD-10-CM

## 2023-06-28 DIAGNOSIS — B35.1 ONYCHOMYCOSIS DUE TO DERMATOPHYTE: ICD-10-CM

## 2023-06-28 DIAGNOSIS — E11.42 TYPE 2 DIABETES MELLITUS WITH DIABETIC POLYNEUROPATHY, UNSPECIFIED WHETHER LONG TERM INSULIN USE: ICD-10-CM

## 2023-06-28 PROCEDURE — 3288F FALL RISK ASSESSMENT DOCD: CPT | Mod: CPTII,S$GLB,, | Performed by: PODIATRIST

## 2023-06-28 PROCEDURE — 11721 DEBRIDE NAIL 6 OR MORE: CPT | Mod: Q9,S$GLB,, | Performed by: PODIATRIST

## 2023-06-28 PROCEDURE — 4010F PR ACE/ARB THEARPY RXD/TAKEN: ICD-10-PCS | Mod: CPTII,S$GLB,, | Performed by: PODIATRIST

## 2023-06-28 PROCEDURE — 3051F PR MOST RECENT HEMOGLOBIN A1C LEVEL 7.0 - < 8.0%: ICD-10-PCS | Mod: CPTII,S$GLB,, | Performed by: PODIATRIST

## 2023-06-28 PROCEDURE — 4010F ACE/ARB THERAPY RXD/TAKEN: CPT | Mod: CPTII,S$GLB,, | Performed by: PODIATRIST

## 2023-06-28 PROCEDURE — 99214 OFFICE O/P EST MOD 30 MIN: CPT | Mod: 25,S$GLB,, | Performed by: PODIATRIST

## 2023-06-28 PROCEDURE — 3075F PR MOST RECENT SYSTOLIC BLOOD PRESS GE 130-139MM HG: ICD-10-PCS | Mod: CPTII,S$GLB,, | Performed by: PODIATRIST

## 2023-06-28 PROCEDURE — 3075F SYST BP GE 130 - 139MM HG: CPT | Mod: CPTII,S$GLB,, | Performed by: PODIATRIST

## 2023-06-28 PROCEDURE — 1159F MED LIST DOCD IN RCRD: CPT | Mod: CPTII,S$GLB,, | Performed by: PODIATRIST

## 2023-06-28 PROCEDURE — 11721 PR DEBRIDEMENT OF NAILS, 6 OR MORE: ICD-10-PCS | Mod: Q9,S$GLB,, | Performed by: PODIATRIST

## 2023-06-28 PROCEDURE — 1125F AMNT PAIN NOTED PAIN PRSNT: CPT | Mod: CPTII,S$GLB,, | Performed by: PODIATRIST

## 2023-06-28 PROCEDURE — 3078F DIAST BP <80 MM HG: CPT | Mod: CPTII,S$GLB,, | Performed by: PODIATRIST

## 2023-06-28 PROCEDURE — 99999 PR PBB SHADOW E&M-EST. PATIENT-LVL IV: ICD-10-PCS | Mod: PBBFAC,,, | Performed by: PODIATRIST

## 2023-06-28 PROCEDURE — 3008F PR BODY MASS INDEX (BMI) DOCUMENTED: ICD-10-PCS | Mod: CPTII,S$GLB,, | Performed by: PODIATRIST

## 2023-06-28 PROCEDURE — 99214 PR OFFICE/OUTPT VISIT, EST, LEVL IV, 30-39 MIN: ICD-10-PCS | Mod: 25,S$GLB,, | Performed by: PODIATRIST

## 2023-06-28 PROCEDURE — 1101F PR PT FALLS ASSESS DOC 0-1 FALLS W/OUT INJ PAST YR: ICD-10-PCS | Mod: CPTII,S$GLB,, | Performed by: PODIATRIST

## 2023-06-28 PROCEDURE — 1125F PR PAIN SEVERITY QUANTIFIED, PAIN PRESENT: ICD-10-PCS | Mod: CPTII,S$GLB,, | Performed by: PODIATRIST

## 2023-06-28 PROCEDURE — 1101F PT FALLS ASSESS-DOCD LE1/YR: CPT | Mod: CPTII,S$GLB,, | Performed by: PODIATRIST

## 2023-06-28 PROCEDURE — 99999 PR PBB SHADOW E&M-EST. PATIENT-LVL IV: CPT | Mod: PBBFAC,,, | Performed by: PODIATRIST

## 2023-06-28 PROCEDURE — 3008F BODY MASS INDEX DOCD: CPT | Mod: CPTII,S$GLB,, | Performed by: PODIATRIST

## 2023-06-28 PROCEDURE — 1159F PR MEDICATION LIST DOCUMENTED IN MEDICAL RECORD: ICD-10-PCS | Mod: CPTII,S$GLB,, | Performed by: PODIATRIST

## 2023-06-28 PROCEDURE — 3288F PR FALLS RISK ASSESSMENT DOCUMENTED: ICD-10-PCS | Mod: CPTII,S$GLB,, | Performed by: PODIATRIST

## 2023-06-28 PROCEDURE — 3051F HG A1C>EQUAL 7.0%<8.0%: CPT | Mod: CPTII,S$GLB,, | Performed by: PODIATRIST

## 2023-06-28 PROCEDURE — 3078F PR MOST RECENT DIASTOLIC BLOOD PRESSURE < 80 MM HG: ICD-10-PCS | Mod: CPTII,S$GLB,, | Performed by: PODIATRIST

## 2023-06-28 RX ORDER — LIDOCAINE 50 MG/G
OINTMENT TOPICAL
Qty: 120 G | Refills: 3 | Status: SHIPPED | OUTPATIENT
Start: 2023-06-28

## 2023-06-28 RX ORDER — LIDOCAINE HYDROCHLORIDE 20 MG/ML
JELLY TOPICAL
Qty: 30 ML | Refills: 2 | Status: SHIPPED | OUTPATIENT
Start: 2023-06-28

## 2023-06-28 RX ORDER — CICLOPIROX 80 MG/ML
SOLUTION TOPICAL NIGHTLY
Qty: 6.6 ML | Refills: 11 | Status: SHIPPED | OUTPATIENT
Start: 2023-06-28

## 2023-06-28 NOTE — PROGRESS NOTES
Subjective:      Patient ID: Mercedez Purvis is a 68 y.o. female.    Chief Complaint: Foot Pain (Bilateral foot pain/Bottom of ) and Nail Care    Burning tingling pain both feet. Gradual onset, worsening over past several weeks, aggravated by increased weight bearing, shoe gear, pressure.  No previous medical treatment.  OTC pain med not helping.     Cc2 long thick discolored misshapen toenails all 10 toes Gradual onset, worsening over past several weeks, aggravated by increased weight bearing, shoe gear, pressure.  Periodic debridement help symptoms.    Did not feel Penlac.  Once diabetic shoes.    Chief Complaint   Patient presents with    Foot Pain     Bilateral foot pain  Bottom of     Nail Care       Casual shoes bialteral      Review of Systems   Constitutional: Negative for chills, diaphoresis, fever, malaise/fatigue and night sweats.   Cardiovascular:  Negative for claudication, cyanosis, leg swelling and syncope.   Skin:  Positive for nail changes. Negative for color change, dry skin, rash, suspicious lesions and unusual hair distribution.   Musculoskeletal:  Negative for falls, joint pain, joint swelling, muscle cramps, muscle weakness and stiffness.   Gastrointestinal:  Negative for constipation, diarrhea, nausea and vomiting.   Neurological:  Positive for paresthesias and sensory change. Negative for brief paralysis, disturbances in coordination, focal weakness, numbness and tremors.         Objective:      Physical Exam  Musculoskeletal:      Comments: Mild adductovarus position bilateral 5th toes without symptoms today.      Otherwise, Normal angle, base, station of gait. All ten toes without clubbing, cyanosis, or signs of ischemia.  No pain to palpation bilateral lower extremities.  Range of motion, stability, muscle strength, and muscle tone normal bilateral feet and legs.    Skin:     Comments: Skin is normal age and health appropriate color, turgor, texture, and  temperature bilateral lower extremities without ulceration, hyperpigmentation, discoloration, masses nodules or cords palpated.  No ecchymosis, erythema, edema, or cardinal signs of infection bilateral lower extremities.    Toenails 1st, 2nd, 3rd, 4th, 5th  bilateral are hypertrophic thickened 2-3 mm, dystrophic, discolored tanish brown with tan, gray crumbly subungual debris.  Tender to distal nail plate pressure, without periungual skin abnormality of each.     Neurological:      Comments: Negative tinel sign to percussion sural, superficial peroneal, deep peroneal, saphenous, and posterior tibial nerves right and left ankles and feet.    Paresthesias, and burning bilateral feet with no clearly identified trigger or source.               Assessment:       Encounter Diagnoses   Name Primary?    Encounter for diabetic foot exam Yes    Type 2 diabetes mellitus with diabetic polyneuropathy, unspecified whether long term insulin use     Onychomycosis due to dermatophyte     Hammer toe, unspecified laterality          Plan:       Mercedez was seen today for foot pain and nail care.    Diagnoses and all orders for this visit:    Encounter for diabetic foot exam  -     DIABETIC SHOES FOR HOME USE    Type 2 diabetes mellitus with diabetic polyneuropathy, unspecified whether long term insulin use  -     DIABETIC SHOES FOR HOME USE    Onychomycosis due to dermatophyte  -     DIABETIC SHOES FOR HOME USE    Hammer toe, unspecified laterality  -     DIABETIC SHOES FOR HOME USE    Other orders  -     ciclopirox (PENLAC) 8 % Soln; Apply topically nightly.  -     LIDOcaine HCL 2% (XYLOCAINE) 2 % jelly; Apply topically as needed. Apply topically once nightly to affected part of foot/feet.      I counseled the patient on her conditions, their implications and medical management.        With the patient's permission, I debrided all ten toenails with a sterile nipper and curette, removing all offending nail and debris.  Patient  tolerated the procedure well and related significant relief.    Rx DM shoes, lidocaine gel, penlac    Follow with primary care pain management for long-term medical management chronic nerve pain.  She understands lidocaine gel is temporary relief at nighttime to facilitate sleep between now and the time she can follow some manage her chronic pain.          Follow up in about 1 year (around 6/28/2024).

## 2023-07-10 ENCOUNTER — TELEPHONE (OUTPATIENT)
Dept: OPHTHALMOLOGY | Facility: CLINIC | Age: 68
End: 2023-07-10
Payer: MEDICARE

## 2023-07-12 ENCOUNTER — HOSPITAL ENCOUNTER (OUTPATIENT)
Dept: CARDIOLOGY | Facility: HOSPITAL | Age: 68
Discharge: HOME | End: 2023-07-12
Attending: INTERNAL MEDICINE
Payer: MEDICARE

## 2023-07-12 VITALS
WEIGHT: 195 LBS | BODY MASS INDEX: 38.28 KG/M2 | DIASTOLIC BLOOD PRESSURE: 80 MMHG | SYSTOLIC BLOOD PRESSURE: 140 MMHG | HEIGHT: 60 IN | HEART RATE: 70 BPM

## 2023-07-12 DIAGNOSIS — I25.5 ISCHEMIC CARDIOMYOPATHY: ICD-10-CM

## 2023-07-12 LAB
ASCENDING AORTA: 3.04 CM
AV INDEX (PROSTH): 0.52
AV MEAN GRADIENT: 7 MMHG
AV PEAK GRADIENT: 13 MMHG
AV VALVE AREA: 1.65 CM2
AV VELOCITY RATIO: 0.55
BSA FOR ECHO PROCEDURE: 1.93 M2
CV ECHO LV RWT: 0.38 CM
DOP CALC AO PEAK VEL: 1.78 M/S
DOP CALC AO VTI: 39.55 CM
DOP CALC LVOT AREA: 3.2 CM2
DOP CALC LVOT DIAMETER: 2.02 CM
DOP CALC LVOT PEAK VEL: 0.98 M/S
DOP CALC LVOT STROKE VOLUME: 65.41 CM3
DOP CALCLVOT PEAK VEL VTI: 20.42 CM
E WAVE DECELERATION TIME: 212.04 MSEC
E/A RATIO: 0.87
E/E' RATIO: 22.44 M/S
ECHO LV POSTERIOR WALL: 1 CM (ref 0.6–1.1)
EJECTION FRACTION: 25 %
FRACTIONAL SHORTENING: 26 % (ref 28–44)
INTERVENTRICULAR SEPTUM: 0.78 CM (ref 0.6–1.1)
IVRT: 76.12 MSEC
LA MAJOR: 5.35 CM
LA MINOR: 4.99 CM
LA WIDTH: 4.11 CM
LEFT ATRIUM SIZE: 3.48 CM
LEFT ATRIUM VOLUME INDEX MOD: 38.1 ML/M2
LEFT ATRIUM VOLUME INDEX: 33.9 ML/M2
LEFT ATRIUM VOLUME MOD: 70.49 CM3
LEFT ATRIUM VOLUME: 62.78 CM3
LEFT INTERNAL DIMENSION IN SYSTOLE: 3.93 CM (ref 2.1–4)
LEFT VENTRICLE DIASTOLIC VOLUME INDEX: 61.86 ML/M2
LEFT VENTRICLE DIASTOLIC VOLUME: 114.45 ML
LEFT VENTRICLE MASS INDEX: 93 G/M2
LEFT VENTRICLE SYSTOLIC VOLUME INDEX: 36.3 ML/M2
LEFT VENTRICLE SYSTOLIC VOLUME: 67.19 ML
LEFT VENTRICULAR INTERNAL DIMENSION IN DIASTOLE: 5.3 CM (ref 3.5–6)
LEFT VENTRICULAR MASS: 172.01 G
LV LATERAL E/E' RATIO: 20.2 M/S
LV SEPTAL E/E' RATIO: 25.25 M/S
MV A" WAVE DURATION": 21.31 MSEC
MV PEAK A VEL: 1.16 M/S
MV PEAK E VEL: 1.01 M/S
MV STENOSIS PRESSURE HALF TIME: 61.49 MS
MV VALVE AREA P 1/2 METHOD: 3.58 CM2
PULM VEIN S/D RATIO: 1.22
PV PEAK D VEL: 0.36 M/S
PV PEAK S VEL: 0.44 M/S
QEF: 28 %
RA MAJOR: 4.23 CM
RA PRESSURE: 3 MMHG
RA WIDTH: 2.85 CM
RIGHT VENTRICULAR END-DIASTOLIC DIMENSION: 2.77 CM
SINUS: 2.86 CM
STJ: 2.53 CM
TDI LATERAL: 0.05 M/S
TDI SEPTAL: 0.04 M/S
TDI: 0.05 M/S
TRICUSPID ANNULAR PLANE SYSTOLIC EXCURSION: 1.75 CM

## 2023-07-12 PROCEDURE — 93306 TTE W/DOPPLER COMPLETE: CPT | Mod: 26,,, | Performed by: INTERNAL MEDICINE

## 2023-07-12 PROCEDURE — 93306 TTE W/DOPPLER COMPLETE: CPT

## 2023-07-12 PROCEDURE — 93306 ECHO (CUPID ONLY): ICD-10-PCS | Mod: 26,,, | Performed by: INTERNAL MEDICINE

## 2023-07-13 ENCOUNTER — PATIENT MESSAGE (OUTPATIENT)
Dept: CARDIOLOGY | Facility: CLINIC | Age: 68
End: 2023-07-13
Payer: MEDICARE

## 2023-07-14 ENCOUNTER — OFFICE VISIT (OUTPATIENT)
Dept: OPHTHALMOLOGY | Facility: CLINIC | Age: 68
End: 2023-07-14
Payer: MEDICARE

## 2023-07-14 DIAGNOSIS — Z79.4 TYPE 2 DIABETES MELLITUS WITH BOTH EYES AFFECTED BY MILD NONPROLIFERATIVE RETINOPATHY WITHOUT MACULAR EDEMA, WITH LONG-TERM CURRENT USE OF INSULIN: Primary | ICD-10-CM

## 2023-07-14 DIAGNOSIS — E11.36 DIABETIC CATARACT OF BOTH EYES: ICD-10-CM

## 2023-07-14 DIAGNOSIS — E11.3293 TYPE 2 DIABETES MELLITUS WITH BOTH EYES AFFECTED BY MILD NONPROLIFERATIVE RETINOPATHY WITHOUT MACULAR EDEMA, WITH LONG-TERM CURRENT USE OF INSULIN: Primary | ICD-10-CM

## 2023-07-14 PROCEDURE — 4010F ACE/ARB THERAPY RXD/TAKEN: CPT | Mod: CPTII,S$GLB,, | Performed by: OPHTHALMOLOGY

## 2023-07-14 PROCEDURE — 1160F RVW MEDS BY RX/DR IN RCRD: CPT | Mod: CPTII,S$GLB,, | Performed by: OPHTHALMOLOGY

## 2023-07-14 PROCEDURE — 1101F PT FALLS ASSESS-DOCD LE1/YR: CPT | Mod: CPTII,S$GLB,, | Performed by: OPHTHALMOLOGY

## 2023-07-14 PROCEDURE — 92014 COMPRE OPH EXAM EST PT 1/>: CPT | Mod: S$GLB,,, | Performed by: OPHTHALMOLOGY

## 2023-07-14 PROCEDURE — 1126F AMNT PAIN NOTED NONE PRSNT: CPT | Mod: CPTII,S$GLB,, | Performed by: OPHTHALMOLOGY

## 2023-07-14 PROCEDURE — 4010F PR ACE/ARB THEARPY RXD/TAKEN: ICD-10-PCS | Mod: CPTII,S$GLB,, | Performed by: OPHTHALMOLOGY

## 2023-07-14 PROCEDURE — 99999 PR PBB SHADOW E&M-EST. PATIENT-LVL IV: ICD-10-PCS | Mod: PBBFAC,,, | Performed by: OPHTHALMOLOGY

## 2023-07-14 PROCEDURE — 3051F PR MOST RECENT HEMOGLOBIN A1C LEVEL 7.0 - < 8.0%: ICD-10-PCS | Mod: CPTII,S$GLB,, | Performed by: OPHTHALMOLOGY

## 2023-07-14 PROCEDURE — 92134 CPTRZ OPH DX IMG PST SGM RTA: CPT | Mod: S$GLB,,, | Performed by: OPHTHALMOLOGY

## 2023-07-14 PROCEDURE — 92014 PR EYE EXAM, EST PATIENT,COMPREHESV: ICD-10-PCS | Mod: S$GLB,,, | Performed by: OPHTHALMOLOGY

## 2023-07-14 PROCEDURE — 3288F FALL RISK ASSESSMENT DOCD: CPT | Mod: CPTII,S$GLB,, | Performed by: OPHTHALMOLOGY

## 2023-07-14 PROCEDURE — 1159F PR MEDICATION LIST DOCUMENTED IN MEDICAL RECORD: ICD-10-PCS | Mod: CPTII,S$GLB,, | Performed by: OPHTHALMOLOGY

## 2023-07-14 PROCEDURE — 1160F PR REVIEW ALL MEDS BY PRESCRIBER/CLIN PHARMACIST DOCUMENTED: ICD-10-PCS | Mod: CPTII,S$GLB,, | Performed by: OPHTHALMOLOGY

## 2023-07-14 PROCEDURE — 1126F PR PAIN SEVERITY QUANTIFIED, NO PAIN PRESENT: ICD-10-PCS | Mod: CPTII,S$GLB,, | Performed by: OPHTHALMOLOGY

## 2023-07-14 PROCEDURE — 1101F PR PT FALLS ASSESS DOC 0-1 FALLS W/OUT INJ PAST YR: ICD-10-PCS | Mod: CPTII,S$GLB,, | Performed by: OPHTHALMOLOGY

## 2023-07-14 PROCEDURE — 92134 POSTERIOR SEGMENT OCT RETINA (OCULAR COHERENCE TOMOGRAPHY)-BOTH EYES: ICD-10-PCS | Mod: S$GLB,,, | Performed by: OPHTHALMOLOGY

## 2023-07-14 PROCEDURE — 3288F PR FALLS RISK ASSESSMENT DOCUMENTED: ICD-10-PCS | Mod: CPTII,S$GLB,, | Performed by: OPHTHALMOLOGY

## 2023-07-14 PROCEDURE — 3051F HG A1C>EQUAL 7.0%<8.0%: CPT | Mod: CPTII,S$GLB,, | Performed by: OPHTHALMOLOGY

## 2023-07-14 PROCEDURE — 1159F MED LIST DOCD IN RCRD: CPT | Mod: CPTII,S$GLB,, | Performed by: OPHTHALMOLOGY

## 2023-07-14 PROCEDURE — 99999 PR PBB SHADOW E&M-EST. PATIENT-LVL IV: CPT | Mod: PBBFAC,,, | Performed by: OPHTHALMOLOGY

## 2023-07-16 NOTE — PROGRESS NOTES
Subjective:       Patient ID: Mercedez Purvis is a 68 y.o. female      Chief Complaint   Patient presents with    Concerns About Ocular Health     Long overdue f/u for DR     History of Present Illness  HPI     Concerns About Ocular Health     Additional comments: Long overdue f/u for DR           Comments    6 month F/u Oct/Dfe   Dls: 02/19/2021 Dr. Alanis     Pt states her vision is blurry OU, no f/f/pain  FBS this am was 112, bs is controlled.     Eye meds:   None       Hemoglobin A1C       Date                     Value               Ref Range             Status                04/21/2023               7.5 (H)             4.0 - 5.6 %           Final                 12/16/2022               7.7 (H)             4.0 - 5.6 %           Final                 07/06/2022               8.6 (H)             4.0 - 5.6 %           Final                       Last edited by Ian Alanis MD on 7/16/2023 12:23 PM.        Imaging:    See report    Assessment/Plan:     1. Type 2 diabetes mellitus with both eyes affected by mild nonproliferative retinopathy without macular edema, with long-term current use of insulin  Min cystoid eccentric spaces on OCT OD  Diabetic Retinopathy discussed in detail, all questions answered  Stressed importance of good BS/BP/Chol Control  RTC immediately PRN any vision changes, hortencia blurry vision, missing vision, floaters, distortions, etc    - Posterior Segment OCT Retina-Both eyes    2. Diabetic cataract of both eyes  Refraction vs CE/IOL per pt's preference  F/u Dr Akins    Follow up in about 1 year (around 7/14/2024), or if symptoms worsen or fail to improve, for Comprehensive Examination, OCT Mac.

## 2023-07-17 ENCOUNTER — OFFICE VISIT (OUTPATIENT)
Dept: CARDIOLOGY | Facility: CLINIC | Age: 68
End: 2023-07-17
Payer: MEDICARE

## 2023-07-17 VITALS
DIASTOLIC BLOOD PRESSURE: 87 MMHG | HEART RATE: 91 BPM | BODY MASS INDEX: 38.26 KG/M2 | SYSTOLIC BLOOD PRESSURE: 157 MMHG | OXYGEN SATURATION: 98 % | WEIGHT: 194.88 LBS | HEIGHT: 60 IN

## 2023-07-17 DIAGNOSIS — E11.42 TYPE 2 DIABETES MELLITUS WITH DIABETIC POLYNEUROPATHY, WITH LONG-TERM CURRENT USE OF INSULIN: ICD-10-CM

## 2023-07-17 DIAGNOSIS — I42.8 NONISCHEMIC CARDIOMYOPATHY: ICD-10-CM

## 2023-07-17 DIAGNOSIS — G47.33 OSA (OBSTRUCTIVE SLEEP APNEA): ICD-10-CM

## 2023-07-17 DIAGNOSIS — E66.01 CLASS 2 SEVERE OBESITY DUE TO EXCESS CALORIES WITH SERIOUS COMORBIDITY AND BODY MASS INDEX (BMI) OF 38.0 TO 38.9 IN ADULT: ICD-10-CM

## 2023-07-17 DIAGNOSIS — I25.118 CORONARY ARTERY DISEASE OF NATIVE ARTERY OF NATIVE HEART WITH STABLE ANGINA PECTORIS: ICD-10-CM

## 2023-07-17 DIAGNOSIS — I25.5 ISCHEMIC CARDIOMYOPATHY: Primary | ICD-10-CM

## 2023-07-17 DIAGNOSIS — I15.2 HYPERTENSION ASSOCIATED WITH DIABETES: ICD-10-CM

## 2023-07-17 DIAGNOSIS — I15.2 OBESITY, DIABETES, AND HYPERTENSION SYNDROME: ICD-10-CM

## 2023-07-17 DIAGNOSIS — I25.2 HISTORY OF HEART ATTACK: ICD-10-CM

## 2023-07-17 DIAGNOSIS — E11.59 OBESITY, DIABETES, AND HYPERTENSION SYNDROME: ICD-10-CM

## 2023-07-17 DIAGNOSIS — E11.59 HYPERTENSION ASSOCIATED WITH DIABETES: ICD-10-CM

## 2023-07-17 DIAGNOSIS — E66.9 OBESITY, DIABETES, AND HYPERTENSION SYNDROME: ICD-10-CM

## 2023-07-17 DIAGNOSIS — Z79.4 TYPE 2 DIABETES MELLITUS WITH DIABETIC POLYNEUROPATHY, WITH LONG-TERM CURRENT USE OF INSULIN: ICD-10-CM

## 2023-07-17 DIAGNOSIS — E11.69 OBESITY, DIABETES, AND HYPERTENSION SYNDROME: ICD-10-CM

## 2023-07-17 DIAGNOSIS — Z95.5 HISTORY OF HEART ARTERY STENT: ICD-10-CM

## 2023-07-17 DIAGNOSIS — N18.30 STAGE 3 CHRONIC KIDNEY DISEASE, UNSPECIFIED WHETHER STAGE 3A OR 3B CKD: ICD-10-CM

## 2023-07-17 DIAGNOSIS — I50.22 CHRONIC SYSTOLIC CONGESTIVE HEART FAILURE: ICD-10-CM

## 2023-07-17 PROCEDURE — 1101F PR PT FALLS ASSESS DOC 0-1 FALLS W/OUT INJ PAST YR: ICD-10-PCS | Mod: CPTII,S$GLB,, | Performed by: INTERNAL MEDICINE

## 2023-07-17 PROCEDURE — 3288F PR FALLS RISK ASSESSMENT DOCUMENTED: ICD-10-PCS | Mod: CPTII,S$GLB,, | Performed by: INTERNAL MEDICINE

## 2023-07-17 PROCEDURE — 3008F BODY MASS INDEX DOCD: CPT | Mod: CPTII,S$GLB,, | Performed by: INTERNAL MEDICINE

## 2023-07-17 PROCEDURE — 3051F HG A1C>EQUAL 7.0%<8.0%: CPT | Mod: CPTII,S$GLB,, | Performed by: INTERNAL MEDICINE

## 2023-07-17 PROCEDURE — 4010F PR ACE/ARB THEARPY RXD/TAKEN: ICD-10-PCS | Mod: CPTII,S$GLB,, | Performed by: INTERNAL MEDICINE

## 2023-07-17 PROCEDURE — 3079F PR MOST RECENT DIASTOLIC BLOOD PRESSURE 80-89 MM HG: ICD-10-PCS | Mod: CPTII,S$GLB,, | Performed by: INTERNAL MEDICINE

## 2023-07-17 PROCEDURE — 1159F MED LIST DOCD IN RCRD: CPT | Mod: CPTII,S$GLB,, | Performed by: INTERNAL MEDICINE

## 2023-07-17 PROCEDURE — 4010F ACE/ARB THERAPY RXD/TAKEN: CPT | Mod: CPTII,S$GLB,, | Performed by: INTERNAL MEDICINE

## 2023-07-17 PROCEDURE — 3079F DIAST BP 80-89 MM HG: CPT | Mod: CPTII,S$GLB,, | Performed by: INTERNAL MEDICINE

## 2023-07-17 PROCEDURE — 1126F PR PAIN SEVERITY QUANTIFIED, NO PAIN PRESENT: ICD-10-PCS | Mod: CPTII,S$GLB,, | Performed by: INTERNAL MEDICINE

## 2023-07-17 PROCEDURE — 3288F FALL RISK ASSESSMENT DOCD: CPT | Mod: CPTII,S$GLB,, | Performed by: INTERNAL MEDICINE

## 2023-07-17 PROCEDURE — 99999 PR PBB SHADOW E&M-EST. PATIENT-LVL III: CPT | Mod: PBBFAC,,, | Performed by: INTERNAL MEDICINE

## 2023-07-17 PROCEDURE — 3051F PR MOST RECENT HEMOGLOBIN A1C LEVEL 7.0 - < 8.0%: ICD-10-PCS | Mod: CPTII,S$GLB,, | Performed by: INTERNAL MEDICINE

## 2023-07-17 PROCEDURE — 1126F AMNT PAIN NOTED NONE PRSNT: CPT | Mod: CPTII,S$GLB,, | Performed by: INTERNAL MEDICINE

## 2023-07-17 PROCEDURE — 99213 OFFICE O/P EST LOW 20 MIN: CPT | Mod: S$GLB,,, | Performed by: INTERNAL MEDICINE

## 2023-07-17 PROCEDURE — 1159F PR MEDICATION LIST DOCUMENTED IN MEDICAL RECORD: ICD-10-PCS | Mod: CPTII,S$GLB,, | Performed by: INTERNAL MEDICINE

## 2023-07-17 PROCEDURE — 99999 PR PBB SHADOW E&M-EST. PATIENT-LVL III: ICD-10-PCS | Mod: PBBFAC,,, | Performed by: INTERNAL MEDICINE

## 2023-07-17 PROCEDURE — 3077F PR MOST RECENT SYSTOLIC BLOOD PRESSURE >= 140 MM HG: ICD-10-PCS | Mod: CPTII,S$GLB,, | Performed by: INTERNAL MEDICINE

## 2023-07-17 PROCEDURE — 1101F PT FALLS ASSESS-DOCD LE1/YR: CPT | Mod: CPTII,S$GLB,, | Performed by: INTERNAL MEDICINE

## 2023-07-17 PROCEDURE — 3077F SYST BP >= 140 MM HG: CPT | Mod: CPTII,S$GLB,, | Performed by: INTERNAL MEDICINE

## 2023-07-17 PROCEDURE — 99213 PR OFFICE/OUTPT VISIT, EST, LEVL III, 20-29 MIN: ICD-10-PCS | Mod: S$GLB,,, | Performed by: INTERNAL MEDICINE

## 2023-07-17 PROCEDURE — 3008F PR BODY MASS INDEX (BMI) DOCUMENTED: ICD-10-PCS | Mod: CPTII,S$GLB,, | Performed by: INTERNAL MEDICINE

## 2023-07-17 RX ORDER — SACUBITRIL AND VALSARTAN 97; 103 MG/1; MG/1
1 TABLET, FILM COATED ORAL 2 TIMES DAILY
Qty: 60 TABLET | Refills: 11 | Status: SHIPPED | OUTPATIENT
Start: 2023-07-17

## 2023-07-17 NOTE — PROGRESS NOTES
Subjective:    Patient ID:  Mercedez Purvis is a 68 y.o. female who presents for follow-up of CAD hypertension    HPI  The patient is a 68 year old female initially seen 6/8/23 with CAD, hypertension,  hyperlipidemia, diabetes and obesity.She presented to [facility] in 2016 with chest pain and had a PCI to RCA and OM2. She presented 4/18/19 with SOB and had a PCI to Cfx and a staged PCI to Cfx and mid RCA. An echo at [facility] 10/3/19 was normal with EF 50-55 but echo 10/10/21 reported EF 40-45% and MAURI 1.58 cm2. Her repeat echo revealed EF 25%. She denies chest pain or ARREOLA.    Summary 7/12/23    The estimated ejection fraction is 25%. LV apex not well seen. consider limited study with contrast to r/o LV thrombus  The quantitatively derived ejection fraction is 28%.  The left ventricle is normal in size with severely decreased systolic function.  There is severe left ventricular global hypokinesis.  Grade I left ventricular diastolic dysfunction.  Normal right ventricular size with normal right ventricular systolic function.  Normal central venous pressure (3 mmHg).  Trivial posterior pericardial effusion.     Lab Results   Component Value Date     04/21/2023    K 4.0 04/21/2023     04/21/2023    CO2 27 04/21/2023    BUN 13 04/21/2023    CREATININE 1.1 04/21/2023     04/21/2023    HGBA1C 7.5 (H) 04/21/2023    MG 2.3 10/30/2021    AST 11 04/21/2023    ALT 12 04/21/2023    ALBUMIN 3.2 (L) 04/21/2023    PROT 7.1 04/21/2023    BILITOT 0.4 04/21/2023    WBC 7.10 04/21/2023    HGB 12.7 04/21/2023    HCT 44.2 04/21/2023    MCV 74 (L) 04/21/2023     04/21/2023    INR 1.0 08/27/2010    TSH 1.525 04/21/2023         Lab Results   Component Value Date    CHOL 161 04/21/2023    HDL 37 (L) 04/21/2023    TRIG 101 04/21/2023       Lab Results   Component Value Date    LDLCALC 103.8 04/21/2023       Past Medical History:   Diagnosis Date    Chronic headache     Diabetes     Heart attack 2004    8 stents    Heart  failure     History of heart artery stent     Hyperlipemia     Hypertension     Obesity (BMI 30-39.9)     Yeast infection        Current Outpatient Medications:     amitriptyline (ELAVIL) 75 MG tablet, Take 1 tablet (75 mg total) by mouth every evening., Disp: 90 tablet, Rfl: 0    amLODIPine (NORVASC) 10 MG tablet, Take 1 tablet (10 mg total) by mouth once daily., Disp: 90 tablet, Rfl: 3    atorvastatin (LIPITOR) 80 MG tablet, Take 1 tablet (80 mg total) by mouth every evening., Disp: 90 tablet, Rfl: 3    blood sugar diagnostic Strp, 1 each by Misc.(Non-Drug; Combo Route) route 4 (four) times daily., Disp: 200 each, Rfl: 3    blood-glucose meter,continuous (DEXCOM ) Misc, 1 Device by Misc.(Non-Drug; Combo Route) route continuous., Disp: 1 each, Rfl: 0    blood-glucose sensor (DEXCOM G7 SENSOR) Roberta, 1 each by Misc.(Non-Drug; Combo Route) route every 10 days., Disp: 10 each, Rfl: 3    ciclopirox (PENLAC) 8 % Soln, Apply topically nightly., Disp: 6.6 mL, Rfl: 11    clopidogreL (PLAVIX) 75 mg tablet, Take 1 tablet (75 mg total) by mouth once daily., Disp: 90 tablet, Rfl: 3    clotrimazole-betamethasone 1-0.05% (LOTRISONE) cream, Apply topically 2 (two) times daily., Disp: 45 g, Rfl: 3    cyclobenzaprine (FLEXERIL) 10 MG tablet, Take 1 tablet (10 mg total) by mouth 3 (three) times a week., Disp: 90 tablet, Rfl: 0    diclofenac sodium (VOLTAREN) 1 % Gel, Apply topically once daily., Disp: 100 g, Rfl: 0    empagliflozin (JARDIANCE) 25 mg tablet, Take 1 tablet (25 mg total) by mouth once daily., Disp: 30 tablet, Rfl: 3    fluconazole (DIFLUCAN) 150 MG Tab, Take 1 tablet (150 mg total) by mouth once daily. May repeat after 72 hrs for yeast infection., Disp: 2 tablet, Rfl: 1    furosemide (LASIX) 40 MG tablet, Take 1 tablet (40 mg total) by mouth once daily., Disp: 90 tablet, Rfl: 3    gabapentin (NEURONTIN) 300 MG capsule, Take 1 capsule (300 mg total) by mouth 2 (two) times daily AND 2 capsules (600 mg total) every  "evening., Disp: 270 capsule, Rfl: 1    insulin detemir U-100 (LEVEMIR FLEXTOUCH U-100 INSULN) 100 unit/mL (3 mL) InPn pen, Inject 40 Units into the skin once daily. FOR EMERGENCY USE ONLY - BACK UP INSULIN, IF OFF OF YOUR INSULIN PUMP - USE 40 UNITS INJECTION DAILY UNTIL GETTING BACK ON PUMP., Disp: 15 mL, Rfl: 1    insulin lispro (HUMALOG U-100 INSULIN) 100 unit/mL injection, Inject 100 Units into the skin continuous. Gives up to 100 units daily via Omnipod. Do not directly inject - only use within pods., Disp: 40 mL, Rfl: 11    LIDOcaine (XYLOCAINE) 5 % Oint ointment, Apply topically as needed., Disp: 120 g, Rfl: 3    LIDOcaine HCL 2% (XYLOCAINE) 2 % jelly, Apply topically as needed. Apply topically once nightly to affected part of foot/feet., Disp: 30 mL, Rfl: 2    LIDOcaine-prilocaine (EMLA) cream, Apply topically 2 (two) times a day., Disp: 30 g, Rfl: 1    meclizine (ANTIVERT) 12.5 mg tablet, Take 2 tablets (25 mg total) by mouth 2 (two) times daily as needed for Dizziness., Disp: 30 tablet, Rfl: 1    metFORMIN (GLUCOPHAGE-XR) 500 MG ER 24hr tablet, Take 2 tablets (1,000 mg total) by mouth every morning., Disp: 180 tablet, Rfl: 3    metoprolol succinate (TOPROL-XL) 200 MG 24 hr tablet, Take 1 tablet (200 mg total) by mouth once daily., Disp: 90 tablet, Rfl: 3    OMNIPOD DASH PODS, GEN 4, Crtg, Inject 1 each into the skin every other day., Disp: 45 each, Rfl: 3    pen needle, diabetic (PEN NEEDLE) 32 gauge x 5/32" Ndle, 1 each by Misc.(Non-Drug; Combo Route) route 4 (four) times daily. ICD 10 E11.42, Disp: 400 each, Rfl: 3    semaglutide (OZEMPIC) 1 mg/dose (4 mg/3 mL), Inject 1 mg into the skin once a week., Disp: 1 each, Rfl: 3    spironolactone (ALDACTONE) 50 MG tablet, Take 1 tablet (50 mg total) by mouth once daily., Disp: 90 tablet, Rfl: 3    sacubitriL-valsartan (ENTRESTO)  mg per tablet, Take 1 tablet by mouth 2 (two) times daily., Disp: 60 tablet, Rfl: 11    Current Facility-Administered " Medications:     furosemide injection 20 mg, 20 mg, Intravenous, 1 time in Clinic/HOD, Dottie Merritt MD          Review of Systems   Constitutional: Negative for decreased appetite, diaphoresis, fever, malaise/fatigue, weight gain and weight loss.   HENT:  Negative for congestion, ear discharge, ear pain and nosebleeds.    Eyes:  Negative for blurred vision, double vision and visual disturbance.   Cardiovascular:  Negative for chest pain, claudication, cyanosis, dyspnea on exertion, irregular heartbeat, leg swelling, near-syncope, orthopnea, palpitations, paroxysmal nocturnal dyspnea and syncope.   Respiratory:  Negative for cough, hemoptysis, shortness of breath, sleep disturbances due to breathing, snoring, sputum production and wheezing.    Endocrine: Negative for polydipsia, polyphagia and polyuria.   Hematologic/Lymphatic: Negative for adenopathy and bleeding problem. Does not bruise/bleed easily.   Skin:  Negative for color change, nail changes, poor wound healing and rash.   Musculoskeletal:  Negative for muscle cramps and muscle weakness.   Gastrointestinal:  Negative for abdominal pain, anorexia, change in bowel habit, hematochezia, nausea and vomiting.   Genitourinary:  Negative for dysuria, frequency and hematuria.   Neurological:  Negative for brief paralysis, difficulty with concentration, excessive daytime sleepiness, dizziness, focal weakness, headaches, light-headedness, seizures, vertigo and weakness.   Psychiatric/Behavioral:  Negative for altered mental status and depression.    Allergic/Immunologic: Negative for persistent infections.      Objective:BP (!) 157/87   Pulse 91   Ht 5' (1.524 m)   Wt 88.4 kg (194 lb 14.2 oz)   LMP 01/01/2020 (Approximate)   SpO2 98%   BMI 38.06 kg/m²             Physical Exam  Constitutional:       Appearance: She is well-developed. She is obese.   HENT:      Head: Normocephalic.      Right Ear: External ear normal.      Left Ear: External ear normal.       Nose: Nose normal.   Eyes:      General: No scleral icterus.     Conjunctiva/sclera: Conjunctivae normal.      Pupils: Pupils are equal, round, and reactive to light.   Neck:      Thyroid: No thyromegaly.      Vascular: No JVD.      Trachea: No tracheal deviation.   Cardiovascular:      Rate and Rhythm: Normal rate and regular rhythm.      Pulses: Intact distal pulses.           Carotid pulses are 2+ on the right side and 2+ on the left side.       Dorsalis pedis pulses are 0 on the right side and 0 on the left side.        Posterior tibial pulses are 0 on the right side and 0 on the left side.      Heart sounds: Normal heart sounds. No murmur heard.    No friction rub. No gallop.   Pulmonary:      Effort: Pulmonary effort is normal. No respiratory distress.      Breath sounds: Normal breath sounds. No wheezing or rales.   Chest:      Chest wall: No tenderness.   Abdominal:      General: Bowel sounds are normal. There is no distension.      Palpations: Abdomen is soft.      Tenderness: There is no abdominal tenderness. There is no guarding.   Musculoskeletal:         General: No tenderness. Normal range of motion.      Cervical back: Normal range of motion.   Lymphadenopathy:      Comments: Palpation of lymph nodes of neck and groin normal   Skin:     General: Skin is warm and dry.      Coloration: Skin is not pale.      Findings: No erythema or rash.      Comments: Palpation of skin normal   Neurological:      Mental Status: She is alert and oriented to person, place, and time.      Cranial Nerves: No cranial nerve deficit.      Motor: No abnormal muscle tone.      Coordination: Coordination normal.   Psychiatric:         Behavior: Behavior normal.         Thought Content: Thought content normal.         Judgment: Judgment normal.         Assessment:       1. Ischemic cardiomyopathy    2. History of heart artery stent    3. History of heart attack    4. Coronary artery disease of native artery of native heart with  stable angina pectoris    5. Hypertension associated with diabetes    6. Nonischemic cardiomyopathy    7. Chronic systolic congestive heart failure    8. Stage 3 chronic kidney disease, unspecified whether stage 3a or 3b CKD    9. Type 2 diabetes mellitus with diabetic polyneuropathy, with long-term current use of insulin    10. Obesity, diabetes, and hypertension syndrome    11. Class 2 severe obesity due to excess calories with serious comorbidity and body mass index (BMI) of 38.0 to 38.9 in adult    12. TIKA (obstructive sleep apnea)         Plan:       Mercedez was seen today for hypertension.    Diagnoses and all orders for this visit:    Ischemic cardiomyopathy  -     Cardiac PET Scan Stress; Future  -     sacubitriL-valsartan (ENTRESTO)  mg per tablet; Take 1 tablet by mouth 2 (two) times daily.    History of heart artery stent    History of heart attack    Coronary artery disease of native artery of native heart with stable angina pectoris  -     Cardiac PET Scan Stress; Future    Hypertension associated with diabetes    Nonischemic cardiomyopathy    Chronic systolic congestive heart failure  -     Cardiac PET Scan Stress; Future  -     sacubitriL-valsartan (ENTRESTO)  mg per tablet; Take 1 tablet by mouth 2 (two) times daily.    Stage 3 chronic kidney disease, unspecified whether stage 3a or 3b CKD    Type 2 diabetes mellitus with diabetic polyneuropathy, with long-term current use of insulin    Obesity, diabetes, and hypertension syndrome    Class 2 severe obesity due to excess calories with serious comorbidity and body mass index (BMI) of 38.0 to 38.9 in adult    TIKA (obstructive sleep apnea)

## 2023-07-20 DIAGNOSIS — R51.9 CHRONIC NONINTRACTABLE HEADACHE, UNSPECIFIED HEADACHE TYPE: ICD-10-CM

## 2023-07-20 DIAGNOSIS — G89.29 CHRONIC NONINTRACTABLE HEADACHE, UNSPECIFIED HEADACHE TYPE: ICD-10-CM

## 2023-07-20 RX ORDER — AMITRIPTYLINE HYDROCHLORIDE 75 MG/1
75 TABLET ORAL NIGHTLY
Qty: 90 TABLET | Refills: 1 | Status: SHIPPED | OUTPATIENT
Start: 2023-07-20 | End: 2024-01-12 | Stop reason: SDUPTHER

## 2023-07-20 NOTE — TELEPHONE ENCOUNTER
----- Message from Charlene Cota sent at 7/20/2023  2:56 PM CDT -----  Contact: [phone]  Pt would like a new prescription for her amitriptyline (ELAVIL) 75 MG tablet 90 tablet. Pt is using   [facility] [address] [place] [facility] [address]  Phone: [phone] Fax: [phone]          Thank you

## 2023-08-01 DIAGNOSIS — N18.30 STAGE 3 CHRONIC KIDNEY DISEASE, UNSPECIFIED WHETHER STAGE 3A OR 3B CKD: ICD-10-CM

## 2023-08-23 ENCOUNTER — OFFICE VISIT (OUTPATIENT)
Dept: CARDIOLOGY | Facility: CLINIC | Age: 68
End: 2023-08-23
Payer: MEDICARE

## 2023-08-23 VITALS
HEART RATE: 83 BPM | SYSTOLIC BLOOD PRESSURE: 103 MMHG | DIASTOLIC BLOOD PRESSURE: 56 MMHG | WEIGHT: 186.31 LBS | BODY MASS INDEX: 36.58 KG/M2 | OXYGEN SATURATION: 94 % | HEIGHT: 60 IN

## 2023-08-23 DIAGNOSIS — Z98.61 HISTORY OF PTCA 2: ICD-10-CM

## 2023-08-23 DIAGNOSIS — I15.2 HYPERTENSION ASSOCIATED WITH DIABETES: ICD-10-CM

## 2023-08-23 DIAGNOSIS — E11.59 HYPERTENSION ASSOCIATED WITH DIABETES: ICD-10-CM

## 2023-08-23 DIAGNOSIS — E11.69 HYPERLIPIDEMIA ASSOCIATED WITH TYPE 2 DIABETES MELLITUS: ICD-10-CM

## 2023-08-23 DIAGNOSIS — I25.118 CORONARY ARTERY DISEASE OF NATIVE ARTERY OF NATIVE HEART WITH STABLE ANGINA PECTORIS: ICD-10-CM

## 2023-08-23 DIAGNOSIS — Z95.5 HISTORY OF HEART ARTERY STENT: ICD-10-CM

## 2023-08-23 DIAGNOSIS — E11.40 NEUROPATHY DUE TO TYPE 2 DIABETES MELLITUS: ICD-10-CM

## 2023-08-23 DIAGNOSIS — I25.2 HISTORY OF HEART ATTACK: ICD-10-CM

## 2023-08-23 DIAGNOSIS — E78.5 HYPERLIPIDEMIA ASSOCIATED WITH TYPE 2 DIABETES MELLITUS: ICD-10-CM

## 2023-08-23 DIAGNOSIS — N18.30 STAGE 3 CHRONIC KIDNEY DISEASE, UNSPECIFIED WHETHER STAGE 3A OR 3B CKD: ICD-10-CM

## 2023-08-23 DIAGNOSIS — I50.22 CHRONIC SYSTOLIC CONGESTIVE HEART FAILURE: Primary | ICD-10-CM

## 2023-08-23 PROCEDURE — 3288F FALL RISK ASSESSMENT DOCD: CPT | Mod: CPTII,S$GLB,, | Performed by: INTERNAL MEDICINE

## 2023-08-23 PROCEDURE — 3078F DIAST BP <80 MM HG: CPT | Mod: CPTII,S$GLB,, | Performed by: INTERNAL MEDICINE

## 2023-08-23 PROCEDURE — 3078F PR MOST RECENT DIASTOLIC BLOOD PRESSURE < 80 MM HG: ICD-10-PCS | Mod: CPTII,S$GLB,, | Performed by: INTERNAL MEDICINE

## 2023-08-23 PROCEDURE — 3051F HG A1C>EQUAL 7.0%<8.0%: CPT | Mod: CPTII,S$GLB,, | Performed by: INTERNAL MEDICINE

## 2023-08-23 PROCEDURE — 3008F PR BODY MASS INDEX (BMI) DOCUMENTED: ICD-10-PCS | Mod: CPTII,S$GLB,, | Performed by: INTERNAL MEDICINE

## 2023-08-23 PROCEDURE — 1159F PR MEDICATION LIST DOCUMENTED IN MEDICAL RECORD: ICD-10-PCS | Mod: CPTII,S$GLB,, | Performed by: INTERNAL MEDICINE

## 2023-08-23 PROCEDURE — 99999 PR PBB SHADOW E&M-EST. PATIENT-LVL V: ICD-10-PCS | Mod: PBBFAC,,, | Performed by: INTERNAL MEDICINE

## 2023-08-23 PROCEDURE — 4010F ACE/ARB THERAPY RXD/TAKEN: CPT | Mod: CPTII,S$GLB,, | Performed by: INTERNAL MEDICINE

## 2023-08-23 PROCEDURE — 3074F PR MOST RECENT SYSTOLIC BLOOD PRESSURE < 130 MM HG: ICD-10-PCS | Mod: CPTII,S$GLB,, | Performed by: INTERNAL MEDICINE

## 2023-08-23 PROCEDURE — 1101F PT FALLS ASSESS-DOCD LE1/YR: CPT | Mod: CPTII,S$GLB,, | Performed by: INTERNAL MEDICINE

## 2023-08-23 PROCEDURE — 4010F PR ACE/ARB THEARPY RXD/TAKEN: ICD-10-PCS | Mod: CPTII,S$GLB,, | Performed by: INTERNAL MEDICINE

## 2023-08-23 PROCEDURE — 3074F SYST BP LT 130 MM HG: CPT | Mod: CPTII,S$GLB,, | Performed by: INTERNAL MEDICINE

## 2023-08-23 PROCEDURE — 1126F AMNT PAIN NOTED NONE PRSNT: CPT | Mod: CPTII,S$GLB,, | Performed by: INTERNAL MEDICINE

## 2023-08-23 PROCEDURE — 1101F PR PT FALLS ASSESS DOC 0-1 FALLS W/OUT INJ PAST YR: ICD-10-PCS | Mod: CPTII,S$GLB,, | Performed by: INTERNAL MEDICINE

## 2023-08-23 PROCEDURE — 3288F PR FALLS RISK ASSESSMENT DOCUMENTED: ICD-10-PCS | Mod: CPTII,S$GLB,, | Performed by: INTERNAL MEDICINE

## 2023-08-23 PROCEDURE — 3008F BODY MASS INDEX DOCD: CPT | Mod: CPTII,S$GLB,, | Performed by: INTERNAL MEDICINE

## 2023-08-23 PROCEDURE — 99999 PR PBB SHADOW E&M-EST. PATIENT-LVL V: CPT | Mod: PBBFAC,,, | Performed by: INTERNAL MEDICINE

## 2023-08-23 PROCEDURE — 99213 OFFICE O/P EST LOW 20 MIN: CPT | Mod: S$GLB,,, | Performed by: INTERNAL MEDICINE

## 2023-08-23 PROCEDURE — 3051F PR MOST RECENT HEMOGLOBIN A1C LEVEL 7.0 - < 8.0%: ICD-10-PCS | Mod: CPTII,S$GLB,, | Performed by: INTERNAL MEDICINE

## 2023-08-23 PROCEDURE — 1126F PR PAIN SEVERITY QUANTIFIED, NO PAIN PRESENT: ICD-10-PCS | Mod: CPTII,S$GLB,, | Performed by: INTERNAL MEDICINE

## 2023-08-23 PROCEDURE — 99213 PR OFFICE/OUTPT VISIT, EST, LEVL III, 20-29 MIN: ICD-10-PCS | Mod: S$GLB,,, | Performed by: INTERNAL MEDICINE

## 2023-08-23 PROCEDURE — 1159F MED LIST DOCD IN RCRD: CPT | Mod: CPTII,S$GLB,, | Performed by: INTERNAL MEDICINE

## 2023-08-23 NOTE — PROGRESS NOTES
Subjective:    Patient ID:  Mercedez Purvis is a 68 y.o. female who presents for follow-up of CAD    HPI  The patient is a 68 year old female initially seen 6/8/23 with CAD, hypertension,  hyperlipidemia, diabetes and obesity.She presented to [facility] in 2016 with chest pain and had a PCI to RCA and OM2. She presented 4/18/19 with SOB and had a PCI to Cfx and a staged PCI to Cfx and mid RCA. An echo at [facility] 10/3/19 was normal with EF 50-55 but echo 10/10/21 reported EF 40-45% and MAURI 1.58 cm2. Her repeat echo 7/12/23 revealed EF 25%. She was placed on entresto ,aldactone and was started on jardiace by Dr Merritt.  A PET was ordered but remains pending. Her SOB is improved and no edema. Her weight is down 8# since her last visit.  Lab Results   Component Value Date     04/21/2023    K 4.0 04/21/2023     04/21/2023    CO2 27 04/21/2023    BUN 13 04/21/2023    CREATININE 1.1 04/21/2023     04/21/2023    HGBA1C 7.5 (H) 04/21/2023    MG 2.3 10/30/2021    AST 11 04/21/2023    ALT 12 04/21/2023    ALBUMIN 3.2 (L) 04/21/2023    PROT 7.1 04/21/2023    BILITOT 0.4 04/21/2023    WBC 7.10 04/21/2023    HGB 12.7 04/21/2023    HCT 44.2 04/21/2023    MCV 74 (L) 04/21/2023     04/21/2023    INR 1.0 08/27/2010    TSH 1.525 04/21/2023         Lab Results   Component Value Date    CHOL 161 04/21/2023    HDL 37 (L) 04/21/2023    TRIG 101 04/21/2023       Lab Results   Component Value Date    LDLCALC 103.8 04/21/2023       Past Medical History:   Diagnosis Date    Chronic headache     Diabetes     Heart attack 2004    8 stents    Heart failure     History of heart artery stent     Hyperlipemia     Hypertension     Obesity (BMI 30-39.9)     Yeast infection        Current Outpatient Medications:     amitriptyline (ELAVIL) 75 MG tablet, Take 1 tablet (75 mg total) by mouth every evening., Disp: 90 tablet, Rfl: 1    amLODIPine (NORVASC) 10 MG tablet, Take 1 tablet (10 mg total) by mouth once daily., Disp: 90 tablet,  Rfl: 3    atorvastatin (LIPITOR) 80 MG tablet, Take 1 tablet (80 mg total) by mouth every evening., Disp: 90 tablet, Rfl: 3    blood-glucose meter,continuous (DEXCOM ) Misc, 1 Device by Misc.(Non-Drug; Combo Route) route continuous., Disp: 1 each, Rfl: 0    blood-glucose sensor (DEXCOM G7 SENSOR) Roberta, 1 each by Misc.(Non-Drug; Combo Route) route every 10 days., Disp: 10 each, Rfl: 3    ciclopirox (PENLAC) 8 % Soln, Apply topically nightly., Disp: 6.6 mL, Rfl: 11    clopidogreL (PLAVIX) 75 mg tablet, Take 1 tablet (75 mg total) by mouth once daily., Disp: 90 tablet, Rfl: 3    clotrimazole-betamethasone 1-0.05% (LOTRISONE) cream, Apply topically 2 (two) times daily., Disp: 45 g, Rfl: 3    cyclobenzaprine (FLEXERIL) 10 MG tablet, Take 1 tablet (10 mg total) by mouth 3 (three) times a week., Disp: 90 tablet, Rfl: 0    diclofenac sodium (VOLTAREN) 1 % Gel, Apply topically once daily., Disp: 100 g, Rfl: 0    empagliflozin (JARDIANCE) 25 mg tablet, Take 1 tablet (25 mg total) by mouth once daily., Disp: 90 tablet, Rfl: 1    fluconazole (DIFLUCAN) 150 MG Tab, Take 1 tablet (150 mg total) by mouth once daily. May repeat after 72 hrs for yeast infection., Disp: 2 tablet, Rfl: 1    furosemide (LASIX) 40 MG tablet, Take 1 tablet (40 mg total) by mouth once daily., Disp: 90 tablet, Rfl: 3    gabapentin (NEURONTIN) 300 MG capsule, Take 1 capsule (300 mg total) by mouth 2 (two) times daily AND 2 capsules (600 mg total) every evening., Disp: 270 capsule, Rfl: 1    insulin lispro (HUMALOG U-100 INSULIN) 100 unit/mL injection, Inject 100 Units into the skin continuous. Gives up to 100 units daily via Omnipod. Do not directly inject - only use within pods., Disp: 40 mL, Rfl: 11    LIDOcaine (XYLOCAINE) 5 % Oint ointment, Apply topically as needed., Disp: 120 g, Rfl: 3    LIDOcaine HCL 2% (XYLOCAINE) 2 % jelly, Apply topically as needed. Apply topically once nightly to affected part of foot/feet., Disp: 30 mL, Rfl: 2     "LIDOcaine-prilocaine (EMLA) cream, Apply topically 2 (two) times a day., Disp: 30 g, Rfl: 1    meclizine (ANTIVERT) 12.5 mg tablet, Take 2 tablets (25 mg total) by mouth 2 (two) times daily as needed for Dizziness., Disp: 30 tablet, Rfl: 1    metFORMIN (GLUCOPHAGE-XR) 500 MG ER 24hr tablet, Take 2 tablets (1,000 mg total) by mouth every morning., Disp: 180 tablet, Rfl: 3    metoprolol succinate (TOPROL-XL) 200 MG 24 hr tablet, Take 1 tablet (200 mg total) by mouth once daily., Disp: 90 tablet, Rfl: 3    OMNIPOD DASH PODS, GEN 4, Crtg, Inject 1 each into the skin every other day., Disp: 45 each, Rfl: 3    sacubitriL-valsartan (ENTRESTO)  mg per tablet, Take 1 tablet by mouth 2 (two) times daily., Disp: 60 tablet, Rfl: 11    semaglutide (OZEMPIC) 1 mg/dose (4 mg/3 mL), Inject 1 mg into the skin once a week., Disp: 1 each, Rfl: 3    spironolactone (ALDACTONE) 50 MG tablet, Take 1 tablet (50 mg total) by mouth once daily., Disp: 90 tablet, Rfl: 3    blood sugar diagnostic Strp, 1 each by Misc.(Non-Drug; Combo Route) route 4 (four) times daily. (Patient not taking: Reported on 8/23/2023), Disp: 200 each, Rfl: 3    insulin detemir U-100 (LEVEMIR FLEXTOUCH U-100 INSULN) 100 unit/mL (3 mL) InPn pen, Inject 40 Units into the skin once daily. FOR EMERGENCY USE ONLY - BACK UP INSULIN, IF OFF OF YOUR INSULIN PUMP - USE 40 UNITS INJECTION DAILY UNTIL GETTING BACK ON PUMP., Disp: 15 mL, Rfl: 1    pen needle, diabetic (PEN NEEDLE) 32 gauge x 5/32" Ndle, 1 each by Misc.(Non-Drug; Combo Route) route 4 (four) times daily. ICD 10 E11.42 (Patient not taking: Reported on 8/23/2023), Disp: 400 each, Rfl: 3    Current Facility-Administered Medications:     furosemide injection 20 mg, 20 mg, Intravenous, 1 time in Clinic/HOD, Dottie Merritt MD          Review of Systems   Constitutional: Positive for weight loss. Negative for decreased appetite, diaphoresis, fever and malaise/fatigue.   HENT:  Negative for congestion, ear discharge, " ear pain and nosebleeds.    Eyes:  Negative for blurred vision, double vision and visual disturbance.   Cardiovascular:  Negative for chest pain, claudication, cyanosis, dyspnea on exertion, irregular heartbeat, leg swelling, near-syncope, orthopnea, palpitations, paroxysmal nocturnal dyspnea and syncope.   Respiratory:  Negative for cough, hemoptysis, shortness of breath, sleep disturbances due to breathing, snoring, sputum production and wheezing.    Endocrine: Negative for polydipsia, polyphagia and polyuria.   Hematologic/Lymphatic: Negative for adenopathy and bleeding problem. Does not bruise/bleed easily.   Skin:  Negative for color change, nail changes, poor wound healing and rash.   Musculoskeletal:  Negative for muscle cramps and muscle weakness.   Gastrointestinal:  Negative for abdominal pain, anorexia, change in bowel habit, hematochezia, nausea and vomiting.   Genitourinary:  Negative for dysuria, frequency and hematuria.   Neurological:  Negative for brief paralysis, difficulty with concentration, excessive daytime sleepiness, dizziness, focal weakness, headaches, light-headedness, seizures, vertigo and weakness.   Psychiatric/Behavioral:  Negative for altered mental status and depression.    Allergic/Immunologic: Negative for persistent infections.        Objective:BP (!) 103/56   Pulse 83   Ht 5' (1.524 m)   Wt 84.5 kg (186 lb 4.6 oz)   LMP 01/01/2020 (Approximate)   SpO2 (!) 94%   BMI 36.38 kg/m²             Physical Exam  Constitutional:       Appearance: She is well-developed. She is obese.   HENT:      Head: Normocephalic.      Right Ear: External ear normal.      Left Ear: External ear normal.      Nose: Nose normal.   Eyes:      General: No scleral icterus.     Conjunctiva/sclera: Conjunctivae normal.      Pupils: Pupils are equal, round, and reactive to light.   Neck:      Thyroid: No thyromegaly.      Vascular: No JVD.      Trachea: No tracheal deviation.   Cardiovascular:      Rate and  Rhythm: Normal rate and regular rhythm.      Pulses: Intact distal pulses.           Carotid pulses are 2+ on the right side and 2+ on the left side.       Dorsalis pedis pulses are 0 on the right side and 0 on the left side.        Posterior tibial pulses are 0 on the right side and 0 on the left side.      Heart sounds: Murmur heard.      Low-pitched harsh midsystolic murmur is present with a grade of 1/6 at the upper right sternal border and upper left sternal border.      No friction rub. No gallop.      Comments: JVP normal  No edema  Pulmonary:      Effort: Pulmonary effort is normal. No respiratory distress.      Breath sounds: Normal breath sounds. No wheezing or rales.   Chest:      Chest wall: No tenderness.   Abdominal:      General: Bowel sounds are normal. There is no distension.      Palpations: Abdomen is soft.      Tenderness: There is no abdominal tenderness. There is no guarding.   Musculoskeletal:         General: No tenderness. Normal range of motion.      Cervical back: Normal range of motion.   Lymphadenopathy:      Comments: Palpation of lymph nodes of neck and groin normal   Skin:     General: Skin is warm and dry.      Coloration: Skin is not pale.      Findings: No erythema or rash.      Comments: Palpation of skin normal   Neurological:      Mental Status: She is alert and oriented to person, place, and time.      Cranial Nerves: No cranial nerve deficit.      Motor: No abnormal muscle tone.      Coordination: Coordination normal.   Psychiatric:         Behavior: Behavior normal.         Thought Content: Thought content normal.         Judgment: Judgment normal.           Assessment:       1. Chronic systolic congestive heart failure    2. Neuropathy due to type 2 diabetes mellitus    3. History of heart artery stent    4. History of heart attack    5. Coronary artery disease of native artery of native heart with stable angina pectoris    6. Hypertension associated with diabetes    7.  Hyperlipidemia associated with type 2 diabetes mellitus    8. Stage 3 chronic kidney disease, unspecified whether stage 3a or 3b CKD    9. History of PTCA 2         Plan:       Mercedez was seen today for hypertension.    Diagnoses and all orders for this visit:    Chronic systolic congestive heart failure  -     Cardiac PET Scan Stress; Future  -     Echo Saline Bubble? No; Future; Expected date: 11/23/2023    Neuropathy due to type 2 diabetes mellitus    History of heart artery stent    History of heart attack    Coronary artery disease of native artery of native heart with stable angina pectoris  -     Cardiac PET Scan Stress; Future    Hypertension associated with diabetes    Hyperlipidemia associated with type 2 diabetes mellitus  -     Lipid Panel; Future; Expected date: 11/21/2023    Stage 3 chronic kidney disease, unspecified whether stage 3a or 3b CKD  -     Comprehensive Metabolic Panel; Future; Expected date: 11/21/2023  -     CBC Auto Differential; Future; Expected date: 11/21/2023    History of PTCA 2  -     Cardiac PET Scan Stress; Future

## 2023-09-06 ENCOUNTER — LAB VISIT (OUTPATIENT)
Dept: LAB | Facility: HOSPITAL | Age: 68
End: 2023-09-06
Attending: INTERNAL MEDICINE
Payer: MEDICAID

## 2023-09-06 ENCOUNTER — OFFICE VISIT (OUTPATIENT)
Dept: INTERNAL MEDICINE | Facility: CLINIC | Age: 68
End: 2023-09-06
Payer: MEDICARE

## 2023-09-06 VITALS
SYSTOLIC BLOOD PRESSURE: 104 MMHG | TEMPERATURE: 98 F | RESPIRATION RATE: 16 BRPM | DIASTOLIC BLOOD PRESSURE: 64 MMHG | OXYGEN SATURATION: 99 % | BODY MASS INDEX: 36.36 KG/M2 | WEIGHT: 185.19 LBS | HEART RATE: 81 BPM | HEIGHT: 60 IN

## 2023-09-06 DIAGNOSIS — I15.2 HYPERTENSION ASSOCIATED WITH DIABETES: ICD-10-CM

## 2023-09-06 DIAGNOSIS — I25.118 CORONARY ARTERY DISEASE OF NATIVE ARTERY OF NATIVE HEART WITH STABLE ANGINA PECTORIS: ICD-10-CM

## 2023-09-06 DIAGNOSIS — K21.9 GASTROESOPHAGEAL REFLUX DISEASE WITHOUT ESOPHAGITIS: ICD-10-CM

## 2023-09-06 DIAGNOSIS — E11.42 TYPE 2 DIABETES MELLITUS WITH DIABETIC POLYNEUROPATHY, WITH LONG-TERM CURRENT USE OF INSULIN: Primary | ICD-10-CM

## 2023-09-06 DIAGNOSIS — E66.01 CLASS 2 SEVERE OBESITY DUE TO EXCESS CALORIES WITH SERIOUS COMORBIDITY AND BODY MASS INDEX (BMI) OF 38.0 TO 38.9 IN ADULT: ICD-10-CM

## 2023-09-06 DIAGNOSIS — E66.9 OBESITY, DIABETES, AND HYPERTENSION SYNDROME: ICD-10-CM

## 2023-09-06 DIAGNOSIS — E11.69 HYPERLIPIDEMIA ASSOCIATED WITH TYPE 2 DIABETES MELLITUS: ICD-10-CM

## 2023-09-06 DIAGNOSIS — E11.69 OBESITY, DIABETES, AND HYPERTENSION SYNDROME: ICD-10-CM

## 2023-09-06 DIAGNOSIS — E11.59 HYPERTENSION ASSOCIATED WITH DIABETES: ICD-10-CM

## 2023-09-06 DIAGNOSIS — Z79.4 TYPE 2 DIABETES MELLITUS WITH DIABETIC POLYNEUROPATHY, WITH LONG-TERM CURRENT USE OF INSULIN: Primary | ICD-10-CM

## 2023-09-06 DIAGNOSIS — E78.5 HYPERLIPIDEMIA ASSOCIATED WITH TYPE 2 DIABETES MELLITUS: ICD-10-CM

## 2023-09-06 DIAGNOSIS — E78.00 PURE HYPERCHOLESTEROLEMIA: ICD-10-CM

## 2023-09-06 DIAGNOSIS — N18.30 STAGE 3 CHRONIC KIDNEY DISEASE, UNSPECIFIED WHETHER STAGE 3A OR 3B CKD: ICD-10-CM

## 2023-09-06 DIAGNOSIS — E11.59 OBESITY, DIABETES, AND HYPERTENSION SYNDROME: ICD-10-CM

## 2023-09-06 DIAGNOSIS — I15.2 OBESITY, DIABETES, AND HYPERTENSION SYNDROME: ICD-10-CM

## 2023-09-06 DIAGNOSIS — I50.20 SYSTOLIC HEART FAILURE, UNSPECIFIED HF CHRONICITY: ICD-10-CM

## 2023-09-06 DIAGNOSIS — G47.33 OSA (OBSTRUCTIVE SLEEP APNEA): ICD-10-CM

## 2023-09-06 DIAGNOSIS — E11.3393 MODERATE NONPROLIFERATIVE DIABETIC RETINOPATHY OF BOTH EYES WITHOUT MACULAR EDEMA ASSOCIATED WITH TYPE 2 DIABETES MELLITUS: ICD-10-CM

## 2023-09-06 LAB
CHOLEST SERPL-MCNC: 133 MG/DL (ref 120–199)
CHOLEST/HDLC SERPL: 3.6 {RATIO} (ref 2–5)
HDLC SERPL-MCNC: 37 MG/DL (ref 40–75)
HDLC SERPL: 27.8 % (ref 20–50)
LDLC SERPL CALC-MCNC: 76.6 MG/DL (ref 63–159)
NONHDLC SERPL-MCNC: 96 MG/DL
TRIGL SERPL-MCNC: 97 MG/DL (ref 30–150)

## 2023-09-06 PROCEDURE — 1101F PT FALLS ASSESS-DOCD LE1/YR: CPT | Mod: CPTII,S$GLB,, | Performed by: NURSE PRACTITIONER

## 2023-09-06 PROCEDURE — 4010F PR ACE/ARB THEARPY RXD/TAKEN: ICD-10-PCS | Mod: CPTII,S$GLB,, | Performed by: NURSE PRACTITIONER

## 2023-09-06 PROCEDURE — 99214 OFFICE O/P EST MOD 30 MIN: CPT | Mod: S$GLB,,, | Performed by: NURSE PRACTITIONER

## 2023-09-06 PROCEDURE — 3074F SYST BP LT 130 MM HG: CPT | Mod: CPTII,S$GLB,, | Performed by: NURSE PRACTITIONER

## 2023-09-06 PROCEDURE — 1126F AMNT PAIN NOTED NONE PRSNT: CPT | Mod: CPTII,S$GLB,, | Performed by: NURSE PRACTITIONER

## 2023-09-06 PROCEDURE — 1159F MED LIST DOCD IN RCRD: CPT | Mod: CPTII,S$GLB,, | Performed by: NURSE PRACTITIONER

## 2023-09-06 PROCEDURE — 1101F PR PT FALLS ASSESS DOC 0-1 FALLS W/OUT INJ PAST YR: ICD-10-PCS | Mod: CPTII,S$GLB,, | Performed by: NURSE PRACTITIONER

## 2023-09-06 PROCEDURE — 95249 CONT GLUC MNTR PT PROV EQP: CPT | Mod: S$GLB,,, | Performed by: NURSE PRACTITIONER

## 2023-09-06 PROCEDURE — 3078F DIAST BP <80 MM HG: CPT | Mod: CPTII,S$GLB,, | Performed by: NURSE PRACTITIONER

## 2023-09-06 PROCEDURE — 3008F BODY MASS INDEX DOCD: CPT | Mod: CPTII,S$GLB,, | Performed by: NURSE PRACTITIONER

## 2023-09-06 PROCEDURE — 80061 LIPID PANEL: CPT | Performed by: INTERNAL MEDICINE

## 2023-09-06 PROCEDURE — 99999 PR PBB SHADOW E&M-EST. PATIENT-LVL III: CPT | Mod: PBBFAC,,, | Performed by: NURSE PRACTITIONER

## 2023-09-06 PROCEDURE — 3288F PR FALLS RISK ASSESSMENT DOCUMENTED: ICD-10-PCS | Mod: CPTII,S$GLB,, | Performed by: NURSE PRACTITIONER

## 2023-09-06 PROCEDURE — 3078F PR MOST RECENT DIASTOLIC BLOOD PRESSURE < 80 MM HG: ICD-10-PCS | Mod: CPTII,S$GLB,, | Performed by: NURSE PRACTITIONER

## 2023-09-06 PROCEDURE — 3051F PR MOST RECENT HEMOGLOBIN A1C LEVEL 7.0 - < 8.0%: ICD-10-PCS | Mod: CPTII,S$GLB,, | Performed by: NURSE PRACTITIONER

## 2023-09-06 PROCEDURE — 36415 COLL VENOUS BLD VENIPUNCTURE: CPT | Performed by: INTERNAL MEDICINE

## 2023-09-06 PROCEDURE — 3051F HG A1C>EQUAL 7.0%<8.0%: CPT | Mod: CPTII,S$GLB,, | Performed by: NURSE PRACTITIONER

## 2023-09-06 PROCEDURE — 4010F ACE/ARB THERAPY RXD/TAKEN: CPT | Mod: CPTII,S$GLB,, | Performed by: NURSE PRACTITIONER

## 2023-09-06 PROCEDURE — 95249 PR GLUCOSE MONITORING, 72 HRS, SUB-Q SENSOR, PATIENT PROVIDED: ICD-10-PCS | Mod: S$GLB,,, | Performed by: NURSE PRACTITIONER

## 2023-09-06 PROCEDURE — 95251 CONT GLUC MNTR ANALYSIS I&R: CPT | Mod: S$GLB,,, | Performed by: NURSE PRACTITIONER

## 2023-09-06 PROCEDURE — 3008F PR BODY MASS INDEX (BMI) DOCUMENTED: ICD-10-PCS | Mod: CPTII,S$GLB,, | Performed by: NURSE PRACTITIONER

## 2023-09-06 PROCEDURE — 95251 PR GLUCOSE MONITOR, 72 HOUR, PHYS INTERP: ICD-10-PCS | Mod: S$GLB,,, | Performed by: NURSE PRACTITIONER

## 2023-09-06 PROCEDURE — 1159F PR MEDICATION LIST DOCUMENTED IN MEDICAL RECORD: ICD-10-PCS | Mod: CPTII,S$GLB,, | Performed by: NURSE PRACTITIONER

## 2023-09-06 PROCEDURE — 1126F PR PAIN SEVERITY QUANTIFIED, NO PAIN PRESENT: ICD-10-PCS | Mod: CPTII,S$GLB,, | Performed by: NURSE PRACTITIONER

## 2023-09-06 PROCEDURE — 3074F PR MOST RECENT SYSTOLIC BLOOD PRESSURE < 130 MM HG: ICD-10-PCS | Mod: CPTII,S$GLB,, | Performed by: NURSE PRACTITIONER

## 2023-09-06 PROCEDURE — 99999 PR PBB SHADOW E&M-EST. PATIENT-LVL III: ICD-10-PCS | Mod: PBBFAC,,, | Performed by: NURSE PRACTITIONER

## 2023-09-06 PROCEDURE — 99214 PR OFFICE/OUTPT VISIT, EST, LEVL IV, 30-39 MIN: ICD-10-PCS | Mod: S$GLB,,, | Performed by: NURSE PRACTITIONER

## 2023-09-06 PROCEDURE — 3288F FALL RISK ASSESSMENT DOCD: CPT | Mod: CPTII,S$GLB,, | Performed by: NURSE PRACTITIONER

## 2023-09-06 RX ORDER — INSULIN PUMP CONTROLLER
1 EACH MISCELLANEOUS EVERY OTHER DAY
Qty: 45 EACH | Refills: 3 | Status: SHIPPED | OUTPATIENT
Start: 2023-09-06

## 2023-09-06 RX ORDER — GLUCAGON 3 MG/1
1 POWDER NASAL DAILY PRN
Qty: 2 EACH | Refills: 1 | Status: SHIPPED | OUTPATIENT
Start: 2023-09-06

## 2023-09-06 NOTE — PROGRESS NOTES
"68 y.o. female here for routine for follow up for management of T2dm -   Last seen May 2023 - those notes below.     No new a1c for today's visit - estimated at 6.8% on current cgm download.   Jardiance 25 mg tablet daily   Metformin 1000mg tablet in morning time.   Trulicity once per week switched to ozempic 1mg every week -taking and tolerating well -   Ozempic gets from [employer] -   Has not lost any weight on medication which is frustrating to her.   Normal Bm's. No constipation or issues with urination.     Omnipod dash using humalog U100 insulin. (She gets humalog vials from [employer]).   [facility] report - see scanned in  -   Has levemir pen for back up insulin - 40 units daily.   Total daily dose is 61.8 units/day on average -   61% is coming from basal rate.   39% is coming from bolusing.   1.8 boluses per day on average  Active insulin time is 4 hours -   12 am - basal rate is 1.7 units/hour.   3 am - 1.5 units/hour.   4pm - back up to 1.7 units/hour -  ISF is at 50 -   Bg correction 100 -   Carb ratio at 1: 15 -   But she is using preset boluses -       New dexcom g7 personal - downloaded and reviewed today - using reciever -   Not compatible with her cell phone -   See scanned in .   Average glucose is 146 -   Gmi is at 6.8% -   87% time in range.   <1% lows.   <1% very lows.   11% highs.   1% very highs.       Has noticed when she has "lows" - she is out in the sun or working outside.         Last visit notes from may 2023 as follows:   68 y.o. female here for routine follow up visit for management of T2dm - last seen 2022 -   Those notes are below.     A1c is much more stable now at 7.5%. last year she was at 10%  She continues on metformin 1000mg daily in am.   Jardiance 25mg tablet in morning  Trulicity 3mg every week. (Up to 4.5mg every week?)  [facility]od dash [facility]   She gets her dash pods from [employer]  Using humalog U100 insulin - "   Tells me that she's been having some more low blood sugars - more than normal lately.   She wants to lose weight -     Total daily dose is at 63.3 units/day -   56% is basal rate,   44% is bolusing.   She is giving preset boluses -   12, 14, or 16 units with her meals.   Snack options.   -2 units for correction -   3 units for small snack.   5 units for large scan  Changing pod about every 3 days.   She has 2 unit preset for a snack or drink -   Active insulin time 4 hours.   Rate is at 1.5 units/hour (increased last visit). 3am until 4pm  Overnight is at 1.7 untis/hour - 4pm until 3am   ISF is at 1: 50   bg target is at 100 -   ICR (carb ratio) at 15 - but she is not using carbs/not counting carbs.     She is on dexcom g6 - she gets from [facility] right now -   Uses with a reader - she has a smartphone, but prefers  option   Average glucose is 162 -   71% time in range.   <1% lows.   0% very low.   28% highs.   <1% very highs.     Last visit notes from 2022:   67 y.o. female, here for 4 month follow up for management of T2dm -   Last seen in November 2021 - those notes are below.     a1c back elevated to 10%.   She continues on metformin 1000mg daily. (she tried to do 2 times per day but couldn't tolerate due to gi s/e's)  Also on jardiance 25mg tablet daily.   On Trulicity 3mg every week.   Brother, father, and stepmother - all passed away - recently.   So increased stress.     She is on omnipod - downloaded - [facility]   Total daily dose  - 54.2 units of insulin per day.   53% is coming from the basal rate.   47% is coming from the bolusing.   She is giving preset boluses -   10, 12 or 14 units with her meals.   Changing pod about every 3 days.   She has 2 unit preset for a snack or drink -   Active insulin time 4 hours.   Rate is at 1.3 units/hour.   ISF is at 1: 50   bg target is at 100 -   ICR (carb ratio) at 15 - but she is not using carbs/not counting carbs.    Notices glucoses are more elevated lately - boluses for meals, not always with snacks.     Using dexcom g6 - downloaded and reviewed today-   Average glucose 241 -   15% time in range -   0% lows.   46% highs.   39% very highs.         Last visit notes from November 2021 as follows:   66 y.o. female here for 4 month follow up visit for management of T2dm -   Last seen in August 2021 - those notes are below.     a1c is up again from 7.8% to 9.1%.   She continues on omnipod. States her pods are expiring before 3 days and she is running out of insulin too early.   Still using metformin 1000 mg daily in morning.   Also on jardiance 25mg tablet daily in morning.   Continues on Trulicity 3mg every week.     She continues on omnipod - I was unable to download her Dash pod to [employer] due to technical errors today on our computer.   However did review a 14 day history of bolusing and her settings:   She is using humalog insulin -   Active insulin time is at 4 hours.   isf is set at 1: 50   Basal rate is at 1.3 units/hour.   Total daily dose of insulin is 47.4 units/day.   59% of insulin coming from the basal rate.   41% is coming from the bolusing.     She uses preset boluses -   I had increased both her basal rate and presets at last visit.   Presets are 10 units, 12 units, 14 units  - 2 units, 4 units   She usually gives just 12 units with meals.   This is even with HIGH carb meals, not just based on the size.     Dexcom g6 downloaded - reviewed -   See scanned in  -   Average glucose is 195 -   42% time in range.   <1% lows.   42% highs.   15% extreme highs.     She admits, she has been eating bad - not following Ada diet.   Fried foods mostly.   She is doing boluses before meals, sometimes forgetting on occasion.   As in this morning she ate breakfast, but did not give a bolus.   Last night she ate dinner, did not give a bolus -   However sugar today in clinic is at 112.   She has not checked her glucose in  3 days as she is out of test strips, and is awaiting her next shipment of dexcom supplies.   Requests Rx for contour next test strips.     Of note - she went to ER on 10/29/2021 - was having chest discomfort. Was found to be in ALEXIS.   Amlodipine and chlorthalidone was stopped/held.   She is going to follow up with cardiology in February 2021.   She ran out of her beta blocker medication states 2 weeks ago, and requests refill.   Her blood pressure is running on higher end today at 140/78 -   She is feeling well, with no acute complaints today.         Last visit notes from August 2021 as follows:   66 y.o. female, here for follow up visit for management for diabetes management   Last seen in May 2021. Those notes are below.     a1c down from 10% to 7.8%.   Much improved.   Her life has been very stressful since last visit -   Her son got injured severely from a shooting and was hospitalized -   So she's gotten off track, and only give 2 meal time boluses per day with her Omnipod.   Also stopped using her dexcom.     Current meds:   trulicity 3mg every week (increased from last visit from 1.5 to 3 mg every week).   jardiance 25mg tablet daily  Metformin 1000 in morning  She is on omnipod with humalog insulin continuous - [facility] report reviewed today  basal rate from 1.2 to 1.3 units/hour at last visit.      presets for meals increased at last visit -   Small 8---> increased to 10 units.   Medium 10 ---> increased to 12 units.   Large 12 ---> increased to 14 units.   Small snack - 2 units. No change  Added interval of Large snack - 4 snacks.   Correction bolus 2 units if bg greater than 180     Total daily dose of insulin - 45.2 units/day on average.   58% is coming from the basal rate.   42% is coming from the bolusing -   Active insulin time is at 4 hours.   Carb ratio is at 15 grams -  But she uses presets.   ISF is at 1: 50   bg target is set at 100.     Also was on dexcom G6 -   She ran out of her supplies/got  off track again - so she is not using her dexcom right now.   She is willing to restart.       Last visit notes as follows from 5/17/2021:   66 y.o. female, here for 3 month follow up for management of t2dm.   Last seen in February 2021 - those notes below.     No new labs for today's visit, her a1c was last @ 10.2%.   I had switched her off of MDI onto VGO which seemed to help slightly.   Still was not getting enough insulin on VGO 40 patch (max dose) - so switched her to Omnipod insulin pump for higher capacity.   She trained/transitioned with diabetic educator on 3/3/2021 -   Her basal rate was then increased on 3/9/2021 from 1.1 to 1.2 units/hour     Downloaded her omnipod brunilda via [employer] today -   Her current rate is at 1.2 units/hour.   Presets increased to Small: 8 units, Medium: 10 units, Large: 12 units; will continue 2 units for snack and 2 units for BG over 180 for meals.  Insulin time is set at 4 hours.   Total daily dose = 46.2 units/day.   She averages about 2 boluses per day - often missing breakfast bolus.   57% of her insulin comes from basal rate.   43% of her insulin comes from her bolusing.     Continues on metformin 1000 bid. States takes in morning time, but not every night.   Also on trulicity 1.5 every week.   Also on jardiance, higher dose (increase from 10 to 25mg last visit) - no side effects.   Lipids elevated - I had prescribed her to start taking repatha - they have been high despite max dose statin and zetia, but she has not started repatha yet.    dexcom downloaded and reviewed -   Average glucose is 199 -   Estimated a1c @ 8.1%.   37% time in range  47% highs.   16% extreme highs.   0% lows.       Last visit notes from February 2021 as follows:   66 y.o. female, here for rtc visit per routine for management of T2dm.   Last seen 11/2020 - those notes below.     a1c was > 14%, current @ 10.2%.   Continues on metformin 1000 bid.   trulicity 1.5   jardiance 10   vgo 40 - 6 clicks with  "meals.   Patient tells me/admits she had not been good about eating or following ada diet.   She has also not been taking her metformin or jardiance regularly.   History of MI, HTN, and CHF.   HLd - remains above goal despite max dose statin and add on of zetia 10mg last visit.     dexcom - reviewed and downloaded today - see  -   15% time in range.   0% lows.   37% highs.   48% extreme highs.       Last visit notes from 11/2020 as follows:   65 y.o. female, here for 2 month follow up for T2dm.   Last seen 9/22/2020 - those notes below.     History of CHF, HTN, HLd - current bp 140 - 150's systolic. Has not taken bp meds yet this morning.   Denies cp, palpitations, shortness of breath.   HLD - stable on current meds. On zetia and statin.   History of MI in past.     Doing well/much improved with diabetes management.   Last Hgba1c was undetectable at > 14%.   Now improved at 9%.   Her bg's are much more controlled.     She continues on metformin 1000 bid.   Also on trulicity 1.5 every week.   Taking jardiance 10 mg tablet daily in am now - tolerating well. No s/e's.   Continue on VGO 40 - 6 clicks with breakfast - 5 clicks with lunch and dinner.   Uses 1 - 2 clicks for a snack in the evening if needed.   Feeling well - and denies any hypoglycemic episodes.     dexcom G6 interpreted/downloaded today - she is using a reader.   Average Bg is 173.   63% time in range.   0% lows.   33% highs.   4% extreme highs.     C/o feet "tingling" - painful itching type sensation to her feet.   Has been using gabapentin 100mg 3 times per day, but not sure if helping much.   Also reports using clobetasol cream to her feet prn which helps.   Reports history of lower back issues/not too painful at present. Just hurts to stoop/lean forward.   Not interested in seeing pain management at this time.     C/o dizziness - happens every day. Has been alleviated by meclizine in the past. Requesting refill.   Comes and goes, usually " daily, often accompanied by nausea and sometimes emesis.   Has not seen ENT for this. Denies any vision changes.       Last visit notes from 9/22/2020:   65 y.o. female, here for 1 month check up for management of type 2 diabetes.   Previous a1c's at 14% on MDI -   Switched her to vgo, and put her on a dexcom.   Average 's. So much improved.     Continues on Trulicity 1.5mg sc weekly.   Also on metformin 1000mg po bid.   Taking humalog insulin via VGO 40 - taking 5 clicks of insulin with meals, 1 with a snack.   Often eats yogurt at bedtime and notices sugar high at nighttime.   Previous on levemir 45 units bid and novolog 25 units tid meals - so previously on total daily dose of 165 units daily.   Large improvement. Now on about 70 units total daily insulin.     Wearing Dexcom G6 - downloaded and interpreted today -   Average BG is 189   42% time in range.   48% highs.   9% very highs.   No lows.     She has multiple comorbidites including heart failure and CAD, with history of multiple stents.   On statin, zetia, bp meds, diuretics and plavix. Requesting refills.   No acute complaints today's visit.     Last visit notes from August 18th, 2020 as follows:   Very pleasant, 65 y.o. female, here for 4 week follow up - managing her type 2 diabetes.   I first saw her July 20th, 2020 -   She was on MDI at that time, with very high sugars.     In the interim, I switched her from MDI to vgo 40 and dexcom.   She is enjoying using both - she is much more aware of her sugars.   She is trying to eat better, and now is learning what foods increase her sugars.     She is using humalog in her vgo 40 - 3 clicks with meals, 4 clicks if sugar is greater than 200 going into meal.   Then 1 click with a snack.     She often snacks in afternoon and before bed - usually a yogurt or/and apple before bed.   Taking in less sweets.   States primarily is doing 3 clicks with meals for most of the day.   Still continues on trulicity 1.5mg  "sc weekly.   Continues on metformin 1000 metformin XR bid.     Dexcom G6 download interpreted today -   Average BG is 221.   Estimated a1c is 8.6%.   19% time in range.   56% highs.   25% very high.   0% lows.     Her last a1c on mdi was nondetectable >> 14%.   So she has improved for sure down to average BG of 220.   Still not to goal, but large improvement now that she is on continuous insulin.       Last Office visit notes as follows:   HPI: Mercedez Purvis is a 68 y.o.  female c/I for visit to address Diabetes Type 2   This is the first time I am seeing her, she is a patient of Dr. Vasquez's for primary care needs.   She also saw general endocrinology - [facility], Dr. Olivo just last week to address her uncontrolled diabetes.   She is unsure why she is here with me today as well as she just saw a specialist for diabetes management.     She was diagnosed with T2DM about 10 years ago.   Taking metformin 500mg XR - 2 tablets bid. This dose was just increased last week. She is taking.   Also on Trulicity 1.5mg sc weekly - has been taking for about 6 months.   Also on MDI - for 10 years.   Levemir 37 units BID, was advised to increase to 45 units bid, but hasn't done yet/hasn't changed yet.   and novolog 25 units tid ac meals - was advised to increase to 30 units tid meals at last visit, but hasn't done yet.   Patient denies skipping dose of insulin.   She takes insulin injections 4x/daily.   Was hospitalized with what she recalls as a "diabetic coma" in 2010 - that was when she was initially diagnosed with diabetes, and insulin was begun right away.   Has not been hospitalized for diabetes since that time.   Checking sugars 4x/daily - not writing down.   Today's blood sugars 2 hours post prandial is 309 on fingerstick in my office. Patient took her 25 units of novolog this am, but did not take the levemir yet for her morning dose.    Past medical History:   Past Medical History:   Diagnosis Date    Chronic " headache     Diabetes     Heart attack 2004    8 stents    Heart failure     History of heart artery stent     Hyperlipemia     Hypertension     Obesity (BMI 30-39.9)     Yeast infection       Family hx: No family history on file.   Current meds:   Current Outpatient Medications:     amitriptyline (ELAVIL) 75 MG tablet, Take 1 tablet (75 mg total) by mouth every evening., Disp: 90 tablet, Rfl: 1    amLODIPine (NORVASC) 10 MG tablet, Take 1 tablet (10 mg total) by mouth once daily., Disp: 90 tablet, Rfl: 3    atorvastatin (LIPITOR) 80 MG tablet, Take 1 tablet (80 mg total) by mouth every evening., Disp: 90 tablet, Rfl: 3    blood sugar diagnostic Strp, 1 each by Misc.(Non-Drug; Combo Route) route 4 (four) times daily. (Patient not taking: Reported on 8/23/2023), Disp: 200 each, Rfl: 3    blood-glucose meter,continuous (DEXCOM ) Misc, 1 Device by Misc.(Non-Drug; Combo Route) route continuous., Disp: 1 each, Rfl: 0    blood-glucose sensor (DEXCOM G7 SENSOR) Roberta, 1 each by Misc.(Non-Drug; Combo Route) route every 10 days., Disp: 10 each, Rfl: 3    ciclopirox (PENLAC) 8 % Soln, Apply topically nightly., Disp: 6.6 mL, Rfl: 11    clopidogreL (PLAVIX) 75 mg tablet, Take 1 tablet (75 mg total) by mouth once daily., Disp: 90 tablet, Rfl: 3    clotrimazole-betamethasone 1-0.05% (LOTRISONE) cream, Apply topically 2 (two) times daily., Disp: 45 g, Rfl: 3    cyclobenzaprine (FLEXERIL) 10 MG tablet, Take 1 tablet (10 mg total) by mouth 3 (three) times a week., Disp: 90 tablet, Rfl: 0    diclofenac sodium (VOLTAREN) 1 % Gel, Apply topically once daily., Disp: 100 g, Rfl: 0    empagliflozin (JARDIANCE) 25 mg tablet, Take 1 tablet (25 mg total) by mouth once daily., Disp: 90 tablet, Rfl: 1    fluconazole (DIFLUCAN) 150 MG Tab, Take 1 tablet (150 mg total) by mouth once daily. May repeat after 72 hrs for yeast infection., Disp: 2 tablet, Rfl: 1    furosemide (LASIX) 40 MG tablet, Take 1 tablet (40 mg total) by mouth once  "daily., Disp: 90 tablet, Rfl: 3    gabapentin (NEURONTIN) 300 MG capsule, Take 1 capsule (300 mg total) by mouth 2 (two) times daily AND 2 capsules (600 mg total) every evening., Disp: 270 capsule, Rfl: 1    insulin detemir U-100 (LEVEMIR FLEXTOUCH U-100 INSULN) 100 unit/mL (3 mL) InPn pen, Inject 40 Units into the skin once daily. FOR EMERGENCY USE ONLY - BACK UP INSULIN, IF OFF OF YOUR INSULIN PUMP - USE 40 UNITS INJECTION DAILY UNTIL GETTING BACK ON PUMP., Disp: 15 mL, Rfl: 1    insulin lispro (HUMALOG U-100 INSULIN) 100 unit/mL injection, Inject 100 Units into the skin continuous. Gives up to 100 units daily via Omnipod. Do not directly inject - only use within pods., Disp: 40 mL, Rfl: 11    LIDOcaine (XYLOCAINE) 5 % Oint ointment, Apply topically as needed., Disp: 120 g, Rfl: 3    LIDOcaine HCL 2% (XYLOCAINE) 2 % jelly, Apply topically as needed. Apply topically once nightly to affected part of foot/feet., Disp: 30 mL, Rfl: 2    LIDOcaine-prilocaine (EMLA) cream, Apply topically 2 (two) times a day., Disp: 30 g, Rfl: 1    meclizine (ANTIVERT) 12.5 mg tablet, Take 2 tablets (25 mg total) by mouth 2 (two) times daily as needed for Dizziness., Disp: 30 tablet, Rfl: 1    metFORMIN (GLUCOPHAGE-XR) 500 MG ER 24hr tablet, Take 2 tablets (1,000 mg total) by mouth every morning., Disp: 180 tablet, Rfl: 3    metoprolol succinate (TOPROL-XL) 200 MG 24 hr tablet, Take 1 tablet (200 mg total) by mouth once daily., Disp: 90 tablet, Rfl: 3    OMNIPOD DASH PODS, GEN 4, Crtg, Inject 1 each into the skin every other day., Disp: 45 each, Rfl: 3    pen needle, diabetic (PEN NEEDLE) 32 gauge x 5/32" Ndle, 1 each by Misc.(Non-Drug; Combo Route) route 4 (four) times daily. ICD 10 E11.42 (Patient not taking: Reported on 8/23/2023), Disp: 400 each, Rfl: 3    sacubitriL-valsartan (ENTRESTO)  mg per tablet, Take 1 tablet by mouth 2 (two) times daily., Disp: 60 tablet, Rfl: 11    semaglutide (OZEMPIC) 1 mg/dose (4 mg/3 mL), Inject 1 " mg into the skin once a week., Disp: 1 each, Rfl: 3    spironolactone (ALDACTONE) 50 MG tablet, Take 1 tablet (50 mg total) by mouth once daily., Disp: 90 tablet, Rfl: 3    Current Facility-Administered Medications:     furosemide injection 20 mg, 20 mg, Intravenous, 1 time in Clinic/HOD, Dottie Merritt MD     Social:   Lives at home by herself.  Does have family visit her with social distancing.   Has 5 children, 3 are still living. 13 grandchildren, 11 great-grandchildren.   Diet: not always following ADA diet   Meals: 3 per day and snacks   Breakfast - grits, eggs, sausage, boiled eggs.   Lunch - salad, fish, chicken breast.   Dinner -steaks, green beans, small portions of potatoe salad. Fried shrimp and fried catfish.   Snacks - fruits, apples/oranges, or nuts/pistachios or almonds.  Grapes.   Exercise: none. But does work in yard  Activities: not working.     Current Diabetes medications:   humalog insulin via Omnipod dash - humalog U100   GLP1:  Trulicity 4.5 mg every week.   Oral meds: meformin 500 xr - 2 tabs just in morning.   jardiance 25 daily.     Medications Tried and Failed:   levemir 45 units bid.   Novolog 25 units tid ac meals.   (switched to vgo 40)  VGO 40 insulin patch.- 6 clicks with breakfast, 5 clicks with lunch and dinner.   1 - 2 clicks with a snack.   trulicity  Metformin     Glucose Monitoring:   CGM: Dexcom G6   Gets supplies from : [employer]  Has glucometer at home.     Review of Pertinent co-morbidities/risk factors:   CV: history of MI 2004 - with stents placed in 2010 and also in 2019.    CAD: yes   Takes aspirin and plavix daily.  BP: has history of HTN  Statin: Taking  ACE/ARB: Taking    Vital Signs  /64 (BP Location: Right arm, Patient Position: Sitting, BP Method: Large (Manual))   Pulse 81   Temp 98 °F (36.7 °C)   Resp 16   Ht 5' (1.524 m)   Wt 84 kg (185 lb 3 oz)   LMP 01/01/2020 (Approximate)   SpO2 99%   BMI 36.17 kg/m²     Pertinent Labs:   Hgba1c   Lab Results    Component Value Date    HGBA1C 7.5 (H) 04/21/2023     Lipid panel   Lab Results   Component Value Date    CHOL 133 09/06/2023    CHOL 161 04/21/2023    CHOL 113 (L) 12/16/2022     Lab Results   Component Value Date    HDL 37 (L) 09/06/2023    HDL 37 (L) 04/21/2023    HDL 39 (L) 12/16/2022     Lab Results   Component Value Date    LDLCALC 76.6 09/06/2023    LDLCALC 103.8 04/21/2023    LDLCALC 28.2 (L) 12/16/2022     Lab Results   Component Value Date    TRIG 97 09/06/2023    TRIG 101 04/21/2023    TRIG 229 (H) 12/16/2022     Lab Results   Component Value Date    CHOLHDL 27.8 09/06/2023    CHOLHDL 23.0 04/21/2023    CHOLHDL 34.5 12/16/2022      CMP  Glucose   Date Value Ref Range Status   04/21/2023 100 70 - 110 mg/dL Final     BUN   Date Value Ref Range Status   04/21/2023 13 8 - 23 mg/dL Final     Creatinine   Date Value Ref Range Status   04/21/2023 1.1 0.5 - 1.4 mg/dL Final     eGFR if    Date Value Ref Range Status   03/21/2022 >60.0 >60 mL/min/1.73 m^2 Final     eGFR if non    Date Value Ref Range Status   03/21/2022 58.0 (A) >60 mL/min/1.73 m^2 Final     Comment:     Calculation used to obtain the estimated glomerular filtration  rate (eGFR) is the CKD-EPI equation.         Microalbumin creatinine ratio:   Lab Results   Component Value Date    MICALBCREAT 20.0 03/21/2022       Social History     Tobacco Use   Smoking Status Never   Smokeless Tobacco Never      Standards of care:   Eyes: .: 07/14/2023  Foot exam: : 03/29/2022 and done today.   Diabetes education: yes    Review Of Systems:   Gen: Appetite good, no weight gain or loss, denies fatigue and weakness. Denies polydipsia.   Skin: Skin is intact and heals well, no rashes, no hair changes  Eyes: Denies acute visual disturbances, or blurred vision  Resp: no SOB or Dyspnea on exertion, denies cough.   Cardiac: Denies chest pain, palpitations, or swelling. Denies tremors.   GI: Denies abdominal pain, nausea or vomiting,  diarrhea, constipation.   /GYN: no nocturia, Previous yeast infection. None in the past year.   MS/Neuro: Denies numbness/ tingling in BLE; taking neurontin 300 tid which helps with her leg and foot neuropathy.   Gait steady, speech clear  Psych: Denies drug/ETOH abuse, no hx of depression. Anxiety lately as r/t her son's injuries/hospitalization.   Other systems: negative.    Physical Exam:   GENERAL: Well developed, well nourished in appearance.   PSYCH: AAOx3, appropriate mood and affect, pleasant expression, conversant, appears relaxed, well groomed.   EYES: PERRL, Conjunctiva and corneas clear  NECK: Soft and Supple, trachea midline   CHEST: Even, regular, and unlabored respirations  ABDOMEN: Soft, non-tender, non-distended   VASCULAR: pedal pulses palpable bilaterally, no edema.  NEURO:  cranial nerves II - XII intact   MUSCULOSKELETAL: Good ROM, steady gait.   SKIN: Skin warm, dry, and intact     Assessment and Plan of Care:     Mercedez was seen today for follow-up and diabetes.    Diagnoses and all orders for this visit:    Moderate nonproliferative diabetic retinopathy of both eyes without macular edema associated with type 2 diabetes mellitus    Hypertension associated with diabetes    Hyperlipidemia associated with type 2 diabetes mellitus    Systolic heart failure, unspecified HF chronicity    Stage 3 chronic kidney disease, unspecified whether stage 3a or 3b CKD    Type 2 diabetes mellitus with diabetic polyneuropathy, with long-term current use of insulin    Class 2 severe obesity due to excess calories with serious comorbidity and body mass index (BMI) of 38.0 to 38.9 in adult    Obesity, diabetes, and hypertension syndrome    Gastroesophageal reflux disease without esophagitis    TIKA (obstructive sleep apnea)         1. T2DM with hyperglycemia- Hgba1c goal is 7.5% or less -Hgba1c was > 14%-->  9% ---> 10.2%---> 7.8% --> 9.1% --> 10% --> 8.6%--> 7.7%--> 7.5%    Continue omnipod dash - 1.5 units/hour  during the day - 1.7 units/hour overnight 4pm - 3am. -- decrease back down to 1.5 units/hour over night.   Continue to bolus - presets for meals increased at last visit. No changes were made today.   Small 8---> increased to 10 units. --> increased to 12 units last visit - no changes.   Medium 10 ---> increased to 12 units--> increased to 14 units - no changes  Large 12 ---> increased to 14 units---> increased to 16 units - no changes.   Small snack - 2 units-- increased this to 3 units - no changes.   Added interval of Large snack - 4 snacks-- increased this to 5 units-- no changes.   Correction bolus 2 units if bg greater than 180   (previously on 165 units of insulin per day with MDI, now more controlled on less units of insulin per day).   Continue metformin 1000 every morning.cannot tolerate bid due to gi side effects.   advsied to call me if any side effects.   Continue Jardiance 25mg daily - getting this free through patient assistance.   Discussed MOA, possible side effects including yeast infection, UTI, dehydration and ketoacidosis, importance of maintaining hydration and avoiding No carb diets. Good use hygiene. Notify my office if any side effects. Will monitor renal function closely. Stable at present.   Continue using Dexcom  G7 -   Titrated clicks/increased based on CGM interpretation).   Continues to check sugars 4 times per day.   Continues on insulin injections 4 times per day via Omnipod.   discussed DM, progression of disease, long term complications, CV risk factors and tx options.   She already has established CAD and history of MI, so at risk for repeat event.   Advise compliance with ADA diet and encourage exercise  Reviewed  hypoglycemia, s/s and appropriate tx.    Instructed to monitor Blood glucose 4x/day and bring meter/ log to every clinic visit.   Eyes - dilated retinal exam 7/15/2020 - no diabetic retinopathy but with follow up recommended in 1 month with ophthalmology.   Had exam done  9/2/2020 with opthalmology  Podiatry - had check up recently. Foot exam negative today. Advised on proper foot care. - Dr. Carrera 2020. Refilled clotrimazole/betamethasone.  Neuropathy - increased gabapentin from 100 to 300 tid. Recommended consult with pain management doctor as I think some of this might be related to her back, but she declines for now.     2. HTN- controlled, continue meds as previously prescribed and monitor.   meds refilled. \has not taken bp meds yet this morning. Encouraged to take.     3. HLD - LDL goal < 100. Not at goal on max dose statin - On atorvastatin 80mg every Hs and zetia 10.   Stop zetia. Was on repatha, pcp took her off of it - will monitor and then start if needed.     4. Weight - BMI Body mass index is 36.17 kg/m².   Encourage Ada diet and exercise.    continue Trulicity 4.5  mg every week. - gets through [facility] assist.   Now on ozempic 1mg every week - gets through patient assistance.     5. GERD - previously on nexium but stable.   No longer taking.     6. TIKA - stable on cpap    7. Heart failure - stable - refilled BB and diuretics at last visit.     8. CAD - continues on aspirin and plavix, s/p 6 stents.         F/u in 4 months with OV and labs.

## 2023-09-06 NOTE — PATIENT INSTRUCTIONS
For low blood sugar - remember to keep glucose tablets on hand. You can purchase these at the pharmacy check out desk/over the counter.   If blood sugar is less than 70, take/eat 2 - 3 tablets quickly to bring your sugar up.   Always keep 15 grams of Quick acting carbohydrates on hand to eat/drink if your sugar is low - examples are 1/2 cup of juice, coke, or crackers, granola bars.   Remember to eat meals frequently to prevent low blood blood sugars.      Prescription for baqsimi nasal spray sent.     Continue jardiance tablet in morning.  Continue ozempic 1mg every week.   Continue metformin tablet in morning    Continue on omnipod -   Back up plan - if you are off of your pod - you can use Levemir back up insulin 40 units daily - if needed.   Then you give humalog before your meals with an insulin syringes.

## 2023-09-08 ENCOUNTER — TELEPHONE (OUTPATIENT)
Dept: CARDIOLOGY | Facility: HOSPITAL | Age: 68
End: 2023-09-08
Payer: MEDICARE

## 2023-09-11 ENCOUNTER — OFFICE VISIT (OUTPATIENT)
Dept: OPTOMETRY | Facility: CLINIC | Age: 68
End: 2023-09-11
Payer: MEDICARE

## 2023-09-11 DIAGNOSIS — H52.4 MYOPIA WITH ASTIGMATISM AND PRESBYOPIA, BILATERAL: Primary | ICD-10-CM

## 2023-09-11 DIAGNOSIS — H52.203 MYOPIA WITH ASTIGMATISM AND PRESBYOPIA, BILATERAL: Primary | ICD-10-CM

## 2023-09-11 DIAGNOSIS — H04.123 DRY EYE SYNDROME OF BOTH EYES: ICD-10-CM

## 2023-09-11 DIAGNOSIS — H52.13 MYOPIA WITH ASTIGMATISM AND PRESBYOPIA, BILATERAL: Primary | ICD-10-CM

## 2023-09-11 PROCEDURE — 1101F PR PT FALLS ASSESS DOC 0-1 FALLS W/OUT INJ PAST YR: ICD-10-PCS | Mod: CPTII,S$GLB,, | Performed by: OPTOMETRIST

## 2023-09-11 PROCEDURE — 3051F HG A1C>EQUAL 7.0%<8.0%: CPT | Mod: CPTII,S$GLB,, | Performed by: OPTOMETRIST

## 2023-09-11 PROCEDURE — 4010F PR ACE/ARB THEARPY RXD/TAKEN: ICD-10-PCS | Mod: CPTII,S$GLB,, | Performed by: OPTOMETRIST

## 2023-09-11 PROCEDURE — 3051F PR MOST RECENT HEMOGLOBIN A1C LEVEL 7.0 - < 8.0%: ICD-10-PCS | Mod: CPTII,S$GLB,, | Performed by: OPTOMETRIST

## 2023-09-11 PROCEDURE — 92015 PR REFRACTION: ICD-10-PCS | Mod: S$GLB,,, | Performed by: OPTOMETRIST

## 2023-09-11 PROCEDURE — 92012 PR EYE EXAM, EST PATIENT,INTERMED: ICD-10-PCS | Mod: S$GLB,,, | Performed by: OPTOMETRIST

## 2023-09-11 PROCEDURE — 3288F PR FALLS RISK ASSESSMENT DOCUMENTED: ICD-10-PCS | Mod: CPTII,S$GLB,, | Performed by: OPTOMETRIST

## 2023-09-11 PROCEDURE — 4010F ACE/ARB THERAPY RXD/TAKEN: CPT | Mod: CPTII,S$GLB,, | Performed by: OPTOMETRIST

## 2023-09-11 PROCEDURE — 3288F FALL RISK ASSESSMENT DOCD: CPT | Mod: CPTII,S$GLB,, | Performed by: OPTOMETRIST

## 2023-09-11 PROCEDURE — 1126F PR PAIN SEVERITY QUANTIFIED, NO PAIN PRESENT: ICD-10-PCS | Mod: CPTII,S$GLB,, | Performed by: OPTOMETRIST

## 2023-09-11 PROCEDURE — 1101F PT FALLS ASSESS-DOCD LE1/YR: CPT | Mod: CPTII,S$GLB,, | Performed by: OPTOMETRIST

## 2023-09-11 PROCEDURE — 1126F AMNT PAIN NOTED NONE PRSNT: CPT | Mod: CPTII,S$GLB,, | Performed by: OPTOMETRIST

## 2023-09-11 PROCEDURE — 92012 INTRM OPH EXAM EST PATIENT: CPT | Mod: S$GLB,,, | Performed by: OPTOMETRIST

## 2023-09-11 PROCEDURE — 1159F PR MEDICATION LIST DOCUMENTED IN MEDICAL RECORD: ICD-10-PCS | Mod: CPTII,S$GLB,, | Performed by: OPTOMETRIST

## 2023-09-11 PROCEDURE — 99999 PR PBB SHADOW E&M-EST. PATIENT-LVL III: ICD-10-PCS | Mod: PBBFAC,,, | Performed by: OPTOMETRIST

## 2023-09-11 PROCEDURE — 99999 PR PBB SHADOW E&M-EST. PATIENT-LVL III: CPT | Mod: PBBFAC,,, | Performed by: OPTOMETRIST

## 2023-09-11 PROCEDURE — 92015 DETERMINE REFRACTIVE STATE: CPT | Mod: S$GLB,,, | Performed by: OPTOMETRIST

## 2023-09-11 PROCEDURE — 1159F MED LIST DOCD IN RCRD: CPT | Mod: CPTII,S$GLB,, | Performed by: OPTOMETRIST

## 2023-09-11 NOTE — PROGRESS NOTES
HPI    Pt is here for a refraction per Dr. Alanis   VA OU decreased for near and distance gradually for about 2 months.  No flashes no floaters.  Eye meds: None  Last edited by Mirtha Akins, OD on 9/11/2023  9:49 AM.            Assessment /Plan     For exam results, see Encounter Report.    Myopia with astigmatism and presbyopia, bilateral    Dry eye syndrome of both eyes      Updated SRx. Mild change from habitual. Discussed mild decrease in BCVA due to cataracts. Pt notes understanding, but would like to wait on cataract removal at this time. Monitor yearly.      2. Educated pt on findings. Recommended ATs TID-QID  for added lubrication and comfort. If symptoms worsen or dont improve, RTC. Monitor.      NOTE: Pt followed yearly by Dr. Alanis. Pt to follow up with him 07/2024 unless changes are noted sooner.       RTC with Dr. Alanis as directed, me prn.                     (3) walks occasionally

## 2023-09-12 ENCOUNTER — HOSPITAL ENCOUNTER (OUTPATIENT)
Dept: CARDIOLOGY | Facility: HOSPITAL | Age: 68
Discharge: HOME | End: 2023-09-12
Attending: INTERNAL MEDICINE
Payer: MEDICARE

## 2023-09-12 VITALS
SYSTOLIC BLOOD PRESSURE: 142 MMHG | RESPIRATION RATE: 16 BRPM | BODY MASS INDEX: 36.32 KG/M2 | DIASTOLIC BLOOD PRESSURE: 68 MMHG | HEART RATE: 85 BPM | HEIGHT: 60 IN | WEIGHT: 185 LBS

## 2023-09-12 DIAGNOSIS — I50.22 CHRONIC SYSTOLIC CONGESTIVE HEART FAILURE: ICD-10-CM

## 2023-09-12 DIAGNOSIS — I25.118 CORONARY ARTERY DISEASE OF NATIVE ARTERY OF NATIVE HEART WITH STABLE ANGINA PECTORIS: ICD-10-CM

## 2023-09-12 DIAGNOSIS — I25.5 ISCHEMIC CARDIOMYOPATHY: ICD-10-CM

## 2023-09-12 LAB
CFR FLOW - ANTERIOR: 1.45
CFR FLOW - INFERIOR: 1.49
CFR FLOW - LATERAL: 1.58
CFR FLOW - MAX: 2.05
CFR FLOW - MIN: 1.08
CFR FLOW - SEPTAL: 1.54
CFR FLOW - WHOLE HEART: 1.52
CV STRESS BASE HR: 79 BPM
DIASTOLIC BLOOD PRESSURE: 79 MMHG
EJECTION FRACTION- HIGH: 65 %
END DIASTOLIC INDEX-HIGH: 153 ML/M2
END DIASTOLIC INDEX-LOW: 93 ML/M2
END SYSTOLIC INDEX-HIGH: 71 ML/M2
END SYSTOLIC INDEX-LOW: 31 ML/M2
NUC REST DIASTOLIC VOLUME INDEX: 158
NUC REST EJECTION FRACTION: 32
NUC REST SYSTOLIC VOLUME INDEX: 107
NUC STRESS DIASTOLIC VOLUME INDEX: 169
NUC STRESS EJECTION FRACTION: 35 %
NUC STRESS SYSTOLIC VOLUME INDEX: 110
OHS CV CPX 1 MINUTE RECOVERY HEART RATE: 93 BPM
OHS CV CPX 85 PERCENT MAX PREDICTED HEART RATE MALE: 123
OHS CV CPX MAX PREDICTED HEART RATE: 145
OHS CV CPX PATIENT IS FEMALE: 1
OHS CV CPX PATIENT IS MALE: 0
OHS CV CPX PEAK DIASTOLIC BLOOD PRESSURE: 78 MMHG
OHS CV CPX PEAK HEAR RATE: 88 BPM
OHS CV CPX PEAK RATE PRESSURE PRODUCT: NORMAL
OHS CV CPX PEAK SYSTOLIC BLOOD PRESSURE: 151 MMHG
OHS CV CPX PERCENT MAX PREDICTED HEART RATE ACHIEVED: 61
OHS CV CPX RATE PRESSURE PRODUCT PRESENTING: NORMAL
REST FLOW - ANTERIOR: 0.65 CC/MIN/G
REST FLOW - INFERIOR: 0.51 CC/MIN/G
REST FLOW - LATERAL: 0.63 CC/MIN/G
REST FLOW - MAX: 0.85 CC/MIN/G
REST FLOW - MIN: 0.34 CC/MIN/G
REST FLOW - SEPTAL: 0.47 CC/MIN/G
REST FLOW - WHOLE HEART: 0.57 CC/MIN/G
RETIRED EF AND QEF - SEE NOTES: 53 %
STRESS FLOW - ANTERIOR: 0.94 CC/MIN/G
STRESS FLOW - INFERIOR: 0.75 CC/MIN/G
STRESS FLOW - LATERAL: 0.98 CC/MIN/G
STRESS FLOW - MAX: 1.24 CC/MIN/G
STRESS FLOW - MIN: 0.44 CC/MIN/G
STRESS FLOW - SEPTAL: 0.73 CC/MIN/G
STRESS FLOW - WHOLE HEART: 0.85 CC/MIN/G
SYSTOLIC BLOOD PRESSURE: 138 MMHG

## 2023-09-12 PROCEDURE — 78434 AQMBF PET REST & RX STRESS: CPT

## 2023-09-12 PROCEDURE — 63600175 PHARM REV CODE 636 W HCPCS: Performed by: INTERNAL MEDICINE

## 2023-09-12 PROCEDURE — 93018 CARDIAC PET SCAN STRESS (CUPID ONLY): ICD-10-PCS | Mod: ,,, | Performed by: INTERNAL MEDICINE

## 2023-09-12 PROCEDURE — 78431 MYOCRD IMG PET RST&STRS CT: CPT | Mod: 26,,, | Performed by: INTERNAL MEDICINE

## 2023-09-12 PROCEDURE — 78431 CARDIAC PET SCAN STRESS (CUPID ONLY): ICD-10-PCS | Mod: 26,,, | Performed by: INTERNAL MEDICINE

## 2023-09-12 PROCEDURE — 78431 MYOCRD IMG PET RST&STRS CT: CPT

## 2023-09-12 PROCEDURE — 78434 CARDIAC PET SCAN STRESS (CUPID ONLY): ICD-10-PCS | Mod: 26,,, | Performed by: INTERNAL MEDICINE

## 2023-09-12 PROCEDURE — 93016 CARDIAC PET SCAN STRESS (CUPID ONLY): ICD-10-PCS | Mod: ,,, | Performed by: INTERNAL MEDICINE

## 2023-09-12 PROCEDURE — 93018 CV STRESS TEST I&R ONLY: CPT | Mod: ,,, | Performed by: INTERNAL MEDICINE

## 2023-09-12 PROCEDURE — 93016 CV STRESS TEST SUPVJ ONLY: CPT | Mod: ,,, | Performed by: INTERNAL MEDICINE

## 2023-09-12 PROCEDURE — 78434 AQMBF PET REST & RX STRESS: CPT | Mod: 26,,, | Performed by: INTERNAL MEDICINE

## 2023-09-12 RX ORDER — AMINOPHYLLINE 25 MG/ML
75 INJECTION, SOLUTION INTRAVENOUS ONCE
Status: COMPLETED | OUTPATIENT
Start: 2023-09-12 | End: 2023-09-12

## 2023-09-12 RX ORDER — REGADENOSON 0.08 MG/ML
0.4 INJECTION, SOLUTION INTRAVENOUS ONCE
Status: COMPLETED | OUTPATIENT
Start: 2023-09-12 | End: 2023-09-12

## 2023-09-12 RX ADMIN — REGADENOSON 0.4 MG: 0.08 INJECTION, SOLUTION INTRAVENOUS at 09:09

## 2023-09-12 RX ADMIN — AMINOPHYLLINE 75 MG: 25 INJECTION, SOLUTION INTRAVENOUS at 10:09

## 2023-10-13 DIAGNOSIS — Z79.4 TYPE 2 DIABETES MELLITUS WITH DIABETIC POLYNEUROPATHY, WITH LONG-TERM CURRENT USE OF INSULIN: ICD-10-CM

## 2023-10-13 DIAGNOSIS — E11.42 TYPE 2 DIABETES MELLITUS WITH DIABETIC POLYNEUROPATHY, WITH LONG-TERM CURRENT USE OF INSULIN: ICD-10-CM

## 2023-10-13 NOTE — TELEPHONE ENCOUNTER
Care Due:                  Date            Visit Type   Department     Provider  --------------------------------------------------------------------------------                                NP -                              PRIMARY      [facility] PRIMARY  Last Visit: 04-      CARE ([facility])   [facility]           Dottie Merritt                              EP -                              PRIMARY      [facility] PRIMARY  Next Visit: 10-      CARE ([facility])   [facility]           Dottie Merritt                                                            Last  Test          Frequency    Reason                     Performed    Due Date  --------------------------------------------------------------------------------    HBA1C.......  6 months...  empagliflozin, metFORMIN.  04-   10-    [facility] Embedded Care Due Messages. Reference number: [id number].   10/13/2023 9:27:35 AM CDT

## 2023-10-15 RX ORDER — SEMAGLUTIDE 1.34 MG/ML
1 INJECTION, SOLUTION SUBCUTANEOUS WEEKLY
Qty: 1 EACH | Refills: 4 | Status: SHIPPED | OUTPATIENT
Start: 2023-10-15 | End: 2024-02-26

## 2023-10-15 RX ORDER — INSULIN LISPRO 100 [IU]/ML
100 INJECTION, SOLUTION INTRAVENOUS; SUBCUTANEOUS CONTINUOUS
Qty: 40 ML | Refills: 11 | Status: SHIPPED | OUTPATIENT
Start: 2023-10-15 | End: 2024-10-14

## 2023-10-16 RX ORDER — GABAPENTIN 300 MG/1
CAPSULE ORAL
Qty: 270 CAPSULE | Refills: 1 | Status: SHIPPED | OUTPATIENT
Start: 2023-10-16

## 2023-10-18 ENCOUNTER — OFFICE VISIT (OUTPATIENT)
Dept: PRIMARY CARE CLINIC | Facility: CLINIC | Age: 68
End: 2023-10-18
Payer: MEDICARE

## 2023-10-18 VITALS
DIASTOLIC BLOOD PRESSURE: 75 MMHG | WEIGHT: 190.69 LBS | SYSTOLIC BLOOD PRESSURE: 138 MMHG | RESPIRATION RATE: 18 BRPM | TEMPERATURE: 98 F | HEART RATE: 79 BPM | OXYGEN SATURATION: 97 % | HEIGHT: 60 IN | BODY MASS INDEX: 37.44 KG/M2

## 2023-10-18 DIAGNOSIS — N18.30 STAGE 3 CHRONIC KIDNEY DISEASE, UNSPECIFIED WHETHER STAGE 3A OR 3B CKD: ICD-10-CM

## 2023-10-18 DIAGNOSIS — E11.3291 TYPE 2 DIABETES MELLITUS WITH RIGHT EYE AFFECTED BY MILD NONPROLIFERATIVE RETINOPATHY WITHOUT MACULAR EDEMA, WITH LONG-TERM CURRENT USE OF INSULIN: ICD-10-CM

## 2023-10-18 DIAGNOSIS — Z79.4 TYPE 2 DIABETES MELLITUS WITH LEFT EYE AFFECTED BY MILD NONPROLIFERATIVE RETINOPATHY AND MACULAR EDEMA, WITH LONG-TERM CURRENT USE OF INSULIN: ICD-10-CM

## 2023-10-18 DIAGNOSIS — E11.40 NEUROPATHY DUE TO TYPE 2 DIABETES MELLITUS: Primary | ICD-10-CM

## 2023-10-18 DIAGNOSIS — Z79.4 TYPE 2 DIABETES MELLITUS WITH RIGHT EYE AFFECTED BY MILD NONPROLIFERATIVE RETINOPATHY WITHOUT MACULAR EDEMA, WITH LONG-TERM CURRENT USE OF INSULIN: ICD-10-CM

## 2023-10-18 DIAGNOSIS — Z79.4 TYPE 2 DIABETES MELLITUS WITH DIABETIC POLYNEUROPATHY, WITH LONG-TERM CURRENT USE OF INSULIN: ICD-10-CM

## 2023-10-18 DIAGNOSIS — G47.33 OSA (OBSTRUCTIVE SLEEP APNEA): ICD-10-CM

## 2023-10-18 DIAGNOSIS — E11.3212 TYPE 2 DIABETES MELLITUS WITH LEFT EYE AFFECTED BY MILD NONPROLIFERATIVE RETINOPATHY AND MACULAR EDEMA, WITH LONG-TERM CURRENT USE OF INSULIN: ICD-10-CM

## 2023-10-18 DIAGNOSIS — E11.42 TYPE 2 DIABETES MELLITUS WITH DIABETIC POLYNEUROPATHY, WITH LONG-TERM CURRENT USE OF INSULIN: ICD-10-CM

## 2023-10-18 DIAGNOSIS — Z12.31 OTHER SCREENING MAMMOGRAM: ICD-10-CM

## 2023-10-18 DIAGNOSIS — I25.118 CORONARY ARTERY DISEASE OF NATIVE ARTERY OF NATIVE HEART WITH STABLE ANGINA PECTORIS: ICD-10-CM

## 2023-10-18 DIAGNOSIS — E66.01 SEVERE OBESITY: ICD-10-CM

## 2023-10-18 PROCEDURE — 3288F PR FALLS RISK ASSESSMENT DOCUMENTED: ICD-10-PCS | Mod: CPTII,S$GLB,, | Performed by: INTERNAL MEDICINE

## 2023-10-18 PROCEDURE — 1126F AMNT PAIN NOTED NONE PRSNT: CPT | Mod: CPTII,S$GLB,, | Performed by: INTERNAL MEDICINE

## 2023-10-18 PROCEDURE — 3288F FALL RISK ASSESSMENT DOCD: CPT | Mod: CPTII,S$GLB,, | Performed by: INTERNAL MEDICINE

## 2023-10-18 PROCEDURE — 3051F PR MOST RECENT HEMOGLOBIN A1C LEVEL 7.0 - < 8.0%: ICD-10-PCS | Mod: CPTII,S$GLB,, | Performed by: INTERNAL MEDICINE

## 2023-10-18 PROCEDURE — 1160F RVW MEDS BY RX/DR IN RCRD: CPT | Mod: CPTII,S$GLB,, | Performed by: INTERNAL MEDICINE

## 2023-10-18 PROCEDURE — 3078F DIAST BP <80 MM HG: CPT | Mod: CPTII,S$GLB,, | Performed by: INTERNAL MEDICINE

## 2023-10-18 PROCEDURE — 1101F PT FALLS ASSESS-DOCD LE1/YR: CPT | Mod: CPTII,S$GLB,, | Performed by: INTERNAL MEDICINE

## 2023-10-18 PROCEDURE — 3075F SYST BP GE 130 - 139MM HG: CPT | Mod: CPTII,S$GLB,, | Performed by: INTERNAL MEDICINE

## 2023-10-18 PROCEDURE — 4010F PR ACE/ARB THEARPY RXD/TAKEN: ICD-10-PCS | Mod: CPTII,S$GLB,, | Performed by: INTERNAL MEDICINE

## 2023-10-18 PROCEDURE — 4010F ACE/ARB THERAPY RXD/TAKEN: CPT | Mod: CPTII,S$GLB,, | Performed by: INTERNAL MEDICINE

## 2023-10-18 PROCEDURE — 3051F HG A1C>EQUAL 7.0%<8.0%: CPT | Mod: CPTII,S$GLB,, | Performed by: INTERNAL MEDICINE

## 2023-10-18 PROCEDURE — 99397 PR PREVENTIVE VISIT,EST,65 & OVER: ICD-10-PCS | Mod: GZ,S$GLB,, | Performed by: INTERNAL MEDICINE

## 2023-10-18 PROCEDURE — 1126F PR PAIN SEVERITY QUANTIFIED, NO PAIN PRESENT: ICD-10-PCS | Mod: CPTII,S$GLB,, | Performed by: INTERNAL MEDICINE

## 2023-10-18 PROCEDURE — 1101F PR PT FALLS ASSESS DOC 0-1 FALLS W/OUT INJ PAST YR: ICD-10-PCS | Mod: CPTII,S$GLB,, | Performed by: INTERNAL MEDICINE

## 2023-10-18 PROCEDURE — 3075F PR MOST RECENT SYSTOLIC BLOOD PRESS GE 130-139MM HG: ICD-10-PCS | Mod: CPTII,S$GLB,, | Performed by: INTERNAL MEDICINE

## 2023-10-18 PROCEDURE — 99999 PR PBB SHADOW E&M-EST. PATIENT-LVL V: CPT | Mod: PBBFAC,,, | Performed by: INTERNAL MEDICINE

## 2023-10-18 PROCEDURE — 1160F PR REVIEW ALL MEDS BY PRESCRIBER/CLIN PHARMACIST DOCUMENTED: ICD-10-PCS | Mod: CPTII,S$GLB,, | Performed by: INTERNAL MEDICINE

## 2023-10-18 PROCEDURE — 99397 PER PM REEVAL EST PAT 65+ YR: CPT | Mod: GZ,S$GLB,, | Performed by: INTERNAL MEDICINE

## 2023-10-18 PROCEDURE — 3008F PR BODY MASS INDEX (BMI) DOCUMENTED: ICD-10-PCS | Mod: CPTII,S$GLB,, | Performed by: INTERNAL MEDICINE

## 2023-10-18 PROCEDURE — 1159F MED LIST DOCD IN RCRD: CPT | Mod: CPTII,S$GLB,, | Performed by: INTERNAL MEDICINE

## 2023-10-18 PROCEDURE — 1159F PR MEDICATION LIST DOCUMENTED IN MEDICAL RECORD: ICD-10-PCS | Mod: CPTII,S$GLB,, | Performed by: INTERNAL MEDICINE

## 2023-10-18 PROCEDURE — 3078F PR MOST RECENT DIASTOLIC BLOOD PRESSURE < 80 MM HG: ICD-10-PCS | Mod: CPTII,S$GLB,, | Performed by: INTERNAL MEDICINE

## 2023-10-18 PROCEDURE — 3008F BODY MASS INDEX DOCD: CPT | Mod: CPTII,S$GLB,, | Performed by: INTERNAL MEDICINE

## 2023-10-18 PROCEDURE — 99999 PR PBB SHADOW E&M-EST. PATIENT-LVL V: ICD-10-PCS | Mod: PBBFAC,,, | Performed by: INTERNAL MEDICINE

## 2023-10-18 RX ORDER — METFORMIN HYDROCHLORIDE 500 MG/1
500 TABLET, EXTENDED RELEASE ORAL EVERY MORNING
Qty: 90 TABLET | Refills: 3 | Status: SHIPPED | OUTPATIENT
Start: 2023-10-18 | End: 2024-10-12

## 2023-10-18 NOTE — PROGRESS NOTES
Subjective:      Patient ID: Mercedez Purvis is a 68 y.o. female.    Chief Complaint: Follow-up      Mercedez Purvis is a 68 y.o. female with chronic conditions significant for significant for CAD, MI s/p stent placement,T2DM, diabetic neuropathy, obesity, HTN, HLD, hyperaldosteronism on spironolactone, diabetic retinopathy, CAD with stable angina, sCHF, CKD3, GERD, BPPV, TIKA, migraines.   Presenting today for follow up / annual. Date of last annual is 4/18/2023    HFrEF: Continues to follow with cardiology. Recent cardiac PET reveals decreased EF to 35%. Currently on lasix 40mg qday and entresto. On jardiance for HF and for T2DM      CAD, hx of MI s/p stent placement: Recent cardiac PET reveals no new stenosis. Continue on plavix, BB.      T2DM with neuropathy: Working with Margarita jon for follow up in January. Continue current regimen of ozempic, jardiance, metformin and humalog. Last A1c 7.7%. UTD with foot exam, continue gabapentin for neuropathy     HTN: Office BP is 138/75 . Amlodipine was increased to 10mg at last visit. No CP/SOB/lightheadedness/dizziness/palpitations. Recommend that patient keep a BP log     HLD: Was previously on repatha for LDL goal <100. Review of recent lipid profile show her LDL being well controlled. Currently on atorvastatin 80mg with LDL at goal.     CKD3b: Discussed CKD and the need to keep control of HTN and T2DM to slow progression of kidney disease. Avoid nephrotoxic medications.     TIKA: Has been more than 5 years since last sleep study. Currently not on CPAP. Sleep medicine referral placed.     Severe obesity: Discussed healthy BMI, goal weight.  Recommend diet/exercise and health lifestyle choices.     Denies any chest pain, shortness of breath, nausea vomiting constipation diarrhea, blood in stool, heartburn    Review of Systems   Constitutional:  Negative for chills, fever and weight loss.   HENT:  Negative for congestion, ear pain and sore throat.     Eyes:  Negative for double vision.   Respiratory:  Negative for cough and shortness of breath.    Cardiovascular:  Negative for chest pain, palpitations and leg swelling.   Gastrointestinal:  Negative for abdominal pain, heartburn, nausea and vomiting.   Skin:  Negative for rash.   Neurological:  Negative for dizziness, tingling and headaches.   Psychiatric/Behavioral:  Negative for depression.           Current Outpatient Medications:     amitriptyline (ELAVIL) 75 MG tablet, Take 1 tablet (75 mg total) by mouth every evening., Disp: 90 tablet, Rfl: 1    amLODIPine (NORVASC) 10 MG tablet, Take 1 tablet (10 mg total) by mouth once daily., Disp: 90 tablet, Rfl: 3    atorvastatin (LIPITOR) 80 MG tablet, Take 1 tablet (80 mg total) by mouth every evening., Disp: 90 tablet, Rfl: 3    blood-glucose meter,continuous (DEXCOM ) Misc, 1 Device by Misc.(Non-Drug; Combo Route) route continuous., Disp: 1 each, Rfl: 0    blood-glucose sensor (DEXCOM G7 SENSOR) Roberta, 1 each by Misc.(Non-Drug; Combo Route) route every 10 days., Disp: 10 each, Rfl: 3    ciclopirox (PENLAC) 8 % Soln, Apply topically nightly., Disp: 6.6 mL, Rfl: 11    clopidogreL (PLAVIX) 75 mg tablet, Take 1 tablet (75 mg total) by mouth once daily., Disp: 90 tablet, Rfl: 3    clotrimazole-betamethasone 1-0.05% (LOTRISONE) cream, Apply topically 2 (two) times daily., Disp: 45 g, Rfl: 3    cyclobenzaprine (FLEXERIL) 10 MG tablet, Take 1 tablet (10 mg total) by mouth 3 (three) times a week., Disp: 90 tablet, Rfl: 0    diclofenac sodium (VOLTAREN) 1 % Gel, Apply topically once daily., Disp: 100 g, Rfl: 0    empagliflozin (JARDIANCE) 25 mg tablet, Take 1 tablet (25 mg total) by mouth once daily., Disp: 90 tablet, Rfl: 1    furosemide (LASIX) 40 MG tablet, Take 1 tablet (40 mg total) by mouth once daily., Disp: 90 tablet, Rfl: 3    gabapentin (NEURONTIN) 300 MG capsule, Take 1 capsule (300 mg total) by mouth 2 (two) times daily AND 2 capsules (600 mg total)  every evening., Disp: 270 capsule, Rfl: 1    glucagon (BAQSIMI) 3 mg/actuation Spry, 1 each by Nasal route daily as needed (if glucose is 70 or less.)., Disp: 2 each, Rfl: 1    insulin lispro (HUMALOG U-100 INSULIN) 100 unit/mL injection, Inject 100 Units into the skin continuous. Gives up to 100 units daily via Omnipod. Do not directly inject - only use within pods., Disp: 40 mL, Rfl: 11    LIDOcaine (XYLOCAINE) 5 % Oint ointment, Apply topically as needed., Disp: 120 g, Rfl: 3    LIDOcaine HCL 2% (XYLOCAINE) 2 % jelly, Apply topically as needed. Apply topically once nightly to affected part of foot/feet., Disp: 30 mL, Rfl: 2    LIDOcaine-prilocaine (EMLA) cream, Apply topically 2 (two) times a day., Disp: 30 g, Rfl: 1    meclizine (ANTIVERT) 12.5 mg tablet, Take 2 tablets (25 mg total) by mouth 2 (two) times daily as needed for Dizziness., Disp: 30 tablet, Rfl: 1    metoprolol succinate (TOPROL-XL) 200 MG 24 hr tablet, Take 1 tablet (200 mg total) by mouth once daily., Disp: 90 tablet, Rfl: 3    OMNIPOD DASH PODS, GEN 4, Crtg, Inject 1 each into the skin every other day., Disp: 45 each, Rfl: 3    sacubitriL-valsartan (ENTRESTO)  mg per tablet, Take 1 tablet by mouth 2 (two) times daily., Disp: 60 tablet, Rfl: 11    semaglutide (OZEMPIC) 1 mg/dose (4 mg/3 mL), Inject 1 mg into the skin once a week., Disp: 1 each, Rfl: 4    spironolactone (ALDACTONE) 50 MG tablet, Take 1 tablet (50 mg total) by mouth once daily., Disp: 90 tablet, Rfl: 3    blood sugar diagnostic Strp, 1 each by Misc.(Non-Drug; Combo Route) route 4 (four) times daily. (Patient not taking: Reported on 8/23/2023), Disp: 200 each, Rfl: 3    fluconazole (DIFLUCAN) 150 MG Tab, Take 1 tablet (150 mg total) by mouth once daily. May repeat after 72 hrs for yeast infection., Disp: 2 tablet, Rfl: 1    insulin detemir U-100 (LEVEMIR FLEXTOUCH U-100 INSULN) 100 unit/mL (3 mL) InPn pen, Inject 40 Units into the skin once daily. FOR EMERGENCY USE ONLY - BACK  "UP INSULIN, IF OFF OF YOUR INSULIN PUMP - USE 40 UNITS INJECTION DAILY UNTIL GETTING BACK ON PUMP., Disp: 15 mL, Rfl: 1    metFORMIN (GLUCOPHAGE-XR) 500 MG ER 24hr tablet, Take 1 tablet (500 mg total) by mouth every morning., Disp: 90 tablet, Rfl: 3    pen needle, diabetic (PEN NEEDLE) 32 gauge x 5/32" Ndle, 1 each by Misc.(Non-Drug; Combo Route) route 4 (four) times daily. ICD 10 E11.42 (Patient not taking: Reported on 8/23/2023), Disp: 400 each, Rfl: 3  No current facility-administered medications for this visit.    Lab Results   Component Value Date    HGBA1C 7.5 (H) 04/21/2023    HGBA1C 7.7 (H) 12/16/2022    HGBA1C 8.6 (H) 07/06/2022     Lab Results   Component Value Date    MICALBCREAT 20.0 03/21/2022     Lab Results   Component Value Date    LDLCALC 76.6 09/06/2023    LDLCALC 103.8 04/21/2023    CHOL 133 09/06/2023    HDL 37 (L) 09/06/2023    TRIG 97 09/06/2023       Lab Results   Component Value Date     04/21/2023    K 4.0 04/21/2023     04/21/2023    CO2 27 04/21/2023     04/21/2023    BUN 13 04/21/2023    CREATININE 1.1 04/21/2023    CALCIUM 8.8 04/21/2023    PROT 7.1 04/21/2023    ALBUMIN 3.2 (L) 04/21/2023    BILITOT 0.4 04/21/2023    ALKPHOS 100 04/21/2023    AST 11 04/21/2023    ALT 12 04/21/2023    ANIONGAP 11 04/21/2023    ESTGFRAFRICA >60.0 03/21/2022    EGFRNONAA 58.0 (A) 03/21/2022    WBC 7.10 04/21/2023    HGB 12.7 04/21/2023    HGB 11.3 (L) 10/30/2021    HCT 44.2 04/21/2023    MCV 74 (L) 04/21/2023     04/21/2023    TSH 1.525 04/21/2023    HEPCAB Negative 10/29/2021       Lab Results   Component Value Date    AQHGHFLC19SM 18 (L) 07/13/2020    KOZMSVVT89FZ 16 (L) 09/23/2010         Past Medical History:   Diagnosis Date    Chronic headache     Diabetes     Heart attack 2004    8 stents    Heart failure     History of heart artery stent     Hyperlipemia     Hypertension     Obesity (BMI 30-39.9)     Yeast infection      Past Surgical History:   Procedure Laterality Date "    CARDIAC CATHETERIZATION      INJECTION OF STEROID Left 3/24/2023    Procedure: INJECTION, STEROID,LEFT RING FINGER;  Surgeon: Rona Lema MD;  Location: [facility] OR;  Service: Orthopedics;  Laterality: Left;    TRIGGER FINGER RELEASE Right 3/24/2023    Procedure: RELEASE, TRIGGER FINGER, RIGHT LONG;  Surgeon: Rona Lema MD;  Location: [facility] OR;  Service: Orthopedics;  Laterality: Right;     Social History     Social History Narrative    Not on file     History reviewed. No pertinent family history.  Vitals:    10/18/23 0934 10/18/23 0954   BP: (!) 142/80 138/75   Pulse: 79    Resp: 18    Temp: 98 °F (36.7 °C)    SpO2: 97%    Weight: 86.5 kg (190 lb 11.2 oz)    Height: 5' (1.524 m)    PainSc: 0-No pain      Objective:   Physical Exam  Vitals reviewed.   Constitutional:       Appearance: Normal appearance.   HENT:      Head: Normocephalic.   Eyes:      Pupils: Pupils are equal, round, and reactive to light.   Cardiovascular:      Rate and Rhythm: Normal rate.      Pulses: Normal pulses.      Heart sounds: Normal heart sounds.   Pulmonary:      Effort: Pulmonary effort is normal.      Breath sounds: Normal breath sounds.   Abdominal:      General: Bowel sounds are normal.      Palpations: Abdomen is soft.   Musculoskeletal:         General: Normal range of motion.   Skin:     General: Skin is warm.   Neurological:      General: No focal deficit present.      Mental Status: She is alert. Mental status is at baseline.   Psychiatric:         Mood and Affect: Mood normal.       Assessment/Plan     Mercedez Purvis is a 68 y.o.female with:    Neuropathy due to type 2 diabetes mellitus  - cont gabapentin    Type 2 diabetes mellitus with diabetic polyneuropathy, with long-term current use of insulin  -     metFORMIN (GLUCOPHAGE-XR) 500 MG ER 24hr tablet; Take 1 tablet (500 mg total) by mouth every morning.  Dispense: 90 tablet; Refill: 3    Coronary artery disease of native artery of native heart with  stable angina pectoris  - cont current management with plavix, lipitor and BB    Stage 3 chronic kidney disease, unspecified whether stage 3a or 3b CKD  - avoid nephrotoxic medication    Type 2 diabetes mellitus with left eye affected by mild nonproliferative retinopathy and macular edema, with long-term current use of insulin  - Continue current regimen    Type 2 diabetes mellitus with right eye affected by mild nonproliferative retinopathy without macular edema, with long-term current use of insulin    TIKA (obstructive sleep apnea)  -     Ambulatory referral/consult to Sleep Disorders; Future; Expected date: 10/25/2023    Other screening mammogram  -     Mammo Digital Screening Bilat w/ [facility]; Future; Expected date: 10/18/2023    Severe obesity  - Discussed healthy BMI, goal weight.  Recommend diet/exercise and health lifestyle choices.        Chronic conditions status updated as per HPI.  Other than changes above, cont current medications and maintain follow up with specialists.  Return to clinic in Follow up in about 6 months (around 4/18/2024).      Dottie Merritt MD  [facility]    There are no Patient Instructions on file for this visit.  Tests to Keep You Healthy    Mammogram: SCHEDULED  Eye Exam: Met on 9/11/2023  Colon Cancer Screening: Met on 4/15/2022  Last Blood Pressure <= 139/89 (10/18/2023): NO  Last HbA1c < 8 (04/21/2023): Yes

## 2023-10-24 ENCOUNTER — HOSPITAL ENCOUNTER (OUTPATIENT)
Dept: RADIOLOGY | Facility: OTHER | Age: 68
Discharge: HOME | End: 2023-10-24
Attending: INTERNAL MEDICINE
Payer: MEDICARE

## 2023-10-24 DIAGNOSIS — Z12.31 OTHER SCREENING MAMMOGRAM: ICD-10-CM

## 2023-10-24 PROCEDURE — 77067 SCR MAMMO BI INCL CAD: CPT | Mod: 26,,, | Performed by: RADIOLOGY

## 2023-10-24 PROCEDURE — 77067 MAMMO DIGITAL SCREENING BILAT WITH TOMO: ICD-10-PCS | Mod: 26,,, | Performed by: RADIOLOGY

## 2023-10-24 PROCEDURE — 77067 SCR MAMMO BI INCL CAD: CPT | Mod: TC

## 2023-10-24 PROCEDURE — 77063 BREAST TOMOSYNTHESIS BI: CPT | Mod: 26,,, | Performed by: RADIOLOGY

## 2023-10-24 PROCEDURE — 77063 MAMMO DIGITAL SCREENING BILAT WITH TOMO: ICD-10-PCS | Mod: 26,,, | Performed by: RADIOLOGY

## 2023-10-30 NOTE — PROGRESS NOTES
I sent pt a my chart message -  I reviewed your Mammogram as Dr. Merritt is out of office today-   The breasts are almost entirely fatty. There is no evidence of suspicious masses, microcalcifications or architectural distortion.  Impression:   No mammographic evidence of malignancy.  Routine screening mammogram in 1 year is recommended.

## 2023-11-14 ENCOUNTER — HOSPITAL ENCOUNTER (OUTPATIENT)
Dept: CARDIOLOGY | Facility: HOSPITAL | Age: 68
Discharge: HOME | End: 2023-11-14
Attending: INTERNAL MEDICINE
Payer: MEDICARE

## 2023-11-14 VITALS
WEIGHT: 190 LBS | HEART RATE: 80 BPM | HEIGHT: 60 IN | SYSTOLIC BLOOD PRESSURE: 140 MMHG | BODY MASS INDEX: 37.3 KG/M2 | DIASTOLIC BLOOD PRESSURE: 80 MMHG

## 2023-11-14 DIAGNOSIS — I50.22 CHRONIC SYSTOLIC CONGESTIVE HEART FAILURE: ICD-10-CM

## 2023-11-14 LAB
ASCENDING AORTA: 3.18 CM
AV INDEX (PROSTH): 0.65
AV MEAN GRADIENT: 6 MMHG
AV PEAK GRADIENT: 13 MMHG
AV VALVE AREA BY VELOCITY RATIO: 1.69 CM²
AV VALVE AREA: 1.83 CM²
AV VELOCITY RATIO: 0.6
BSA FOR ECHO PROCEDURE: 1.91 M2
CV ECHO LV RWT: 0.39 CM
DOP CALC AO PEAK VEL: 1.83 M/S
DOP CALC AO VTI: 27.92 CM
DOP CALC LVOT AREA: 2.8 CM2
DOP CALC LVOT DIAMETER: 1.9 CM
DOP CALC LVOT PEAK VEL: 1.09 M/S
DOP CALC LVOT STROKE VOLUME: 51.09 CM3
DOP CALCLVOT PEAK VEL VTI: 18.03 CM
E WAVE DECELERATION TIME: 111.75 MSEC
E/A RATIO: 0.46
E/E' RATIO: 18.86 M/S
ECHO LV POSTERIOR WALL: 1 CM (ref 0.6–1.1)
FRACTIONAL SHORTENING: 18 % (ref 28–44)
INTERVENTRICULAR SEPTUM: 1.01 CM (ref 0.6–1.1)
IVRT: 94.2 MSEC
LA MAJOR: 4.89 CM
LA MINOR: 4.99 CM
LA WIDTH: 3.71 CM
LEFT ATRIUM SIZE: 3.99 CM
LEFT ATRIUM VOLUME INDEX MOD: 30 ML/M2
LEFT ATRIUM VOLUME INDEX: 34 ML/M2
LEFT ATRIUM VOLUME MOD: 54.83 CM3
LEFT ATRIUM VOLUME: 62.15 CM3
LEFT INTERNAL DIMENSION IN SYSTOLE: 4.19 CM (ref 2.1–4)
LEFT VENTRICLE DIASTOLIC VOLUME INDEX: 59.97 ML/M2
LEFT VENTRICLE DIASTOLIC VOLUME: 109.74 ML
LEFT VENTRICLE MASS INDEX: 103 G/M2
LEFT VENTRICLE SYSTOLIC VOLUME INDEX: 42.8 ML/M2
LEFT VENTRICLE SYSTOLIC VOLUME: 78.24 ML
LEFT VENTRICULAR INTERNAL DIMENSION IN DIASTOLE: 5.1 CM (ref 3.5–6)
LEFT VENTRICULAR MASS: 189.28 G
LV LATERAL E/E' RATIO: 16.5 M/S
LV SEPTAL E/E' RATIO: 22 M/S
MV A" WAVE DURATION": 19.12 MSEC
MV PEAK A VEL: 1.43 M/S
MV PEAK E VEL: 0.66 M/S
MV STENOSIS PRESSURE HALF TIME: 32.41 MS
MV VALVE AREA P 1/2 METHOD: 6.79 CM2
PULM VEIN S/D RATIO: 1.54
PV PEAK D VEL: 0.26 M/S
PV PEAK S VEL: 0.4 M/S
RA MAJOR: 3.87 CM
RA PRESSURE ESTIMATED: 3 MMHG
RA WIDTH: 2.75 CM
RIGHT VENTRICULAR END-DIASTOLIC DIMENSION: 2.89 CM
SINUS: 2.9 CM
STJ: 2.38 CM
TDI LATERAL: 0.04 M/S
TDI SEPTAL: 0.03 M/S
TDI: 0.04 M/S
TRICUSPID ANNULAR PLANE SYSTOLIC EXCURSION: 1.78 CM
Z-SCORE OF LEFT VENTRICULAR DIMENSION IN END DIASTOLE: 0.06
Z-SCORE OF LEFT VENTRICULAR DIMENSION IN END SYSTOLE: 2.3

## 2023-11-14 PROCEDURE — 93306 ECHO (CUPID ONLY): ICD-10-PCS | Mod: 26,,, | Performed by: INTERNAL MEDICINE

## 2023-11-14 PROCEDURE — C8929 TTE W OR WO FOL WCON,DOPPLER: HCPCS

## 2023-11-14 PROCEDURE — 93306 TTE W/DOPPLER COMPLETE: CPT | Mod: 26,,, | Performed by: INTERNAL MEDICINE

## 2023-12-21 ENCOUNTER — LAB VISIT (OUTPATIENT)
Dept: LAB | Facility: HOSPITAL | Age: 68
End: 2023-12-21
Attending: NURSE PRACTITIONER
Payer: MEDICAID

## 2023-12-21 DIAGNOSIS — E11.69 HYPERLIPIDEMIA ASSOCIATED WITH TYPE 2 DIABETES MELLITUS: ICD-10-CM

## 2023-12-21 DIAGNOSIS — E11.42 TYPE 2 DIABETES MELLITUS WITH DIABETIC POLYNEUROPATHY, WITH LONG-TERM CURRENT USE OF INSULIN: ICD-10-CM

## 2023-12-21 DIAGNOSIS — Z79.4 TYPE 2 DIABETES MELLITUS WITH DIABETIC POLYNEUROPATHY, WITH LONG-TERM CURRENT USE OF INSULIN: ICD-10-CM

## 2023-12-21 DIAGNOSIS — E78.5 HYPERLIPIDEMIA ASSOCIATED WITH TYPE 2 DIABETES MELLITUS: ICD-10-CM

## 2023-12-21 LAB
ALBUMIN SERPL BCP-MCNC: 3.1 G/DL (ref 3.5–5.2)
ALP SERPL-CCNC: 104 U/L (ref 55–135)
ALT SERPL W/O P-5'-P-CCNC: 10 U/L (ref 10–44)
ANION GAP SERPL CALC-SCNC: 10 MMOL/L (ref 8–16)
AST SERPL-CCNC: 14 U/L (ref 10–40)
BILIRUB SERPL-MCNC: 0.2 MG/DL (ref 0.1–1)
BUN SERPL-MCNC: 17 MG/DL (ref 8–23)
CALCIUM SERPL-MCNC: 9 MG/DL (ref 8.7–10.5)
CHLORIDE SERPL-SCNC: 106 MMOL/L (ref 95–110)
CHOLEST SERPL-MCNC: 152 MG/DL (ref 120–199)
CHOLEST/HDLC SERPL: 4.1 {RATIO} (ref 2–5)
CO2 SERPL-SCNC: 26 MMOL/L (ref 23–29)
CREAT SERPL-MCNC: 1.1 MG/DL (ref 0.5–1.4)
EST. GFR  (NO RACE VARIABLE): 54.4 ML/MIN/1.73 M^2
ESTIMATED AVG GLUCOSE: 148 MG/DL (ref 68–131)
GLUCOSE SERPL-MCNC: 118 MG/DL (ref 70–110)
HBA1C MFR BLD: 6.8 % (ref 4–5.6)
HDLC SERPL-MCNC: 37 MG/DL (ref 40–75)
HDLC SERPL: 24.3 % (ref 20–50)
LDLC SERPL CALC-MCNC: 93.6 MG/DL (ref 63–159)
NONHDLC SERPL-MCNC: 115 MG/DL
POTASSIUM SERPL-SCNC: 4.2 MMOL/L (ref 3.5–5.1)
PROT SERPL-MCNC: 7.2 G/DL (ref 6–8.4)
SODIUM SERPL-SCNC: 142 MMOL/L (ref 136–145)
TRIGL SERPL-MCNC: 107 MG/DL (ref 30–150)

## 2023-12-21 PROCEDURE — 80053 COMPREHEN METABOLIC PANEL: CPT | Performed by: NURSE PRACTITIONER

## 2023-12-21 PROCEDURE — 83036 HEMOGLOBIN GLYCOSYLATED A1C: CPT | Performed by: NURSE PRACTITIONER

## 2023-12-21 PROCEDURE — 80061 LIPID PANEL: CPT | Performed by: NURSE PRACTITIONER

## 2023-12-21 PROCEDURE — 36415 COLL VENOUS BLD VENIPUNCTURE: CPT | Performed by: NURSE PRACTITIONER

## 2023-12-21 NOTE — PROGRESS NOTES
Your A1c is 6.8%. Shows improvement  Rest of your electrolytes are unremarkable.    Your kidney (BUN, Creatinine and GFT) function is stable.   Your liver (AST, ALT) function is unremarkable.

## 2024-01-01 NOTE — PROGRESS NOTES
Subjective:    Patient ID:  Mercedez Purvis is a 68 y.o. female who presents for follow-up of ischemic cardiomyopathy    HPI  The patient is a 68 year old female with CAD, hypertension,  hyperlipidemia, diabetes and obesity.She presented to [facility] in 2016 with chest pain and had a PCI to RCA and OM2. She presented 4/18/19 with SOB and had a PCI to Cfx and a staged PCI to Cfx and mid RCA. An echo at [facility] 10/3/19 was normal with EF 50-55 but echo 10/10/21 reported EF 40-45% and MAURI 1.58 cm2. Her repeat echo 7/12/23 revealed EF 25%. She was placed on entresto ,aldactone and was started on jardiace by Dr Jones . She is doing well and reports improvement of SOB and ARREOLA. She has not edema. She has gained 9# since last visit. She had vertigo getting up from exam table    vConclusion 9/12/23         There are no clinically significant perfusion abnormalities. There is a small (<10%) amount of mild resting heterogeneity in the inferior wall(s) that improves with stress, indicative of non-obstructive disease.    The whole heart absolute myocardial perfusion values averaged 0.57 cc/min/g at rest, which is reduced; 0.85 cc/min/g at stress, which is moderately reduced; and CFR is 1.52 , which is moderately reduced.    CT attenuation images demonstrate moderate diffuse coronary calcifications in the LAD, LCX and RCA territory and mild diffuse aortic calcifications in the descending aorta and aortic arch.    The gated perfusion images showed an ejection fraction of 32% at rest and 35% during stress. A normal ejection fraction is greater than 53%.    There is severe global hypokinesis at rest and during stress.    The LV cavity size is mildly enlarged at rest and stress.    The ECG portion of the study is negative for ischemia.    There were no arrhythmias during stress.    The patient reported no chest pain during the stress test.    There are no prior studies for comparison.  Lab Results   Component Value Date      12/21/2023    K 4.2 12/21/2023     12/21/2023    CO2 26 12/21/2023    BUN 17 12/21/2023    CREATININE 1.1 12/21/2023     (H) 12/21/2023    HGBA1C 6.8 (H) 12/21/2023    MG 2.3 10/30/2021    AST 14 12/21/2023    ALT 10 12/21/2023    ALBUMIN 3.1 (L) 12/21/2023    PROT 7.2 12/21/2023    BILITOT 0.2 12/21/2023    WBC 7.15 11/14/2023    HGB 12.9 11/14/2023    HCT 43.7 11/14/2023    MCV 77 (L) 11/14/2023     11/14/2023    INR 1.0 08/27/2010    TSH 1.525 04/21/2023         Lab Results   Component Value Date    CHOL 152 12/21/2023    HDL 37 (L) 12/21/2023    TRIG 107 12/21/2023       Lab Results   Component Value Date    LDLCALC 93.6 12/21/2023       Past Medical History:   Diagnosis Date    Chronic headache     Diabetes     Heart attack 2004    8 stents    Heart failure     History of heart artery stent     Hyperlipemia     Hypertension     Obesity (BMI 30-39.9)     Yeast infection        Current Outpatient Medications:     amitriptyline (ELAVIL) 75 MG tablet, Take 1 tablet (75 mg total) by mouth every evening., Disp: 90 tablet, Rfl: 1    amLODIPine (NORVASC) 10 MG tablet, Take 1 tablet (10 mg total) by mouth once daily., Disp: 90 tablet, Rfl: 3    atorvastatin (LIPITOR) 80 MG tablet, Take 1 tablet (80 mg total) by mouth every evening., Disp: 90 tablet, Rfl: 3    blood sugar diagnostic Strp, 1 each by Misc.(Non-Drug; Combo Route) route 4 (four) times daily., Disp: 200 each, Rfl: 3    blood-glucose meter,continuous (DEXCOM ) Misc, 1 Device by Misc.(Non-Drug; Combo Route) route continuous., Disp: 1 each, Rfl: 0    blood-glucose sensor (DEXCOM G7 SENSOR) Roberta, 1 each by Misc.(Non-Drug; Combo Route) route every 10 days., Disp: 10 each, Rfl: 3    ciclopirox (PENLAC) 8 % Soln, Apply topically nightly., Disp: 6.6 mL, Rfl: 11    clopidogreL (PLAVIX) 75 mg tablet, Take 1 tablet (75 mg total) by mouth once daily., Disp: 90 tablet, Rfl: 3    clotrimazole-betamethasone 1-0.05% (LOTRISONE) cream, Apply  "topically 2 (two) times daily., Disp: 45 g, Rfl: 3    cyclobenzaprine (FLEXERIL) 10 MG tablet, Take 1 tablet (10 mg total) by mouth 3 (three) times a week., Disp: 90 tablet, Rfl: 0    diclofenac sodium (VOLTAREN) 1 % Gel, Apply topically once daily., Disp: 100 g, Rfl: 0    empagliflozin (JARDIANCE) 25 mg tablet, Take 1 tablet (25 mg total) by mouth once daily., Disp: 90 tablet, Rfl: 1    furosemide (LASIX) 40 MG tablet, Take 1 tablet (40 mg total) by mouth once daily., Disp: 90 tablet, Rfl: 3    gabapentin (NEURONTIN) 300 MG capsule, Take 1 capsule (300 mg total) by mouth 2 (two) times daily AND 2 capsules (600 mg total) every evening., Disp: 270 capsule, Rfl: 1    insulin lispro (HUMALOG U-100 INSULIN) 100 unit/mL injection, Inject 100 Units into the skin continuous. Gives up to 100 units daily via Omnipod. Do not directly inject - only use within pods., Disp: 40 mL, Rfl: 11    LIDOcaine (XYLOCAINE) 5 % Oint ointment, Apply topically as needed., Disp: 120 g, Rfl: 3    LIDOcaine HCL 2% (XYLOCAINE) 2 % jelly, Apply topically as needed. Apply topically once nightly to affected part of foot/feet., Disp: 30 mL, Rfl: 2    LIDOcaine-prilocaine (EMLA) cream, Apply topically 2 (two) times a day., Disp: 30 g, Rfl: 1    meclizine (ANTIVERT) 12.5 mg tablet, Take 2 tablets (25 mg total) by mouth 2 (two) times daily as needed for Dizziness., Disp: 30 tablet, Rfl: 1    metFORMIN (GLUCOPHAGE-XR) 500 MG ER 24hr tablet, Take 1 tablet (500 mg total) by mouth every morning., Disp: 90 tablet, Rfl: 3    metoprolol succinate (TOPROL-XL) 200 MG 24 hr tablet, Take 1 tablet (200 mg total) by mouth once daily., Disp: 90 tablet, Rfl: 3    OMNIPOD DASH PODS, GEN 4, Crtg, Inject 1 each into the skin every other day., Disp: 45 each, Rfl: 3    pen needle, diabetic (PEN NEEDLE) 32 gauge x 5/32" Ndle, 1 each by Misc.(Non-Drug; Combo Route) route 4 (four) times daily. ICD 10 E11.42, Disp: 400 each, Rfl: 3    sacubitriL-valsartan (ENTRESTO)  mg " per tablet, Take 1 tablet by mouth 2 (two) times daily., Disp: 60 tablet, Rfl: 11    semaglutide (OZEMPIC) 1 mg/dose (4 mg/3 mL), Inject 1 mg into the skin once a week., Disp: 1 each, Rfl: 4    spironolactone (ALDACTONE) 50 MG tablet, Take 1 tablet (50 mg total) by mouth once daily., Disp: 90 tablet, Rfl: 3    fluconazole (DIFLUCAN) 150 MG Tab, Take 1 tablet (150 mg total) by mouth once daily. May repeat after 72 hrs for yeast infection. (Patient not taking: Reported on 1/2/2024), Disp: 2 tablet, Rfl: 1    glucagon (BAQSIMI) 3 mg/actuation Spry, 1 each by Nasal route daily as needed (if glucose is 70 or less.). (Patient not taking: Reported on 1/2/2024), Disp: 2 each, Rfl: 1    insulin detemir U-100 (LEVEMIR FLEXTOUCH U-100 INSULN) 100 unit/mL (3 mL) InPn pen, Inject 40 Units into the skin once daily. FOR EMERGENCY USE ONLY - BACK UP INSULIN, IF OFF OF YOUR INSULIN PUMP - USE 40 UNITS INJECTION DAILY UNTIL GETTING BACK ON PUMP. (Patient not taking: Reported on 1/2/2024), Disp: 15 mL, Rfl: 1          Review of Systems   Constitutional: Positive for weight gain. Negative for decreased appetite, diaphoresis, fever, malaise/fatigue and weight loss.   HENT:  Negative for congestion, ear discharge, ear pain and nosebleeds.    Eyes:  Negative for blurred vision, double vision and visual disturbance.   Cardiovascular:  Negative for chest pain, claudication, cyanosis, dyspnea on exertion, irregular heartbeat, leg swelling, near-syncope, orthopnea, palpitations, paroxysmal nocturnal dyspnea and syncope.   Respiratory:  Negative for cough, hemoptysis, shortness of breath, sleep disturbances due to breathing, snoring, sputum production and wheezing.    Endocrine: Negative for polydipsia, polyphagia and polyuria.   Hematologic/Lymphatic: Negative for adenopathy and bleeding problem. Does not bruise/bleed easily.   Skin:  Negative for color change, nail changes, poor wound healing and rash.   Musculoskeletal:  Negative for muscle  cramps and muscle weakness.   Gastrointestinal:  Negative for abdominal pain, anorexia, change in bowel habit, hematochezia, nausea and vomiting.   Genitourinary:  Negative for dysuria, frequency, hematuria, menorrhagia and missed menses.   Neurological:  Positive for dizziness. Negative for brief paralysis, difficulty with concentration, excessive daytime sleepiness, focal weakness, headaches, light-headedness, loss of balance, seizures, vertigo and weakness.   Psychiatric/Behavioral:  Negative for altered mental status and depression.    Allergic/Immunologic: Negative for persistent infections.        Objective:/72   Pulse 84   Ht 5' (1.524 m)   Wt 88.9 kg (195 lb 15.8 oz)   LMP 01/01/2020 (Approximate)   SpO2 96%   BMI 38.28 kg/m²             Physical Exam  Constitutional:       Appearance: She is well-developed. She is obese.   HENT:      Head: Normocephalic.      Right Ear: External ear normal.      Left Ear: External ear normal.      Nose: Nose normal.   Eyes:      General: No scleral icterus.     Conjunctiva/sclera: Conjunctivae normal.      Pupils: Pupils are equal, round, and reactive to light.   Neck:      Thyroid: No thyromegaly.      Vascular: No JVD.      Trachea: No tracheal deviation.   Cardiovascular:      Rate and Rhythm: Normal rate and regular rhythm.      Pulses: Intact distal pulses.           Carotid pulses are 2+ on the right side and 2+ on the left side.       Dorsalis pedis pulses are 0 on the right side.        Posterior tibial pulses are 0 on the right side and 0 on the left side.      Heart sounds: Normal heart sounds, S1 normal and S2 normal. No murmur heard.     No friction rub. No gallop.      Comments: JVP normal  No edema  Pulmonary:      Effort: Pulmonary effort is normal. No respiratory distress.      Breath sounds: Normal breath sounds. No wheezing or rales.   Chest:      Chest wall: No tenderness.   Abdominal:      General: Bowel sounds are normal. There is no  distension.      Palpations: Abdomen is soft.      Tenderness: There is no abdominal tenderness. There is no guarding.   Musculoskeletal:         General: No tenderness. Normal range of motion.      Cervical back: Normal range of motion.   Lymphadenopathy:      Comments: Palpation of lymph nodes of neck and groin normal   Skin:     General: Skin is warm and dry.      Coloration: Skin is not pale.      Findings: No erythema or rash.      Comments: No ankle nor pretibial edema   Neurological:      Mental Status: She is alert and oriented to person, place, and time.      Cranial Nerves: No cranial nerve deficit.      Motor: No abnormal muscle tone.      Coordination: Coordination normal.   Psychiatric:         Behavior: Behavior normal.         Thought Content: Thought content normal.         Judgment: Judgment normal.           Assessment:       No diagnosis found.     Plan:       There are no diagnoses linked to this encounter.

## 2024-01-02 ENCOUNTER — OFFICE VISIT (OUTPATIENT)
Dept: CARDIOLOGY | Facility: CLINIC | Age: 69
End: 2024-01-02
Payer: MEDICARE

## 2024-01-02 ENCOUNTER — TELEPHONE (OUTPATIENT)
Dept: INTERNAL MEDICINE | Facility: CLINIC | Age: 69
End: 2024-01-02
Payer: MEDICARE

## 2024-01-02 VITALS
DIASTOLIC BLOOD PRESSURE: 72 MMHG | HEIGHT: 60 IN | HEART RATE: 84 BPM | WEIGHT: 196 LBS | SYSTOLIC BLOOD PRESSURE: 139 MMHG | OXYGEN SATURATION: 96 % | BODY MASS INDEX: 38.48 KG/M2

## 2024-01-02 DIAGNOSIS — E66.9 CLASS 2 OBESITY WITH BODY MASS INDEX (BMI) OF 38.0 TO 38.9 IN ADULT, UNSPECIFIED OBESITY TYPE, UNSPECIFIED WHETHER SERIOUS COMORBIDITY PRESENT: ICD-10-CM

## 2024-01-02 DIAGNOSIS — I10 PRIMARY HYPERTENSION: ICD-10-CM

## 2024-01-02 DIAGNOSIS — E11.69 OBESITY, DIABETES, AND HYPERTENSION SYNDROME: ICD-10-CM

## 2024-01-02 DIAGNOSIS — N18.30 STAGE 3 CHRONIC KIDNEY DISEASE, UNSPECIFIED WHETHER STAGE 3A OR 3B CKD: ICD-10-CM

## 2024-01-02 DIAGNOSIS — Z79.4 TYPE 2 DIABETES MELLITUS WITH DIABETIC POLYNEUROPATHY, WITH LONG-TERM CURRENT USE OF INSULIN: ICD-10-CM

## 2024-01-02 DIAGNOSIS — E11.59 OBESITY, DIABETES, AND HYPERTENSION SYNDROME: ICD-10-CM

## 2024-01-02 DIAGNOSIS — I42.8 NONISCHEMIC CARDIOMYOPATHY: ICD-10-CM

## 2024-01-02 DIAGNOSIS — E11.42 TYPE 2 DIABETES MELLITUS WITH DIABETIC POLYNEUROPATHY, WITH LONG-TERM CURRENT USE OF INSULIN: ICD-10-CM

## 2024-01-02 DIAGNOSIS — I77.9 DISORDER OF ARTERIES AND ARTERIOLES, UNSPECIFIED: ICD-10-CM

## 2024-01-02 DIAGNOSIS — I50.20 SYSTOLIC HEART FAILURE, UNSPECIFIED HF CHRONICITY: ICD-10-CM

## 2024-01-02 DIAGNOSIS — I15.2 OBESITY, DIABETES, AND HYPERTENSION SYNDROME: ICD-10-CM

## 2024-01-02 DIAGNOSIS — I50.22 CHRONIC SYSTOLIC CONGESTIVE HEART FAILURE: Primary | ICD-10-CM

## 2024-01-02 DIAGNOSIS — E66.9 OBESITY, DIABETES, AND HYPERTENSION SYNDROME: ICD-10-CM

## 2024-01-02 DIAGNOSIS — G47.33 OSA (OBSTRUCTIVE SLEEP APNEA): ICD-10-CM

## 2024-01-02 DIAGNOSIS — Z95.5 HISTORY OF HEART ARTERY STENT: ICD-10-CM

## 2024-01-02 PROCEDURE — 99999 PR PBB SHADOW E&M-EST. PATIENT-LVL V: CPT | Mod: PBBFAC,,, | Performed by: INTERNAL MEDICINE

## 2024-01-02 PROCEDURE — 99214 OFFICE O/P EST MOD 30 MIN: CPT | Mod: S$GLB,,, | Performed by: INTERNAL MEDICINE

## 2024-01-02 NOTE — TELEPHONE ENCOUNTER
Called Pt.   Regarding changing appt with DM on 1/4/2024 from OV to VV.     Mrs. Mejia only does Virtual appt on Thursdays.  Will not be physically in-office.

## 2024-01-12 DIAGNOSIS — N18.30 STAGE 3 CHRONIC KIDNEY DISEASE, UNSPECIFIED WHETHER STAGE 3A OR 3B CKD: ICD-10-CM

## 2024-01-12 DIAGNOSIS — R51.9 CHRONIC NONINTRACTABLE HEADACHE, UNSPECIFIED HEADACHE TYPE: ICD-10-CM

## 2024-01-12 DIAGNOSIS — G89.29 CHRONIC NONINTRACTABLE HEADACHE, UNSPECIFIED HEADACHE TYPE: ICD-10-CM

## 2024-01-12 NOTE — TELEPHONE ENCOUNTER
No care due was identified.  [facility] Embedded Care Due Messages. Reference number: [id number].   1/12/2024 9:25:38 AM CST

## 2024-01-13 NOTE — TELEPHONE ENCOUNTER
Refill Routing Note   Medication(s) are not appropriate for processing by [facility] for the following reason(s):        Drug-disease interaction    [facility] action(s):  Defer        Medication Therapy Plan: Drug-Disease: empagliflozin and Obesity, diabetes, and hypertension syndrome; Hypertension associated with diabetes; Drug-Disease: amitriptyline and History of heart attack; History of heart artery stent; Coronary artery disease of native artery of native heart with stable angina pectoris    Pharmacist review requested: Yes     Appointments  past 12m or future 3m with PCP    Date Provider   Last Visit   10/18/2023 Dottie Merritt MD   Next Visit   Visit date not found Dottie Merritt MD   ED visits in past 90 days: 0        Note composed:1:38 PM 01/13/2024

## 2024-01-16 RX ORDER — AMITRIPTYLINE HYDROCHLORIDE 75 MG/1
75 TABLET ORAL NIGHTLY
Qty: 90 TABLET | Refills: 1 | Status: SHIPPED | OUTPATIENT
Start: 2024-01-16 | End: 2025-01-15

## 2024-01-18 ENCOUNTER — TELEPHONE (OUTPATIENT)
Dept: INTERNAL MEDICINE | Facility: CLINIC | Age: 69
End: 2024-01-18
Payer: MEDICARE

## 2024-01-18 NOTE — TELEPHONE ENCOUNTER
----- Message from Lian Murcia sent at 1/18/2024 11:27 AM CST -----  Type:  Sooner Apoointment Request    Caller is requesting a sooner appointment.  Caller declined first available appointment listed below.  Caller will not accept being placed on the waitlist and is requesting a message be sent to doctor.  Name of Caller:pt   When is the first available appointment?none   Symptoms:f/u   Would the patient rather a call back or a response via [email or web address]? Call   Best Call Back Number:[phone]  Additional Information: none

## 2024-02-16 ENCOUNTER — PATIENT MESSAGE (OUTPATIENT)
Dept: INTERNAL MEDICINE | Facility: CLINIC | Age: 69
End: 2024-02-16
Payer: MEDICARE

## 2024-02-26 ENCOUNTER — OFFICE VISIT (OUTPATIENT)
Dept: INTERNAL MEDICINE | Facility: CLINIC | Age: 69
End: 2024-02-26
Payer: MEDICARE

## 2024-02-26 VITALS
TEMPERATURE: 97 F | DIASTOLIC BLOOD PRESSURE: 86 MMHG | OXYGEN SATURATION: 95 % | HEIGHT: 60 IN | SYSTOLIC BLOOD PRESSURE: 138 MMHG | BODY MASS INDEX: 38.09 KG/M2 | WEIGHT: 194 LBS | HEART RATE: 81 BPM | RESPIRATION RATE: 16 BRPM

## 2024-02-26 DIAGNOSIS — E11.59 OBESITY, DIABETES, AND HYPERTENSION SYNDROME: ICD-10-CM

## 2024-02-26 DIAGNOSIS — Z79.4 TYPE 2 DIABETES MELLITUS WITH DIABETIC POLYNEUROPATHY, WITH LONG-TERM CURRENT USE OF INSULIN: Primary | ICD-10-CM

## 2024-02-26 DIAGNOSIS — E11.69 HYPERLIPIDEMIA ASSOCIATED WITH TYPE 2 DIABETES MELLITUS: ICD-10-CM

## 2024-02-26 DIAGNOSIS — E11.8 DIABETES MELLITUS TYPE 2, WITH COMPLICATION, ON LONG TERM INSULIN PUMP: ICD-10-CM

## 2024-02-26 DIAGNOSIS — I15.2 OBESITY, DIABETES, AND HYPERTENSION SYNDROME: ICD-10-CM

## 2024-02-26 DIAGNOSIS — E78.5 HYPERLIPIDEMIA ASSOCIATED WITH TYPE 2 DIABETES MELLITUS: ICD-10-CM

## 2024-02-26 DIAGNOSIS — E66.9 OBESITY, DIABETES, AND HYPERTENSION SYNDROME: ICD-10-CM

## 2024-02-26 DIAGNOSIS — E66.01 SEVERE OBESITY: ICD-10-CM

## 2024-02-26 DIAGNOSIS — Z96.41 DIABETES MELLITUS TYPE 2, WITH COMPLICATION, ON LONG TERM INSULIN PUMP: ICD-10-CM

## 2024-02-26 DIAGNOSIS — E11.42 TYPE 2 DIABETES MELLITUS WITH DIABETIC POLYNEUROPATHY, WITH LONG-TERM CURRENT USE OF INSULIN: Primary | ICD-10-CM

## 2024-02-26 DIAGNOSIS — E11.69 OBESITY, DIABETES, AND HYPERTENSION SYNDROME: ICD-10-CM

## 2024-02-26 DIAGNOSIS — N18.30 STAGE 3 CHRONIC KIDNEY DISEASE, UNSPECIFIED WHETHER STAGE 3A OR 3B CKD: ICD-10-CM

## 2024-02-26 PROCEDURE — 99999 PR PBB SHADOW E&M-EST. PATIENT-LVL III: CPT | Mod: PBBFAC,,, | Performed by: NURSE PRACTITIONER

## 2024-02-26 PROCEDURE — 95251 CONT GLUC MNTR ANALYSIS I&R: CPT | Mod: S$GLB,,, | Performed by: NURSE PRACTITIONER

## 2024-02-26 PROCEDURE — 99215 OFFICE O/P EST HI 40 MIN: CPT | Mod: S$GLB,,, | Performed by: NURSE PRACTITIONER

## 2024-02-26 NOTE — PROGRESS NOTES
69 y.o.female for follow up for management of T2dm -   Last seen September 2023 - those notes are below.     A1c is @ 6.8% - improving -   She continues on ozempic 1 mg every week. (Getting for free from patient assistance).   Jardiance 25mg tablet daily   Metformin 500 mg XR tablet with breakfast.    She continues on omnipod dash -   With humalog U100 -   Total daily dose is 52.6 units/day of insulin -   56% is coming from the basal rate.   44% is coming from bolusing   1.6 boluses per day on average.   Basal rate is at 1.5 units/hour -       On dexcom g7 - with her cell phone -   Downloaded - see scanned in .   Average glucose 161   Gmi @ 7.2%  70% time in range  <1% lows.   0% very lows.   29% highs   <1% very highs.         Last visit notes as follows from September 2023 -   68 y.o. female here for routine for follow up for management of T2dm -   Last seen May 2023 - those notes below.     No new a1c for today's visit - estimated at 6.8% on current cgm download.   Jardiance 25 mg tablet daily   Metformin 1000mg tablet in morning time.   Trulicity once per week switched to ozempic 1mg every week -taking and tolerating well -   Ozempic gets from [email or web address] -   Has not lost any weight on medication which is frustrating to her.   Normal Bm's. No constipation or issues with urination.     Omnipod dash using humalog U100 insulin. (She gets humalog vials from [facility]).   [facility] report - see scanned in  -   Has levemir pen for back up insulin - 40 units daily.   Total daily dose is 61.8 units/day on average -   61% is coming from basal rate.   39% is coming from bolusing.   1.8 boluses per day on average  Active insulin time is 4 hours -   12 am - basal rate is 1.7 units/hour.   3 am - 1.5 units/hour.   4pm - back up to 1.7 units/hour -  ISF is at 50 -   Bg correction 100 -   Carb ratio at 1: 15 -   But she is using preset boluses -       New dexcom g7 personal - downloaded and reviewed  "today - using reciever -   Not compatible with her cell phone -   See scanned in .   Average glucose is 146 -   Gmi is at 6.8% -   87% time in range.   <1% lows.   <1% very lows.   11% highs.   1% very highs.       Has noticed when she has "lows" - she is out in the sun or working outside.         Last visit notes from may 2023 as follows:   68 y.o. female here for routine follow up visit for management of T2dm - last seen 2022 -   Those notes are below.     A1c is much more stable now at 7.5%. last year she was at 10%  She continues on metformin 1000mg daily in am.   Jardiance 25mg tablet in morning  Trulicity 3mg every week. (Up to 4.5mg every week?)  On omnipod dash - see scanned in [facility] report.   [facility]  Using humalog U100 insulin -   Tells me that she's been having some more low blood sugars - more than normal lately.   She wants to lose weight -     Total daily dose is at 63.3 units/day -   56% is basal rate,   44% is bolusing.   She is giving preset boluses -   12, 14, or 16 units with her meals.   Snack options.   -2 units for correction -   3 units for small snack.   5 units for large scan  Changing pod about every 3 days.   She has 2 unit preset for a snack or drink -   Active insulin time 4 hours.   Rate is at 1.5 units/hour (increased last visit). 3am until 4pm  Overnight is at 1.7 untis/hour - 4pm until 3am   ISF is at 1: 50   bg target is at 100 -   ICR (carb ratio) at 15 - but she is not using carbs/not counting carbs.     She is on dexcom g6 - she gets from [facility] right now -   Uses with a reader - she has a smartphone, but prefers  option   Average glucose is 162 -   71% time in range.   <1% lows.   0% very low.   28% highs.   <1% very highs.     Last visit notes from 2022:   67 y.o. female, here for 4 month follow up for management of T2dm -   Last seen in November 2021 - those notes are below.     a1c back " elevated to 10%.   She continues on metformin 1000mg daily. (she tried to do 2 times per day but couldn't tolerate due to gi s/e's)  Also on jardiance 25mg tablet daily.   On Trulicity 3mg every week.   Brother, father, and stepmother - all passed away - recently.   So increased stress.     She is on omnipod - downloaded - see scanned in [employer] report.   Total daily dose  - 54.2 units of insulin per day.   53% is coming from the basal rate.   47% is coming from the bolusing.   She is giving preset boluses -   10, 12 or 14 units with her meals.   Changing pod about every 3 days.   She has 2 unit preset for a snack or drink -   Active insulin time 4 hours.   Rate is at 1.3 units/hour.   ISF is at 1: 50   bg target is at 100 -   ICR (carb ratio) at 15 - but she is not using carbs/not counting carbs.   Notices glucoses are more elevated lately - boluses for meals, not always with snacks.     Using dexcom g6 - downloaded and reviewed today-   Average glucose 241 -   15% time in range -   0% lows.   46% highs.   39% very highs.         Last visit notes from November 2021 as follows:   66 y.o. female here for 4 month follow up visit for management of T2dm -   Last seen in August 2021 - those notes are below.     a1c is up again from 7.8% to 9.1%.   She continues on omnipod. States her pods are expiring before 3 days and she is running out of insulin too early.   Still using metformin 1000 mg daily in morning.   Also on jardiance 25mg tablet daily in morning.   Continues on Trulicity 3mg every week.     She continues on omnipod - I was unable to download her Dash pod to [employer] due to technical errors today on our computer.   However did review a 14 day history of bolusing and her settings:   She is using humalog insulin -   Active insulin time is at 4 hours.   isf is set at 1: 50   Basal rate is at 1.3 units/hour.   Total daily dose of insulin is 47.4 units/day.   59% of insulin coming from the basal rate.   41% is coming  from the bolusing.     She uses preset boluses -   I had increased both her basal rate and presets at last visit.   Presets are 10 units, 12 units, 14 units  - 2 units, 4 units   She usually gives just 12 units with meals.   This is even with HIGH carb meals, not just based on the size.     Dexcom g6 downloaded - reviewed -   See scanned in  -   Average glucose is 195 -   42% time in range.   <1% lows.   42% highs.   15% extreme highs.     She admits, she has been eating bad - not following Ada diet.   Fried foods mostly.   She is doing boluses before meals, sometimes forgetting on occasion.   As in this morning she ate breakfast, but did not give a bolus.   Last night she ate dinner, did not give a bolus -   However sugar today in clinic is at 112.   She has not checked her glucose in 3 days as she is out of test strips, and is awaiting her next shipment of dexcom supplies.   Requests Rx for contour next test strips.     Of note - she went to ER on 10/29/2021 - was having chest discomfort. Was found to be in ALEXIS.   Amlodipine and chlorthalidone was stopped/held.   She is going to follow up with cardiology in February 2021.   She ran out of her beta blocker medication states 2 weeks ago, and requests refill.   Her blood pressure is running on higher end today at 140/78 -   She is feeling well, with no acute complaints today.         Last visit notes from August 2021 as follows:   66 y.o. female, here for follow up visit for management for diabetes management   Last seen in May 2021. Those notes are below.     a1c down from 10% to 7.8%.   Much improved.   Her life has been very stressful since last visit -   Her son got injured severely from a shooting and was hospitalized -   So she's gotten off track, and only give 2 meal time boluses per day with her Omnipod.   Also stopped using her dexcom.     Current meds:   trulicity 3mg every week (increased from last visit from 1.5 to 3 mg every week).    jardiance 25mg tablet daily  Metformin 1000 in morning  She is on omnipod with humalog insulin continuous - [employer] report reviewed today  basal rate from 1.2 to 1.3 units/hour at last visit.      presets for meals increased at last visit -   Small 8---> increased to 10 units.   Medium 10 ---> increased to 12 units.   Large 12 ---> increased to 14 units.   Small snack - 2 units. No change  Added interval of Large snack - 4 snacks.   Correction bolus 2 units if bg greater than 180     Total daily dose of insulin - 45.2 units/day on average.   58% is coming from the basal rate.   42% is coming from the bolusing -   Active insulin time is at 4 hours.   Carb ratio is at 15 grams -  But she uses presets.   ISF is at 1: 50   bg target is set at 100.     Also was on dexcom G6 -   She ran out of her supplies/got off track again - so she is not using her dexcom right now.   She is willing to restart.       Last visit notes as follows from 5/17/2021:   66 y.o. female, here for 3 month follow up for management of t2dm.   Last seen in February 2021 - those notes below.     No new labs for today's visit, her a1c was last @ 10.2%.   I had switched her off of MDI onto VGO which seemed to help slightly.   Still was not getting enough insulin on VGO 40 patch (max dose) - so switched her to Omnipod insulin pump for higher capacity.   She trained/transitioned with diabetic educator on 3/3/2021 -   Her basal rate was then increased on 3/9/2021 from 1.1 to 1.2 units/hour     Downloaded her omnipod brunilda via [employer] today -   Her current rate is at 1.2 units/hour.   Presets increased to Small: 8 units, Medium: 10 units, Large: 12 units; will continue 2 units for snack and 2 units for BG over 180 for meals.  Insulin time is set at 4 hours.   Total daily dose = 46.2 units/day.   She averages about 2 boluses per day - often missing breakfast bolus.   57% of her insulin comes from basal rate.   43% of her insulin comes from her bolusing.      Continues on metformin 1000 bid. States takes in morning time, but not every night.   Also on trulicity 1.5 every week.   Also on jardiance, higher dose (increase from 10 to 25mg last visit) - no side effects.   Lipids elevated - I had prescribed her to start taking repatha - they have been high despite max dose statin and zetia, but she has not started repatha yet.    dexcom downloaded and reviewed -   Average glucose is 199 -   Estimated a1c @ 8.1%.   37% time in range  47% highs.   16% extreme highs.   0% lows.       Last visit notes from February 2021 as follows:   66 y.o. female, here for rtc visit per routine for management of T2dm.   Last seen 11/2020 - those notes below.     a1c was > 14%, current @ 10.2%.   Continues on metformin 1000 bid.   trulicity 1.5   jardiance 10   vgo 40 - 6 clicks with meals.   Patient tells me/admits she had not been good about eating or following ada diet.   She has also not been taking her metformin or jardiance regularly.   History of MI, HTN, and CHF.   HLd - remains above goal despite max dose statin and add on of zetia 10mg last visit.     dexcom - reviewed and downloaded today - see  -   15% time in range.   0% lows.   37% highs.   48% extreme highs.       Last visit notes from 11/2020 as follows:   65 y.o. female, here for 2 month follow up for T2dm.   Last seen 9/22/2020 - those notes below.     History of CHF, HTN, HLd - current bp 140 - 150's systolic. Has not taken bp meds yet this morning.   Denies cp, palpitations, shortness of breath.   HLD - stable on current meds. On zetia and statin.   History of MI in past.     Doing well/much improved with diabetes management.   Last Hgba1c was undetectable at > 14%.   Now improved at 9%.   Her bg's are much more controlled.     She continues on metformin 1000 bid.   Also on trulicity 1.5 every week.   Taking jardiance 10 mg tablet daily in am now - tolerating well. No s/e's.   Continue on VGO 40 - 6 clicks  "with breakfast - 5 clicks with lunch and dinner.   Uses 1 - 2 clicks for a snack in the evening if needed.   Feeling well - and denies any hypoglycemic episodes.     dexcom G6 interpreted/downloaded today - she is using a reader.   Average Bg is 173.   63% time in range.   0% lows.   33% highs.   4% extreme highs.     C/o feet "tingling" - painful itching type sensation to her feet.   Has been using gabapentin 100mg 3 times per day, but not sure if helping much.   Also reports using clobetasol cream to her feet prn which helps.   Reports history of lower back issues/not too painful at present. Just hurts to stoop/lean forward.   Not interested in seeing pain management at this time.     C/o dizziness - happens every day. Has been alleviated by meclizine in the past. Requesting refill.   Comes and goes, usually daily, often accompanied by nausea and sometimes emesis.   Has not seen ENT for this. Denies any vision changes.       Last visit notes from 9/22/2020:   65 y.o. female, here for 1 month check up for management of type 2 diabetes.   Previous a1c's at 14% on MDI -   Switched her to vgo, and put her on a dexcom.   Average 's. So much improved.     Continues on Trulicity 1.5mg sc weekly.   Also on metformin 1000mg po bid.   Taking humalog insulin via VGO 40 - taking 5 clicks of insulin with meals, 1 with a snack.   Often eats yogurt at bedtime and notices sugar high at nighttime.   Previous on levemir 45 units bid and novolog 25 units tid meals - so previously on total daily dose of 165 units daily.   Large improvement. Now on about 70 units total daily insulin.     Wearing Dexcom G6 - downloaded and interpreted today -   Average BG is 189   42% time in range.   48% highs.   9% very highs.   No lows.     She has multiple comorbidites including heart failure and CAD, with history of multiple stents.   On statin, zetia, bp meds, diuretics and plavix. Requesting refills.   No acute complaints today's visit. "     Last visit notes from August 18th, 2020 as follows:   Very pleasant, 65 y.o. female, here for 4 week follow up - managing her type 2 diabetes.   I first saw her July 20th, 2020 -   She was on MDI at that time, with very high sugars.     In the interim, I switched her from MDI to vgo 40 and dexcom.   She is enjoying using both - she is much more aware of her sugars.   She is trying to eat better, and now is learning what foods increase her sugars.     She is using humalog in her vgo 40 - 3 clicks with meals, 4 clicks if sugar is greater than 200 going into meal.   Then 1 click with a snack.     She often snacks in afternoon and before bed - usually a yogurt or/and apple before bed.   Taking in less sweets.   States primarily is doing 3 clicks with meals for most of the day.   Still continues on trulicity 1.5mg sc weekly.   Continues on metformin 1000 metformin XR bid.     Dexcom G6 download interpreted today -   Average BG is 221.   Estimated a1c is 8.6%.   19% time in range.   56% highs.   25% very high.   0% lows.     Her last a1c on mdi was nondetectable >> 14%.   So she has improved for sure down to average BG of 220.   Still not to goal, but large improvement now that she is on continuous insulin.       Last Office visit notes as follows:   HPI: Mercedez Purvis is a 69 y.o.  female c/I for visit to address Diabetes Type 2   This is the first time I am seeing her, she is a patient of Dr. Vasquez's for primary care needs.   She also saw general endocrinology - [facility], Dr. Olivo just last week to address her uncontrolled diabetes.   She is unsure why she is here with me today as well as she just saw a specialist for diabetes management.     She was diagnosed with T2DM about 10 years ago.   Taking metformin 500mg XR - 2 tablets bid. This dose was just increased last week. She is taking.   Also on Trulicity 1.5mg sc weekly - has been taking for about 6 months.   Also on MDI - for 10 years.   Levemir  "37 units BID, was advised to increase to 45 units bid, but hasn't done yet/hasn't changed yet.   and novolog 25 units tid ac meals - was advised to increase to 30 units tid meals at last visit, but hasn't done yet.   Patient denies skipping dose of insulin.   She takes insulin injections 4x/daily.   Was hospitalized with what she recalls as a "diabetic coma" in 2010 - that was when she was initially diagnosed with diabetes, and insulin was begun right away.   Has not been hospitalized for diabetes since that time.   Checking sugars 4x/daily - not writing down.   Today's blood sugars 2 hours post prandial is 309 on fingerstick in my office. Patient took her 25 units of novolog this am, but did not take the levemir yet for her morning dose.    Past medical History:   Past Medical History:   Diagnosis Date    Chronic headache     Diabetes     Heart attack 2004    8 stents    Heart failure     History of heart artery stent     Hyperlipemia     Hypertension     Obesity (BMI 30-39.9)     Yeast infection       Family hx: No family history on file.   Current meds:   Current Outpatient Medications:     amitriptyline (ELAVIL) 75 MG tablet, Take 1 tablet (75 mg total) by mouth every evening., Disp: 90 tablet, Rfl: 1    amLODIPine (NORVASC) 10 MG tablet, Take 1 tablet (10 mg total) by mouth once daily., Disp: 90 tablet, Rfl: 3    atorvastatin (LIPITOR) 80 MG tablet, Take 1 tablet (80 mg total) by mouth every evening., Disp: 90 tablet, Rfl: 3    blood sugar diagnostic Strp, 1 each by Misc.(Non-Drug; Combo Route) route 4 (four) times daily., Disp: 200 each, Rfl: 3    blood-glucose meter,continuous (DEXCOM ) Misc, 1 Device by Misc.(Non-Drug; Combo Route) route continuous., Disp: 1 each, Rfl: 0    blood-glucose sensor (DEXCOM G7 SENSOR) Roberta, 1 each by Misc.(Non-Drug; Combo Route) route every 10 days., Disp: 10 each, Rfl: 3    ciclopirox (PENLAC) 8 % Soln, Apply topically nightly., Disp: 6.6 mL, Rfl: 11    clopidogreL " (PLAVIX) 75 mg tablet, Take 1 tablet (75 mg total) by mouth once daily., Disp: 90 tablet, Rfl: 3    clotrimazole-betamethasone 1-0.05% (LOTRISONE) cream, Apply topically 2 (two) times daily., Disp: 45 g, Rfl: 3    cyclobenzaprine (FLEXERIL) 10 MG tablet, Take 1 tablet (10 mg total) by mouth 3 (three) times a week., Disp: 90 tablet, Rfl: 0    diclofenac sodium (VOLTAREN) 1 % Gel, Apply topically once daily., Disp: 100 g, Rfl: 0    empagliflozin (JARDIANCE) 25 mg tablet, Take 1 tablet (25 mg total) by mouth once daily., Disp: 90 tablet, Rfl: 1    fluconazole (DIFLUCAN) 150 MG Tab, Take 1 tablet (150 mg total) by mouth once daily. May repeat after 72 hrs for yeast infection. (Patient not taking: Reported on 1/2/2024), Disp: 2 tablet, Rfl: 1    furosemide (LASIX) 40 MG tablet, Take 1 tablet (40 mg total) by mouth once daily., Disp: 90 tablet, Rfl: 3    gabapentin (NEURONTIN) 300 MG capsule, Take 1 capsule (300 mg total) by mouth 2 (two) times daily AND 2 capsules (600 mg total) every evening., Disp: 270 capsule, Rfl: 1    glucagon (BAQSIMI) 3 mg/actuation Spry, 1 each by Nasal route daily as needed (if glucose is 70 or less.). (Patient not taking: Reported on 1/2/2024), Disp: 2 each, Rfl: 1    insulin detemir U-100 (LEVEMIR FLEXTOUCH U-100 INSULN) 100 unit/mL (3 mL) InPn pen, Inject 40 Units into the skin once daily. FOR EMERGENCY USE ONLY - BACK UP INSULIN, IF OFF OF YOUR INSULIN PUMP - USE 40 UNITS INJECTION DAILY UNTIL GETTING BACK ON PUMP. (Patient not taking: Reported on 1/2/2024), Disp: 15 mL, Rfl: 1    insulin lispro (HUMALOG U-100 INSULIN) 100 unit/mL injection, Inject 100 Units into the skin continuous. Gives up to 100 units daily via Omnipod. Do not directly inject - only use within pods., Disp: 40 mL, Rfl: 11    LIDOcaine (XYLOCAINE) 5 % Oint ointment, Apply topically as needed., Disp: 120 g, Rfl: 3    LIDOcaine HCL 2% (XYLOCAINE) 2 % jelly, Apply topically as needed. Apply topically once nightly to affected  "part of foot/feet., Disp: 30 mL, Rfl: 2    LIDOcaine-prilocaine (EMLA) cream, Apply topically 2 (two) times a day., Disp: 30 g, Rfl: 1    meclizine (ANTIVERT) 12.5 mg tablet, Take 2 tablets (25 mg total) by mouth 2 (two) times daily as needed for Dizziness., Disp: 30 tablet, Rfl: 1    metFORMIN (GLUCOPHAGE-XR) 500 MG ER 24hr tablet, Take 1 tablet (500 mg total) by mouth every morning., Disp: 90 tablet, Rfl: 3    metoprolol succinate (TOPROL-XL) 200 MG 24 hr tablet, Take 1 tablet (200 mg total) by mouth once daily., Disp: 90 tablet, Rfl: 3    OMNIPOD DASH PODS, GEN 4, Crtg, Inject 1 each into the skin every other day., Disp: 45 each, Rfl: 3    pen needle, diabetic (PEN NEEDLE) 32 gauge x 5/32" Ndle, 1 each by Misc.(Non-Drug; Combo Route) route 4 (four) times daily. ICD 10 E11.42, Disp: 400 each, Rfl: 3    sacubitriL-valsartan (ENTRESTO)  mg per tablet, Take 1 tablet by mouth 2 (two) times daily., Disp: 60 tablet, Rfl: 11    spironolactone (ALDACTONE) 50 MG tablet, Take 1 tablet (50 mg total) by mouth once daily., Disp: 90 tablet, Rfl: 3    tirzepatide 7.5 mg/0.5 mL PnIj, Inject 7.5 mg into the skin every 7 days., Disp: 4 pen , Rfl: 3     Social:   Lives at home by herself.  Does have family visit her with social distancing.   Has 5 children, 3 are still living. 13 grandchildren, 11 great-grandchildren.   Diet: not always following ADA diet   Meals: 3 per day and snacks   Breakfast - grits, eggs, sausage, boiled eggs.   Lunch - salad, fish, chicken breast.   Dinner -steaks, green beans, small portions of potatoe salad. Fried shrimp and fried catfish.   Snacks - fruits, apples/oranges, or nuts/pistachios or almonds.  Grapes.   Exercise: none. But does work in yard  Activities: not working.     Current Diabetes medications:   humalog insulin via Omnipod dash - humalog U100   GLP1:  Trulicity 4.5 mg every week.   Oral meds: meformin 500 xr - 2 tabs just in morning.   jardiance 25 daily.     Medications Tried and " Failed:   levemir 45 units bid.   Novolog 25 units tid ac meals.   (switched to vgo 40)  VGO 40 insulin patch.- 6 clicks with breakfast, 5 clicks with lunch and dinner.   1 - 2 clicks with a snack.   trulicity  Metformin     Glucose Monitoring:   CGM: Dexcom G6   Gets supplies from : [employer]  Has glucometer at home.     Review of Pertinent co-morbidities/risk factors:   CV: history of MI 2004 - with stents placed in 2010 and also in 2019.    CAD: yes   Takes aspirin and plavix daily.  BP: has history of HTN  Statin: Taking  ACE/ARB: Taking    Vital Signs  /86 (BP Location: Right arm, Patient Position: Sitting, BP Method: Medium (Manual))   Pulse 81   Temp 97.3 °F (36.3 °C) (Temporal)   Resp 16   Ht 5' (1.524 m)   Wt 88 kg (194 lb)   LMP 01/01/2020 (Approximate)   SpO2 95%   BMI 37.89 kg/m²     Pertinent Labs:   Hgba1c   Lab Results   Component Value Date    HGBA1C 6.8 (H) 12/21/2023     Lipid panel   Lab Results   Component Value Date    CHOL 152 12/21/2023    CHOL 247 (H) 11/14/2023    CHOL 133 09/06/2023     Lab Results   Component Value Date    HDL 37 (L) 12/21/2023    HDL 43 11/14/2023    HDL 37 (L) 09/06/2023     Lab Results   Component Value Date    LDLCALC 93.6 12/21/2023    LDLCALC 175.8 (H) 11/14/2023    LDLCALC 76.6 09/06/2023     Lab Results   Component Value Date    TRIG 107 12/21/2023    TRIG 141 11/14/2023    TRIG 97 09/06/2023     Lab Results   Component Value Date    CHOLHDL 24.3 12/21/2023    CHOLHDL 17.4 (L) 11/14/2023    CHOLHDL 27.8 09/06/2023      CMP  Glucose   Date Value Ref Range Status   12/21/2023 118 (H) 70 - 110 mg/dL Final     BUN   Date Value Ref Range Status   12/21/2023 17 8 - 23 mg/dL Final     Creatinine   Date Value Ref Range Status   12/21/2023 1.1 0.5 - 1.4 mg/dL Final     eGFR if    Date Value Ref Range Status   03/21/2022 >60.0 >60 mL/min/1.73 m^2 Final     eGFR if non    Date Value Ref Range Status   03/21/2022 58.0 (A) >60  mL/min/1.73 m^2 Final     Comment:     Calculation used to obtain the estimated glomerular filtration  rate (eGFR) is the CKD-EPI equation.         Microalbumin creatinine ratio:   Lab Results   Component Value Date    MICHAELLBCREAT 41.6 (H) 12/21/2023       Social History     Tobacco Use   Smoking Status Never   Smokeless Tobacco Never      Standards of care:   Eyes: .: 07/14/2023  Foot exam: : 06/23/2023 and done today.   Diabetes education: yes    Review Of Systems:   Gen: Appetite good, no weight gain or loss, denies fatigue and weakness. Denies polydipsia.   Skin: Skin is intact and heals well, no rashes, no hair changes  Eyes: Denies acute visual disturbances, or blurred vision  Resp: no SOB or Dyspnea on exertion, denies cough.   Cardiac: Denies chest pain, palpitations, or swelling. Denies tremors.   GI: Denies abdominal pain, nausea or vomiting, diarrhea, constipation.   /GYN: no nocturia, Previous yeast infection. None in the past year.   MS/Neuro: Denies numbness/ tingling in BLE; taking neurontin 300 tid which helps with her leg and foot neuropathy.   Gait steady, speech clear  Psych: Denies drug/ETOH abuse, no hx of depression. Anxiety lately as r/t her son's injuries/hospitalization.   Other systems: negative.    Physical Exam:   GENERAL: Well developed, well nourished in appearance.   PSYCH: AAOx3, appropriate mood and affect, pleasant expression, conversant, appears relaxed, well groomed.   EYES: PERRL, Conjunctiva and corneas clear  NECK: Soft and Supple, trachea midline   CHEST: Even, regular, and unlabored respirations  ABDOMEN: Soft, non-tender, non-distended   VASCULAR: pedal pulses palpable bilaterally, no edema.  NEURO:  cranial nerves II - XII intact   MUSCULOSKELETAL: Good ROM, steady gait.   SKIN: Skin warm, dry, and intact     Assessment and Plan of Care:     Mercedez was seen today for follow-up and diabetes.    Diagnoses and all orders for this visit:    Type 2 diabetes mellitus with  diabetic polyneuropathy, with long-term current use of insulin  -     tirzepatide 7.5 mg/0.5 mL PnIj; Inject 7.5 mg into the skin every 7 days.  -     Hemoglobin A1C; Future  -     Lipid Panel; Future  -     Microalbumin/Creatinine Ratio, Urine; Future  -     Comprehensive Metabolic Panel; Future           1. T2DM with hyperglycemia- Hgba1c goal is 7.5% or less -Hgba1c was > 14%-->  9% ---> 10.2%---> 7.8% --> 9.1% --> 10% --> 8.6%--> 7.7%--> 7.5% --> 6.8%   Continue metformin 500mg XR tablet daily.   Continue Jardiance 25mg daily - getting this free through patient assistance.   Swap mounjaro 7.5mg weekly for ozempic.   Continue omnipod dash - 1.5 units/hour   Continue to bolus - presets for meals increased at last visit. No changes were made today.   Small 8---> increased to 10 units. --> increased to 12 units last visit - no changes.   Medium 10 ---> increased to 12 units--> increased to 14 units - no changes  Large 12 ---> increased to 14 units---> increased to 16 units - no changes.   Small snack - 2 units-- increased this to 3 units - no changes.   Added interval of Large snack - 4 snacks-- increased this to 5 units-- no changes.   Correction bolus 2 units if bg greater than 180   (previously on 165 units of insulin per day with MDI, now more controlled on less units of insulin per day).   Discussed MOA, possible side effects including yeast infection, UTI, dehydration and ketoacidosis, importance of maintaining hydration and avoiding No carb diets. Good use hygiene. Notify my office if any side effects. Will monitor renal function closely. Stable at present.   Continue using Dexcom  G7 -   Titrated clicks/increased based on CGM interpretation).   Continues to check sugars 4 times per day.   Continues on insulin injections 4 times per day via Omnipod.   discussed DM, progression of disease, long term complications, CV risk factors and tx options.   She already has established CAD and history of MI, so at risk  for repeat event.   Advise compliance with ADA diet and encourage exercise  Reviewed  hypoglycemia, s/s and appropriate tx.    Instructed to monitor Blood glucose 4x/day and bring meter/ log to every clinic visit.   Eyes - dilated retinal exam 7/15/2020 - no diabetic retinopathy but with follow up recommended in 1 month with ophthalmology.   Had exam done 9/2/2020 with opthalmology  Podiatry - had check up recently. Foot exam negative today. Advised on proper foot care. - Dr. Carrrea 2020. Refilled clotrimazole/betamethasone.  Neuropathy - increased gabapentin from 100 to 300 tid. Recommended consult with pain management doctor as I think some of this might be related to her back, but she declines for now.     2. HTN- controlled, continue meds as previously prescribed and monitor.   meds refilled. \has not taken bp meds yet this morning. Encouraged to take.     3. HLD - LDL goal < 100. Not at goal on max dose statin - On atorvastatin 80mg every Hs and zetia 10.   Stop zetia. Was on repatha, pcp took her off of it - will monitor and then start if needed.     4. Weight - BMI Body mass index is 37.89 kg/m².   Encourage Ada diet and exercise.    continue Trulicity 4.5  mg every week. - gets through [facility].   Now on ozempic 1mg every week - gets through patient assistance.   Try mounjaro weekly instead for now -     5. GERD - previously on nexium but stable.   No longer taking.     6. TIKA - stable on cpap    7. Heart failure - stable - refilled BB and diuretics at last visit.     8. CAD - continues on aspirin and plavix, s/p 6 stents.         F/u in 3 - 4 months with OV and labs.

## 2024-02-26 NOTE — PATIENT INSTRUCTIONS
Try mounjaro once per week isntead of the ozempic and see if this helps you need less insulin with your meal time boluses.     Should help with curbing appetite and some weight loss.   Small frequent meals.   Less concentrated carbs, protein with each meal.     For low blood sugar - remember to keep glucose tablets on hand. You can purchase these at the pharmacy check out desk/over the counter.   If blood sugar is less than 70, take/eat 2 - 3 tablets quickly to bring your sugar up.   Always keep 15 grams of Quick acting carbohydrates on hand to eat/drink if your sugar is low - examples are 1/2 cup of juice, coke, or crackers, granola bars.   Remember to eat meals frequently to prevent low blood blood sugars.      You can try calling [employer] to see if they will give you a new omnipod dash pdm -   Screen is cracked.   [phone] -   (A new pdm would need to be reprogrammed for you).     If you are off of your pump/omnipod - you would do levemir 35 units daily until getting back on your insulin pump.

## 2024-04-17 DIAGNOSIS — M79.18 CHRONIC MUSCULOSKELETAL PAIN: ICD-10-CM

## 2024-04-17 DIAGNOSIS — G89.29 CHRONIC MUSCULOSKELETAL PAIN: ICD-10-CM

## 2024-04-17 DIAGNOSIS — L30.9 DERMATITIS: Primary | ICD-10-CM

## 2024-04-17 RX ORDER — CYCLOBENZAPRINE HCL 10 MG
10 TABLET ORAL
Qty: 90 TABLET | Refills: 0 | Status: SHIPPED | OUTPATIENT
Start: 2024-04-17

## 2024-04-17 RX ORDER — CLOTRIMAZOLE AND BETAMETHASONE DIPROPIONATE 10; .64 MG/G; MG/G
CREAM TOPICAL 2 TIMES DAILY
Qty: 45 G | Refills: 0 | Status: SHIPPED | OUTPATIENT
Start: 2024-04-17 | End: 2024-05-27 | Stop reason: SDUPTHER

## 2024-04-17 NOTE — TELEPHONE ENCOUNTER
Care Due:                  Date            Visit Type   Department     Provider  --------------------------------------------------------------------------------                                EP -                              PRIMARY      [facility]  Last Visit: 10-      CARE ([facility])   CARE           Dottie Merritt  Next Visit: None Scheduled  None         None Found                                                            Last  Test          Frequency    Reason                     Performed    Due Date  --------------------------------------------------------------------------------    HBA1C.......  6 months...  empagliflozin, metFORMIN.  12- 06-    [facility] Embedded Care Due Messages. Reference number: [id number].   4/17/2024 10:15:56 AM CDT

## 2024-04-18 ENCOUNTER — OFFICE VISIT (OUTPATIENT)
Dept: INTERNAL MEDICINE | Facility: CLINIC | Age: 69
End: 2024-04-18
Attending: INTERNAL MEDICINE
Payer: MEDICARE

## 2024-04-18 VITALS
DIASTOLIC BLOOD PRESSURE: 68 MMHG | HEART RATE: 86 BPM | SYSTOLIC BLOOD PRESSURE: 132 MMHG | WEIGHT: 200.19 LBS | BODY MASS INDEX: 39.3 KG/M2 | OXYGEN SATURATION: 98 % | HEIGHT: 60 IN

## 2024-04-18 DIAGNOSIS — B37.9 YEAST INFECTION: ICD-10-CM

## 2024-04-18 DIAGNOSIS — G47.33 OSA (OBSTRUCTIVE SLEEP APNEA): ICD-10-CM

## 2024-04-18 DIAGNOSIS — E11.59 HYPERTENSION ASSOCIATED WITH DIABETES: ICD-10-CM

## 2024-04-18 DIAGNOSIS — Z79.4 TYPE 2 DIABETES MELLITUS WITH DIABETIC POLYNEUROPATHY, WITH LONG-TERM CURRENT USE OF INSULIN: Primary | ICD-10-CM

## 2024-04-18 DIAGNOSIS — E11.42 TYPE 2 DIABETES MELLITUS WITH DIABETIC POLYNEUROPATHY, WITH LONG-TERM CURRENT USE OF INSULIN: Primary | ICD-10-CM

## 2024-04-18 DIAGNOSIS — E11.42 DIABETIC PERIPHERAL NEUROPATHY: ICD-10-CM

## 2024-04-18 DIAGNOSIS — I15.2 HYPERTENSION ASSOCIATED WITH DIABETES: ICD-10-CM

## 2024-04-18 PROCEDURE — 99215 OFFICE O/P EST HI 40 MIN: CPT | Mod: S$GLB,,, | Performed by: INTERNAL MEDICINE

## 2024-04-18 PROCEDURE — 1160F RVW MEDS BY RX/DR IN RCRD: CPT | Mod: CPTII,S$GLB,, | Performed by: INTERNAL MEDICINE

## 2024-04-18 PROCEDURE — 1159F MED LIST DOCD IN RCRD: CPT | Mod: CPTII,S$GLB,, | Performed by: INTERNAL MEDICINE

## 2024-04-18 PROCEDURE — 1126F AMNT PAIN NOTED NONE PRSNT: CPT | Mod: CPTII,S$GLB,, | Performed by: INTERNAL MEDICINE

## 2024-04-18 PROCEDURE — 3078F DIAST BP <80 MM HG: CPT | Mod: CPTII,S$GLB,, | Performed by: INTERNAL MEDICINE

## 2024-04-18 PROCEDURE — 3288F FALL RISK ASSESSMENT DOCD: CPT | Mod: CPTII,S$GLB,, | Performed by: INTERNAL MEDICINE

## 2024-04-18 PROCEDURE — 3008F BODY MASS INDEX DOCD: CPT | Mod: CPTII,S$GLB,, | Performed by: INTERNAL MEDICINE

## 2024-04-18 PROCEDURE — 99999 PR PBB SHADOW E&M-EST. PATIENT-LVL IV: CPT | Mod: PBBFAC,,, | Performed by: INTERNAL MEDICINE

## 2024-04-18 PROCEDURE — 3075F SYST BP GE 130 - 139MM HG: CPT | Mod: CPTII,S$GLB,, | Performed by: INTERNAL MEDICINE

## 2024-04-18 PROCEDURE — 4010F ACE/ARB THERAPY RXD/TAKEN: CPT | Mod: CPTII,S$GLB,, | Performed by: INTERNAL MEDICINE

## 2024-04-18 PROCEDURE — 1101F PT FALLS ASSESS-DOCD LE1/YR: CPT | Mod: CPTII,S$GLB,, | Performed by: INTERNAL MEDICINE

## 2024-04-18 RX ORDER — FLUCONAZOLE 150 MG/1
TABLET ORAL
Qty: 2 TABLET | Refills: 1 | Status: SHIPPED | OUTPATIENT
Start: 2024-04-18

## 2024-04-18 RX ORDER — GABAPENTIN 300 MG/1
CAPSULE ORAL
Qty: 360 CAPSULE | Refills: 1 | Status: SHIPPED | OUTPATIENT
Start: 2024-04-18

## 2024-04-18 NOTE — PROGRESS NOTES
Subjective:   Patient ID: Mercedez Purvis is a 69 y.o. female  Chief complaint:   Chief Complaint   Patient presents with    Diabetes     F/u    Annual Exam       HPI  Here diabetes f/u     PCP: diego  Pt is new to me     Diabetes type 2:   A1c improved!   Norris mounjaro 7.5   No weight loss yet   Has appt with diab speciliast next month     PN   Located at fingertips and bottom of feet   Given lidocaine cream and does help   Taking gpn 300 tid - discussed inc evening dose to 600mg as per instructions on last rx to help address sx - is in agreement to try this    HTN:   Home readings: 165/103 this am - typically it is 130/79   Elevated today   On estresto and amlodipine, metoprolol, aldactone, lasix 40      TIKA:   - has not rescheduled sleep appt   - Has been more than 5 years since last sleep study.   - not on CPAP    Vaginal for a few days that feels c/w yeast infection   Sexually active - last 1 month   Monogamous over 40 years     Review of Systems    Objective:  Vitals:    04/18/24 1005 04/18/24 1108   BP: (!) 174/94 132/68   BP Location: Left arm    Patient Position: Sitting    Pulse: 86    SpO2: 98%    Weight: 90.8 kg (200 lb 2.8 oz)    Height: 5' (1.524 m)      Body mass index is 39.09 kg/m².    Physical Exam  Vitals reviewed.   Constitutional:       Appearance: Normal appearance. She is well-developed.   HENT:      Head: Normocephalic and atraumatic.   Eyes:      Extraocular Movements: Extraocular movements intact.      Conjunctiva/sclera: Conjunctivae normal.   Cardiovascular:      Rate and Rhythm: Normal rate and regular rhythm.      Pulses: Normal pulses.      Heart sounds: Normal heart sounds.   Pulmonary:      Effort: Pulmonary effort is normal.      Breath sounds: Normal breath sounds.   Abdominal:      General: Bowel sounds are normal.      Palpations: Abdomen is soft.   Musculoskeletal:         General: No swelling or tenderness.      Cervical back: Normal range of motion and neck supple.    Skin:     General: Skin is warm and dry.      Capillary Refill: Capillary refill takes less than 2 seconds.      Nails: There is no clubbing.   Neurological:      General: No focal deficit present.      Mental Status: She is alert and oriented to person, place, and time.      Gait: Gait normal.   Psychiatric:         Speech: Speech normal.         Behavior: Behavior normal.         Thought Content: Thought content normal.         Assessment:  1. Type 2 diabetes mellitus with diabetic polyneuropathy, with long-term current use of insulin    2. Yeast infection    3. TIKA (obstructive sleep apnea)    4. Diabetic peripheral neuropathy    5. Hypertension associated with diabetes        Plan:  1. Type 2 diabetes mellitus with diabetic polyneuropathy, with long-term current use of insulin  -     gabapentin (NEURONTIN) 300 MG capsule; Take 1 capsule (300 mg total) by mouth 2 (two) times daily AND 2 capsules (600 mg total) every evening.  Dispense: 360 capsule; Refill: 1  Inc evening dose to 600mg   Cont current regimen   F/u with specialists   Annual eye and foot exams     2. Yeast infection  -     fluconazole (DIFLUCAN) 150 MG Tab; Take 1 dose by mouth. May repeat after 72 hrs for yeast infection.  Dispense: 2 tablet; Refill: 1    3. TIKA (obstructive sleep apnea)  -     Ambulatory referral/consult to Sleep Disorders; Future; Expected date: 04/25/2024  F/u with sleep   Cont weight loss efforts     4. Diabetic peripheral neuropathy  -     gabapentin (NEURONTIN) 300 MG capsule; Take 1 capsule (300 mg total) by mouth 2 (two) times daily AND 2 capsules (600 mg total) every evening.  Dispense: 360 capsule; Refill: 1    5. Hypertension associated with diabetes  Stable   Cont meds     40 min spent in care of patient including chart review, history, orders, physical, orders and coordination of care    Health Maintenance   Topic Date Due    Shingles Vaccine (1 of 2) Never done    TETANUS VACCINE  11/29/2021    Eye Exam  07/14/2024     Hemoglobin A1c  06/21/2024    Foot Exam  06/23/2024    Mammogram  10/24/2024    Lipid Panel  12/21/2024    Colorectal Cancer Screening  04/15/2025    Aspirin/Antiplatelet Therapy  04/18/2025    Hepatitis C Screening  Completed    DEXA Scan  Discontinued

## 2024-04-19 ENCOUNTER — PATIENT MESSAGE (OUTPATIENT)
Dept: ADMINISTRATIVE | Facility: HOSPITAL | Age: 69
End: 2024-04-19
Payer: MEDICARE

## 2024-04-23 ENCOUNTER — OFFICE VISIT (OUTPATIENT)
Dept: SLEEP MEDICINE | Facility: CLINIC | Age: 69
End: 2024-04-23
Attending: INTERNAL MEDICINE
Payer: MEDICARE

## 2024-04-23 VITALS
WEIGHT: 196.19 LBS | HEIGHT: 60 IN | DIASTOLIC BLOOD PRESSURE: 75 MMHG | HEART RATE: 80 BPM | BODY MASS INDEX: 38.52 KG/M2 | SYSTOLIC BLOOD PRESSURE: 136 MMHG

## 2024-04-23 DIAGNOSIS — I25.118 CORONARY ARTERY DISEASE OF NATIVE ARTERY OF NATIVE HEART WITH STABLE ANGINA PECTORIS: ICD-10-CM

## 2024-04-23 DIAGNOSIS — I50.22 CHRONIC SYSTOLIC CONGESTIVE HEART FAILURE: Primary | ICD-10-CM

## 2024-04-23 DIAGNOSIS — G47.33 OSA (OBSTRUCTIVE SLEEP APNEA): ICD-10-CM

## 2024-04-23 DIAGNOSIS — Z96.41 DIABETES MELLITUS TYPE 2, WITH COMPLICATION, ON LONG TERM INSULIN PUMP: ICD-10-CM

## 2024-04-23 DIAGNOSIS — E11.8 DIABETES MELLITUS TYPE 2, WITH COMPLICATION, ON LONG TERM INSULIN PUMP: ICD-10-CM

## 2024-04-23 PROCEDURE — 99204 OFFICE O/P NEW MOD 45 MIN: CPT | Mod: S$GLB,,, | Performed by: NURSE PRACTITIONER

## 2024-04-23 PROCEDURE — 3078F DIAST BP <80 MM HG: CPT | Mod: CPTII,S$GLB,, | Performed by: NURSE PRACTITIONER

## 2024-04-23 PROCEDURE — 99999 PR PBB SHADOW E&M-EST. PATIENT-LVL IV: CPT | Mod: PBBFAC,,, | Performed by: NURSE PRACTITIONER

## 2024-04-23 PROCEDURE — 3008F BODY MASS INDEX DOCD: CPT | Mod: CPTII,S$GLB,, | Performed by: NURSE PRACTITIONER

## 2024-04-23 PROCEDURE — 4010F ACE/ARB THERAPY RXD/TAKEN: CPT | Mod: CPTII,S$GLB,, | Performed by: NURSE PRACTITIONER

## 2024-04-23 PROCEDURE — 1159F MED LIST DOCD IN RCRD: CPT | Mod: CPTII,S$GLB,, | Performed by: NURSE PRACTITIONER

## 2024-04-23 PROCEDURE — 3075F SYST BP GE 130 - 139MM HG: CPT | Mod: CPTII,S$GLB,, | Performed by: NURSE PRACTITIONER

## 2024-04-23 NOTE — PROGRESS NOTES
Referred by Dr. Avila    CHIEF COMPLAINT: TIKA    HISTORY OF PRESENT ILLNESS: She was diagnosed with TIKA ~ 5yr ago at [facility]. Used cpap ?9cm qhs and slept longer. Threw machine away due to recall. +recurrent snoring and  witnessed apneic pauses. ESS=2. +disrupted sleep. Denies driving sleepy.   Insulin pump HgBA1c 6.8  EF 15-20%      FAMILY HISTORY: No known sleep disorders.   SOCIAL HISTORY: l/t significant other, retired  /75 (BP Location: Left arm, Patient Position: Sitting, BP Method: Medium (Automatic))   Pulse 80   Ht 5' (1.524 m)   Wt 89 kg (196 lb 3.4 oz)   LMP 01/01/2020 (Approximate)   BMI 38.32 kg/m²     PSG N/A    ASSESSMENT:   TIKA, previous dx and adherence but stopped using with recall. Recurrent symptoms.   She has medical comorbidities of obesity, hypertension, chronic systolic HF, CAD several stents, DM insulin pump      PLAN:   1. Polysomnogram , discussed plan of care (nose mask)   2. Discussed etiology of TIKA and potential ramifications of untreated TIKA, including heart disease, HTN  See cards as advised HF mgt/continue meds      Thank you for allowing me the opportunity to participate in the care of your patient

## 2024-05-02 ENCOUNTER — CLINICAL SUPPORT (OUTPATIENT)
Dept: DIABETES | Facility: CLINIC | Age: 69
End: 2024-05-02
Payer: MEDICARE

## 2024-05-02 DIAGNOSIS — E11.42 TYPE 2 DIABETES MELLITUS WITH DIABETIC POLYNEUROPATHY, WITH LONG-TERM CURRENT USE OF INSULIN: ICD-10-CM

## 2024-05-02 DIAGNOSIS — Z79.4 TYPE 2 DIABETES MELLITUS WITH DIABETIC POLYNEUROPATHY, WITH LONG-TERM CURRENT USE OF INSULIN: ICD-10-CM

## 2024-05-02 PROCEDURE — G0108 DIAB MANAGE TRN  PER INDIV: HCPCS | Mod: S$GLB,,, | Performed by: DIETITIAN, REGISTERED

## 2024-05-02 NOTE — PROGRESS NOTES
Diabetes Care Specialist Progress Note  Author: Brenda Quiroz RD, CDE  Date: 5/2/2024    Program Intake  Reason for Diabetes Program Visit:: Initial Diabetes Assessment  Type of Intervention:: Individual  Individual: Device Training  Device Training: Insulin Pump Start (New Omnipod Dash device - old one broken)  Current diabetes risk level:: low  In the last 12 months, have you:: none  Continuous Glucose Monitoring  Patient has CGM: Yes  Personal CGM type:: Dexcom    Lab Results   Component Value Date    HGBA1C 6.8 (H) 12/21/2023       Clinical    Problem Review  Reviewed Problem List with Patient: yes  Active comorbidities affecting diabetes self-care.: yes  Comorbidities: Retinopathy, Hypertension, Neuropathy  Reviewed health maintenance: yes    Clinical Assessment  Current Diabetes Treatment: Insulin pump, Oral Medication, Injectable  Have you ever experienced hypoglycemia (low blood sugar)?: no  Have you ever experienced hyperglycemia (high blood sugar)?: yes  In the last month, how often have you experienced high blood sugar?: more than once a day (has been staying high since on back up insulin pens)  Are you able to tell when your blood sugar is high?: Yes (relies on Dexcom readings)  What are your symptoms?: frequent urination, thirst    Medication Information  How do you obtain your medications?: Patient drives, Pharmacy delivery  How many days a week do you miss your medications?: Never  Do you use a pill box or medication chart to help you manage your medications?: Pill box  Do you sometimes have difficulty refilling your medications?: No  Medication adherence impacting ability to self-manage diabetes?: No    Currently using back up insulin pens:  Levemir 40 units in am  Humalog 12 for small meal, 14 for medium meal and 16 for large meal    Labs  Do you have regular lab work to monitor your medications?: Yes  Where do you get your labs drawn?: [facility]    Nutritional Status  Diet: Regular  Change in  appetite?: No  Dentation:: Intact  Recent Changes in Weight: No Recent Weight Change  Current nutritional status an area of need that is impacting patient's ability to self-manage diabetes?: No    Additional Social History    Support  Does anyone support you with your diabetes care?: yes  Who supports you?: self  Who takes you to your medical appointments?: self  Does the current support meet the patient's needs?: Yes  Is Support an area impacting ability to self-manage diabetes?: No    Access to Mass Media & Technology  Does the patient have access to any of the following devices or technologies?: Smart phone  Media or technology needs impacting ability to self-manage diabetes?: No    Cognitive/Behavioral Health  Alert and Oriented: Yes  Difficulty Thinking: No  Requires Prompting: No  Requires assistance for routine expression?: No  Cognitive or behavioral barriers impacting ability to self-manage diabetes?: No    Culture/Episcopalian  Culture or Confucianist beliefs that may impact ability to access healthcare: No    Communication  Language preference: English  Hearing Problems: No  Vision Problems: Yes  Vision problem type:: Decreased Vision  Vision Assistance: Glasses  Communication needs impacting ability to self-manage diabetes?: No    Health Literacy  Preferred Learning Method: Face to Face, Hands On  How often do you need to have someone help you read instructions, pamphlets, or written material from your doctor or pharmacy?: Never  Health literacy needs impacting ability to self-manage diabetes?: No      Diabetes Self-Management Skills Assessment    Diabetes Disease Process/Treatment Options  Patient/caregiver able to state what happens when someone has diabetes.: somewhat  Patient/caregiver knows what type of diabetes they have.: yes  Diabetes Type : Type II  Patient/caregiver able to identify at least three signs and symptoms of diabetes.: no  Identified signs and symptoms:: frequent urination, increased  thirst, fatigue  Patient able to identify at least three risk factors for diabetes.: yes  Identified risk factors:: age over 40, family history, being overweight  Diabetes Disease Process/Treatment Options: Skills Assessment Completed: Yes  Assessment indicates:: Adequate understanding  Area of need?: No    Nutrition/Healthy Eating  Method of carbohydrate measurement:: eyeballing/guessing  Patient can identify foods that impact blood sugar.: yes  Patient-identified foods:: milk, soda, starches (bread, pasta, rice, cereal), sweets, fruit/fruit juice, starchy vegetables (corn, peas, beans)  Nutrition/Healthy Eating Skills Assessment Completed:: Yes  Assessment indicates:: Adequate understanding  Area of need?: No    Physical Activity/Exercise  Physical Activity/Exercise Skills Assessment Completed: : No  Deffered due to:: Time    Medications  Patient is able to describe current diabetes management routine.: yes  Diabetes management routine:: injectable medications, oral medications, insulin pump, insulin  Patient is able to identify current diabetes medications, dosages, and appropriate timing of medications.: yes  Patient understands the purpose of the medications taken for diabetes.: yes  Patient reports problems or concerns with current medication regimen.: yes  Medication regimen problems/concerns:: other (see comments) (needs assistance with entering settings into new Omnipod Dash controller)  Medication Skills Assessment Completed:: Yes  Assessment indicates:: Instruction Needed  Area of need?: Yes    Home Blood Glucose Monitoring  Patient states that blood sugar is checked at home daily.: yes  Monitoring Method:: personal continuous glucose monitor  Personal CGM type:: Dexcom  Patient is able to use personal CGM appropriately.: yes  CGM Report reviewed?: no  Home Blood Glucose Monitoring Skills Assessment Completed: : Yes  Assessment indicates:: Adequate understanding  Area of need?: No    Acute  Complications  Acute Complications Skills Assessment Completed: : No  Deffered due to:: Time    Chronic Complications  Chronic Complications Skills Assessment Completed: : No  Deferred due to:: Time    Psychosocial/Coping  Psychosocial/Coping Skills Assessment Completed: : No  Deffered due to:: Time      Assessment Summary and Plan    Based on today's diabetes care assessment, the following areas of need were identified:          5/2/2024    12:01 AM   Social   Support No   Access to Mass Media/Tech No   Cognitive/Behavioral Health No   Culture/Gnosticist No   Communication No   Health Literacy No            5/2/2024    12:01 AM   Clinical   Medication Adherence No   Nutritional Status No            5/2/2024    12:01 AM   Diabetes Self-Management Skills   Diabetes Disease Process/Treatment Options No   Nutrition/Healthy Eating No   Medication Yes - see care planning   Home Blood Glucose Monitoring No          Today's interventions were provided through individual discussion, instruction, and written materials were provided.      Patient verbalized understanding of instruction and written materials.  Pt was able to return back demonstration of instructions today. Patient understood key points, needs reinforcement and further instruction.     Diabetes Self-Management Care Plan:    Today's Diabetes Self-Management Care Plan was developed with Mercedez's input. Mercedez has agreed to work toward the following goal(s) to improve his/her overall diabetes control.      Care Plan: Diabetes Management   Updates made since 4/2/2024 12:00 AM        Problem: Medications         Goal: Pt will use Omnipod Dash with pre-set boluses for insulin delivery.    Start Date: 5/2/2024   Expected End Date: 6/1/2024   Priority: High   Barriers: No Barriers Identified   Note:    5/2/24 -   OMNIPOD DASH INSULIN PUMP RE-START    Patient here today for setting up replacement Omnipod Dash controller after previous controller broke. She confirms has  been using back up insulin pens while off Dash.     Pt last took Levemir dose yesterday morning - held Levemir this morning. Pt is an experienced Omnipod Dash user and shows good understanding of proper use.    SETTINGS obtained from Juli Mejia NP:  Basal rate: 1.5 u/hr  Maximum basal rate: 3 u/hr    Bolus Menu:    Bolus Pre-sets:  Small Snack 3 units  Large Snack 5 units    Small Meal 12 units  Medium Meal 14 units  Large Meal 16 units    Pt is correcting with 2 units if BG >180    Maximum bolus = 20 units  Reverse Correction: off   Auto Off: off    Pt successfully filled and started pod in office without any assistance or prompting.        Task: Reviewed with patient all current diabetes medications and provided basic review of the purpose, dosage, frequency, side effects, and storage of both oral and injectable diabetes medications. Completed 5/2/2024          Follow Up Plan     Follow up pt will call when ready for follow-up or with questions.    Today's care plan and follow up schedule was discussed with patient.  Mercedez verbalized understanding of the care plan, goals, and agrees to follow up plan.        The patient was encouraged to communicate with his/her health care provider/physician and care team regarding his/her condition(s) and treatment.  I provided the patient with my contact information today and encouraged to contact me via phone or [facility] Patient Portal as needed.     Length of Visit   Total Time: 30 Minutes

## 2024-05-09 ENCOUNTER — HOSPITAL ENCOUNTER (OUTPATIENT)
Dept: SLEEP MEDICINE | Facility: OTHER | Age: 69
Discharge: HOME | End: 2024-05-09
Attending: NURSE PRACTITIONER
Payer: MEDICARE

## 2024-05-09 ENCOUNTER — PATIENT MESSAGE (OUTPATIENT)
Dept: SLEEP MEDICINE | Facility: OTHER | Age: 69
End: 2024-05-09

## 2024-05-09 DIAGNOSIS — G47.33 OSA (OBSTRUCTIVE SLEEP APNEA): ICD-10-CM

## 2024-05-09 PROCEDURE — 95810 POLYSOM 6/> YRS 4/> PARAM: CPT

## 2024-05-10 NOTE — PROGRESS NOTES
A diagnostic study was performed on 69 year old female, Mercedez Purvis. The following was explained to the pt prior to the study: the time to bed and wake time, the set-up process timeframe and the purpose of each sensor, the reason (if sensors fall off) the technician will need to enter the room during the night, the possibility of the tech fitting a PAP mask on pt for treatment in the middle of the night, and how to call out for assistance during the night. A post-study letter was handed to the pt in the morning.

## 2024-05-19 PROCEDURE — 95810 POLYSOM 6/> YRS 4/> PARAM: CPT | Mod: 26,,, | Performed by: INTERNAL MEDICINE

## 2024-05-19 NOTE — PROCEDURES
[facility]    Polysomnography Interpretation Report    Patient Name:  Mercedez Purvis  MRN#:  [id number]  :  1955  Study Date:  2024  Referring Provider:  Breana Johnson NP    Indications for Polysomnography:  The patient is a 69 year old Female who is 5' and weighs 196.0 lbs.  Her BMI equals 38.5.  Fox was - and Neck Circumference was -.  A full night polysomnogram was performed to evaluate for -.    Polysomnogram Data  A full night polysomnogram recorded the standard physiologic parameters including EEG, EOG, EMG, EKG, nasal and oral airflow.  Respiratory parameters of chest and abdominal movements were recorded with (RIP) Respiratory Inductance Plethsmography.  Oxygen saturation was recorded by pulse oximetry.    Sleep Architecture  The total recording time of the polysomnogram was 402.0 minutes.  The total sleep time was 324.5 minutes.  The patient spent 14.6% of total sleep time in Stage N1, 69.8% in Stage N2, 2.9% in Stages N3, and 12.6% in REM.  Sleep latency was 11.7 minutes.  REM latency was 99.5 minutes.  Sleep Efficiency was 80.7%.  Wake after sleep onset was 66.0 minutes.    Respiratory Events  The polysomnogram revealed a presence of 13 obstructive, - central, and - mixed apneas resulting in a Total Apnea index of 2.4 events per hour.  There were 59 hypopneas resulting in a Total Hypopnea index of 10.9 events per hour.  The combined Apnea/Hypopnea index was 13.3 events per hour.  There were a total of 18 RERA events resulting in a Respiratory Disturbance Index (RDI) of 16.6 events per hour.     Mean oxygen saturation was 94.4%.  The lowest oxygen saturation during sleep was 72.0%.  Time spent ?88% oxygen saturation was 22.6 minutes (5.6%).      Limb Activity  There were 29 limb movements recorded.  Of this total, 29 were classified as PLMs.  Of the PLMs, 6 were associated with arousals.  The Limb Movement index was 5.4 per hour while the PLM index was 5.4 per hour  and PLM with arousals index was 1.1 per hour.      Cardiac:  single lead EKG revealed normal sinus rhythm with occasional PVCs    Oxygenation:  Hypoxemia was only observed in relation to obstructive events.    Impression:  -obstructive sleep apnea   -obstructive events are significantly worse in stage REM sleep (REM AHI=70 vs non-REM RAHI =5)      Recommendations:  -treatment for the TIKA seen in this study is recommended  -the patient has follow up with Sleep Medicine          Sly Arshad MD    (This Sleep Study was interpreted by a Board Certified Sleep Specialist who conducted an epoch-by-epoch review of the entire raw data recording.)  (The indication for this sleep study was reviewed and deemed appropriate by AASM Practice Parameters or other reasons by a Board Certified Sleep Specialist.)      [facility]    Diagnostic PSG Report    Patient Name: Mercedez Purvis Study Date: 5/9/2024   YOB: 1955 MRN #: [id number]   Age: 69 year PETER #: [id number]   Sex: Female Referring Provider: Breana Johnson NP   Height: 5' Recording Tech: Heidi Holt RRT SDS   Weight: 196.0 lbs Scoring Tech: Nawaf Cr RRT RPSGT   BMI: 38.5 Interpreting Physician: -   ESS: - Neck Circumference: -     Study Overview    Lights Off: 10:20:27 PM  Count Index   Lights On: 05:02:26 AM Awakenings: 47 8.7   Time in Bed: 402.0 min. Arousals: 68 12.6   Total Sleep Time: 324.5 min. Apneas & Hypopneas: 72 13.3    Sleep Efficiency: 80.7% Limb Movements: 29 5.4   Sleep Latency: 11.7 min. Snores: - -   Wake After Sleep Onset: 66.0 min. Desaturations: 71 13.1    REM Latency from Sleep Onset: 99.5 min. Minimum SpO2 TST: 72.0%        Sleep Architecture   % of Time in Bed  Stages Time (mins) % Sleep Time   Wake 78.0    Stage N1 47.5 14.6%   Stage N2 226.5 69.8%   Stage N3 9.5 2.9%   REM 41.0 12.6%         Arousal Summary     NREM REM Sleep Index   Respiratory Arousals 16 1 17 3.1   PLM Arousals 6 - 6 1.1   Isolated  Limb Movement Arousals - - - -   Spontaneous Arousals 41 4 45 8.3   Total 63 5 68 12.6       Limb Movement Summary     Count Index   Isolated Limb Movements - -   Periodic Limb Movements (PLMs) 29 5.4   Total Limb Movements 29 5.4         Respiratory Summary     By Sleep Stage By Body Position Total    NREM REM Supine Non-Supine    Time (min) 283.5 41.0 95.0 229.5 324.5           Obstructive Apnea 3 10 1 12 13   Mixed Apnea - - - - -   Central Apnea - - - - -   Central Apnea Index - - - - -   Total Apneas 3 10 1 12 13   Total Apnea Index 0.6 14.6 0.6 3.1 2.4           Hypopnea 21 38 9 50 59   Hypopnea Index 4.4 55.6 5.7 13.1 10.9           Apnea & Hypopnea 24 48 10 62 72   Apnea & Hypopnea Index 5.1 70.2 6.3 16.2 13.3           RERAs 18 - 14 4 18   RERA Index 3.8 - 8.8 1.0 3.3           RDI 8.9 70.2 15.2 17.3 16.6     Scoring Criteria: Hypopneas scored at 4% desaturation criteria.    Respiratory Event Durations     Apnea Hypopnea    NREM REM NREM REM   Average (seconds) 12.3 15.6 15.6 16.4   Maximum (seconds) 15.6 29.2 21.9 28.3       Oxygen Saturation Summary     Wake NREM REM TST TIB   Average SpO2 94.8% 95.0% 89.5% 94.3% 94.4%   Minimum SpO2 73.0% 81.0% 72.0% 72.0% 72.0%   Maximum SpO2 98.0% 98.0% 98.0% 98.0% 98.0%       Oxygen Saturation Distribution    Range (%) Time in range (min) Time in range (%)   90.0 - 100.0 374.9 93.3%   80.0 - 90.0 22.8 5.7%   70.0 - 80.0 3.9 1.0%   60.0 - 70.0 - -   50.0 - 60.0 - -   0.0 - 50.0 - -   Time Spent ?88% SpO2    Range (%) Time in range (min) Time in range (%)   0.0 - 88.0 22.6 5.6%          Count Index   Desaturations 71 13.1      Cardiac Summary     Wake NREM REM Sleep Total   Average Pulse Rate (BPM) 85.8 82.5 87.5 83.1 83.6   Minimum Pulse Rate (BPM) 75.0 53.0 73.0 53.0 53.0   Maximum Pulse Rate (BPM) 100.0 96.0 101.0 101.0 101.0     Pulse Rate Distribution    Range (bpm) Time in range (min) Time in range (%)   0.0 - 40.0 - -   40.0 - 60.0 0.1 0.0%   60.0 - 80.0 45.8  11.4%   80.0 - 100.0 356.2 88.6%   100.0 - 120.0 0.1 0.0%   120.0 - 140.0 - -   140.0 - 200.0 - -     EtCO2 Summary    Stage Min (mmHg) Average (mmHg) Max (mmHg)   Wake - - -   NREM(1+2+3) - - -   REM - - -     Range (mmHg) Time in range (min) Time in range (%)   20.0 - 40.0 - -   40.0 - 50.0 - -   50.0 - 55.0 - -   55.0 - 100.0 - -   Excluded data <20.0 & >100.0 402.5 100.0%     TcCO2 Summary    Stage Min (mmHg) Average (mmHg) Max (mmHg)   Wake - - -   NREM(1+2+3) - - -   REM - - -     Range (mmHg) Time in range (min) Time in range (%)   20.0 - 40.0 - -   40.0 - 50.0 - -   50.0 - 55.0 - -   55.0 - 100.0 - -   Excluded data <20.0 & >100.0 402.5 100.0%     Comments    -

## 2024-05-22 ENCOUNTER — PATIENT MESSAGE (OUTPATIENT)
Dept: SLEEP MEDICINE | Facility: CLINIC | Age: 69
End: 2024-05-22
Payer: MEDICARE

## 2024-05-22 DIAGNOSIS — G47.33 OSA (OBSTRUCTIVE SLEEP APNEA): Primary | ICD-10-CM

## 2024-05-27 DIAGNOSIS — L30.9 DERMATITIS: ICD-10-CM

## 2024-05-27 RX ORDER — CLOTRIMAZOLE AND BETAMETHASONE DIPROPIONATE 10; .64 MG/G; MG/G
CREAM TOPICAL 2 TIMES DAILY
Qty: 45 G | Refills: 0 | Status: SHIPPED | OUTPATIENT
Start: 2024-05-27

## 2024-05-30 ENCOUNTER — TELEPHONE (OUTPATIENT)
Dept: INTERNAL MEDICINE | Facility: CLINIC | Age: 69
End: 2024-05-30
Payer: MEDICARE

## 2024-05-30 DIAGNOSIS — E11.42 TYPE 2 DIABETES MELLITUS WITH DIABETIC POLYNEUROPATHY, WITH LONG-TERM CURRENT USE OF INSULIN: ICD-10-CM

## 2024-05-30 DIAGNOSIS — Z79.4 TYPE 2 DIABETES MELLITUS WITH DIABETIC POLYNEUROPATHY, WITH LONG-TERM CURRENT USE OF INSULIN: ICD-10-CM

## 2024-05-30 NOTE — TELEPHONE ENCOUNTER
----- Message from Shannan Abebe sent at 5/30/2024  9:02 AM CDT -----  Regarding: blood-glucose sensor (DEXCOM G7 SENSOR) Roberta  Type:  Patient Returning Call      Name of who is calling:patient        What is request in detail:patient is requesting a call back. She received call from pharmacy stating they need a PA for her blood-glucose sensor (DEXCOM G7 SENSOR) Roberta. Patient also needs to get another  for her omni pump.        Can clinic reply by [facility]:no        What number to call back if not in [facility]:[phone]

## 2024-05-31 RX ORDER — INSULIN PUMP CONTROLLER
1 EACH MISCELLANEOUS EVERY OTHER DAY
Qty: 45 EACH | Refills: 3 | Status: SHIPPED | OUTPATIENT
Start: 2024-05-31 | End: 2024-05-31

## 2024-05-31 RX ORDER — BLOOD-GLUCOSE SENSOR
1 EACH MISCELLANEOUS
Qty: 10 EACH | Refills: 3 | Status: SHIPPED | OUTPATIENT
Start: 2024-05-31 | End: 2025-05-31

## 2024-05-31 RX ORDER — INSULIN PUMP CONTROLLER
1 EACH MISCELLANEOUS EVERY OTHER DAY
Qty: 45 EACH | Refills: 3 | Status: SHIPPED | OUTPATIENT
Start: 2024-05-31

## 2024-06-10 DIAGNOSIS — Z79.4 TYPE 2 DIABETES MELLITUS WITH DIABETIC POLYNEUROPATHY, WITH LONG-TERM CURRENT USE OF INSULIN: Primary | ICD-10-CM

## 2024-06-10 DIAGNOSIS — E11.42 TYPE 2 DIABETES MELLITUS WITH DIABETIC POLYNEUROPATHY, WITH LONG-TERM CURRENT USE OF INSULIN: Primary | ICD-10-CM

## 2024-06-10 RX ORDER — INSULIN PUMP CONTROLLER
1 EACH MISCELLANEOUS ONCE
Qty: 1 EACH | Refills: 0 | Status: SHIPPED | OUTPATIENT
Start: 2024-06-10 | End: 2024-06-17 | Stop reason: SDUPTHER

## 2024-06-19 DIAGNOSIS — E11.42 TYPE 2 DIABETES MELLITUS WITH DIABETIC POLYNEUROPATHY, WITH LONG-TERM CURRENT USE OF INSULIN: ICD-10-CM

## 2024-06-19 DIAGNOSIS — Z79.4 TYPE 2 DIABETES MELLITUS WITH DIABETIC POLYNEUROPATHY, WITH LONG-TERM CURRENT USE OF INSULIN: ICD-10-CM

## 2024-06-19 RX ORDER — INSULIN PMP CART,AUT,G6/7,CNTR
1 EACH SUBCUTANEOUS ONCE
Qty: 1 EACH | Refills: 0 | Status: SHIPPED | OUTPATIENT
Start: 2024-06-19 | End: 2024-06-22

## 2024-06-19 NOTE — TELEPHONE ENCOUNTER
----- Message from Bryan Juárez sent at 6/19/2024 10:10 AM CDT -----  Who Called:        Refill or New Rx:  refill         RX Name and Strength:    SAI GRAVES, GEN 4, Crtg          Is this a 30 day or 90 day RX        Preferred Pharmacy with phone number:   [facility]        Local or Mail Order: local           Would the patient rather a call back or a response via [facility]? Call back         Best Call Back Number:    [phone]        Additional Information:

## 2024-06-24 DIAGNOSIS — E11.42 TYPE 2 DIABETES MELLITUS WITH DIABETIC POLYNEUROPATHY, WITH LONG-TERM CURRENT USE OF INSULIN: ICD-10-CM

## 2024-06-24 DIAGNOSIS — Z79.4 TYPE 2 DIABETES MELLITUS WITH DIABETIC POLYNEUROPATHY, WITH LONG-TERM CURRENT USE OF INSULIN: ICD-10-CM

## 2024-06-24 RX ORDER — INSULIN PUMP CONTROLLER
1 EACH MISCELLANEOUS EVERY OTHER DAY
Qty: 45 EACH | Refills: 3 | Status: CANCELLED | OUTPATIENT
Start: 2024-06-24

## 2024-06-24 RX ORDER — INSULIN PMP CART,AUT,G6/7,CNTR
1 EACH SUBCUTANEOUS EVERY OTHER DAY
Qty: 45 EACH | Refills: 3 | Status: SHIPPED | OUTPATIENT
Start: 2024-06-24 | End: 2025-06-19

## 2024-06-24 NOTE — TELEPHONE ENCOUNTER
Care Due:                  Date            Visit Type   Department     Provider  --------------------------------------------------------------------------------                                EP -                              PRIMARY      [facility]  Last Visit: 10-      CARE ([facility])   CARE           Dottie Merritt  Next Visit: None Scheduled  None         None Found                                                            Last  Test          Frequency    Reason                     Performed    Due Date  --------------------------------------------------------------------------------    HBA1C.......  6 months...  empagliflozin, metFORMIN.  12- 06-    [facility] Embedded Care Due Messages. Reference number: [id number].   6/24/2024 2:21:57 PM CDT

## 2024-06-25 DIAGNOSIS — Z79.4 TYPE 2 DIABETES MELLITUS WITH DIABETIC POLYNEUROPATHY, WITH LONG-TERM CURRENT USE OF INSULIN: ICD-10-CM

## 2024-06-25 DIAGNOSIS — L30.9 DERMATITIS: ICD-10-CM

## 2024-06-25 DIAGNOSIS — E11.42 TYPE 2 DIABETES MELLITUS WITH DIABETIC POLYNEUROPATHY, WITH LONG-TERM CURRENT USE OF INSULIN: ICD-10-CM

## 2024-06-25 DIAGNOSIS — G89.29 CHRONIC NONINTRACTABLE HEADACHE, UNSPECIFIED HEADACHE TYPE: ICD-10-CM

## 2024-06-25 DIAGNOSIS — R51.9 CHRONIC NONINTRACTABLE HEADACHE, UNSPECIFIED HEADACHE TYPE: ICD-10-CM

## 2024-06-25 RX ORDER — CLOTRIMAZOLE AND BETAMETHASONE DIPROPIONATE 10; .64 MG/G; MG/G
CREAM TOPICAL 2 TIMES DAILY
Qty: 45 G | Refills: 0 | Status: SHIPPED | OUTPATIENT
Start: 2024-06-25

## 2024-06-25 RX ORDER — TIRZEPATIDE 7.5 MG/.5ML
7.5 INJECTION, SOLUTION SUBCUTANEOUS
Qty: 4 PEN | Refills: 3 | OUTPATIENT
Start: 2024-06-25

## 2024-06-25 RX ORDER — AMITRIPTYLINE HYDROCHLORIDE 75 MG/1
75 TABLET ORAL NIGHTLY
Qty: 90 TABLET | Refills: 1 | Status: SHIPPED | OUTPATIENT
Start: 2024-06-25 | End: 2025-06-25

## 2024-06-25 NOTE — TELEPHONE ENCOUNTER
Spoke to pharmacy at [facility]. Patient has picked up all 4 months that were ordered. I tried to send request to the NP for Diabetes that prescribed it in February. Not able to find her in [facility]. Do you need patient to do a follow up with you in order to send in refill?

## 2024-06-25 NOTE — TELEPHONE ENCOUNTER
No care due was identified.  [facility] Embedded Care Due Messages. Reference number: [id number].   6/25/2024 2:18:31 PM CDT

## 2024-06-25 NOTE — TELEPHONE ENCOUNTER
No care due was identified.  [facility] Embedded Care Due Messages. Reference number: [id number].   6/25/2024 2:16:56 PM CDT

## 2024-07-08 ENCOUNTER — CLINICAL SUPPORT (OUTPATIENT)
Dept: DIABETES | Facility: CLINIC | Age: 69
End: 2024-07-08
Payer: MEDICARE

## 2024-07-08 DIAGNOSIS — E11.42 TYPE 2 DIABETES MELLITUS WITH DIABETIC POLYNEUROPATHY, WITH LONG-TERM CURRENT USE OF INSULIN: Primary | ICD-10-CM

## 2024-07-08 DIAGNOSIS — Z79.4 TYPE 2 DIABETES MELLITUS WITH DIABETIC POLYNEUROPATHY, WITH LONG-TERM CURRENT USE OF INSULIN: Primary | ICD-10-CM

## 2024-07-08 PROCEDURE — G0108 DIAB MANAGE TRN  PER INDIV: HCPCS | Mod: S$GLB,,, | Performed by: DIETITIAN, REGISTERED

## 2024-07-08 NOTE — PROGRESS NOTES
Diabetes Care Specialist Progress Note  Author: Brenda Quiroz RD, CDE  Date: 7/9/2024    Program Intake  Reason for Diabetes Program Visit:: Intervention  Type of Intervention:: Individual  Individual: Education, Device Training  Device Training: Insulin Pump Start (starting new Omnipod PDM - previous one lost/stolen)  Current diabetes risk level:: low  In the last 12 months, have you:: none  Continuous Glucose Monitoring  Patient has CGM: Yes  Personal CGM type:: Dexcom    Lab Results   Component Value Date    HGBA1C 6.8 (H) 12/21/2023       Clinical    Current DM meds:  Currently taking back up insulin pens  Levemir 40 units daily - last took it yesterday @noon, confirms did not take any today  Humalog AC  12 for small meal  14 for medium meal  16 for large meal    Today's interventions were provided through individual discussion, instruction, and written materials were provided.      Patient verbalized understanding of instruction and written materials.  Pt was able to return back demonstration of instructions today. Patient understood key points, needs reinforcement and further instruction.     Diabetes Self-Management Care Plan:    Today's Diabetes Self-Management Care Plan was developed with Mercedez's input. Mercedez has agreed to work toward the following goal(s) to improve his/her overall diabetes control.      Care Plan: Diabetes Management   Updates made since 6/9/2024 12:00 AM        Problem: Medications         Goal: Pt will use Omnipod for insulin delivery.    Start Date: 5/2/2024   Expected End Date: 6/1/2024   This Visit's Progress: On track   Priority: High   Barriers: No Barriers Identified   Note:    5/2/24 -   OMNIPOD DASH INSULIN PUMP RE-START    Patient here today for setting up replacement Omnipod Dash controller after previous controller broke. She confirms has been using back up insulin pens while off Dash.     Pt last took Levemir dose yesterday morning - held Levemir this morning. Pt is  an experienced Omnipod Dash user and shows good understanding of proper use.    SETTINGS obtained from Juli Mejia NP:  Basal rate: 1.5 u/hr  Maximum basal rate: 3 u/hr    Bolus Menu:    Bolus Pre-sets:  Small Snack 3 units  Large Snack 5 units    Small Meal 12 units  Medium Meal 14 units  Large Meal 16 units    Pt is correcting with 2 units if BG >180    Maximum bolus = 20 units  Reverse Correction: off   Auto Off: off    Pt successfully filled and started pod in office without any assistance or prompting.     7/8/24 - Pt has been on Omnipod Dash; however, since last visit, she reports PDM was lost or stolen and picked up replacement from pharmacy. Upon inspection of Omnipod PDM, pt received Omnipod5 controller and Omnipod 5 pods instead of Dash. She states that pharmacist told her Dash was discontinued and that was reason for the switch. Discussed while Omnipod CLASSIC has been discontinued, Dash has not. Still, since she has picked this system up from pharmacy, she will not be able to return to get replaced. Explained Omnipod 5 does not allow for bolus presets and will have to enter set doses in bolus calculator set doses as she does not carb count. Pt has not yet set up appt with endocrinology since Juli Mejia NP has left. Contacted Piero Schofield NP and her staff has scheduled appt next month.     OMNI POD 5 INSULIN PUMP START    Pump training was provided per Omni Pod protocol.  Patient has used an insulin pump in the past.   Settings for pump based on last office visit note from Juli Mejia NP.    Patient is not new to insulin pump therapy. Will not be using Automated mode at this time as she is on Dexcom G7 for CGM and not yet compatible with Omnipod 5 - should be available within next few months.      Basal:  12AM: 1.5 units/hr     Max basal rate: 3 units/hr     Bolus:    Pt to enter only set doses at this time:  12 units for small meal  14 units for medium meal  16 units for large meal  3 units for  small snack  5 units for large snack    CHO ratio: 1:10 (weight based - however, entered only as place coker as system requires entering ICR for set up - pt will be entering set units)  ISF: 1:50 (weight based 1:40 - however, entered lower dose correction for safety; also instructed on 2 unit correction for glucose >200 per Juli's last note)  Glucose target : 120 mg/dL  Correct  over: 120 mg/dl  Active insulin time: 4 hours  Max bolus: 18 units     Low reservoir insulin alarm: 10 units  Change pod alarm: every 3 days       Details of pump therapy were covered included following controller features and programming, pod activation, pod site selection and rotation, automatic pod priming and insertion, setting & editing basal rates in manual mode, giving bolus and other features in the set-up menu.  Patient demonstrated ability to activate and insert pod using aseptic technique.  Pt demonstrated ability to enter set doses in insulin field of bolus calculator. Instructed to leave carbs at ZERO, enter glucose reading from Dexcom G7, and enter set bolus amount as above. Pt practiced several times on simulator brunilda until comfortable.   Instructed pt on use of basic pump features ie...give a bolus, pause insulin   Reviewed site selection of pods, rotation of sites and hard stop to change pod every 72 hrs.   Instructed that insulin vial is good out of refrigeration for up to 28-30 days.   Reviewed treatment of hypoglycemia, hyperglycemia; sick day care, DKA, and troubleshooting of pump. Advised to change site if issue is suspected.   Omni Pod 24-hour support line provided.         Dean username: Emilee Moran password: [email or web address]     Keegan username: [email or web address]  Keegan password: [email or web address]             Follow Up Plan     No follow-ups on file.    Today's care plan and follow up schedule was discussed with patient.  Mercedez verbalized understanding of the care plan, goals, and agrees to follow up  plan.        The patient was encouraged to communicate with his/her health care provider/physician and care team regarding his/her condition(s) and treatment.  I provided the patient with my contact information today and encouraged to contact me via phone or [facility] as needed.     Length of Visit   Total Time: 30 Minutes

## 2024-07-09 ENCOUNTER — TELEPHONE (OUTPATIENT)
Dept: INTERNAL MEDICINE | Facility: CLINIC | Age: 69
End: 2024-07-09
Payer: MEDICARE

## 2024-07-09 NOTE — TELEPHONE ENCOUNTER
----- Message from KAMI Spicer FNP sent at 7/8/2024  2:39 PM CDT -----  Regarding: f/u in next 6 weeks  Hi!  Thanks so much , Brenda!  Will love to f/u with her.  Piero   Do you want me to send the g6 bundle for now, which pharmacy?  Not sure if by aug-sept g7 integration will be a go.  Evelyn book pt for a f/u in 6 weeks or so.  ----- Message -----  From: Brenda Quiroz, MURIEL, CDE  Sent: 7/8/2024   2:33 PM CDT  To: KAMI Spicer FNP Hi Irielle!    This pt was seeing Juli and is on an Omnipod. She has not gotten connected with anyone else since Juli left, and I wanted to check if she could get scheduled with you?     We have a bit of a sticky situation. She has been on an Omnipod Dash. Appt today was to restart Dash after her previous controller was lost or stolen. Somehow she received an Omnipod 5 from the pharmacy. She said the pharmacist told her Dash was discontinued. She was using presets with Dash and doesn't know how to carb count, so I set up the Omnipod 5 with some basic settings and instructed her to enter set boluses by leaving carb at zero and entering units in the insulin field. She practiced multiple times in the simulator brunilda.    Settings entered:  Basal 1.5 u/hr (same as her dash settings)    Boluses: (based on presets from Dash)  Instructed pt to manually enter in insulin field  12 units for small meal  14 units for medium meal  16 units for large meal  3 units for small snack  5 units for large snack    O5 requires ICR, ISF, and target to be entered to complete set up, so I entered ICR 1:10 (wt based), ISF 1:50 (wt based would be 1:40), and target 120. Also, she has Dexcom G7, so not able to use automated mode yet.  Please advise.     Many thanks!  Brenda

## 2024-07-17 ENCOUNTER — CLINICAL SUPPORT (OUTPATIENT)
Dept: DIABETES | Facility: CLINIC | Age: 69
End: 2024-07-17
Payer: MEDICARE

## 2024-07-17 DIAGNOSIS — E11.59 HYPERTENSION ASSOCIATED WITH DIABETES: ICD-10-CM

## 2024-07-17 DIAGNOSIS — E78.00 PURE HYPERCHOLESTEROLEMIA: ICD-10-CM

## 2024-07-17 DIAGNOSIS — I10 ESSENTIAL HYPERTENSION: ICD-10-CM

## 2024-07-17 DIAGNOSIS — I25.118 CORONARY ARTERY DISEASE OF NATIVE ARTERY OF NATIVE HEART WITH STABLE ANGINA PECTORIS: ICD-10-CM

## 2024-07-17 DIAGNOSIS — I50.22 CHRONIC SYSTOLIC HEART FAILURE: ICD-10-CM

## 2024-07-17 DIAGNOSIS — I15.2 HYPERTENSION ASSOCIATED WITH DIABETES: ICD-10-CM

## 2024-07-17 DIAGNOSIS — E11.42 TYPE 2 DIABETES MELLITUS WITH DIABETIC POLYNEUROPATHY, WITH LONG-TERM CURRENT USE OF INSULIN: Primary | ICD-10-CM

## 2024-07-17 DIAGNOSIS — Z79.4 TYPE 2 DIABETES MELLITUS WITH DIABETIC POLYNEUROPATHY, WITH LONG-TERM CURRENT USE OF INSULIN: Primary | ICD-10-CM

## 2024-07-17 PROCEDURE — G0108 DIAB MANAGE TRN  PER INDIV: HCPCS | Mod: S$GLB,,, | Performed by: DIETITIAN, REGISTERED

## 2024-07-17 PROCEDURE — 99999 PR PBB SHADOW E&M-EST. PATIENT-LVL I: CPT | Mod: PBBFAC,,, | Performed by: DIETITIAN, REGISTERED

## 2024-07-18 RX ORDER — AMLODIPINE BESYLATE 10 MG/1
10 TABLET ORAL DAILY
Qty: 90 TABLET | Refills: 3 | Status: SHIPPED | OUTPATIENT
Start: 2024-07-18 | End: 2025-07-18

## 2024-07-18 RX ORDER — CLOPIDOGREL BISULFATE 75 MG/1
75 TABLET ORAL DAILY
Qty: 90 TABLET | Refills: 1 | Status: SHIPPED | OUTPATIENT
Start: 2024-07-18 | End: 2025-01-14

## 2024-07-18 RX ORDER — METOPROLOL SUCCINATE 200 MG/1
200 TABLET, EXTENDED RELEASE ORAL DAILY
Qty: 90 TABLET | Refills: 1 | Status: SHIPPED | OUTPATIENT
Start: 2024-07-18 | End: 2025-01-14

## 2024-07-18 RX ORDER — SPIRONOLACTONE 50 MG/1
50 TABLET, FILM COATED ORAL DAILY
Qty: 90 TABLET | Refills: 3 | Status: SHIPPED | OUTPATIENT
Start: 2024-07-18

## 2024-07-18 RX ORDER — ATORVASTATIN CALCIUM 80 MG/1
80 TABLET, FILM COATED ORAL NIGHTLY
Qty: 90 TABLET | Refills: 3 | Status: SHIPPED | OUTPATIENT
Start: 2024-07-18 | End: 2025-07-13

## 2024-07-18 RX ORDER — FUROSEMIDE 40 MG/1
40 TABLET ORAL DAILY
Qty: 90 TABLET | Refills: 3 | Status: SHIPPED | OUTPATIENT
Start: 2024-07-18

## 2024-07-18 NOTE — PROGRESS NOTES
Diabetes Care Specialist Progress Note  Author: Brenda Quiroz RD, CDE  Date: 7/18/2024    Intake    Program Intake  Reason for Diabetes Program Visit:: Intervention  Type of Intervention:: Individual  Individual: Education  Education: Self-Management Skill Review  Current diabetes risk level:: low  In the last 12 months, have you:: none              Continuous Glucose Monitoring  Patient has CGM: Yes  Personal CGM type:: Dexcom  GMI Date: 07/17/24  GMI Value: 7.9 %    Lab Results   Component Value Date    HGBA1C 6.8 (H) 12/21/2023           Today's interventions were provided through individual discussion, instruction, and written materials were provided.      Patient verbalized understanding of instruction and written materials.  Pt was able to return back demonstration of instructions today. Patient understood key points, needs reinforcement and further instruction.     Diabetes Self-Management Care Plan:    Today's Diabetes Self-Management Care Plan was developed with Mercedez's input. Mercedez has agreed to work toward the following goal(s) to improve his/her overall diabetes control.      Care Plan: Diabetes Management   Updates made since 6/18/2024 12:00 AM        Problem: Medications         Goal: Pt will use Omnipod for insulin delivery.    Start Date: 5/2/2024   Expected End Date: 6/1/2024   This Visit's Progress: On track   Recent Progress: On track   Priority: High   Barriers: No Barriers Identified   Note:    5/2/24 -   OMNIPOD DASH INSULIN PUMP RE-START    Patient here today for setting up replacement Omnipod Dash controller after previous controller broke. She confirms has been using back up insulin pens while off Dash.     Pt last took Levemir dose yesterday morning - held Levemir this morning. Pt is an experienced Omnipod Dash user and shows good understanding of proper use.    SETTINGS obtained from Juli Mejia NP:  Basal rate: 1.5 u/hr  Maximum basal rate: 3 u/hr    Bolus Menu:    Bolus  Pre-sets:  Small Snack 3 units  Large Snack 5 units    Small Meal 12 units  Medium Meal 14 units  Large Meal 16 units    Pt is correcting with 2 units if BG >180    Maximum bolus = 20 units  Reverse Correction: off   Auto Off: off    Pt successfully filled and started pod in office without any assistance or prompting.     7/8/24 - Pt has been on Omnipod Dash; however, since last visit, she reports PDM was lost or stolen and picked up replacement from pharmacy. Upon inspection of Omnipod PDM, pt received Omnipod5 controller and Omnipod 5 pods instead of Dash. She states that pharmacist told her Dash was discontinued and that was reason for the switch. Discussed while Omnipod CLASSIC has been discontinued, Dash has not. Still, since she has picked this system up from pharmacy, she will not be able to return to get replaced. Explained Omnipod 5 does not allow for bolus presets and will have to enter set doses in bolus calculator set doses as she does not carb count. Pt has not yet set up appt with endocrinology since Juli Mejia NP has left. Contacted Piero Schofield NP and her staff has scheduled appt next month.     OMNI POD 5 INSULIN PUMP START    Pump training was provided per Omni Pod protocol.  Patient has used an insulin pump in the past.   Settings for pump based on last office visit note from Juli Mejia NP.    Patient is not new to insulin pump therapy. Will not be using Automated mode at this time as she is on Dexcom G7 for CGM and not yet compatible with Omnipod 5 - should be available within next few months.      Basal:  12AM: 1.5 units/hr     Max basal rate: 3 units/hr     Bolus:    Pt to enter only set doses at this time:  12 units for small meal  14 units for medium meal  16 units for large meal  3 units for small snack  5 units for large snack    CHO ratio: 1:10 (weight based - however, entered only as place coker as system requires entering ICR for set up - pt will be entering set units)  ISF:  1:50 (weight based 1:40 - however, entered lower dose correction for safety; also instructed on 2 unit correction for glucose >200 per Juli's last note)  Glucose target : 120 mg/dL  Correct  over: 120 mg/dl  Active insulin time: 4 hours  Max bolus: 18 units     Low reservoir insulin alarm: 10 units  Change pod alarm: every 3 days       Details of pump therapy were covered included following controller features and programming, pod activation, pod site selection and rotation, automatic pod priming and insertion, setting & editing basal rates in manual mode, giving bolus and other features in the set-up menu.  Patient demonstrated ability to activate and insert pod using aseptic technique.  Pt demonstrated ability to enter set doses in insulin field of bolus calculator. Instructed to leave carbs at ZERO, enter glucose reading from Dexcom G7, and enter set bolus amount as above. Pt practiced several times on simulator brunilda until comfortable.   Instructed pt on use of basic pump features ie...give a bolus, pause insulin   Reviewed site selection of pods, rotation of sites and hard stop to change pod every 72 hrs.   Instructed that insulin vial is good out of refrigeration for up to 28-30 days.   Reviewed treatment of hypoglycemia, hyperglycemia; sick day care, DKA, and troubleshooting of pump. Advised to change site if issue is suspected.   Omni Pod 24-hour support line provided.         Dena username: Emilee Moran password: [email or web address]     Keegan username: [email or web address]  Keegan password: [email or web address]      7/17/24 - Pt is using Omnipod 5 in manual mode since currently using dexcom G7 and not currently integrated. She successfully changed pods on her own since last visit without any difficulty. She is also bolusing before meals; however, she has some questions about bolusing. Omnipod 5 screens are different compared to her old Dash controller - does not have presets - and she wanted to make sure she  is bolusing correctly. She demonstrated how she is bolusing and all steps were correct. However, she is only bolusing 12 units (small meal) bolus with each meal and not bolusing at all for snacks. High PP spikes on report noted. Had an especially high PP spike after eating a piece of cake about 2 hours after dinner without bolusing with it. Reviewed and encouraged using 12 units for small meal, 14 units for medium meal, and 16 units for large meal as well as 3 units for small snack and 5 units for large snacks as were her previous presets. Explained this version of Omnipod does not allow for bolus presets and she will need to continue to manually enter units until she is able to carb count and enter grams of carbs - needs to establish with new endocrine NP prior to making such a transition as setting adjustment would be warranted. She verbalized better understanding and will adjust the units she is bolusing according to meal size. Also, reviewed OP5 is supposed to be able to connect with Dexcom G7 in the near future and will plan to aid in that transition at next visit.          Follow Up Plan     Follow up in about 3 months (around 10/28/2024) for omnipod follow-up.    Today's care plan and follow up schedule was discussed with patient.  Mercedez verbalized understanding of the care plan, goals, and agrees to follow up plan.        The patient was encouraged to communicate with his/her health care provider/physician and care team regarding his/her condition(s) and treatment.  I provided the patient with my contact information today and encouraged to contact me via phone or [facility] as needed.     Length of Visit   Total Time: 60 Minutes

## 2024-08-20 ENCOUNTER — OFFICE VISIT (OUTPATIENT)
Dept: INTERNAL MEDICINE | Facility: CLINIC | Age: 69
End: 2024-08-20
Payer: MEDICARE

## 2024-08-20 ENCOUNTER — TELEPHONE (OUTPATIENT)
Dept: INTERNAL MEDICINE | Facility: CLINIC | Age: 69
End: 2024-08-20

## 2024-08-20 VITALS
WEIGHT: 197.06 LBS | DIASTOLIC BLOOD PRESSURE: 68 MMHG | OXYGEN SATURATION: 98 % | BODY MASS INDEX: 38.69 KG/M2 | HEART RATE: 85 BPM | SYSTOLIC BLOOD PRESSURE: 116 MMHG | HEIGHT: 60 IN

## 2024-08-20 DIAGNOSIS — Z79.4 TYPE 2 DIABETES MELLITUS WITH LEFT EYE AFFECTED BY MILD NONPROLIFERATIVE RETINOPATHY AND MACULAR EDEMA, WITH LONG-TERM CURRENT USE OF INSULIN: ICD-10-CM

## 2024-08-20 DIAGNOSIS — E11.3393 MODERATE NONPROLIFERATIVE DIABETIC RETINOPATHY OF BOTH EYES WITHOUT MACULAR EDEMA ASSOCIATED WITH TYPE 2 DIABETES MELLITUS: ICD-10-CM

## 2024-08-20 DIAGNOSIS — Z12.31 ENCOUNTER FOR SCREENING MAMMOGRAM FOR BREAST CANCER: ICD-10-CM

## 2024-08-20 DIAGNOSIS — H81.13 BENIGN PAROXYSMAL POSITIONAL VERTIGO DUE TO BILATERAL VESTIBULAR DISORDER: ICD-10-CM

## 2024-08-20 DIAGNOSIS — I50.22 CHRONIC SYSTOLIC CONGESTIVE HEART FAILURE: ICD-10-CM

## 2024-08-20 DIAGNOSIS — N18.32 CHRONIC KIDNEY DISEASE, STAGE 3B: ICD-10-CM

## 2024-08-20 DIAGNOSIS — E26.9 HYPERALDOSTERONISM: ICD-10-CM

## 2024-08-20 DIAGNOSIS — E11.40 NEUROPATHY DUE TO TYPE 2 DIABETES MELLITUS: ICD-10-CM

## 2024-08-20 DIAGNOSIS — Z12.31 OTHER SCREENING MAMMOGRAM: ICD-10-CM

## 2024-08-20 DIAGNOSIS — R51.9 CHRONIC NONINTRACTABLE HEADACHE, UNSPECIFIED HEADACHE TYPE: ICD-10-CM

## 2024-08-20 DIAGNOSIS — Z00.00 ENCOUNTER FOR ANNUAL HEALTH EXAMINATION: Primary | ICD-10-CM

## 2024-08-20 DIAGNOSIS — G89.29 CHRONIC NONINTRACTABLE HEADACHE, UNSPECIFIED HEADACHE TYPE: ICD-10-CM

## 2024-08-20 DIAGNOSIS — Z00.00 ROUTINE GENERAL MEDICAL EXAMINATION AT A HEALTH CARE FACILITY: Primary | ICD-10-CM

## 2024-08-20 DIAGNOSIS — R79.9 ABNORMAL FINDING OF BLOOD CHEMISTRY, UNSPECIFIED: ICD-10-CM

## 2024-08-20 DIAGNOSIS — D68.69 OTHER THROMBOPHILIA: ICD-10-CM

## 2024-08-20 DIAGNOSIS — E11.3212 TYPE 2 DIABETES MELLITUS WITH LEFT EYE AFFECTED BY MILD NONPROLIFERATIVE RETINOPATHY AND MACULAR EDEMA, WITH LONG-TERM CURRENT USE OF INSULIN: ICD-10-CM

## 2024-08-20 PROCEDURE — 1159F MED LIST DOCD IN RCRD: CPT | Mod: CPTII,S$GLB,, | Performed by: INTERNAL MEDICINE

## 2024-08-20 PROCEDURE — 3288F FALL RISK ASSESSMENT DOCD: CPT | Mod: CPTII,S$GLB,, | Performed by: INTERNAL MEDICINE

## 2024-08-20 PROCEDURE — 4010F ACE/ARB THERAPY RXD/TAKEN: CPT | Mod: CPTII,S$GLB,, | Performed by: INTERNAL MEDICINE

## 2024-08-20 PROCEDURE — 3074F SYST BP LT 130 MM HG: CPT | Mod: CPTII,S$GLB,, | Performed by: INTERNAL MEDICINE

## 2024-08-20 PROCEDURE — 3078F DIAST BP <80 MM HG: CPT | Mod: CPTII,S$GLB,, | Performed by: INTERNAL MEDICINE

## 2024-08-20 PROCEDURE — 1126F AMNT PAIN NOTED NONE PRSNT: CPT | Mod: CPTII,S$GLB,, | Performed by: INTERNAL MEDICINE

## 2024-08-20 PROCEDURE — 99999 PR PBB SHADOW E&M-EST. PATIENT-LVL IV: CPT | Mod: PBBFAC,,, | Performed by: INTERNAL MEDICINE

## 2024-08-20 PROCEDURE — 99397 PER PM REEVAL EST PAT 65+ YR: CPT | Mod: S$GLB,,, | Performed by: INTERNAL MEDICINE

## 2024-08-20 PROCEDURE — 1101F PT FALLS ASSESS-DOCD LE1/YR: CPT | Mod: CPTII,S$GLB,, | Performed by: INTERNAL MEDICINE

## 2024-08-20 PROCEDURE — 3008F BODY MASS INDEX DOCD: CPT | Mod: CPTII,S$GLB,, | Performed by: INTERNAL MEDICINE

## 2024-08-20 PROCEDURE — 1160F RVW MEDS BY RX/DR IN RCRD: CPT | Mod: CPTII,S$GLB,, | Performed by: INTERNAL MEDICINE

## 2024-08-20 RX ORDER — AMITRIPTYLINE HYDROCHLORIDE 75 MG/1
75 TABLET ORAL NIGHTLY
Qty: 90 TABLET | Refills: 3 | Status: SHIPPED | OUTPATIENT
Start: 2024-08-20 | End: 2025-08-20

## 2024-08-20 NOTE — PROGRESS NOTES
Subjective:      Patient ID: Mercedez Purvis is a 69 y.o. female.    Chief Complaint: Annual Exam      Mercedez Purvis is a 69 y.o. female with chronic conditions significant for CAD, MI s/p stent placement,T2DM, diabetic neuropathy, obesity, HTN, HLD, hyperaldosteronism on spironolactone, diabetic retinopathy, CAD with stable angina, sCHF, CKD3, GERD, BPPV, TIKA, migraines.    Presenting today for follow up / annual. Date of last annual is 4/18/2023    HFrEF: Continues to follow with cardiology. Recent cardiac PET reveals decreased EF to 35%. Currently on lasix 40mg qday and entresto. On jardiance for HF and for T2DM      CAD, hx of MI s/p stent placement: Recent cardiac PET reveals no new stenosis. Continue on plavix, BB.      T2DM with neuropathy: Was working Margarita Mejia NP. Continue current regimen of tirzepatide, jardiance, metformin and humalog. She has levamir pen for back up insulin. Last A1c 6.8%. UTD with foot exam, continue gabapentin for neuropathy. Appnt with specialist 8/26/2024.     HTN: Office BP is 116/68. Amlodipine was increased to 10mg at last visit. No CP/SOB/lightheadedness/dizziness/palpitations. Recommend that patient keep a BP log     HLD: Was previously on repatha for LDL goal <100. Review of recent lipid profile show her LDL being well controlled. Currently on atorvastatin 80mg with LDL at goal.      CKD3b: Discussed CKD and the need to keep control of HTN and T2DM to slow progression of kidney disease. Avoid nephrotoxic medications.      TIKA: Has been more than 5 years since last sleep study. Currently not on CPAP. Sleep medicine referral placed.      Severe obesity: Discussed healthy BMI, goal weight.  Recommend diet/exercise and health lifestyle choices. Cont tirzepatide    Denies any chest pain, shortness of breath, nausea vomiting constipation diarrhea, blood in stool, heartburn    Review of Systems   Constitutional:  Negative for chills, fever and weight loss.   HENT:   Negative for congestion, ear pain and sore throat.    Eyes:  Negative for double vision.   Respiratory:  Negative for cough and shortness of breath.    Cardiovascular:  Negative for chest pain, palpitations and leg swelling.   Gastrointestinal:  Negative for abdominal pain, heartburn, nausea and vomiting.   Skin:  Negative for rash.   Neurological:  Negative for dizziness, tingling and headaches.   Psychiatric/Behavioral:  Negative for depression.           Current Outpatient Medications:     amLODIPine (NORVASC) 10 MG tablet, Take 1 tablet (10 mg total) by mouth once daily., Disp: 90 tablet, Rfl: 3    atorvastatin (LIPITOR) 80 MG tablet, Take 1 tablet (80 mg total) by mouth every evening., Disp: 90 tablet, Rfl: 3    blood sugar diagnostic Strp, 1 each by Misc.(Non-Drug; Combo Route) route 4 (four) times daily., Disp: 200 each, Rfl: 3    blood-glucose meter,continuous Misc, 1 Device by Misc.(Non-Drug; Combo Route) route continuous., Disp: 1 each, Rfl: 0    blood-glucose sensor (DEXCOM G7 SENSOR) Roberta, 1 each by Misc.(Non-Drug; Combo Route) route every 10 days., Disp: 10 each, Rfl: 3    ciclopirox (PENLAC) 8 % Soln, Apply topically nightly., Disp: 6.6 mL, Rfl: 11    clopidogreL (PLAVIX) 75 mg tablet, Take 1 tablet (75 mg total) by mouth once daily., Disp: 90 tablet, Rfl: 1    clotrimazole-betamethasone 1-0.05% (LOTRISONE) cream, Apply topically 2 (two) times daily., Disp: 45 g, Rfl: 0    cyclobenzaprine (FLEXERIL) 10 MG tablet, Take 1 tablet (10 mg total) by mouth 3 times daily, Disp: 90 tablet, Rfl: 0    diclofenac sodium (VOLTAREN) 1 % Gel, Apply topically once daily., Disp: 100 g, Rfl: 0    empagliflozin (JARDIANCE) 25 mg tablet, Take 1 tablet (25 mg total) by mouth once daily., Disp: 90 tablet, Rfl: 1    fluconazole (DIFLUCAN) 150 MG Tab, Take 1 dose by mouth. May repeat after 72 hrs for yeast infection., Disp: 2 tablet, Rfl: 1    furosemide (LASIX) 40 MG tablet, Take 1 tablet (40 mg total) by mouth once daily.,  "Disp: 90 tablet, Rfl: 3    gabapentin (NEURONTIN) 300 MG capsule, Take 1 capsule (300 mg total) by mouth 2 (two) times daily AND 2 capsules (600 mg total) every evening., Disp: 360 capsule, Rfl: 1    glucagon (BAQSIMI) 3 mg/actuation Spry, 1 each by Nasal route daily as needed (if glucose is 70 or less.)., Disp: 2 each, Rfl: 1    insulin detemir U-100 (LEVEMIR FLEXTOUCH U-100 INSULN) 100 unit/mL (3 mL) InPn pen, Inject 40 Units into the skin once daily. FOR EMERGENCY USE ONLY - BACK UP INSULIN, IF OFF OF YOUR INSULIN PUMP - USE 40 UNITS INJECTION DAILY UNTIL GETTING BACK ON PUMP., Disp: 15 mL, Rfl: 1    insulin lispro (HUMALOG U-100 INSULIN) 100 unit/mL injection, Inject 100 Units into the skin continuous. Gives up to 100 units daily via Omnipod. Do not directly inject - only use within pods., Disp: 40 mL, Rfl: 11    insulin pump cart,automated,BT (OMNIPOD 5 G6 PODS, GEN 5,) Crtg, Inject 1 each into the skin every other day., Disp: 45 each, Rfl: 3    LIDOcaine (XYLOCAINE) 5 % Oint ointment, Apply topically as needed., Disp: 120 g, Rfl: 3    LIDOcaine HCL 2% (XYLOCAINE) 2 % jelly, Apply topically as needed. Apply topically once nightly to affected part of foot/feet., Disp: 30 mL, Rfl: 2    LIDOcaine-prilocaine (EMLA) cream, Apply topically 2 (two) times a day., Disp: 30 g, Rfl: 1    meclizine (ANTIVERT) 12.5 mg tablet, Take 2 tablets (25 mg total) by mouth 2 (two) times daily as needed for Dizziness., Disp: 30 tablet, Rfl: 1    metFORMIN (GLUCOPHAGE-XR) 500 MG ER 24hr tablet, Take 1 tablet (500 mg total) by mouth every morning., Disp: 90 tablet, Rfl: 3    metoprolol succinate (TOPROL-XL) 200 MG 24 hr tablet, Take 1 tablet (200 mg total) by mouth once daily., Disp: 90 tablet, Rfl: 1    pen needle, diabetic (PEN NEEDLE) 32 gauge x 5/32" Anneliese 1 each by Misc.(Non-Drug; Combo Route) route 4 (four) times daily. ICD 10 E11.42, Disp: 400 each, Rfl: 3    sacubitriL-valsartan (ENTRESTO)  mg per tablet, Take 1 tablet by " mouth 2 (two) times daily., Disp: 60 tablet, Rfl: 11    spironolactone (ALDACTONE) 50 MG tablet, Take 1 tablet (50 mg total) by mouth once daily., Disp: 90 tablet, Rfl: 3    amitriptyline (ELAVIL) 75 MG tablet, Take 1 tablet (75 mg total) by mouth every evening., Disp: 90 tablet, Rfl: 3    tirzepatide 10 mg/0.5 mL PnIj, Inject 10 mg into the skin every 7 days., Disp: 2 mL, Rfl: 11    Lab Results   Component Value Date    HGBA1C 6.8 (H) 12/21/2023    HGBA1C 7.5 (H) 04/21/2023    HGBA1C 7.7 (H) 12/16/2022     Lab Results   Component Value Date    MICALBCREAT 41.6 (H) 12/21/2023     Lab Results   Component Value Date    LDLCALC 93.6 12/21/2023    LDLCALC 175.8 (H) 11/14/2023    CHOL 152 12/21/2023    HDL 37 (L) 12/21/2023    TRIG 107 12/21/2023       Lab Results   Component Value Date     12/21/2023    K 4.2 12/21/2023     12/21/2023    CO2 26 12/21/2023     (H) 12/21/2023    BUN 17 12/21/2023    CREATININE 1.1 12/21/2023    CALCIUM 9.0 12/21/2023    PROT 7.2 12/21/2023    ALBUMIN 3.1 (L) 12/21/2023    BILITOT 0.2 12/21/2023    ALKPHOS 104 12/21/2023    AST 14 12/21/2023    ALT 10 12/21/2023    ANIONGAP 10 12/21/2023    ESTGFRAFRICA >60.0 03/21/2022    EGFRNONAA 58.0 (A) 03/21/2022    WBC 7.15 11/14/2023    HGB 12.9 11/14/2023    HGB 12.7 04/21/2023    HCT 43.7 11/14/2023    MCV 77 (L) 11/14/2023     11/14/2023    TSH 1.525 04/21/2023    HEPCAB Negative 10/29/2021       Lab Results   Component Value Date    GMKBGXMI94FT 18 (L) 07/13/2020    GZEQTMZI54AK 16 (L) 09/23/2010         Past Medical History:   Diagnosis Date    Chronic headache     Diabetes     Heart attack 2004    8 stents    Heart failure     History of heart artery stent     Hyperlipemia     Hypertension     Obesity (BMI 30-39.9)     Yeast infection      Past Surgical History:   Procedure Laterality Date    CARDIAC CATHETERIZATION      INJECTION OF STEROID Left 03/24/2023    Procedure: INJECTION, STEROID,LEFT RING FINGER;  Surgeon:  Rona Lema MD;  Location: [facility] OR;  Service: Orthopedics;  Laterality: Left;    TRIGGER FINGER RELEASE Right 03/24/2023    Procedure: RELEASE, TRIGGER FINGER, RIGHT LONG;  Surgeon: Rona Lema MD;  Location: [facility] OR;  Service: Orthopedics;  Laterality: Right;    TUBAL LIGATION       Social History     Social History Narrative    Not on file     Family History   Problem Relation Name Age of Onset    Stroke Father Scott Sigala Sr     Arthritis Brother Kartik Sigala     Diabetes Brother Kartik Sigala     Heart disease Brother Kartik Sigala     Hypertension Brother Kartik Sigala      Vitals:    08/20/24 0929   BP: 116/68   Pulse: 85   SpO2: 98%   Weight: 89.4 kg (197 lb 1.5 oz)   Height: 5' (1.524 m)   PainSc: 0-No pain     Objective:   Physical Exam  Vitals reviewed.   Constitutional:       Appearance: Normal appearance.   HENT:      Head: Normocephalic.      Right Ear: Tympanic membrane, ear canal and external ear normal.      Left Ear: Tympanic membrane, ear canal and external ear normal.      Nose: Nose normal.      Mouth/Throat:      Mouth: Mucous membranes are moist.      Pharynx: Oropharynx is clear.   Eyes:      Conjunctiva/sclera: Conjunctivae normal.      Pupils: Pupils are equal, round, and reactive to light.   Cardiovascular:      Rate and Rhythm: Normal rate and regular rhythm.      Pulses: Normal pulses.   Pulmonary:      Effort: Pulmonary effort is normal.      Breath sounds: Normal breath sounds.   Abdominal:      General: Abdomen is flat. Bowel sounds are normal.      Palpations: Abdomen is soft.   Musculoskeletal:      Cervical back: Neck supple.   Skin:     General: Skin is warm.   Neurological:      General: No focal deficit present.      Mental Status: She is alert.   Psychiatric:         Mood and Affect: Mood normal.       Assessment/Plan     Mercedez Purvis is a 69 y.o.female with:    Routine general medical examination at a health care facility  -     Hemoglobin  A1C; Future; Expected date: 08/20/2024  -     CBC Auto Differential; Future; Expected date: 08/20/2024  -     Comprehensive Metabolic Panel; Future; Expected date: 08/20/2024  -     TSH; Future; Expected date: 08/20/2024  -     Mammo Digital Screening Bilat; Future; Expected date: 10/25/2024  -     Lipid Panel; Future; Expected date: 08/20/2024    Neuropathy due to type 2 diabetes mellitus  -     Hemoglobin A1C; Future; Expected date: 08/20/2024  -     TSH; Future; Expected date: 08/20/2024  -     Lipid Panel; Future; Expected date: 08/20/2024  -     tirzepatide 10 mg/0.5 mL PnIj; Inject 10 mg into the skin every 7 days.  Dispense: 2 mL; Refill: 11  -     Microalbumin/Creatinine Ratio, Urine; Future; Expected date: 08/20/2024  -     Cancel: Ambulatory referral/consult to Diabetic Advanced Practice Providers (Medical Management); Future; Expected date: 08/27/2024    Moderate nonproliferative diabetic retinopathy of both eyes without macular edema associated with type 2 diabetes mellitus  -     Hemoglobin A1C; Future; Expected date: 08/20/2024  -     tirzepatide 10 mg/0.5 mL PnIj; Inject 10 mg into the skin every 7 days.  Dispense: 2 mL; Refill: 11    Benign paroxysmal positional vertigo due to bilateral vestibular disorder    Chronic systolic congestive heart failure    Type 2 diabetes mellitus with left eye affected by mild nonproliferative retinopathy and macular edema, with long-term current use of insulin  -     Hemoglobin A1C; Future; Expected date: 08/20/2024  -     Comprehensive Metabolic Panel; Future; Expected date: 08/20/2024  -     TSH; Future; Expected date: 08/20/2024  -     Lipid Panel; Future; Expected date: 08/20/2024  -     tirzepatide 10 mg/0.5 mL PnIj; Inject 10 mg into the skin every 7 days.  Dispense: 2 mL; Refill: 11  -     Microalbumin/Creatinine Ratio, Urine; Future; Expected date: 08/20/2024  -     Cancel: Ambulatory referral/consult to Diabetic Advanced Practice Providers (Medical  Management); Future; Expected date: 08/27/2024    Hyperaldosteronism    Other thrombophilia    Chronic kidney disease, stage 3b  -     Comprehensive Metabolic Panel; Future; Expected date: 08/20/2024    Chronic nonintractable headache, unspecified headache type  -     amitriptyline (ELAVIL) 75 MG tablet; Take 1 tablet (75 mg total) by mouth every evening.  Dispense: 90 tablet; Refill: 3    Other screening mammogram  -     Mammo Digital Screening Bilat; Future; Expected date: 10/25/2024    Abnormal finding of blood chemistry, unspecified  -     CBC Auto Differential; Future; Expected date: 08/20/2024         Chronic conditions status updated as per HPI.  Other than changes above, cont current medications and maintain follow up with specialists.  Return to clinic in Follow up in about 4 months (around 12/20/2024).      Dottie Merritt MD  [facility]    There are no Patient Instructions on file for this visit.  Tests to Keep You Healthy    Mammogram: Met on 10/24/2023  Eye Exam: SCHEDULED  Colon Cancer Screening: Met on 4/15/2022  Last Blood Pressure <= 139/89 (8/20/2024): Yes  Last HbA1c < 8 (12/21/2023): Yes

## 2024-08-22 ENCOUNTER — LAB VISIT (OUTPATIENT)
Dept: LAB | Facility: HOSPITAL | Age: 69
End: 2024-08-22
Attending: INTERNAL MEDICINE
Payer: MEDICARE

## 2024-08-22 DIAGNOSIS — N18.32 CHRONIC KIDNEY DISEASE, STAGE 3B: ICD-10-CM

## 2024-08-22 DIAGNOSIS — E11.3393 MODERATE NONPROLIFERATIVE DIABETIC RETINOPATHY OF BOTH EYES WITHOUT MACULAR EDEMA ASSOCIATED WITH TYPE 2 DIABETES MELLITUS: ICD-10-CM

## 2024-08-22 DIAGNOSIS — R79.9 ABNORMAL FINDING OF BLOOD CHEMISTRY, UNSPECIFIED: ICD-10-CM

## 2024-08-22 DIAGNOSIS — Z00.00 ROUTINE GENERAL MEDICAL EXAMINATION AT A HEALTH CARE FACILITY: ICD-10-CM

## 2024-08-22 DIAGNOSIS — Z79.4 TYPE 2 DIABETES MELLITUS WITH LEFT EYE AFFECTED BY MILD NONPROLIFERATIVE RETINOPATHY AND MACULAR EDEMA, WITH LONG-TERM CURRENT USE OF INSULIN: ICD-10-CM

## 2024-08-22 DIAGNOSIS — E11.3212 TYPE 2 DIABETES MELLITUS WITH LEFT EYE AFFECTED BY MILD NONPROLIFERATIVE RETINOPATHY AND MACULAR EDEMA, WITH LONG-TERM CURRENT USE OF INSULIN: ICD-10-CM

## 2024-08-22 DIAGNOSIS — E11.40 NEUROPATHY DUE TO TYPE 2 DIABETES MELLITUS: ICD-10-CM

## 2024-08-22 LAB
ALBUMIN SERPL BCP-MCNC: 3.1 G/DL (ref 3.5–5.2)
ALP SERPL-CCNC: 123 U/L (ref 55–135)
ALT SERPL W/O P-5'-P-CCNC: 11 U/L (ref 10–44)
ANION GAP SERPL CALC-SCNC: 9 MMOL/L (ref 8–16)
AST SERPL-CCNC: 12 U/L (ref 10–40)
BASOPHILS # BLD AUTO: 0.02 K/UL (ref 0–0.2)
BASOPHILS NFR BLD: 0.3 % (ref 0–1.9)
BILIRUB SERPL-MCNC: 0.2 MG/DL (ref 0.1–1)
BUN SERPL-MCNC: 14 MG/DL (ref 8–23)
CALCIUM SERPL-MCNC: 8.8 MG/DL (ref 8.7–10.5)
CHLORIDE SERPL-SCNC: 102 MMOL/L (ref 95–110)
CHOLEST SERPL-MCNC: 183 MG/DL (ref 120–199)
CHOLEST/HDLC SERPL: 5.4 {RATIO} (ref 2–5)
CO2 SERPL-SCNC: 29 MMOL/L (ref 23–29)
CREAT SERPL-MCNC: 1.2 MG/DL (ref 0.5–1.4)
DIFFERENTIAL METHOD BLD: ABNORMAL
EOSINOPHIL # BLD AUTO: 0.1 K/UL (ref 0–0.5)
EOSINOPHIL NFR BLD: 1 % (ref 0–8)
ERYTHROCYTE [DISTWIDTH] IN BLOOD BY AUTOMATED COUNT: 18.7 % (ref 11.5–14.5)
EST. GFR  (NO RACE VARIABLE): 48.7 ML/MIN/1.73 M^2
ESTIMATED AVG GLUCOSE: 174 MG/DL (ref 68–131)
GLUCOSE SERPL-MCNC: 186 MG/DL (ref 70–110)
HBA1C MFR BLD: 7.7 % (ref 4–5.6)
HCT VFR BLD AUTO: 41.3 % (ref 37–48.5)
HDLC SERPL-MCNC: 34 MG/DL (ref 40–75)
HDLC SERPL: 18.6 % (ref 20–50)
HGB BLD-MCNC: 12 G/DL (ref 12–16)
IMM GRANULOCYTES # BLD AUTO: 0.02 K/UL (ref 0–0.04)
IMM GRANULOCYTES NFR BLD AUTO: 0.3 % (ref 0–0.5)
LDLC SERPL CALC-MCNC: 116.6 MG/DL (ref 63–159)
LYMPHOCYTES # BLD AUTO: 2.4 K/UL (ref 1–4.8)
LYMPHOCYTES NFR BLD: 35.1 % (ref 18–48)
MCH RBC QN AUTO: 22.6 PG (ref 27–31)
MCHC RBC AUTO-ENTMCNC: 29.1 G/DL (ref 32–36)
MCV RBC AUTO: 78 FL (ref 82–98)
MONOCYTES # BLD AUTO: 0.5 K/UL (ref 0.3–1)
MONOCYTES NFR BLD: 7.7 % (ref 4–15)
NEUTROPHILS # BLD AUTO: 3.8 K/UL (ref 1.8–7.7)
NEUTROPHILS NFR BLD: 55.6 % (ref 38–73)
NONHDLC SERPL-MCNC: 149 MG/DL
NRBC BLD-RTO: 0 /100 WBC
PLATELET # BLD AUTO: 230 K/UL (ref 150–450)
PMV BLD AUTO: 11.3 FL (ref 9.2–12.9)
POTASSIUM SERPL-SCNC: 4.3 MMOL/L (ref 3.5–5.1)
PROT SERPL-MCNC: 6.9 G/DL (ref 6–8.4)
RBC # BLD AUTO: 5.32 M/UL (ref 4–5.4)
SODIUM SERPL-SCNC: 140 MMOL/L (ref 136–145)
TRIGL SERPL-MCNC: 162 MG/DL (ref 30–150)
TSH SERPL DL<=0.005 MIU/L-ACNC: 1.63 UIU/ML (ref 0.4–4)
WBC # BLD AUTO: 6.78 K/UL (ref 3.9–12.7)

## 2024-08-22 PROCEDURE — 84443 ASSAY THYROID STIM HORMONE: CPT | Performed by: INTERNAL MEDICINE

## 2024-08-22 PROCEDURE — 85025 COMPLETE CBC W/AUTO DIFF WBC: CPT | Performed by: INTERNAL MEDICINE

## 2024-08-22 PROCEDURE — 80061 LIPID PANEL: CPT | Performed by: INTERNAL MEDICINE

## 2024-08-22 PROCEDURE — 83036 HEMOGLOBIN GLYCOSYLATED A1C: CPT | Performed by: INTERNAL MEDICINE

## 2024-08-22 PROCEDURE — 36415 COLL VENOUS BLD VENIPUNCTURE: CPT | Performed by: INTERNAL MEDICINE

## 2024-08-22 PROCEDURE — 80053 COMPREHEN METABOLIC PANEL: CPT | Performed by: INTERNAL MEDICINE

## 2024-08-23 NOTE — PROGRESS NOTES
CHIEF COMPLAINT: Type 2 Diabetes     HPI: Ms. Mercedez Purvis is a 69 y.o. female who was diagnosed with Type 2 DM >15 years ago.  H/o seizures from globally elevated,uncontrolled bg.  A1c 14.7% in 2020.   In 2010, A1c >12%.   H/o cardiac stent placement, on entresto, jardiance.  Just had mounjaro increased to 10 mg weekly.  On 1 metformin 500 mg xr daily.  Egfr 48-50.     +PN, nephropathy, cardiovascular manifestations, DR   Seen by Jackie NP in 4/2024  Being seen by me for the first time.  Doing well w/ omnipod 5 and dexcom g7.    Lab Results   Component Value Date    HGBA1C 7.7 (H) 08/22/2024     PREVIOUS DIABETES MEDICATIONS TRIED  Humalog vials  Levemir   Humalog pens   Mounjaro   Metformin   Jardiance       CURRENT DIABETIC MEDS: humalog vials- see media for settings, mounjaro 10 mg weekly, metformin xr 500 mg qd, jardiance 25 mg daily    On injections 4x a day via pump  Makes frequent changes to his/her insulin regimen on the basis of blood glucose data  Testing 4 x a day  Patient is willing and able to use the device  Demonstrated an understanding of the technology and is motivated to use CGM  Patient expected to adhere to a comprehensive diabetes treatment plan and patient has adequate medical supervision  Patient experiences recurrent, unexplained, severe (lows of <50 mg/dl) hypoglycemia   Has multiple impaired awareness of hypoglycemia (hypoglycemia unawareness)      Diabetes Management Status    Statin: Taking  ACE/ARB: Not taking    Screening or Prevention Patient's value Goal Complete/Controlled?   HgA1C Testing and Control   Lab Results   Component Value Date    HGBA1C 7.7 (H) 08/22/2024      Annually/Less than 8% Yes   Lipid profile : 08/22/2024 Annually Yes   LDL control Lab Results   Component Value Date    LDLCALC 116.6 08/22/2024    Annually/Less than 100 mg/dl  No   Nephropathy screening Lab Results   Component Value Date    LABMICR 37.0 12/21/2023     Lab Results   Component Value Date     PROTEINUA Negative 07/01/2020    Annually Yes   Blood pressure BP Readings from Last 1 Encounters:   08/20/24 116/68    Less than 140/90 Yes   Dilated retinal exam : 07/14/2023 Annually Yes   Foot exam   : 08/20/2024 Annually Yes     REVIEW OF SYSTEMS  General: no weakness, fatigue, no weight changes.   Eyes: no double or blurred vision, eye pain, or redness  Cardiovascular: no chest pain, palpitations, edema, or murmurs.   Respiratory: no cough or dyspnea.   GI: no heartburn, nausea, or changes in bowel patterns; good appetite.   Skin: no rashes, dryness, itching, or reactions at insulin injection sites.  Neuro: +numbness, tingling, tremors, or vertigo.   Endocrine: no polyuria, polydipsia, polyphagia, heat or cold intolerance.     Vital Signs  /78 (BP Location: Left arm, Patient Position: Sitting, BP Method: Medium (Manual))   Pulse 68   Ht 5' (1.524 m)   Wt 88.6 kg (195 lb 5.2 oz)   LMP 01/01/2020 (Approximate)   SpO2 99%   BMI 38.15 kg/m²     Hemoglobin A1C   Date Value Ref Range Status   08/22/2024 7.7 (H) 4.0 - 5.6 % Final     Comment:     ADA Screening Guidelines:  5.7-6.4%  Consistent with prediabetes  >or=6.5%  Consistent with diabetes    High levels of fetal hemoglobin interfere with the HbA1C  assay. Heterozygous hemoglobin variants (HbS, HgC, etc)do  not significantly interfere with this assay.   However, presence of multiple variants may affect accuracy.     12/21/2023 6.8 (H) 4.0 - 5.6 % Final     Comment:     ADA Screening Guidelines:  5.7-6.4%  Consistent with prediabetes  >or=6.5%  Consistent with diabetes    High levels of fetal hemoglobin interfere with the HbA1C  assay. Heterozygous hemoglobin variants (HbS, HgC, etc)do  not significantly interfere with this assay.   However, presence of multiple variants may affect accuracy.     04/21/2023 7.5 (H) 4.0 - 5.6 % Final     Comment:     ADA Screening Guidelines:  5.7-6.4%  Consistent with prediabetes  >or=6.5%  Consistent with  diabetes    High levels of fetal hemoglobin interfere with the HbA1C  assay. Heterozygous hemoglobin variants (HbS, HgC, etc)do  not significantly interfere with this assay.   However, presence of multiple variants may affect accuracy.          Chemistry        Component Value Date/Time     08/22/2024 0923    K 4.3 08/22/2024 0923     08/22/2024 0923    CO2 29 08/22/2024 0923    BUN 14 08/22/2024 0923    CREATININE 1.2 08/22/2024 0923     (H) 08/22/2024 0923        Component Value Date/Time    CALCIUM 8.8 08/22/2024 0923    ALKPHOS 123 08/22/2024 0923    AST 12 08/22/2024 0923    ALT 11 08/22/2024 0923    BILITOT 0.2 08/22/2024 0923    ESTGFRAFRICA >60.0 03/21/2022 0805    EGFRNONAA 58.0 (A) 03/21/2022 0805           Lab Results   Component Value Date    TSH 1.627 08/22/2024      Lab Results   Component Value Date    CHOL 183 08/22/2024    CHOL 152 12/21/2023    CHOL 247 (H) 11/14/2023     Lab Results   Component Value Date    HDL 34 (L) 08/22/2024    HDL 37 (L) 12/21/2023    HDL 43 11/14/2023     Lab Results   Component Value Date    LDLCALC 116.6 08/22/2024    LDLCALC 93.6 12/21/2023    LDLCALC 175.8 (H) 11/14/2023     Lab Results   Component Value Date    TRIG 162 (H) 08/22/2024    TRIG 107 12/21/2023    TRIG 141 11/14/2023     Lab Results   Component Value Date    CHOLHDL 18.6 (L) 08/22/2024    CHOLHDL 24.3 12/21/2023    CHOLHDL 17.4 (L) 11/14/2023         PHYSICAL EXAMINATION  Constitutional: Appears well, no distress Reviewed vitals above.  Eyes: conjunctivae & lids intact; PERRLA, EOMs intact; optic discs   Neck: Supple, trachea midline.   Respiratory: No wheezes, even and unlabored  Cardiovascular: RRR; no edema or varicosities  Lymph: no lymphadenopathy palpated  Skin: warm and dry; no injection site reactions, no acanthosis nigracans observed.  Neuro:patient alert and cooperative, normal affect; steady gait.  Psychiatric: judgement & insight intact, orientation of time, place & person  intact, memory; mood & affect wnl     Diabetes Foot Exam: deferred     Assessment/Plan  1. Type 2 diabetes mellitus with left eye affected by mild nonproliferative retinopathy and macular edema, with long-term current use of insulin  Hemoglobin A1C next time   Discussed higher dose of mounjaro  A1c goal less than 7.5%  Changes today:    Active time: 3.5 hours  Mn--0700 1.2 u/hr   0700-mn 1.5 u/hr    Mn-0700 55  0700-5p 50  5p-mn 50    Mn-10a 1:10   10a-5p 1 :12   5p-mn 1:10   Continue mounjaro, jardiance and metformin  Reviewed kidney functions        2. Type 2 diabetes mellitus with diabetic polyneuropathy, with long-term current use of insulin  F/u with podiatry  Dealing with worsening PN  Sent capsaicin cream and kenalog cream      3. Type 2 diabetes mellitus with right eye affected by mild nonproliferative retinopathy without macular edema, with long-term current use of insulin  F/u with retinal specialist   Stable       4. Neuropathy due to type 2 diabetes mellitus  See above       5. Coronary artery disease of native artery of native heart with stable angina pectoris  F/u with cards   Stable  Avoid hypoglycemia       6. Chronic systolic congestive heart failure  On entresto, f/u with cards       7. Chronic kidney disease, stage 3b  Watch kidney functions  On sglt2i       8. Moderate nonproliferative diabetic retinopathy of both eyes without macular edema associated with type 2 diabetes mellitus  See above       9. Nonischemic cardiomyopathy  Avoid hypoglycemia  F/u w/ cards       10. TIKA (obstructive sleep apnea)  On cpap  Helps with cardiovascular and metabolism       11. Iron deficiency anemia, unspecified iron deficiency anemia type  On supplement  May skew A1c              FOLLOW UP  In 4 months      Time: 30 mins

## 2024-08-26 ENCOUNTER — OFFICE VISIT (OUTPATIENT)
Dept: INTERNAL MEDICINE | Facility: CLINIC | Age: 69
End: 2024-08-26
Payer: MEDICARE

## 2024-08-26 VITALS
WEIGHT: 195.31 LBS | HEART RATE: 68 BPM | OXYGEN SATURATION: 99 % | SYSTOLIC BLOOD PRESSURE: 128 MMHG | DIASTOLIC BLOOD PRESSURE: 78 MMHG | HEIGHT: 60 IN | BODY MASS INDEX: 38.34 KG/M2

## 2024-08-26 DIAGNOSIS — E11.42 TYPE 2 DIABETES MELLITUS WITH DIABETIC POLYNEUROPATHY, WITH LONG-TERM CURRENT USE OF INSULIN: ICD-10-CM

## 2024-08-26 DIAGNOSIS — Z79.4 TYPE 2 DIABETES MELLITUS WITH LEFT EYE AFFECTED BY MILD NONPROLIFERATIVE RETINOPATHY AND MACULAR EDEMA, WITH LONG-TERM CURRENT USE OF INSULIN: Primary | ICD-10-CM

## 2024-08-26 DIAGNOSIS — D50.9 IRON DEFICIENCY ANEMIA, UNSPECIFIED IRON DEFICIENCY ANEMIA TYPE: ICD-10-CM

## 2024-08-26 DIAGNOSIS — I50.22 CHRONIC SYSTOLIC CONGESTIVE HEART FAILURE: ICD-10-CM

## 2024-08-26 DIAGNOSIS — Z79.4 TYPE 2 DIABETES MELLITUS WITH DIABETIC POLYNEUROPATHY, WITH LONG-TERM CURRENT USE OF INSULIN: ICD-10-CM

## 2024-08-26 DIAGNOSIS — N18.32 CHRONIC KIDNEY DISEASE, STAGE 3B: ICD-10-CM

## 2024-08-26 DIAGNOSIS — E11.40 NEUROPATHY DUE TO TYPE 2 DIABETES MELLITUS: ICD-10-CM

## 2024-08-26 DIAGNOSIS — G47.33 OSA (OBSTRUCTIVE SLEEP APNEA): ICD-10-CM

## 2024-08-26 DIAGNOSIS — I42.8 NONISCHEMIC CARDIOMYOPATHY: ICD-10-CM

## 2024-08-26 DIAGNOSIS — E11.3212 TYPE 2 DIABETES MELLITUS WITH LEFT EYE AFFECTED BY MILD NONPROLIFERATIVE RETINOPATHY AND MACULAR EDEMA, WITH LONG-TERM CURRENT USE OF INSULIN: Primary | ICD-10-CM

## 2024-08-26 DIAGNOSIS — E11.3393 MODERATE NONPROLIFERATIVE DIABETIC RETINOPATHY OF BOTH EYES WITHOUT MACULAR EDEMA ASSOCIATED WITH TYPE 2 DIABETES MELLITUS: ICD-10-CM

## 2024-08-26 DIAGNOSIS — I25.118 CORONARY ARTERY DISEASE OF NATIVE ARTERY OF NATIVE HEART WITH STABLE ANGINA PECTORIS: ICD-10-CM

## 2024-08-26 DIAGNOSIS — E11.3291 TYPE 2 DIABETES MELLITUS WITH RIGHT EYE AFFECTED BY MILD NONPROLIFERATIVE RETINOPATHY WITHOUT MACULAR EDEMA, WITH LONG-TERM CURRENT USE OF INSULIN: ICD-10-CM

## 2024-08-26 DIAGNOSIS — Z79.4 TYPE 2 DIABETES MELLITUS WITH RIGHT EYE AFFECTED BY MILD NONPROLIFERATIVE RETINOPATHY WITHOUT MACULAR EDEMA, WITH LONG-TERM CURRENT USE OF INSULIN: ICD-10-CM

## 2024-08-26 PROCEDURE — 3008F BODY MASS INDEX DOCD: CPT | Mod: CPTII,S$GLB,, | Performed by: NURSE PRACTITIONER

## 2024-08-26 PROCEDURE — 1160F RVW MEDS BY RX/DR IN RCRD: CPT | Mod: CPTII,S$GLB,, | Performed by: NURSE PRACTITIONER

## 2024-08-26 PROCEDURE — 1101F PT FALLS ASSESS-DOCD LE1/YR: CPT | Mod: CPTII,S$GLB,, | Performed by: NURSE PRACTITIONER

## 2024-08-26 PROCEDURE — 99214 OFFICE O/P EST MOD 30 MIN: CPT | Mod: S$GLB,,, | Performed by: NURSE PRACTITIONER

## 2024-08-26 PROCEDURE — 3288F FALL RISK ASSESSMENT DOCD: CPT | Mod: CPTII,S$GLB,, | Performed by: NURSE PRACTITIONER

## 2024-08-26 PROCEDURE — 3051F HG A1C>EQUAL 7.0%<8.0%: CPT | Mod: CPTII,S$GLB,, | Performed by: NURSE PRACTITIONER

## 2024-08-26 PROCEDURE — 99999 PR PBB SHADOW E&M-EST. PATIENT-LVL III: CPT | Mod: PBBFAC,,, | Performed by: NURSE PRACTITIONER

## 2024-08-26 PROCEDURE — 1159F MED LIST DOCD IN RCRD: CPT | Mod: CPTII,S$GLB,, | Performed by: NURSE PRACTITIONER

## 2024-08-26 PROCEDURE — 3074F SYST BP LT 130 MM HG: CPT | Mod: CPTII,S$GLB,, | Performed by: NURSE PRACTITIONER

## 2024-08-26 PROCEDURE — 1126F AMNT PAIN NOTED NONE PRSNT: CPT | Mod: CPTII,S$GLB,, | Performed by: NURSE PRACTITIONER

## 2024-08-26 PROCEDURE — 95251 CONT GLUC MNTR ANALYSIS I&R: CPT | Mod: S$GLB,,, | Performed by: NURSE PRACTITIONER

## 2024-08-26 PROCEDURE — 3078F DIAST BP <80 MM HG: CPT | Mod: CPTII,S$GLB,, | Performed by: NURSE PRACTITIONER

## 2024-08-26 PROCEDURE — 4010F ACE/ARB THERAPY RXD/TAKEN: CPT | Mod: CPTII,S$GLB,, | Performed by: NURSE PRACTITIONER

## 2024-08-26 RX ORDER — CAPSAICIN 0 G/G
CREAM TOPICAL
Qty: 42.5 G | Refills: 2 | Status: SHIPPED | OUTPATIENT
Start: 2024-08-26

## 2024-08-26 RX ORDER — TRIAMCINOLONE ACETONIDE 1 MG/G
CREAM TOPICAL 2 TIMES DAILY
Qty: 15 G | Refills: 2 | Status: SHIPPED | OUTPATIENT
Start: 2024-08-26

## 2024-08-26 RX ORDER — MECLIZINE HCL 12.5 MG 12.5 MG/1
25 TABLET ORAL 2 TIMES DAILY PRN
Qty: 30 TABLET | Refills: 6 | Status: SHIPPED | OUTPATIENT
Start: 2024-08-26

## 2024-08-26 NOTE — PATIENT INSTRUCTIONS
Follow up in 4 months w/Irielle   A1c prior -4 months   Jardiance 25 mg daily, mounjaro 10 mg weekly, metformin xr 500 mg daily    Lab Results   Component Value Date    HGBA1C 7.7 (H) 08/22/2024     Active time: 3.5 hours  Mn--0700 1.2 u/hr   0700-mn 1.5 u/hr    Mn-0700 55  0700-5p 50  5p-mn 50    Mn-10a 1:10   10a-5p 1 :12   5p-mn 1:10     [email or web address]  Eat fit brunilda  Myfitnesspal brunilda  [email or web address] - Private Location App    Mysugr brunilda     Goal  no higher than 200

## 2024-08-26 NOTE — Clinical Note
Overall doing okay-made some tweaks via pump for the evening, staying up til midnight at times, eating heavier in the evening

## 2024-08-30 ENCOUNTER — PATIENT OUTREACH (OUTPATIENT)
Dept: ADMINISTRATIVE | Facility: HOSPITAL | Age: 69
End: 2024-08-30
Payer: MEDICARE

## 2024-08-30 DIAGNOSIS — E11.9 TYPE 2 DIABETES MELLITUS WITHOUT COMPLICATION, WITHOUT LONG-TERM CURRENT USE OF INSULIN: Primary | ICD-10-CM

## 2024-10-18 DIAGNOSIS — E11.42 DIABETIC PERIPHERAL NEUROPATHY: ICD-10-CM

## 2024-10-18 DIAGNOSIS — E11.42 TYPE 2 DIABETES MELLITUS WITH DIABETIC POLYNEUROPATHY, WITH LONG-TERM CURRENT USE OF INSULIN: ICD-10-CM

## 2024-10-18 DIAGNOSIS — Z79.4 TYPE 2 DIABETES MELLITUS WITH DIABETIC POLYNEUROPATHY, WITH LONG-TERM CURRENT USE OF INSULIN: ICD-10-CM

## 2024-10-18 NOTE — TELEPHONE ENCOUNTER
No care due was identified.  [facility] Embedded Care Due Messages. Reference number: [id number].   10/18/2024 2:07:54 PM CDT

## 2024-10-21 RX ORDER — GABAPENTIN 300 MG/1
CAPSULE ORAL
Qty: 360 CAPSULE | Refills: 1 | Status: SHIPPED | OUTPATIENT
Start: 2024-10-21

## 2024-11-04 ENCOUNTER — OFFICE VISIT (OUTPATIENT)
Dept: OPTOMETRY | Facility: CLINIC | Age: 69
End: 2024-11-04
Payer: MEDICARE

## 2024-11-04 DIAGNOSIS — E11.3393 MODERATE NONPROLIFERATIVE DIABETIC RETINOPATHY OF BOTH EYES WITHOUT MACULAR EDEMA ASSOCIATED WITH TYPE 2 DIABETES MELLITUS: ICD-10-CM

## 2024-11-04 DIAGNOSIS — H25.13 NUCLEAR SCLEROSIS OF BOTH EYES: Primary | ICD-10-CM

## 2024-11-04 DIAGNOSIS — H04.123 DRY EYE SYNDROME OF BOTH EYES: ICD-10-CM

## 2024-11-04 DIAGNOSIS — H52.7 REFRACTION ERROR: ICD-10-CM

## 2024-11-04 PROCEDURE — 3288F FALL RISK ASSESSMENT DOCD: CPT | Mod: CPTII,S$GLB,, | Performed by: OPTOMETRIST

## 2024-11-04 PROCEDURE — 99999 PR PBB SHADOW E&M-EST. PATIENT-LVL III: CPT | Mod: PBBFAC,,, | Performed by: OPTOMETRIST

## 2024-11-04 PROCEDURE — 1101F PT FALLS ASSESS-DOCD LE1/YR: CPT | Mod: CPTII,S$GLB,, | Performed by: OPTOMETRIST

## 2024-11-04 PROCEDURE — 4010F ACE/ARB THERAPY RXD/TAKEN: CPT | Mod: CPTII,S$GLB,, | Performed by: OPTOMETRIST

## 2024-11-04 PROCEDURE — 92014 COMPRE OPH EXAM EST PT 1/>: CPT | Mod: S$GLB,,, | Performed by: OPTOMETRIST

## 2024-11-04 PROCEDURE — 1126F AMNT PAIN NOTED NONE PRSNT: CPT | Mod: CPTII,S$GLB,, | Performed by: OPTOMETRIST

## 2024-11-04 PROCEDURE — 1159F MED LIST DOCD IN RCRD: CPT | Mod: CPTII,S$GLB,, | Performed by: OPTOMETRIST

## 2024-11-04 PROCEDURE — 3051F HG A1C>EQUAL 7.0%<8.0%: CPT | Mod: CPTII,S$GLB,, | Performed by: OPTOMETRIST

## 2024-11-04 PROCEDURE — 92015 DETERMINE REFRACTIVE STATE: CPT | Mod: S$GLB,,, | Performed by: OPTOMETRIST

## 2024-11-04 PROCEDURE — 2022F DILAT RTA XM EVC RTNOPTHY: CPT | Mod: CPTII,S$GLB,, | Performed by: OPTOMETRIST

## 2024-11-06 NOTE — PROGRESS NOTES
HPI    CC: Pt reports blurry vision at distance and near    LYNDA:07/14/2023 with Dr. Mercedes, 09/11/2023 with Dr. Akins     (+) Changes in vision, pt is here today stating that her vision is still   blurry when looking both up close and at a distance with current Rx   glasses on.    (-) Pain  (-) Irritation   (-) Itching   (-) Flashes  (-) Floaters  (+) Glasses wearer  (-) CL wearer  (-) Uses eye gtts    Does patient want a refraction today? Yes    (-) Eye injury  (-) Eye surgery   (+)POHx, Cat OU and DM ret  (-)FOHx     (+)DM  Hemoglobin A1C       Date                     Value               Ref Range             Status                08/22/2024               7.7 (H)             4.0 - 5.6 %           Final                  12/21/2023               6.8 (H)             4.0 - 5.6 %           Final                  04/21/2023               7.5 (H)             4.0 - 5.6 %           Final                  Last edited by Mirtha Akins, OD on 11/4/2024 10:16 AM.            Assessment /Plan     For exam results, see Encounter Report.    Nuclear sclerosis of both eyes    Moderate nonproliferative diabetic retinopathy of both eyes without macular edema associated with type 2 diabetes mellitus    Dry eye syndrome of both eyes    Refraction error      Educated pt on findings. Visually significant cataracts. Recommend removal. Monitor.     2. Pt followed by Dr. Alanis. She is due for f/u. Monitor as directed.     3. Educated pt on findings. Recommended ATs TID-QID for added lubrication and comfort. If symptoms worsen or dont improve, RTC. Monitor.      4. Updated SRx. Monitor yearly.        RTC with Dr. Alanis as directed and cataract eval as directed.

## 2024-11-15 ENCOUNTER — HOSPITAL ENCOUNTER (INPATIENT)
Facility: HOSPITAL | Age: 69
LOS: 4 days | Discharge: HOME | DRG: 291 | End: 2024-11-19
Attending: STUDENT IN AN ORGANIZED HEALTH CARE EDUCATION/TRAINING PROGRAM | Admitting: STUDENT IN AN ORGANIZED HEALTH CARE EDUCATION/TRAINING PROGRAM
Payer: MEDICARE

## 2024-11-15 DIAGNOSIS — I50.9 HEART FAILURE: ICD-10-CM

## 2024-11-15 DIAGNOSIS — R06.00 DYSPNEA: ICD-10-CM

## 2024-11-15 DIAGNOSIS — I50.9 CHF (CONGESTIVE HEART FAILURE): ICD-10-CM

## 2024-11-15 DIAGNOSIS — I50.9 CONGESTIVE HEART FAILURE, UNSPECIFIED HF CHRONICITY, UNSPECIFIED HEART FAILURE TYPE: Primary | ICD-10-CM

## 2024-11-15 LAB
ALBUMIN SERPL BCP-MCNC: 3.2 G/DL (ref 3.5–5.2)
ALLENS TEST: ABNORMAL
ALP SERPL-CCNC: 119 U/L (ref 40–150)
ALT SERPL W/O P-5'-P-CCNC: 11 U/L (ref 10–44)
ANION GAP SERPL CALC-SCNC: 9 MMOL/L (ref 8–16)
AST SERPL-CCNC: 17 U/L (ref 10–40)
BASOPHILS # BLD AUTO: 0.03 K/UL (ref 0–0.2)
BASOPHILS NFR BLD: 0.3 % (ref 0–1.9)
BILIRUB SERPL-MCNC: 0.2 MG/DL (ref 0.1–1)
BNP SERPL-MCNC: 345 PG/ML (ref 0–99)
BUN SERPL-MCNC: 10 MG/DL (ref 8–23)
CALCIUM SERPL-MCNC: 8.9 MG/DL (ref 8.7–10.5)
CHLORIDE SERPL-SCNC: 104 MMOL/L (ref 95–110)
CO2 SERPL-SCNC: 24 MMOL/L (ref 23–29)
CREAT SERPL-MCNC: 1.1 MG/DL (ref 0.5–1.4)
DIFFERENTIAL METHOD BLD: ABNORMAL
EOSINOPHIL # BLD AUTO: 0.1 K/UL (ref 0–0.5)
EOSINOPHIL NFR BLD: 1.1 % (ref 0–8)
ERYTHROCYTE [DISTWIDTH] IN BLOOD BY AUTOMATED COUNT: 16.9 % (ref 11.5–14.5)
EST. GFR  (NO RACE VARIABLE): 54.1 ML/MIN/1.73 M^2
GLUCOSE SERPL-MCNC: 157 MG/DL (ref 70–110)
HCO3 UR-SCNC: 28.3 MMOL/L (ref 24–28)
HCT VFR BLD AUTO: 40.7 % (ref 37–48.5)
HCV AB SERPL QL IA: NORMAL
HGB BLD-MCNC: 12.2 G/DL (ref 12–16)
HIV 1+2 AB+HIV1 P24 AG SERPL QL IA: NORMAL
IMM GRANULOCYTES # BLD AUTO: 0.03 K/UL (ref 0–0.04)
IMM GRANULOCYTES NFR BLD AUTO: 0.3 % (ref 0–0.5)
INFLUENZA A, MOLECULAR: NEGATIVE
INFLUENZA B, MOLECULAR: NEGATIVE
LYMPHOCYTES # BLD AUTO: 2.3 K/UL (ref 1–4.8)
LYMPHOCYTES NFR BLD: 22.1 % (ref 18–48)
MAGNESIUM SERPL-MCNC: 2.1 MG/DL (ref 1.6–2.6)
MCH RBC QN AUTO: 22.8 PG (ref 27–31)
MCHC RBC AUTO-ENTMCNC: 30 G/DL (ref 32–36)
MCV RBC AUTO: 76 FL (ref 82–98)
MONOCYTES # BLD AUTO: 0.6 K/UL (ref 0.3–1)
MONOCYTES NFR BLD: 5.8 % (ref 4–15)
NEUTROPHILS # BLD AUTO: 7.4 K/UL (ref 1.8–7.7)
NEUTROPHILS NFR BLD: 70.4 % (ref 38–73)
NRBC BLD-RTO: 0 /100 WBC
PCO2 BLDA: 56.5 MMHG (ref 35–45)
PH SMN: 7.31 [PH] (ref 7.35–7.45)
PHOSPHATE SERPL-MCNC: 4.4 MG/DL (ref 2.7–4.5)
PLATELET # BLD AUTO: 245 K/UL (ref 150–450)
PMV BLD AUTO: 12.1 FL (ref 9.2–12.9)
PO2 BLDA: 24 MMHG (ref 40–60)
POC BE: 2 MMOL/L
POC SATURATED O2: 35 % (ref 95–100)
POC TCO2: 30 MMOL/L (ref 24–29)
POTASSIUM SERPL-SCNC: 3.9 MMOL/L (ref 3.5–5.1)
PROT SERPL-MCNC: 7.5 G/DL (ref 6–8.4)
RBC # BLD AUTO: 5.36 M/UL (ref 4–5.4)
SAMPLE: ABNORMAL
SARS-COV-2 RDRP RESP QL NAA+PROBE: NEGATIVE
SITE: ABNORMAL
SODIUM SERPL-SCNC: 137 MMOL/L (ref 136–145)
SPECIMEN SOURCE: NORMAL
TROPONIN I SERPL DL<=0.01 NG/ML-MCNC: 0.05 NG/ML (ref 0–0.03)
WBC # BLD AUTO: 10.52 K/UL (ref 3.9–12.7)

## 2024-11-15 PROCEDURE — 84100 ASSAY OF PHOSPHORUS: CPT | Performed by: STUDENT IN AN ORGANIZED HEALTH CARE EDUCATION/TRAINING PROGRAM

## 2024-11-15 PROCEDURE — 99900035 HC TECH TIME PER 15 MIN (STAT)

## 2024-11-15 PROCEDURE — 83735 ASSAY OF MAGNESIUM: CPT | Performed by: STUDENT IN AN ORGANIZED HEALTH CARE EDUCATION/TRAINING PROGRAM

## 2024-11-15 PROCEDURE — 80053 COMPREHEN METABOLIC PANEL: CPT | Performed by: STUDENT IN AN ORGANIZED HEALTH CARE EDUCATION/TRAINING PROGRAM

## 2024-11-15 PROCEDURE — 84484 ASSAY OF TROPONIN QUANT: CPT | Performed by: STUDENT IN AN ORGANIZED HEALTH CARE EDUCATION/TRAINING PROGRAM

## 2024-11-15 PROCEDURE — 87502 INFLUENZA DNA AMP PROBE: CPT | Performed by: STUDENT IN AN ORGANIZED HEALTH CARE EDUCATION/TRAINING PROGRAM

## 2024-11-15 PROCEDURE — 12000002 HC ACUTE/MED SURGE SEMI-PRIVATE ROOM

## 2024-11-15 PROCEDURE — 63600175 PHARM REV CODE 636 W HCPCS: Performed by: STUDENT IN AN ORGANIZED HEALTH CARE EDUCATION/TRAINING PROGRAM

## 2024-11-15 PROCEDURE — 82803 BLOOD GASES ANY COMBINATION: CPT

## 2024-11-15 PROCEDURE — 87635 SARS-COV-2 COVID-19 AMP PRB: CPT | Performed by: STUDENT IN AN ORGANIZED HEALTH CARE EDUCATION/TRAINING PROGRAM

## 2024-11-15 PROCEDURE — 96374 THER/PROPH/DIAG INJ IV PUSH: CPT

## 2024-11-15 PROCEDURE — 93010 ELECTROCARDIOGRAM REPORT: CPT | Mod: ,,, | Performed by: INTERNAL MEDICINE

## 2024-11-15 PROCEDURE — 87389 HIV-1 AG W/HIV-1&-2 AB AG IA: CPT | Performed by: PHYSICIAN ASSISTANT

## 2024-11-15 PROCEDURE — 93005 ELECTROCARDIOGRAM TRACING: CPT

## 2024-11-15 PROCEDURE — 83880 ASSAY OF NATRIURETIC PEPTIDE: CPT | Performed by: STUDENT IN AN ORGANIZED HEALTH CARE EDUCATION/TRAINING PROGRAM

## 2024-11-15 PROCEDURE — 85025 COMPLETE CBC W/AUTO DIFF WBC: CPT | Performed by: STUDENT IN AN ORGANIZED HEALTH CARE EDUCATION/TRAINING PROGRAM

## 2024-11-15 PROCEDURE — 99285 EMERGENCY DEPT VISIT HI MDM: CPT | Mod: 25

## 2024-11-15 PROCEDURE — 86803 HEPATITIS C AB TEST: CPT | Performed by: PHYSICIAN ASSISTANT

## 2024-11-15 RX ORDER — ATORVASTATIN CALCIUM 40 MG/1
80 TABLET, FILM COATED ORAL NIGHTLY
Status: DISCONTINUED | OUTPATIENT
Start: 2024-11-16 | End: 2024-11-19 | Stop reason: HOSPADM

## 2024-11-15 RX ORDER — FUROSEMIDE 10 MG/ML
80 INJECTION INTRAMUSCULAR; INTRAVENOUS
Status: COMPLETED | OUTPATIENT
Start: 2024-11-15 | End: 2024-11-15

## 2024-11-15 RX ORDER — TALC
6 POWDER (GRAM) TOPICAL NIGHTLY PRN
Status: DISCONTINUED | OUTPATIENT
Start: 2024-11-16 | End: 2024-11-19 | Stop reason: HOSPADM

## 2024-11-15 RX ORDER — ENOXAPARIN SODIUM 100 MG/ML
40 INJECTION SUBCUTANEOUS EVERY 24 HOURS
Status: DISCONTINUED | OUTPATIENT
Start: 2024-11-16 | End: 2024-11-19 | Stop reason: HOSPADM

## 2024-11-15 RX ORDER — FUROSEMIDE 10 MG/ML
40 INJECTION INTRAMUSCULAR; INTRAVENOUS EVERY 12 HOURS
Status: DISCONTINUED | OUTPATIENT
Start: 2024-11-16 | End: 2024-11-17

## 2024-11-15 RX ORDER — GABAPENTIN 300 MG/1
300 CAPSULE ORAL 3 TIMES DAILY
Status: DISCONTINUED | OUTPATIENT
Start: 2024-11-16 | End: 2024-11-19 | Stop reason: HOSPADM

## 2024-11-15 RX ORDER — AMLODIPINE BESYLATE 10 MG/1
10 TABLET ORAL DAILY
Status: DISCONTINUED | OUTPATIENT
Start: 2024-11-16 | End: 2024-11-17

## 2024-11-15 RX ORDER — CYCLOBENZAPRINE HCL 10 MG
10 TABLET ORAL 3 TIMES DAILY PRN
Status: DISCONTINUED | OUTPATIENT
Start: 2024-11-16 | End: 2024-11-19 | Stop reason: HOSPADM

## 2024-11-15 RX ORDER — SODIUM CHLORIDE 0.9 % (FLUSH) 0.9 %
10 SYRINGE (ML) INJECTION
Status: DISCONTINUED | OUTPATIENT
Start: 2024-11-16 | End: 2024-11-19 | Stop reason: HOSPADM

## 2024-11-15 RX ORDER — SPIRONOLACTONE 50 MG/1
50 TABLET, FILM COATED ORAL DAILY
Status: DISCONTINUED | OUTPATIENT
Start: 2024-11-16 | End: 2024-11-18

## 2024-11-15 RX ORDER — CLOPIDOGREL BISULFATE 75 MG/1
75 TABLET ORAL DAILY
Status: DISCONTINUED | OUTPATIENT
Start: 2024-11-16 | End: 2024-11-19 | Stop reason: HOSPADM

## 2024-11-15 RX ORDER — METOPROLOL SUCCINATE 100 MG/1
200 TABLET, EXTENDED RELEASE ORAL DAILY
Status: DISCONTINUED | OUTPATIENT
Start: 2024-11-16 | End: 2024-11-19 | Stop reason: HOSPADM

## 2024-11-15 RX ORDER — ACETAMINOPHEN 325 MG/1
650 TABLET ORAL EVERY 6 HOURS PRN
Status: DISCONTINUED | OUTPATIENT
Start: 2024-11-16 | End: 2024-11-19 | Stop reason: HOSPADM

## 2024-11-15 RX ADMIN — FUROSEMIDE 80 MG: 10 INJECTION, SOLUTION INTRAVENOUS at 10:11

## 2024-11-15 NOTE — Clinical Note
Diagnosis: CHF (congestive heart failure) [197293]   Future Attending Provider: ISAIAH CANELA [[id number]]   Reason for IP Medical Treatment  (Clinical interventions that can only be accomplished in the IP setting? ) :: CHF

## 2024-11-16 PROBLEM — Z96.41 INSULIN PUMP IN PLACE: Status: ACTIVE | Noted: 2024-11-16

## 2024-11-16 LAB
ANION GAP SERPL CALC-SCNC: 12 MMOL/L (ref 8–16)
ASCENDING AORTA: 3.02 CM
AV AREA BY CONTINUOUS VTI: 1.2 CM2
AV INDEX (PROSTH): 0.48
AV LVOT MEAN GRADIENT: 1 MMHG
AV LVOT PEAK GRADIENT: 1 MMHG
AV MEAN GRADIENT: 7.9 MMHG
AV PEAK GRADIENT: 13 MMHG
AV VALVE AREA BY VELOCITY RATIO: 1.3 CM²
AV VALVE AREA: 1.8 CM²
AV VELOCITY RATIO: 0.33
BACTERIA #/AREA URNS AUTO: NORMAL /HPF
BASOPHILS # BLD AUTO: 0.03 K/UL (ref 0–0.2)
BASOPHILS NFR BLD: 0.3 % (ref 0–1.9)
BILIRUB UR QL STRIP: NEGATIVE
BSA FOR ECHO PROCEDURE: 1.87 M2
BUN SERPL-MCNC: 10 MG/DL (ref 8–23)
CALCIUM SERPL-MCNC: 8.8 MG/DL (ref 8.7–10.5)
CHLORIDE SERPL-SCNC: 102 MMOL/L (ref 95–110)
CLARITY UR REFRACT.AUTO: CLEAR
CO2 SERPL-SCNC: 25 MMOL/L (ref 23–29)
COLOR UR AUTO: COLORLESS
CREAT SERPL-MCNC: 1 MG/DL (ref 0.5–1.4)
CV ECHO LV RWT: 0.34 CM
DIFFERENTIAL METHOD BLD: ABNORMAL
DOP CALC AO PEAK VEL: 1.8 M/S
DOP CALC AO VTI: 30 CM
DOP CALC LVOT AREA: 3.8 CM2
DOP CALC LVOT DIAMETER: 2.2 CM
DOP CALC LVOT PEAK VEL: 0.6 M/S
DOP CALC LVOT STROKE VOLUME: 54.3 CM3
DOP CALCLVOT PEAK VEL VTI: 14.3 CM
E WAVE DECELERATION TIME: 134.6 MS
E/A RATIO: 1.41
E/E' RATIO: 21.82 M/S
ECHO EF ESTIMATED: 40 %
ECHO LV POSTERIOR WALL: 0.9 CM (ref 0.6–1.1)
EOSINOPHIL # BLD AUTO: 0 K/UL (ref 0–0.5)
EOSINOPHIL NFR BLD: 0.3 % (ref 0–8)
ERYTHROCYTE [DISTWIDTH] IN BLOOD BY AUTOMATED COUNT: 16.9 % (ref 11.5–14.5)
EST. GFR  (NO RACE VARIABLE): >60 ML/MIN/1.73 M^2
FRACTIONAL SHORTENING: 18.9 % (ref 28–44)
GLUCOSE SERPL-MCNC: 108 MG/DL (ref 70–110)
GLUCOSE UR QL STRIP: ABNORMAL
HCT VFR BLD AUTO: 40.1 % (ref 37–48.5)
HGB BLD-MCNC: 12.3 G/DL (ref 12–16)
HGB UR QL STRIP: NEGATIVE
IMM GRANULOCYTES # BLD AUTO: 0.02 K/UL (ref 0–0.04)
IMM GRANULOCYTES NFR BLD AUTO: 0.2 % (ref 0–0.5)
INTERVENTRICULAR SEPTUM: 0.9 CM (ref 0.6–1.1)
IVRT: 77.07 MS
KETONES UR QL STRIP: NEGATIVE
LA MAJOR: 6.3 CM
LA MINOR: 6.18 CM
LA WIDTH: 4.38 CM
LEFT ATRIUM SIZE: 4.14 CM
LEFT ATRIUM VOLUME INDEX MOD: 28.8 ML/M2
LEFT ATRIUM VOLUME INDEX: 52.8 ML/M2
LEFT ATRIUM VOLUME MOD: 52.5 ML
LEFT ATRIUM VOLUME: 96.17 CM3
LEFT INTERNAL DIMENSION IN SYSTOLE: 4.3 CM (ref 2.1–4)
LEFT VENTRICLE DIASTOLIC VOLUME INDEX: 74.66 ML/M2
LEFT VENTRICLE DIASTOLIC VOLUME: 135.88 ML
LEFT VENTRICLE MASS INDEX: 95.9 G/M2
LEFT VENTRICLE SYSTOLIC VOLUME INDEX: 44.9 ML/M2
LEFT VENTRICLE SYSTOLIC VOLUME: 81.71 ML
LEFT VENTRICULAR INTERNAL DIMENSION IN DIASTOLE: 5.3 CM (ref 3.5–6)
LEFT VENTRICULAR MASS: 174.5 G
LEUKOCYTE ESTERASE UR QL STRIP: NEGATIVE
LV LATERAL E/E' RATIO: 15
LV SEPTAL E/E' RATIO: 40
LYMPHOCYTES # BLD AUTO: 1.7 K/UL (ref 1–4.8)
LYMPHOCYTES NFR BLD: 15.8 % (ref 18–48)
MCH RBC QN AUTO: 23.1 PG (ref 27–31)
MCHC RBC AUTO-ENTMCNC: 30.7 G/DL (ref 32–36)
MCV RBC AUTO: 75 FL (ref 82–98)
MICROSCOPIC COMMENT: NORMAL
MONOCYTES # BLD AUTO: 0.8 K/UL (ref 0.3–1)
MONOCYTES NFR BLD: 8 % (ref 4–15)
MV PEAK A VEL: 0.85 M/S
MV PEAK E VEL: 1.2 M/S
NEUTROPHILS # BLD AUTO: 7.9 K/UL (ref 1.8–7.7)
NEUTROPHILS NFR BLD: 75.4 % (ref 38–73)
NITRITE UR QL STRIP: POSITIVE
NRBC BLD-RTO: 0 /100 WBC
OHS CV RV/LV RATIO: 0.57 CM
OHS QRS DURATION: 102 MS
OHS QRS DURATION: 104 MS
OHS QTC CALCULATION: 474 MS
OHS QTC CALCULATION: 482 MS
PH UR STRIP: 6 [PH] (ref 5–8)
PLATELET # BLD AUTO: 251 K/UL (ref 150–450)
PMV BLD AUTO: 12.1 FL (ref 9.2–12.9)
POCT GLUCOSE: 139 MG/DL (ref 70–110)
POCT GLUCOSE: 77 MG/DL (ref 70–110)
POTASSIUM SERPL-SCNC: 3.9 MMOL/L (ref 3.5–5.1)
PROT UR QL STRIP: NEGATIVE
RA MAJOR: 4.43 CM
RA PRESSURE ESTIMATED: 3 MMHG
RA WIDTH: 2.9 CM
RBC # BLD AUTO: 5.33 M/UL (ref 4–5.4)
RBC #/AREA URNS AUTO: 0 /HPF (ref 0–4)
RIGHT VENTRICLE DIASTOLIC BASEL DIMENSION: 3 CM
RV TISSUE DOPPLER FREE WALL SYSTOLIC VELOCITY 1 (APICAL 4 CHAMBER VIEW): 10.38 CM/S
SINUS: 2.58 CM
SODIUM SERPL-SCNC: 139 MMOL/L (ref 136–145)
SP GR UR STRIP: 1 (ref 1–1.03)
STJ: 2.12 CM
TDI LATERAL: 0.08 M/S
TDI SEPTAL: 0.03 M/S
TDI: 0.06 M/S
TRICUSPID ANNULAR PLANE SYSTOLIC EXCURSION: 1.47 CM
URN SPEC COLLECT METH UR: ABNORMAL
WBC # BLD AUTO: 10.42 K/UL (ref 3.9–12.7)
WBC #/AREA URNS AUTO: 0 /HPF (ref 0–5)
Z-SCORE OF LEFT VENTRICULAR DIMENSION IN END DIASTOLE: 0.51
Z-SCORE OF LEFT VENTRICULAR DIMENSION IN END SYSTOLE: 2.56

## 2024-11-16 PROCEDURE — 25000003 PHARM REV CODE 250: Performed by: INTERNAL MEDICINE

## 2024-11-16 PROCEDURE — 99900035 HC TECH TIME PER 15 MIN (STAT)

## 2024-11-16 PROCEDURE — 36415 COLL VENOUS BLD VENIPUNCTURE: CPT | Performed by: INTERNAL MEDICINE

## 2024-11-16 PROCEDURE — 85025 COMPLETE CBC W/AUTO DIFF WBC: CPT | Performed by: INTERNAL MEDICINE

## 2024-11-16 PROCEDURE — 94799 UNLISTED PULMONARY SVC/PX: CPT

## 2024-11-16 PROCEDURE — 63600175 PHARM REV CODE 636 W HCPCS: Performed by: PHYSICIAN ASSISTANT

## 2024-11-16 PROCEDURE — 27100171 HC OXYGEN HIGH FLOW UP TO 24 HOURS

## 2024-11-16 PROCEDURE — 94761 N-INVAS EAR/PLS OXIMETRY MLT: CPT

## 2024-11-16 PROCEDURE — 99222 1ST HOSP IP/OBS MODERATE 55: CPT | Mod: ,,, | Performed by: PHYSICIAN ASSISTANT

## 2024-11-16 PROCEDURE — 94660 CPAP INITIATION&MGMT: CPT

## 2024-11-16 PROCEDURE — 27000190 HC CPAP FULL FACE MASK W/VALVE

## 2024-11-16 PROCEDURE — 63600175 PHARM REV CODE 636 W HCPCS: Performed by: INTERNAL MEDICINE

## 2024-11-16 PROCEDURE — 20600001 HC STEP DOWN PRIVATE ROOM

## 2024-11-16 PROCEDURE — 80048 BASIC METABOLIC PNL TOTAL CA: CPT | Performed by: INTERNAL MEDICINE

## 2024-11-16 PROCEDURE — 81001 URINALYSIS AUTO W/SCOPE: CPT

## 2024-11-16 RX ORDER — INSULIN ASPART 100 [IU]/ML
0-3 INJECTION, SOLUTION INTRAVENOUS; SUBCUTANEOUS CONTINUOUS
Status: DISCONTINUED | OUTPATIENT
Start: 2024-11-16 | End: 2024-11-19 | Stop reason: HOSPADM

## 2024-11-16 RX ORDER — GLUCAGON 1 MG
1 KIT INJECTION
Status: DISCONTINUED | OUTPATIENT
Start: 2024-11-16 | End: 2024-11-16

## 2024-11-16 RX ORDER — INSULIN ASPART 100 [IU]/ML
0-10 INJECTION, SOLUTION INTRAVENOUS; SUBCUTANEOUS
Status: DISCONTINUED | OUTPATIENT
Start: 2024-11-16 | End: 2024-11-19 | Stop reason: HOSPADM

## 2024-11-16 RX ORDER — INSULIN ASPART 100 [IU]/ML
0-5 INJECTION, SOLUTION INTRAVENOUS; SUBCUTANEOUS
Status: DISCONTINUED | OUTPATIENT
Start: 2024-11-16 | End: 2024-11-16

## 2024-11-16 RX ORDER — IBUPROFEN 200 MG
24 TABLET ORAL
Status: DISCONTINUED | OUTPATIENT
Start: 2024-11-16 | End: 2024-11-16

## 2024-11-16 RX ORDER — IBUPROFEN 200 MG
24 TABLET ORAL
Status: DISCONTINUED | OUTPATIENT
Start: 2024-11-16 | End: 2024-11-19 | Stop reason: HOSPADM

## 2024-11-16 RX ORDER — GLUCAGON 1 MG
1 KIT INJECTION
Status: DISCONTINUED | OUTPATIENT
Start: 2024-11-16 | End: 2024-11-19 | Stop reason: HOSPADM

## 2024-11-16 RX ORDER — IBUPROFEN 200 MG
16 TABLET ORAL
Status: DISCONTINUED | OUTPATIENT
Start: 2024-11-16 | End: 2024-11-19 | Stop reason: HOSPADM

## 2024-11-16 RX ORDER — IBUPROFEN 200 MG
16 TABLET ORAL
Status: DISCONTINUED | OUTPATIENT
Start: 2024-11-16 | End: 2024-11-16

## 2024-11-16 RX ADMIN — SACUBITRIL AND VALSARTAN 1 TABLET: 97; 103 TABLET, FILM COATED ORAL at 09:11

## 2024-11-16 RX ADMIN — AMLODIPINE BESYLATE 10 MG: 10 TABLET ORAL at 09:11

## 2024-11-16 RX ADMIN — AMITRIPTYLINE HYDROCHLORIDE 75 MG: 25 TABLET, FILM COATED ORAL at 09:11

## 2024-11-16 RX ADMIN — SPIRONOLACTONE 50 MG: 50 TABLET ORAL at 09:11

## 2024-11-16 RX ADMIN — ENOXAPARIN SODIUM 40 MG: 40 INJECTION SUBCUTANEOUS at 04:11

## 2024-11-16 RX ADMIN — GABAPENTIN 300 MG: 300 CAPSULE ORAL at 09:11

## 2024-11-16 RX ADMIN — FUROSEMIDE 40 MG: 10 INJECTION, SOLUTION INTRAVENOUS at 09:11

## 2024-11-16 RX ADMIN — GABAPENTIN 300 MG: 300 CAPSULE ORAL at 03:11

## 2024-11-16 RX ADMIN — CYCLOBENZAPRINE 10 MG: 10 TABLET, FILM COATED ORAL at 02:11

## 2024-11-16 RX ADMIN — CLOPIDOGREL BISULFATE 75 MG: 75 TABLET ORAL at 09:11

## 2024-11-16 RX ADMIN — INSULIN ASPART 1 UNITS/HR: 100 INJECTION, SOLUTION INTRAVENOUS; SUBCUTANEOUS at 01:11

## 2024-11-16 RX ADMIN — SACUBITRIL AND VALSARTAN 1 TABLET: 97; 103 TABLET, FILM COATED ORAL at 02:11

## 2024-11-16 RX ADMIN — METOPROLOL SUCCINATE 200 MG: 100 TABLET, EXTENDED RELEASE ORAL at 09:11

## 2024-11-16 RX ADMIN — ATORVASTATIN CALCIUM 80 MG: 40 TABLET, FILM COATED ORAL at 09:11

## 2024-11-16 NOTE — PLAN OF CARE
[facility] - Stepdown Flex ([place]-14)  Initial Discharge Assessment       Primary Care Provider: Dottie Merritt MD    Admission Diagnosis: CHF (congestive heart failure) [I50.9]  Dyspnea [R06.00]  Heart failure [I50.9]  Congestive heart failure, unspecified HF chronicity, unspecified heart failure type [I50.9]    Admission Date: 11/15/2024  Expected Discharge Date:     Transition of Care Barriers: None    Payor: [employer] [facility] / Plan: [employer] / Product Type: Medicare Advantage /     Extended Emergency Contact Information  Primary Emergency Contact: Nawaf Murray   [place]  Mobile Phone: [phone]  Relation: Friend  Preferred language: English   needed? No    Discharge Plan A: Home  Discharge Plan B: Home Health      [facility] [address] [place] [address]  Phone: [phone] Fax: [phone]    [facility] ([facility]) #[id number] - [place] - [address]  [address]  [address]  [place] [address]  Phone: [phone] Fax: [phone]      Initial Assessment (most recent)       Adult Discharge Assessment - 11/16/24 1613          Discharge Assessment    Assessment Type Discharge Planning Assessment     Confirmed/corrected address, phone number and insurance Yes     Confirmed Demographics Correct on Facesheet     Source of Information patient     Does patient/caregiver understand observation status Yes     Communicated PEGGY with patient/caregiver Yes     Reason For Admission CHF     People in Home alone     Do you expect to return to your current living situation? Yes     Prior to hospitilization cognitive status: Alert/Oriented     Current cognitive status: Alert/Oriented     Walking or Climbing Stairs Difficulty no     Dressing/Bathing Difficulty no     Equipment Currently Used at Home CPAP     Readmission within 30 days? No     Patient currently being followed by outpatient case  management? No     Do you take prescription medications? Yes     Do you have prescription coverage? Yes     Coverage PHN     Do you have any problems affording any of your prescribed medications? No     Is the patient taking medications as prescribed? yes     Who is going to help you get home at discharge? MurrayNawaf (Friend)  [phone]     How do you get to doctors appointments? family or friend will provide     Are you on dialysis? No     Do you take coumadin? No     Discharge Plan A Home     Discharge Plan B Home Health     DME Needed Upon Discharge  none     Discharge Plan discussed with: Patient     Transition of Care Barriers None        Physical Activity    On average, how many days per week do you engage in moderate to strenuous exercise (like a brisk walk)? Patient declined     On average, how many minutes do you engage in exercise at this level? Patient declined        Financial Resource Strain    How hard is it for you to pay for the very basics like food, housing, medical care, and heating? Not very hard        Housing Stability    In the last 12 months, was there a time when you were not able to pay the mortgage or rent on time? No     At any time in the past 12 months, were you homeless or living in a shelter (including now)? No        Transportation Needs    Has the lack of transportation kept you from medical appointments, meetings, work or from getting things needed for daily living? No        Food Insecurity    Within the past 12 months, you worried that your food would run out before you got the money to buy more. Never true     Within the past 12 months, the food you bought just didn't last and you didn't have money to get more. Never true        Stress    Do you feel stress - tense, restless, nervous, or anxious, or unable to sleep at night because your mind is troubled all the time - these days? Not at all        Social Isolation    How often do you feel lonely or isolated from those  around you?  Never        Alcohol Use    Q1: How often do you have a drink containing alcohol? Never     Q2: How many drinks containing alcohol do you have on a typical day when you are drinking? Patient does not drink     Q3: How often do you have six or more drinks on one occasion? Never                   SW met with pt to discuss discharge planning.  Pt lives alone and doesn't need assistance with ambulation and ADLs.  No dialysis.  Pt will have transportation and assistance from friend at discharge.  Discharge Plan A and Plan B have been determined by review of patient's clinical status, future medical and therapeutic needs, and coverage/benefits for post-acute care in coordination with multidisciplinary team members.        Emma Ponce MSW, CSW

## 2024-11-16 NOTE — SUBJECTIVE & OBJECTIVE
Past Medical History:   Diagnosis Date    Chronic headache     Diabetes     Heart attack 2004    8 stents    Heart failure     History of heart artery stent     Hyperlipemia     Hypertension     Obesity (BMI 30-39.9)     Yeast infection        Past Surgical History:   Procedure Laterality Date    CARDIAC CATHETERIZATION      INJECTION OF STEROID Left 03/24/2023    Procedure: INJECTION, STEROID,LEFT RING FINGER;  Surgeon: Rona Lema MD;  Location: [facility] OR;  Service: Orthopedics;  Laterality: Left;    TRIGGER FINGER RELEASE Right 03/24/2023    Procedure: RELEASE, TRIGGER FINGER, RIGHT LONG;  Surgeon: Rona Lema MD;  Location: [facility] OR;  Service: Orthopedics;  Laterality: Right;    TUBAL LIGATION         Review of patient's allergies indicates:  No Known Allergies    No current facility-administered medications on file prior to encounter.     Current Outpatient Medications on File Prior to Encounter   Medication Sig    amitriptyline (ELAVIL) 75 MG tablet Take 1 tablet (75 mg total) by mouth every evening.    amLODIPine (NORVASC) 10 MG tablet Take 1 tablet (10 mg total) by mouth once daily.    atorvastatin (LIPITOR) 80 MG tablet Take 1 tablet (80 mg total) by mouth every evening.    blood sugar diagnostic Strp 1 each by Misc.(Non-Drug; Combo Route) route 4 (four) times daily.    blood-glucose meter,continuous Misc 1 Device by Misc.(Non-Drug; Combo Route) route continuous.    blood-glucose sensor (DEXCOM G7 SENSOR) Roberta 1 each by Misc.(Non-Drug; Combo Route) route every 10 days.    capsaicin 0.1 % Crea Apply 1-2 times a day    ciclopirox (PENLAC) 8 % Soln Apply topically nightly.    clopidogreL (PLAVIX) 75 mg tablet Take 1 tablet (75 mg total) by mouth once daily.    clotrimazole-betamethasone 1-0.05% (LOTRISONE) cream Apply topically 2 (two) times daily.    cyclobenzaprine (FLEXERIL) 10 MG tablet Take 1 tablet (10 mg total) by mouth 3 times daily    diclofenac sodium (VOLTAREN) 1 % Gel Apply  "topically once daily.    empagliflozin (JARDIANCE) 25 mg tablet Take 1 tablet (25 mg total) by mouth once daily.    fluconazole (DIFLUCAN) 150 MG Tab Take 1 dose by mouth. May repeat after 72 hrs for yeast infection.    furosemide (LASIX) 40 MG tablet Take 1 tablet (40 mg total) by mouth once daily.    gabapentin (NEURONTIN) 300 MG capsule Take 1 capsule (300 mg total) by mouth 2 (two) times daily AND 2 capsules (600 mg total) every evening.    glucagon (BAQSIMI) 3 mg/actuation Spry 1 each by Nasal route daily as needed (if glucose is 70 or less.).    insulin detemir U-100 (LEVEMIR FLEXTOUCH U-100 INSULN) 100 unit/mL (3 mL) InPn pen Inject 40 Units into the skin once daily. FOR EMERGENCY USE ONLY - BACK UP INSULIN, IF OFF OF YOUR INSULIN PUMP - USE 40 UNITS INJECTION DAILY UNTIL GETTING BACK ON PUMP.    insulin lispro (HUMALOG U-100 INSULIN) 100 unit/mL injection Inject 100 Units into the skin continuous. Gives up to 100 units daily via Omnipod. Do not directly inject - only use within pods.    insulin pump cart,automated,BT (OMNIPOD 5 G6 PODS, GEN 5,) Crtg Inject 1 each into the skin every other day.    LIDOcaine (XYLOCAINE) 5 % Oint ointment Apply topically as needed.    LIDOcaine HCL 2% (XYLOCAINE) 2 % jelly Apply topically as needed. Apply topically once nightly to affected part of foot/feet.    LIDOcaine-prilocaine (EMLA) cream Apply topically 2 (two) times a day.    meclizine (ANTIVERT) 12.5 mg tablet Take 2 tablets (25 mg total) by mouth 2 (two) times daily as needed for Dizziness.    metFORMIN (GLUCOPHAGE-XR) 500 MG ER 24hr tablet Take 1 tablet (500 mg total) by mouth every morning.    metoprolol succinate (TOPROL-XL) 200 MG 24 hr tablet Take 1 tablet (200 mg total) by mouth once daily.    pen needle, diabetic (PEN NEEDLE) 32 gauge x 5/32" Ndle 1 each by Misc.(Non-Drug; Combo Route) route 4 (four) times daily. ICD 10 E11.42    sacubitriL-valsartan (ENTRESTO)  mg per tablet Take 1 tablet by mouth 2 " (two) times daily.    spironolactone (ALDACTONE) 50 MG tablet Take 1 tablet (50 mg total) by mouth once daily.    tirzepatide 10 mg/0.5 mL PnIj Inject 10 mg into the skin every 7 days.    triamcinolone acetonide 0.1% (KENALOG) 0.1 % cream Apply topically 2 (two) times daily.    [DISCONTINUED] insulin aspart U-100 (NOVOLOG) 100 unit/mL (3 mL) InPn pen Inject 25 Units into the skin 3 (three) times daily with meals.     Family History       Problem Relation (Age of Onset)    Arthritis Brother    Diabetes Brother    Heart disease Brother    Hypertension Brother    Stroke Father          Tobacco Use    Smoking status: Never    Smokeless tobacco: Never   Substance and Sexual Activity    Alcohol use: Never    Drug use: Never    Sexual activity: Yes     Partners: Male     Review of Systems   Constitutional:  Negative for activity change, appetite change, chills and fever.   HENT:  Negative for congestion, hearing loss and rhinorrhea.    Eyes:  Negative for discharge, itching and visual disturbance.   Respiratory:  Positive for shortness of breath. Negative for apnea and cough.    Cardiovascular:  Negative for chest pain, palpitations and leg swelling.   Gastrointestinal:  Positive for abdominal pain. Negative for abdominal distention, constipation, diarrhea, nausea and vomiting.   Endocrine: Negative for cold intolerance and heat intolerance.   Genitourinary:  Negative for dysuria and hematuria.   Musculoskeletal:  Negative for back pain, neck pain and neck stiffness.   Skin:  Negative for rash and wound.   Neurological:  Negative for dizziness, seizures, light-headedness and headaches.   Psychiatric/Behavioral:  Negative for agitation, confusion and suicidal ideas.      Objective:     Vital Signs (Most Recent):  Temp: 98.3 °F (36.8 °C) (11/15/24 2144)  Pulse: 91 (11/15/24 2302)  Resp: 19 (11/15/24 2302)  BP: 127/70 (11/15/24 2302)  SpO2: 97 % (11/15/24 2302) Vital Signs (24h Range):  Temp:  [98.3 °F (36.8 °C)] 98.3 °F  (36.8 °C)  Pulse:  [] 91  Resp:  [13-35] 19  SpO2:  [95 %-100 %] 97 %  BP: (109-127)/(61-70) 127/70     Weight: 85.7 kg (189 lb)  Body mass index is 33.48 kg/m².     Physical Exam  Vitals reviewed.   Constitutional:       General: She is not in acute distress.     Appearance: She is well-developed.   HENT:      Head: Normocephalic and atraumatic.      Nose: Nose normal. No rhinorrhea.      Mouth/Throat:      Mouth: Mucous membranes are moist.   Eyes:      General: No scleral icterus.        Right eye: No discharge.         Left eye: No discharge.      Pupils: Pupils are equal, round, and reactive to light.   Neck:      Vascular: No JVD.   Cardiovascular:      Rate and Rhythm: Normal rate and regular rhythm.      Heart sounds: Normal heart sounds. No murmur heard.     No friction rub.   Pulmonary:      Effort: Pulmonary effort is normal. No respiratory distress.      Breath sounds: Rales present. No wheezing.   Abdominal:      General: Bowel sounds are normal. There is no distension.      Palpations: Abdomen is soft.      Tenderness: There is no abdominal tenderness.   Musculoskeletal:         General: No deformity. Normal range of motion.      Cervical back: Normal range of motion and neck supple.      Right lower leg: Edema present.      Left lower leg: Edema present.   Skin:     General: Skin is warm and dry.   Neurological:      General: No focal deficit present.      Mental Status: She is alert and oriented to person, place, and time.   Psychiatric:         Mood and Affect: Mood normal.         Behavior: Behavior normal.              CRANIAL NERVES     CN III, IV, VI   Pupils are equal, round, and reactive to light.       Significant Labs: All pertinent labs within the past 24 hours have been reviewed.  CBC:   Recent Labs   Lab 11/15/24  2143   WBC 10.52   HGB 12.2   HCT 40.7        CMP:   Recent Labs   Lab 11/15/24  2143      K 3.9      CO2 24   *   BUN 10   CREATININE 1.1    CALCIUM 8.9   PROT 7.5   ALBUMIN 3.2*   BILITOT 0.2   ALKPHOS 119   AST 17   ALT 11   ANIONGAP 9     Cardiac Markers:   Recent Labs   Lab 11/15/24  2143   *     Troponin:   Recent Labs   Lab 11/15/24  2143   TROPONINI 0.052*       Significant Imaging: I have reviewed all pertinent imaging results/findings within the past 24 hours.

## 2024-11-16 NOTE — SUBJECTIVE & OBJECTIVE
Interval HPI:   Admitted overnight. Patient in room 85976/26457 A. Blood glucose stable. BG at goal on current insulin regimen (Home Insulin Pump). Steroid use- None .      Renal function- Normal   Vasopressors-  None     Diet Low Sodium, 2gm Fluid - 1500mL     Eatin%  Nausea: No  Hypoglycemia and intervention: No  Fever: No  TPN and/or TF: No    PMH, PSH, FH, SH updated and reviewed     ROS:     Review of Systems   Constitutional:  Negative for appetite change and fatigue.   Gastrointestinal:  Negative for nausea.       Current Medications and/or Treatments Impacting Glycemic Control  Immunotherapy:    Immunosuppressants       None          Steroids:   Hormones (From admission, onward)      Start     Stop Route Frequency Ordered    24 0046  melatonin tablet 6 mg         -- Oral Nightly PRN 11/15/24 2348          Pressors:    Autonomic Drugs (From admission, onward)      None          Hyperglycemia/Diabetes Medications:   Antihyperglycemics (From admission, onward)      Start     Stop Route Frequency Ordered    24 0104  insulin aspart U-100 pen 0-5 Units         -- SubQ Before meals & nightly PRN 24 0005             PHYSICAL EXAMINATION:  Vitals:    24 0755   BP:    Pulse: 78   Resp:    Temp:      Body mass index is 30.77 kg/m².     Physical Exam  Constitutional:       General: She is not in acute distress.     Appearance: She is not ill-appearing.   HENT:      Head: Normocephalic and atraumatic.   Abdominal:      General: There is no distension.   Psychiatric:         Mood and Affect: Mood normal.         Behavior: Behavior normal.

## 2024-11-16 NOTE — ASSESSMENT & PLAN NOTE
BG goal: 140-180  T2DM on Omnipod 5.  Forgot PDM.  Blood sugar stable.  Patient reports PDM and rest of supplies to be delivered today at 2:00 p.m. If unable to get supplies and PDM, likely will need to switch to transition drip    - -Continue Insulin pump    - POCT Glucose before meals, at bedtime and at 2 am  - Hypoglycemia protocol in place      ** Please notify Endocrine for any change and/or advance in diet**  ** Please call Endocrine for any BG related issues **     Discharge Planning:   TBD. Please notify endocrinology prior to discharge.

## 2024-11-16 NOTE — H&P
[facility] Emergency Dept  Hospital Medicine  History & Physical    Patient Name: Mercedez Purvis  MRN: [id number]  Patient Class: IP- Inpatient  Admission Date: 11/15/2024  Attending Physician: Janee Elkins MD   Primary Care Provider: Dottie Merritt MD         Patient information was obtained from patient, past medical records, and ER records.     Subjective:     Principal Problem:Acute on chronic systolic congestive heart failure    Chief Complaint:   Chief Complaint   Patient presents with    Shortness of Breath     Per EMS pt coming from home with CHF exacerbation. Pt tripoding on scene. Pt came in on CPAP and received 4 sprays of nitro        HPI: 68 yo female with CAD s/p stenting, DM2 on insulin pump, neuropathy, HTN, hyperaldosteronism, HPLD, CHF, CKD, TIKA presenting for acute shortness of breath. She was found tripoding on arrival of EMS, was placed on CPAP and given 4 nitro sprays. She arrived to the ER on CPAP and was able to be weaned down to 2L of oxygen. She was helping her sister move this afternoon, she states that she does not always take her medications when she goes out, but states she has only been sleeping on one pillow, she denies any chest pain but does have some lower abd pain. She has not noticed any leg swelling.     She was given 40mg IV lasix in the ER. BNP elevated, troponin mildly elevated. Medicine called for admission.     Past Medical History:   Diagnosis Date    Chronic headache     Diabetes     Heart attack 2004    8 stents    Heart failure     History of heart artery stent     Hyperlipemia     Hypertension     Obesity (BMI 30-39.9)     Yeast infection        Past Surgical History:   Procedure Laterality Date    CARDIAC CATHETERIZATION      INJECTION OF STEROID Left 03/24/2023    Procedure: INJECTION, STEROID,LEFT RING FINGER;  Surgeon: Rona Lema MD;  Location: [facility] OR;  Service: Orthopedics;  Laterality: Left;    TRIGGER FINGER RELEASE Right 03/24/2023     Procedure: RELEASE, TRIGGER FINGER, RIGHT LONG;  Surgeon: Rona Lema MD;  Location: [facility];  Service: Orthopedics;  Laterality: Right;    TUBAL LIGATION         Review of patient's allergies indicates:  No Known Allergies    No current facility-administered medications on file prior to encounter.     Current Outpatient Medications on File Prior to Encounter   Medication Sig    amitriptyline (ELAVIL) 75 MG tablet Take 1 tablet (75 mg total) by mouth every evening.    amLODIPine (NORVASC) 10 MG tablet Take 1 tablet (10 mg total) by mouth once daily.    atorvastatin (LIPITOR) 80 MG tablet Take 1 tablet (80 mg total) by mouth every evening.    blood sugar diagnostic Strp 1 each by Misc.(Non-Drug; Combo Route) route 4 (four) times daily.    blood-glucose meter,continuous Misc 1 Device by Misc.(Non-Drug; Combo Route) route continuous.    blood-glucose sensor (DEXCOM G7 SENSOR) Roberta 1 each by Misc.(Non-Drug; Combo Route) route every 10 days.    capsaicin 0.1 % Crea Apply 1-2 times a day    ciclopirox (PENLAC) 8 % Soln Apply topically nightly.    clopidogreL (PLAVIX) 75 mg tablet Take 1 tablet (75 mg total) by mouth once daily.    clotrimazole-betamethasone 1-0.05% (LOTRISONE) cream Apply topically 2 (two) times daily.    cyclobenzaprine (FLEXERIL) 10 MG tablet Take 1 tablet (10 mg total) by mouth 3 times daily    diclofenac sodium (VOLTAREN) 1 % Gel Apply topically once daily.    empagliflozin (JARDIANCE) 25 mg tablet Take 1 tablet (25 mg total) by mouth once daily.    fluconazole (DIFLUCAN) 150 MG Tab Take 1 dose by mouth. May repeat after 72 hrs for yeast infection.    furosemide (LASIX) 40 MG tablet Take 1 tablet (40 mg total) by mouth once daily.    gabapentin (NEURONTIN) 300 MG capsule Take 1 capsule (300 mg total) by mouth 2 (two) times daily AND 2 capsules (600 mg total) every evening.    glucagon (BAQSIMI) 3 mg/actuation Spry 1 each by Nasal route daily as needed (if glucose is 70 or less.).    insulin  "detemir U-100 (LEVEMIR FLEXTOUCH U-100 INSULN) 100 unit/mL (3 mL) InPn pen Inject 40 Units into the skin once daily. FOR EMERGENCY USE ONLY - BACK UP INSULIN, IF OFF OF YOUR INSULIN PUMP - USE 40 UNITS INJECTION DAILY UNTIL GETTING BACK ON PUMP.    insulin lispro (HUMALOG U-100 INSULIN) 100 unit/mL injection Inject 100 Units into the skin continuous. Gives up to 100 units daily via Omnipod. Do not directly inject - only use within pods.    insulin pump cart,automated,BT (OMNIPOD 5 G6 PODS, GEN 5,) Crtg Inject 1 each into the skin every other day.    LIDOcaine (XYLOCAINE) 5 % Oint ointment Apply topically as needed.    LIDOcaine HCL 2% (XYLOCAINE) 2 % jelly Apply topically as needed. Apply topically once nightly to affected part of foot/feet.    LIDOcaine-prilocaine (EMLA) cream Apply topically 2 (two) times a day.    meclizine (ANTIVERT) 12.5 mg tablet Take 2 tablets (25 mg total) by mouth 2 (two) times daily as needed for Dizziness.    metFORMIN (GLUCOPHAGE-XR) 500 MG ER 24hr tablet Take 1 tablet (500 mg total) by mouth every morning.    metoprolol succinate (TOPROL-XL) 200 MG 24 hr tablet Take 1 tablet (200 mg total) by mouth once daily.    pen needle, diabetic (PEN NEEDLE) 32 gauge x 5/32" Ndle 1 each by Misc.(Non-Drug; Combo Route) route 4 (four) times daily. ICD 10 E11.42    sacubitriL-valsartan (ENTRESTO)  mg per tablet Take 1 tablet by mouth 2 (two) times daily.    spironolactone (ALDACTONE) 50 MG tablet Take 1 tablet (50 mg total) by mouth once daily.    tirzepatide 10 mg/0.5 mL PnIj Inject 10 mg into the skin every 7 days.    triamcinolone acetonide 0.1% (KENALOG) 0.1 % cream Apply topically 2 (two) times daily.    [DISCONTINUED] insulin aspart U-100 (NOVOLOG) 100 unit/mL (3 mL) InPn pen Inject 25 Units into the skin 3 (three) times daily with meals.     Family History       Problem Relation (Age of Onset)    Arthritis Brother    Diabetes Brother    Heart disease Brother    Hypertension Brother    " Stroke Father          Tobacco Use    Smoking status: Never    Smokeless tobacco: Never   Substance and Sexual Activity    Alcohol use: Never    Drug use: Never    Sexual activity: Yes     Partners: Male     Review of Systems   Constitutional:  Negative for activity change, appetite change, chills and fever.   HENT:  Negative for congestion, hearing loss and rhinorrhea.    Eyes:  Negative for discharge, itching and visual disturbance.   Respiratory:  Positive for shortness of breath. Negative for apnea and cough.    Cardiovascular:  Negative for chest pain, palpitations and leg swelling.   Gastrointestinal:  Positive for abdominal pain. Negative for abdominal distention, constipation, diarrhea, nausea and vomiting.   Endocrine: Negative for cold intolerance and heat intolerance.   Genitourinary:  Negative for dysuria and hematuria.   Musculoskeletal:  Negative for back pain, neck pain and neck stiffness.   Skin:  Negative for rash and wound.   Neurological:  Negative for dizziness, seizures, light-headedness and headaches.   Psychiatric/Behavioral:  Negative for agitation, confusion and suicidal ideas.      Objective:     Vital Signs (Most Recent):  Temp: 98.3 °F (36.8 °C) (11/15/24 2144)  Pulse: 91 (11/15/24 2302)  Resp: 19 (11/15/24 2302)  BP: 127/70 (11/15/24 2302)  SpO2: 97 % (11/15/24 2302) Vital Signs (24h Range):  Temp:  [98.3 °F (36.8 °C)] 98.3 °F (36.8 °C)  Pulse:  [] 91  Resp:  [13-35] 19  SpO2:  [95 %-100 %] 97 %  BP: (109-127)/(61-70) 127/70     Weight: 85.7 kg (189 lb)  Body mass index is 33.48 kg/m².     Physical Exam  Vitals reviewed.   Constitutional:       General: She is not in acute distress.     Appearance: She is well-developed.   HENT:      Head: Normocephalic and atraumatic.      Nose: Nose normal. No rhinorrhea.      Mouth/Throat:      Mouth: Mucous membranes are moist.   Eyes:      General: No scleral icterus.        Right eye: No discharge.         Left eye: No discharge.      Pupils:  Pupils are equal, round, and reactive to light.   Neck:      Vascular: No JVD.   Cardiovascular:      Rate and Rhythm: Normal rate and regular rhythm.      Heart sounds: Normal heart sounds. No murmur heard.     No friction rub.   Pulmonary:      Effort: Pulmonary effort is normal. No respiratory distress.      Breath sounds: Rales present. No wheezing.   Abdominal:      General: Bowel sounds are normal. There is no distension.      Palpations: Abdomen is soft.      Tenderness: There is no abdominal tenderness.   Musculoskeletal:         General: No deformity. Normal range of motion.      Cervical back: Normal range of motion and neck supple.      Right lower leg: Edema present.      Left lower leg: Edema present.   Skin:     General: Skin is warm and dry.   Neurological:      General: No focal deficit present.      Mental Status: She is alert and oriented to person, place, and time.   Psychiatric:         Mood and Affect: Mood normal.         Behavior: Behavior normal.              CRANIAL NERVES     CN III, IV, VI   Pupils are equal, round, and reactive to light.       Significant Labs: All pertinent labs within the past 24 hours have been reviewed.  CBC:   Recent Labs   Lab 11/15/24  2143   WBC 10.52   HGB 12.2   HCT 40.7        CMP:   Recent Labs   Lab 11/15/24  2143      K 3.9      CO2 24   *   BUN 10   CREATININE 1.1   CALCIUM 8.9   PROT 7.5   ALBUMIN 3.2*   BILITOT 0.2   ALKPHOS 119   AST 17   ALT 11   ANIONGAP 9     Cardiac Markers:   Recent Labs   Lab 11/15/24  2143   *     Troponin:   Recent Labs   Lab 11/15/24  2143   TROPONINI 0.052*       Significant Imaging: I have reviewed all pertinent imaging results/findings within the past 24 hours.  Assessment/Plan:     * Acute on chronic systolic congestive heart failure  Patient has Systolic (HFrEF) heart failure that is Acute on chronic. On presentation their CHF was decompensated. Evidence of decompensated CHF on presentation  includes: edema, crackles on lung auscultation, and shortness of breath. The etiology of their decompensation is likely medication non-compliance. Most recent BNP and echo results are listed below.  Recent Labs     11/15/24  2143   *     Latest ECHO  Results for orders placed during the hospital encounter of 11/14/23    Echo Saline Bubble? No    Interpretation Summary    Left Ventricle: The left ventricle is normal in size. Normal wall thickness. There is mild eccentric hypertrophy. Severe global hypokinesis present. There is severely reduced systolic function with a visually estimated ejection fraction of 15 - 20%. Unable to assess diastolic function due to E-A fusion. No thrombus present.    Right Ventricle: Normal right ventricular cavity size. Wall thickness is normal. Right ventricle wall motion  is normal. Systolic function is normal.    Aortic Valve: The aortic valve is a trileaflet valve. There is mild aortic valve sclerosis. There is moderate annular dilation present.    Mitral Valve: There is mild mitral annular calcification present.    IVC/SVC: Normal venous pressure at 3 mmHg.    Pericardium: There is a trivial circumferential effusion.    Current Heart Failure Medications  metoprolol succinate (TOPROL-XL) 24 hr tablet 200 mg, Daily, Oral  sacubitriL-valsartan  mg per tablet 1 tablet, 2 times daily, Oral  spironolactone tablet 50 mg, Daily, Oral  furosemide injection 40 mg, Every 12 hours, Intravenous    Plan  - Monitor strict I&Os and daily weights.    - Place on telemetry  - Low sodium diet  - Place on fluid restriction of 1.5 L.   - Cardiology has not been consulted  - The patient's volume status is improving but not at their baseline as indicated by edema, crackles on lung auscultation, and shortness of breath        Hyperaldosteronism  Chronic, controlled, continue spironolactone      Hyperlipidemia associated with type 2 diabetes mellitus  Chronic, controlled, continue home  "statin      Hypertension associated with diabetes  Chronic, slightly elevated, continue home amlodipine, entresto, metop      Gastroesophageal reflux disease without esophagitis  Chronic, controlled, continue home to monitor      TIKA (obstructive sleep apnea)  Chronic, controlled, continue CPAP at night      Coronary artery disease of native artery of native heart with stable angina pectoris  Patient with known CAD s/p stent placement, which is controlled Will continue ASA, Plavix, and Statin and monitor for S/Sx of angina/ACS. Continue to monitor on telemetry.     Type 2 diabetes mellitus with diabetic polyneuropathy, with long-term current use of insulin  Patient's FSGs are controlled on current medication regimen.  Last A1c reviewed-   Lab Results   Component Value Date    HGBA1C 7.7 (H) 08/22/2024     Most recent fingerstick glucose reviewed- No results for input(s): "POCTGLUCOSE" in the last 24 hours.  Current correctional scale  Low  Maintain anti-hyperglycemic dose as follows-   Antihyperglycemics (From admission, onward)      Start     Stop Route Frequency Ordered    11/16/24 0104  insulin aspart U-100 pen 0-5 Units         -- SubQ Before meals & nightly PRN 11/16/24 0005        CONTINUE PATIENT'S HOME INSULIN PUMP for now, consult endocrine      Hold Oral hypoglycemics while patient is in the hospital.      VTE Risk Mitigation (From admission, onward)           Ordered     enoxaparin injection 40 mg  Daily         11/15/24 2348     IP VTE HIGH RISK PATIENT  Once         11/15/24 2348     Place sequential compression device  Until discontinued         11/15/24 2348                                    Adarsh Sanford MD  Department of Hospital Medicine  [facility] - Emergency Dept          "

## 2024-11-16 NOTE — ASSESSMENT & PLAN NOTE
Patient has Systolic (HFrEF) heart failure that is Acute on chronic. On presentation their CHF was decompensated. Evidence of decompensated CHF on presentation includes: edema, crackles on lung auscultation, and shortness of breath. The etiology of their decompensation is likely medication non-compliance. Most recent BNP and echo results are listed below.  Recent Labs     11/15/24  2143   *     Latest ECHO  Results for orders placed during the hospital encounter of 11/14/23    Echo Saline Bubble? No    Interpretation Summary    Left Ventricle: The left ventricle is normal in size. Normal wall thickness. There is mild eccentric hypertrophy. Severe global hypokinesis present. There is severely reduced systolic function with a visually estimated ejection fraction of 15 - 20%. Unable to assess diastolic function due to E-A fusion. No thrombus present.    Right Ventricle: Normal right ventricular cavity size. Wall thickness is normal. Right ventricle wall motion  is normal. Systolic function is normal.    Aortic Valve: The aortic valve is a trileaflet valve. There is mild aortic valve sclerosis. There is moderate annular dilation present.    Mitral Valve: There is mild mitral annular calcification present.    IVC/SVC: Normal venous pressure at 3 mmHg.    Pericardium: There is a trivial circumferential effusion.    Current Heart Failure Medications  metoprolol succinate (TOPROL-XL) 24 hr tablet 200 mg, Daily, Oral  sacubitriL-valsartan  mg per tablet 1 tablet, 2 times daily, Oral  spironolactone tablet 50 mg, Daily, Oral  furosemide injection 40 mg, Every 12 hours, Intravenous    Plan  - Monitor strict I&Os and daily weights.    - Place on telemetry  - Low sodium diet  - Place on fluid restriction of 1.5 L.   - Cardiology has not been consulted  - The patient's volume status is improving but not at their baseline as indicated by edema, crackles on lung auscultation, and shortness of breath

## 2024-11-16 NOTE — PLAN OF CARE
Patient aaox4. Patient complains of no pain. Patient blood sugar was low. Patient requested for orange juice. Patient due medication administered and patient tolerating well. Plan of care reviewed with patient and patient verbalized understanding. Patient safety measures ongoing, call bell within reach, patient made comfortable in chair. No other concerns at this time.        Problem: Adult Inpatient Plan of Care  Goal: Plan of Care Review  Outcome: Progressing  Goal: Patient-Specific Goal (Individualized)  Outcome: Progressing  Goal: Absence of Hospital-Acquired Illness or Injury  Outcome: Progressing  Goal: Optimal Comfort and Wellbeing  Outcome: Progressing  Goal: Readiness for Transition of Care  Outcome: Progressing     Problem: Diabetes Comorbidity  Goal: Blood Glucose Level Within Targeted Range  Outcome: Progressing     Problem: Acute Kidney Injury/Impairment  Goal: Fluid and Electrolyte Balance  Outcome: Progressing  Goal: Improved Oral Intake  Outcome: Progressing  Goal: Effective Renal Function  Outcome: Progressing

## 2024-11-16 NOTE — ASSESSMENT & PLAN NOTE
"Patient's FSGs are controlled on current medication regimen.  Last A1c reviewed-   Lab Results   Component Value Date    HGBA1C 7.7 (H) 08/22/2024     Most recent fingerstick glucose reviewed- No results for input(s): "POCTGLUCOSE" in the last 24 hours.  Current correctional scale  Low  Maintain anti-hyperglycemic dose as follows-   Antihyperglycemics (From admission, onward)      Start     Stop Route Frequency Ordered    11/16/24 0104  insulin aspart U-100 pen 0-5 Units         -- SubQ Before meals & nightly PRN 11/16/24 0005        CONTINUE PATIENT'S HOME INSULIN PUMP for now, consult endocrine      Hold Oral hypoglycemics while patient is in the hospital.  "

## 2024-11-16 NOTE — ED PROVIDER NOTES
Encounter Date: 11/15/2024       History     Chief Complaint   Patient presents with    Shortness of Breath     Per EMS pt coming from home with CHF exacerbation. Pt tripoding on scene. Pt came in on CPAP and received 4 sprays of nitro     Ms. Mercedez Purvis is a 69 y.o. female with chronic conditions significant for CAD, MI s/p stent placement,T2DM (HbA1C, 7.7 on 08/22), diabetic neuropathy, obesity, HTN, HLD, hyperaldosteronism on spironolactone, diabetic retinopathy, CAD with stable angina, CHF, CKD3, GERD, BPPV, TIKA, migraines.  Presents to [facility] ED via EMS with the complaint SOB that started suddenly about 1 hour before presentation. She denies chest pain,headaches, cough, nausea, diarrhea, abdominal pain, or dysuria. Earlier today, she assisted her sister who is moving to another location to move. Patient arrived to us on CPAP, now on 2 L NC saturating at 99%.    The history is provided by the patient, medical records and the EMS personnel. No  was used.     Review of patient's allergies indicates:  No Known Allergies  Past Medical History:   Diagnosis Date    Chronic headache     Diabetes     Heart attack 2004    8 stents    Heart failure     History of heart artery stent     Hyperlipemia     Hypertension     Obesity (BMI 30-39.9)     Yeast infection      Past Surgical History:   Procedure Laterality Date    CARDIAC CATHETERIZATION      INJECTION OF STEROID Left 03/24/2023    Procedure: INJECTION, STEROID,LEFT RING FINGER;  Surgeon: Rona Lema MD;  Location: [facility] OR;  Service: Orthopedics;  Laterality: Left;    TRIGGER FINGER RELEASE Right 03/24/2023    Procedure: RELEASE, TRIGGER FINGER, RIGHT LONG;  Surgeon: Rona Lema MD;  Location: [facility] OR;  Service: Orthopedics;  Laterality: Right;    TUBAL LIGATION       Family History   Problem Relation Name Age of Onset    Stroke Father Scott Sigala Sr     Arthritis Brother Kartik Sigala     Diabetes Brother Kartik  Jovon     Heart disease Brother Kartik Sigala     Hypertension Brother Kartik Sigala      Social History     Tobacco Use    Smoking status: Never    Smokeless tobacco: Never   Substance Use Topics    Alcohol use: Never    Drug use: Never     Review of Systems    Physical Exam     Initial Vitals   BP Pulse Resp Temp SpO2   11/15/24 2120 11/15/24 2120 11/15/24 2120 11/15/24 2144 11/15/24 2120   125/70 (!) 126 (!) 35 98.3 °F (36.8 °C) 95 %      MAP       --                Physical Exam    Nursing note and vitals reviewed.  Constitutional: She appears well-developed and well-nourished.   HENT:   Head: Normocephalic and atraumatic.   Eyes: Right eye exhibits no discharge. Left eye exhibits no discharge.   Neck: No JVD present.   Cardiovascular:  Regular rhythm and intact distal pulses.           Pulmonary/Chest: She is in respiratory distress.   Abdominal: Abdomen is soft. Bowel sounds are normal. She exhibits distension. There is no abdominal tenderness.   Musculoskeletal:         General: Edema present. No tenderness. Normal range of motion.     Neurological: She is alert and oriented to person, place, and time. She has normal strength.   Skin: Skin is warm.   Psychiatric: She has a normal mood and affect. Her behavior is normal.         ED Course   Procedures  Labs Reviewed   CBC W/ AUTO DIFFERENTIAL - Abnormal       Result Value    WBC 10.52      RBC 5.36      Hemoglobin 12.2      Hematocrit 40.7      MCV 76 (*)     MCH 22.8 (*)     MCHC 30.0 (*)     RDW 16.9 (*)     Platelets 245      MPV 12.1      Immature Granulocytes 0.3      Gran # (ANC) 7.4      Immature Grans (Abs) 0.03      Lymph # 2.3      Mono # 0.6      Eos # 0.1      Baso # 0.03      nRBC 0      Gran % 70.4      Lymph % 22.1      Mono % 5.8      Eosinophil % 1.1      Basophil % 0.3      Differential Method Automated      Narrative:     Release to patient->Immediate   B-TYPE NATRIURETIC PEPTIDE - Abnormal     (*)     Narrative:     Release to  patient->Immediate  Add on Phos per Dr. Hightower @ 21:47 pm to order # [id number]   COMPREHENSIVE METABOLIC PANEL - Abnormal    Sodium 137      Potassium 3.9      Chloride 104      CO2 24      Glucose 157 (*)     BUN 10      Creatinine 1.1      Calcium 8.9      Total Protein 7.5      Albumin 3.2 (*)     Total Bilirubin 0.2      Alkaline Phosphatase 119      AST 17      ALT 11      eGFR 54.1 (*)     Anion Gap 9      Narrative:     Release to patient->Immediate  Add on Phos per Dr. Hightower @ 21:47 pm to order # [id number]   TROPONIN I - Abnormal    Troponin I 0.052 (*)     Narrative:     Release to patient->Immediate  Add on Phos per Dr. Hightower @ 21:47 pm to order # [id number]   ISTAT PROCEDURE - Abnormal    POC PH 7.307 (*)     POC PCO2 56.5 (*)     POC PO2 24 (*)     POC HCO3 28.3 (*)     POC BE 2      POC SATURATED O2 35      POC TCO2 30 (*)     Sample VENOUS      Site Other      Allens Test N/A     INFLUENZA A & B BY MOLECULAR    Influenza A, Molecular Negative      Influenza B, Molecular Negative      Flu A & B Source NP     HEPATITIS C ANTIBODY    Hepatitis C Ab Non-reactive      Narrative:     Release to patient->Immediate  Add on Phos per Dr. Hightower @ 21:47 pm to order # [id number]   HIV 1 / 2 ANTIBODY    HIV 1/2 Ag/Ab Non-reactive      Narrative:     Release to patient->Immediate  Add on Phos per Dr. Hightower @ 21:47 pm to order # [id number]   MAGNESIUM    Magnesium 2.1      Narrative:     Release to patient->Immediate  Add on Phos per Dr. Hightower @ 21:47 pm to order # [id number]   SARS-COV-2 RNA AMPLIFICATION, QUAL    SARS-CoV-2 RNA, Amplification, Qual Negative     PHOSPHORUS   PHOSPHORUS    Phosphorus 4.4      Narrative:     Release to patient->Immediate  Add on Phos per Dr. Hightower @ 21:47 pm to order # [id number]   URINALYSIS, REFLEX TO URINE CULTURE          Imaging Results              X-Ray Chest AP Portable (In process)                      Medications   furosemide injection 80 mg (80 mg Intravenous Given 11/15/24 0023)      Medical Decision Making  Presented with c/o SOB.     Amount and/or Complexity of Data Reviewed  Labs: ordered.  Radiology: ordered.    Risk  Prescription drug management.               ED Course as of 11/15/24 4803   Fri Nov 15, 2024   2138 EKG independently interpreted by me with sinus tachycardia rate of 109, with RSR morphology in inferior leads suggesting of developing right bundle-branch block, not seen on prior EKG, no corresponding ST depressions, , patient denying any chest pain, is stable on 6 L nasal cannula [LF]   2217 Denies chest pain, comfortable appearing on 5 L nasal cannula, talking on the phone with family members [LF]      ED Course User Index  [LF] Jeniffer Hightower MD                           Clinical Impression:  Final diagnoses:  [R06.00] Dyspnea  [I50.9] Congestive heart failure, unspecified HF chronicity, unspecified heart failure type (Primary)  [I50.9] CHF (congestive heart failure)          ED Disposition Condition    Admit                 Hamlte Hudson MD  Resident  11/15/24 2695

## 2024-11-16 NOTE — ED NOTES
Telemetry Verification   Patient placed on Telemetry Box  Verified with [email or web address] Room  Tech Ross   Box # [id number]   Rate 95   Rhythm NSR

## 2024-11-16 NOTE — CONSULTS
Food & Nutrition  Education     Diet Education: Fluid and Salt restriction   Time Spent: 7 minutes   Learners: Patient      Handouts provided: Low Sodium Nutrition Therapy, Fluid-Restricted Nutrition Therapy      Comments: Discussed importance of a low sodium diet. Reviewed high sodium foods that should be avoided. Food labels, salt free seasonings, and recommended sodium intake reviewed. Encouraged healthy, fresh foods that are low in sodium that are good for consumption. Fluid intake and conversions discussed. Foods considered fluids were reviewed and encouraged to monitor. General healthy diet encouraged. All questions/concerns were addressed.     Follow-Up: Yes     Please Re-consult as needed.   Thanks!    Molly Cleaning, MS, RD, LDN

## 2024-11-16 NOTE — ED TRIAGE NOTES
Mercedez Purvis, a 69 y.o. female presents to the ED w/ complaint of SOB for 1 day. Pt has CHF, DM, HTM, and +cardiac history. Pt missed two doses of lasix. Pt spO2 in 60s per EMS upon arrival, on CPAP upon arrival to ED. Pt sats 93% on 6L NC. Pt is AAOx4, GCS 15.     Triage note:  Chief Complaint   Patient presents with    Shortness of Breath     Per EMS pt coming from home with CHF exacerbation. Pt tripoding on scene. Pt came in on CPAP and received 4 sprays of nitro     Review of patient's allergies indicates:  No Known Allergies  Past Medical History:   Diagnosis Date    Chronic headache     Diabetes     Heart attack 2004    8 stents    Heart failure     History of heart artery stent     Hyperlipemia     Hypertension     Obesity (BMI 30-39.9)     Yeast infection

## 2024-11-16 NOTE — ASSESSMENT & PLAN NOTE
At time of evaluation, pt meets criteria to continue home insulin pump usage.  - Has all adequate supplies   - Insulin pump site change on 7/14/2024.    - Bolus settings reviewed    - No changes to home regimen.   - Nurse to check BG qac/hs/0200 & record in epic   - Patient to input glucose into pump and use bolus wizard for prandial needs   - Will continue to monitor accuchecks and titrate insulin as clinically indicated .     - Discussed above plan with patient, patient verbalized understanding.   - Understands in case of pump malfunction or cognitive decline in which pt can no longer safely use insulin pump, will transition to SC MDI       Current inpatient pump settings:  Active time: 3.5 hours  Mn--0700 1.2 u/hr   0700-mn 1.5 u/hr     Mn-0700 55  0700-5p 50  5p-mn 50     Mn-10a 1:10   10a-5p 1 :12   5p-mn 1:10      If pump malfunctions or is disconnected, please contact endocrine

## 2024-11-16 NOTE — HPI
68 yo female with CAD s/p stenting, DM2 on insulin pump, neuropathy, HTN, hyperaldosteronism, HPLD, CHF, CKD, TIKA presenting for acute shortness of breath. She was found tripoding on arrival of EMS, was placed on CPAP and given 4 nitro sprays. She arrived to the ER on CPAP and was able to be weaned down to 2L of oxygen. She was helping her sister move this afternoon, she states that she does not always take her medications when she goes out, but states she has only been sleeping on one pillow, she denies any chest pain but does have some lower abd pain. She has not noticed any leg swelling.     She was given 40mg IV lasix in the ER. BNP elevated, troponin mildly elevated. Medicine called for admission.

## 2024-11-16 NOTE — NURSING
Patient arrived to the floor ,Aox4, no complaint of pain at this time. Plan of care to monitor vitals and monitor patient for signs of fluid overload discussed with patient. Call light within reach, will continue to monitor

## 2024-11-16 NOTE — CONSULTS
[facility] ([facility])  Endocrinology  Diabetes Consult Note    Consult Requested by: Yaw Mccullough*   Reason for admit: Acute on chronic systolic congestive heart failure    HISTORY OF PRESENT ILLNESS:  Reason for Consult: Management of T2DM, Hyperglycemia     Diabetes diagnosis year: >10 years ago    Home Diabetes Medications:    Omnipod 5-  Active time: 3.5 hours  Mn--0700 1.2 u/hr   0700-mn 1.5 u/hr     Mn-0700 55  0700-5p 50  5p-mn 50     Mn-10a 1:10   10a-5p 1 :12   5p-mn 1:10     Metformin 500 mg  Mounjaro 10 mg  Jardiance 25 mg    How often checking glucose at home? >4 x day   BG readings on regimen: 100s and lower  Hypoglycemia on the regimen?  Yes  Missed doses on regimen?  No    Diabetes Complications include:     Hyperglycemia    Complicating diabetes co morbidities:   History of MI      HPI:   Patient is a 69 y.o. female with CAD s/p stenting, DM2 on insulin pump, neuropathy, HTN, hyperaldosteronism, HPLD, CHF, CKD, TIKA presented for acute shortness of breath. She was found tripoding on arrival of EMS, was placed on CPAP and given 4 nitro sprays. She arrived to the ER on CPAP and was able to be weaned down to 2L of oxygen.  Endocrine consulted for bg management of patient on pump.     Interval HPI:   Admitted overnight. Patient in room 58078/98707 A. Blood glucose stable. BG at goal on current insulin regimen (Home Insulin Pump). Steroid use- None .      Renal function- Normal   Vasopressors-  None     Diet Low Sodium, 2gm Fluid - 1500mL     Eatin%  Nausea: No  Hypoglycemia and intervention: No  Fever: No  TPN and/or TF: No    PMH, PSH, FH, SH updated and reviewed     ROS:     Review of Systems   Constitutional:  Negative for appetite change and fatigue.   Gastrointestinal:  Negative for nausea.       Current Medications and/or Treatments Impacting Glycemic Control  Immunotherapy:    Immunosuppressants       None          Steroids:   Hormones (From admission, onward)  "     Start     Stop Route Frequency Ordered    11/16/24 0046  melatonin tablet 6 mg         -- Oral Nightly PRN 11/15/24 2348          Pressors:    Autonomic Drugs (From admission, onward)      None          Hyperglycemia/Diabetes Medications:   Antihyperglycemics (From admission, onward)      Start     Stop Route Frequency Ordered    11/16/24 0104  insulin aspart U-100 pen 0-5 Units         -- SubQ Before meals & nightly PRN 11/16/24 0005             PHYSICAL EXAMINATION:  Vitals:    11/16/24 0755   BP:    Pulse: 78   Resp:    Temp:      Body mass index is 30.77 kg/m².     Physical Exam  Constitutional:       General: She is not in acute distress.     Appearance: She is not ill-appearing.   HENT:      Head: Normocephalic and atraumatic.   Abdominal:      General: There is no distension.   Psychiatric:         Mood and Affect: Mood normal.         Behavior: Behavior normal.            Labs Reviewed and Include   Recent Labs   Lab 11/15/24  2143 11/16/24  0336   * 108   CALCIUM 8.9 8.8   ALBUMIN 3.2*  --    PROT 7.5  --     139   K 3.9 3.9   CO2 24 25    102   BUN 10 10   CREATININE 1.1 1.0   ALKPHOS 119  --    ALT 11  --    AST 17  --    BILITOT 0.2  --      Lab Results   Component Value Date    WBC 10.42 11/16/2024    HGB 12.3 11/16/2024    HCT 40.1 11/16/2024    MCV 75 (L) 11/16/2024     11/16/2024     No results for input(s): "TSH", "FREET4" in the last 168 hours.  Lab Results   Component Value Date    HGBA1C 7.7 (H) 08/22/2024       Nutritional status:   Body mass index is 30.77 kg/m².  Lab Results   Component Value Date    ALBUMIN 3.2 (L) 11/15/2024    ALBUMIN 3.1 (L) 08/22/2024    ALBUMIN 3.1 (L) 12/21/2023     No results found for: "PREALBUMIN"    Estimated Creatinine Clearance: 52.1 mL/min (based on SCr of 1 mg/dL).    Accu-Checks  Recent Labs     11/16/24  0732   POCTGLUCOSE 139*        ASSESSMENT and PLAN    Cardiac/Vascular  * Acute on chronic systolic congestive heart " failure    Managed by primary team  Optimize BG control      Hyperlipidemia associated with type 2 diabetes mellitus  Managed per primary.   On statin per ADA        Endocrine  Insulin pump in place  At time of evaluation, pt meets criteria to continue home insulin pump usage.  - Has all adequate supplies   - Insulin pump site change on 7/14/2024.    - Bolus settings reviewed    - No changes to home regimen.   - Nurse to check BG qac/hs/0200 & record in epic   - Patient to input glucose into pump and use bolus wizard for prandial needs   - Will continue to monitor accuchecks and titrate insulin as clinically indicated .     - Discussed above plan with patient, patient verbalized understanding.   - Understands in case of pump malfunction or cognitive decline in which pt can no longer safely use insulin pump, will transition to SC MDI       Current inpatient pump settings:  Active time: 3.5 hours  Mn--0700 1.2 u/hr   0700-mn 1.5 u/hr     Mn-0700 55  0700-5p 50  5p-mn 50     Mn-10a 1:10   10a-5p 1 :12   5p-mn 1:10      If pump malfunctions or is disconnected, please contact endocrine        Type 2 diabetes mellitus with diabetic polyneuropathy, with long-term current use of insulin  BG goal: 140-180  T2DM on Omnipod 5.  Forgot PDM.  Blood sugar stable.  Patient reports PDM and rest of supplies to be delivered today at 2:00 p.m. If unable to get supplies and PDM, likely will need to switch to transition drip    - -Continue Insulin pump    - POCT Glucose before meals, at bedtime and at 2 am  - Hypoglycemia protocol in place      ** Please notify Endocrine for any change and/or advance in diet**  ** Please call Endocrine for any BG related issues **     Discharge Planning:   TBD. Please notify endocrinology prior to discharge.            Plan discussed with patient, family, and RN at bedside.     Ryder Benito PA-C  Endocrinology  [facility] ([place]-14)

## 2024-11-16 NOTE — HPI
Reason for Consult: Management of T2DM, Hyperglycemia     Diabetes diagnosis year: >10 years ago    Home Diabetes Medications:    Omnipod 5-  Active time: 3.5 hours  Mn--0700 1.2 u/hr   0700-mn 1.5 u/hr     Mn-0700 55  0700-5p 50  5p-mn 50     Mn-10a 1:10   10a-5p 1 :12   5p-mn 1:10     Metformin 500 mg  Mounjaro 10 mg  Jardiance 25 mg    How often checking glucose at home? >4 x day   BG readings on regimen: 100s and lower  Hypoglycemia on the regimen?  Yes  Missed doses on regimen?  No    Diabetes Complications include:     Hyperglycemia    Complicating diabetes co morbidities:   History of MI      HPI:   Patient is a 69 y.o. female with CAD s/p stenting, DM2 on insulin pump, neuropathy, HTN, hyperaldosteronism, HPLD, CHF, CKD, TIKA presented for acute shortness of breath. She was found tripoding on arrival of EMS, was placed on CPAP and given 4 nitro sprays. She arrived to the ER on CPAP and was able to be weaned down to 2L of oxygen.  Endocrine consulted for bg management of patient on pump.

## 2024-11-17 LAB
ANION GAP SERPL CALC-SCNC: 11 MMOL/L (ref 8–16)
BASOPHILS # BLD AUTO: 0.04 K/UL (ref 0–0.2)
BASOPHILS NFR BLD: 0.5 % (ref 0–1.9)
BUN SERPL-MCNC: 17 MG/DL (ref 8–23)
CALCIUM SERPL-MCNC: 8.6 MG/DL (ref 8.7–10.5)
CHLORIDE SERPL-SCNC: 102 MMOL/L (ref 95–110)
CO2 SERPL-SCNC: 24 MMOL/L (ref 23–29)
CREAT SERPL-MCNC: 1.4 MG/DL (ref 0.5–1.4)
DIFFERENTIAL METHOD BLD: ABNORMAL
EOSINOPHIL # BLD AUTO: 0.1 K/UL (ref 0–0.5)
EOSINOPHIL NFR BLD: 1.1 % (ref 0–8)
ERYTHROCYTE [DISTWIDTH] IN BLOOD BY AUTOMATED COUNT: 16.4 % (ref 11.5–14.5)
EST. GFR  (NO RACE VARIABLE): 40.5 ML/MIN/1.73 M^2
GLUCOSE SERPL-MCNC: 111 MG/DL (ref 70–110)
HCT VFR BLD AUTO: 42.4 % (ref 37–48.5)
HGB BLD-MCNC: 12.7 G/DL (ref 12–16)
IMM GRANULOCYTES # BLD AUTO: 0.01 K/UL (ref 0–0.04)
IMM GRANULOCYTES NFR BLD AUTO: 0.1 % (ref 0–0.5)
LYMPHOCYTES # BLD AUTO: 2 K/UL (ref 1–4.8)
LYMPHOCYTES NFR BLD: 26.8 % (ref 18–48)
MCH RBC QN AUTO: 22.4 PG (ref 27–31)
MCHC RBC AUTO-ENTMCNC: 30 G/DL (ref 32–36)
MCV RBC AUTO: 75 FL (ref 82–98)
MONOCYTES # BLD AUTO: 0.8 K/UL (ref 0.3–1)
MONOCYTES NFR BLD: 10.1 % (ref 4–15)
NEUTROPHILS # BLD AUTO: 4.6 K/UL (ref 1.8–7.7)
NEUTROPHILS NFR BLD: 61.4 % (ref 38–73)
NRBC BLD-RTO: 0 /100 WBC
PLATELET # BLD AUTO: 269 K/UL (ref 150–450)
PMV BLD AUTO: 12.1 FL (ref 9.2–12.9)
POTASSIUM SERPL-SCNC: 4.2 MMOL/L (ref 3.5–5.1)
RBC # BLD AUTO: 5.66 M/UL (ref 4–5.4)
SODIUM SERPL-SCNC: 137 MMOL/L (ref 136–145)
WBC # BLD AUTO: 7.49 K/UL (ref 3.9–12.7)

## 2024-11-17 PROCEDURE — 27100171 HC OXYGEN HIGH FLOW UP TO 24 HOURS

## 2024-11-17 PROCEDURE — 99900035 HC TECH TIME PER 15 MIN (STAT)

## 2024-11-17 PROCEDURE — 94660 CPAP INITIATION&MGMT: CPT

## 2024-11-17 PROCEDURE — 36415 COLL VENOUS BLD VENIPUNCTURE: CPT | Performed by: INTERNAL MEDICINE

## 2024-11-17 PROCEDURE — 94761 N-INVAS EAR/PLS OXIMETRY MLT: CPT

## 2024-11-17 PROCEDURE — 80048 BASIC METABOLIC PNL TOTAL CA: CPT | Performed by: INTERNAL MEDICINE

## 2024-11-17 PROCEDURE — 20600001 HC STEP DOWN PRIVATE ROOM

## 2024-11-17 PROCEDURE — 85025 COMPLETE CBC W/AUTO DIFF WBC: CPT | Performed by: INTERNAL MEDICINE

## 2024-11-17 PROCEDURE — 25000003 PHARM REV CODE 250: Performed by: INTERNAL MEDICINE

## 2024-11-17 PROCEDURE — 99232 SBSQ HOSP IP/OBS MODERATE 35: CPT | Mod: ,,, | Performed by: PHYSICIAN ASSISTANT

## 2024-11-17 PROCEDURE — 94799 UNLISTED PULMONARY SVC/PX: CPT

## 2024-11-17 PROCEDURE — 63600175 PHARM REV CODE 636 W HCPCS: Performed by: INTERNAL MEDICINE

## 2024-11-17 RX ADMIN — ATORVASTATIN CALCIUM 80 MG: 40 TABLET, FILM COATED ORAL at 09:11

## 2024-11-17 RX ADMIN — ENOXAPARIN SODIUM 40 MG: 40 INJECTION SUBCUTANEOUS at 05:11

## 2024-11-17 RX ADMIN — METOPROLOL SUCCINATE 200 MG: 100 TABLET, EXTENDED RELEASE ORAL at 08:11

## 2024-11-17 RX ADMIN — GABAPENTIN 300 MG: 300 CAPSULE ORAL at 02:11

## 2024-11-17 RX ADMIN — GABAPENTIN 300 MG: 300 CAPSULE ORAL at 08:11

## 2024-11-17 RX ADMIN — AMITRIPTYLINE HYDROCHLORIDE 75 MG: 25 TABLET, FILM COATED ORAL at 09:11

## 2024-11-17 RX ADMIN — SACUBITRIL AND VALSARTAN 1 TABLET: 97; 103 TABLET, FILM COATED ORAL at 08:11

## 2024-11-17 RX ADMIN — CLOPIDOGREL BISULFATE 75 MG: 75 TABLET ORAL at 08:11

## 2024-11-17 RX ADMIN — FUROSEMIDE 40 MG: 10 INJECTION, SOLUTION INTRAVENOUS at 08:11

## 2024-11-17 RX ADMIN — SPIRONOLACTONE 50 MG: 50 TABLET ORAL at 08:11

## 2024-11-17 RX ADMIN — GABAPENTIN 300 MG: 300 CAPSULE ORAL at 09:11

## 2024-11-17 NOTE — ASSESSMENT & PLAN NOTE
At time of evaluation, pt meets criteria to continue home insulin pump usage.  - Has all adequate supplies   - Insulin pump site change on 11/16//2024.    - Bolus settings reviewed    - No changes to home regimen.   - Nurse to check BG qac/hs/0200 & record in epic   - Patient to input glucose into pump and use bolus wizard for prandial needs   - Will continue to monitor accuchecks and titrate insulin as clinically indicated .     - Discussed above plan with patient, patient verbalized understanding.   - Understands in case of pump malfunction or cognitive decline in which pt can no longer safely use insulin pump, will transition to SC MDI       Current inpatient pump settings:  Active time: 3.5 hours  Mn--0700 1.2 u/hr   0700-mn 1.5 u/hr     Mn-0700 55  0700-5p 50  5p-mn 50     Mn-10a 1:10   10a-5p 1 :12   5p-mn 1:10      If pump malfunctions or is disconnected, please contact endocrine

## 2024-11-17 NOTE — SUBJECTIVE & OBJECTIVE
"Interval HPI:   No acute events overnight. Patient in room 27814/75777 A. Blood glucose stable. BG at goal on current insulin regimen (Home Insulin Pump). Steroid use- None .      Renal function- Normal   Vasopressors-  None     Diet Low Sodium, 2gm Fluid - 1500mL     Eatin%  Nausea: No  Hypoglycemia and intervention: No  Fever: No  TPN and/or TF: No      /62 (BP Location: Right arm, Patient Position: Lying)   Pulse 81   Temp 98 °F (36.7 °C) (Oral)   Resp 18   Ht 5' 3" (1.6 m)   Wt 78.5 kg (173 lb)   LMP 2020 (Approximate)   SpO2 95%   Breastfeeding No   BMI 30.65 kg/m²     Labs Reviewed and Include    Recent Labs   Lab 24  0522   *   CALCIUM 8.6*      K 4.2   CO2 24      BUN 17   CREATININE 1.4     Lab Results   Component Value Date    WBC 7.49 2024    HGB 12.7 2024    HCT 42.4 2024    MCV 75 (L) 2024     2024     No results for input(s): "TSH", "FREET4" in the last 168 hours.  Lab Results   Component Value Date    HGBA1C 7.7 (H) 2024       Nutritional status:   Body mass index is 30.65 kg/m².  Lab Results   Component Value Date    ALBUMIN 3.2 (L) 11/15/2024    ALBUMIN 3.1 (L) 2024    ALBUMIN 3.1 (L) 2023     No results found for: "PREALBUMIN"    Estimated Creatinine Clearance: 37.1 mL/min (based on SCr of 1.4 mg/dL).    Accu-Checks  Recent Labs     24  0732 24  1537   POCTGLUCOSE 139* 77       Current Medications and/or Treatments Impacting Glycemic Control  Immunotherapy:    Immunosuppressants       None          Steroids:   Hormones (From admission, onward)      Start     Stop Route Frequency Ordered    24 0046  melatonin tablet 6 mg         -- Oral Nightly PRN 11/15/24 2348          Pressors:    Autonomic Drugs (From admission, onward)      None          Hyperglycemia/Diabetes Medications:   Antihyperglycemics (From admission, onward)      Start     Stop Route Frequency Ordered    " 11/16/24 1300  insulin aspart U-100 insulin pump from home        Question Answer Comment   Target number 120    Basal Rate #1 1.2    Basal rate #1 time 3721-6793    Basal Rate #2 1.5    Basal rate #2 time 0780-3694        -- SubQ Continuous 11/16/24 1152    11/16/24 1250  insulin aspart U-100 insulin pump from home 0-10 Units        Question Answer Comment   Target number 120    Carbohydrate coverage #1 1:10    Carbohydrate coverage #1 time 2011-0498    Sensitivity #1 1:50    Sensitivity #1 time 3366-2780        -- SubQ As needed (PRN) 11/16/24 1152

## 2024-11-17 NOTE — PROGRESS NOTES
[facility] ([place] [address])  [facility] Medicine  Progress Note    Patient Name: Mercedez Purvis  MRN: [id number]  Patient Class: IP- Inpatient   Admission Date: 11/15/2024  Length of Stay: 2 days  Attending Physician: Yaw Mccullough*  Primary Care Provider: Dottie Merritt MD        Subjective:     Principal Problem:Acute on chronic systolic congestive heart failure        HPI:  70 yo female with CAD s/p stenting, DM2 on insulin pump, neuropathy, HTN, hyperaldosteronism, HPLD, CHF, CKD, TIKA presenting for acute shortness of breath. She was found tripoding on arrival of EMS, was placed on CPAP and given 4 nitro sprays. She arrived to the ER on CPAP and was able to be weaned down to 2L of oxygen. She was helping her sister move this afternoon, she states that she does not always take her medications when she goes out, but states she has only been sleeping on one pillow, she denies any chest pain but does have some lower abd pain. She has not noticed any leg swelling.     She was given 40mg IV lasix in the ER. BNP elevated, troponin mildly elevated. Medicine called for admission.     Overview/Hospital Course:  No notes on file    Interval History: No acute events. Pt feels better. Mild bump in Cr    Review of Systems  Objective:     Vital Signs (Most Recent):  Temp: 98.3 °F (36.8 °C) (11/17/24 1127)  Pulse: 88 (11/17/24 1127)  Resp: 18 (11/17/24 1127)  BP: (!) 94/55 (11/17/24 1127)  SpO2: 98 % (11/17/24 1127) Vital Signs (24h Range):  Temp:  [97.5 °F (36.4 °C)-98.5 °F (36.9 °C)] 98.3 °F (36.8 °C)  Pulse:  [73-92] 88  Resp:  [16-20] 18  SpO2:  [95 %-98 %] 98 %  BP: ()/(54-62) 94/55     Weight: 78.5 kg (173 lb)  Body mass index is 30.65 kg/m².    Intake/Output Summary (Last 24 hours) at 11/17/2024 1402  Last data filed at 11/17/2024 0715  Gross per 24 hour   Intake 280 ml   Output 550 ml   Net -270 ml         Physical Exam  Constitutional:       General: She is not in acute distress.      Appearance: Normal appearance. She is not toxic-appearing or diaphoretic.   Cardiovascular:      Rate and Rhythm: Normal rate and regular rhythm.      Heart sounds: No murmur heard.     No friction rub. No gallop.   Pulmonary:      Effort: Pulmonary effort is normal. No respiratory distress.      Breath sounds: Normal breath sounds. No wheezing or rales.   Abdominal:      General: Abdomen is flat. There is no distension.      Palpations: Abdomen is soft.      Tenderness: There is no abdominal tenderness. There is no guarding or rebound.   Musculoskeletal:      Right lower leg: No edema.      Left lower leg: No edema.   Neurological:      Mental Status: She is alert.             Significant Labs: All pertinent labs within the past 24 hours have been reviewed.    Significant Imaging: I have reviewed all pertinent imaging results/findings within the past 24 hours.    Assessment/Plan:      * Acute on chronic systolic congestive heart failure  Patient has Systolic (HFrEF) heart failure that is Acute on chronic. On presentation their CHF was decompensated. Evidence of decompensated CHF on presentation includes: edema, crackles on lung auscultation, and shortness of breath. The etiology of their decompensation is likely medication non-compliance. Most recent BNP and echo results are listed below.  Recent Labs     11/15/24  2143   *     Latest ECHO  Results for orders placed during the hospital encounter of 11/14/23    Echo Saline Bubble? No    Interpretation Summary    Left Ventricle: The left ventricle is normal in size. Normal wall thickness. There is mild eccentric hypertrophy. Severe global hypokinesis present. There is severely reduced systolic function with a visually estimated ejection fraction of 15 - 20%. Unable to assess diastolic function due to E-A fusion. No thrombus present.    Right Ventricle: Normal right ventricular cavity size. Wall thickness is normal. Right ventricle wall motion  is normal.  Systolic function is normal.    Aortic Valve: The aortic valve is a trileaflet valve. There is mild aortic valve sclerosis. There is moderate annular dilation present.    Mitral Valve: There is mild mitral annular calcification present.    IVC/SVC: Normal venous pressure at 3 mmHg.    Pericardium: There is a trivial circumferential effusion.    Current Heart Failure Medications  metoprolol succinate (TOPROL-XL) 24 hr tablet 200 mg, Daily, Oral  sacubitriL-valsartan  mg per tablet 1 tablet, 2 times daily, Oral  spironolactone tablet 50 mg, Daily, Oral  furosemide injection 40 mg, Every 12 hours, Intravenous    Plan  - Monitor strict I&Os and daily weights.    - Place on telemetry  - Low sodium diet  - Place on fluid restriction of 1.5 L.   - Cardiology has not been consulted  - The patient's volume status is improving but not at their baseline as indicated by edema, crackles on lung auscultation, and shortness of breath        Hyperaldosteronism  Chronic, controlled, continue spironolactone      Hyperlipidemia associated with type 2 diabetes mellitus  Chronic, controlled, continue home statin      Hypertension associated with diabetes  Chronic, slightly elevated, continue home amlodipine, entresto, metop      Gastroesophageal reflux disease without esophagitis  Chronic, controlled, continue home to monitor      TIKA (obstructive sleep apnea)  Chronic, controlled, continue CPAP at night      Coronary artery disease of native artery of native heart with stable angina pectoris  Patient with known CAD s/p stent placement, which is controlled Will continue ASA, Plavix, and Statin and monitor for S/Sx of angina/ACS. Continue to monitor on telemetry.     Type 2 diabetes mellitus with diabetic polyneuropathy, with long-term current use of insulin  Patient's FSGs are controlled on current medication regimen.  Last A1c reviewed-   Lab Results   Component Value Date    HGBA1C 7.7 (H) 08/22/2024     Most recent fingerstick  "glucose reviewed- No results for input(s): "POCTGLUCOSE" in the last 24 hours.  Current correctional scale  Low  Maintain anti-hyperglycemic dose as follows-   Antihyperglycemics (From admission, onward)      Start     Stop Route Frequency Ordered    11/16/24 0104  insulin aspart U-100 pen 0-5 Units         -- SubQ Before meals & nightly PRN 11/16/24 0005        CONTINUE PATIENT'S HOME INSULIN PUMP for now, consult endocrine      Hold Oral hypoglycemics while patient is in the hospital.      VTE Risk Mitigation (From admission, onward)           Ordered     enoxaparin injection 40 mg  Daily         11/15/24 2348     IP VTE HIGH RISK PATIENT  Once         11/15/24 2348     Place sequential compression device  Until discontinued         11/15/24 2348                    Discharge Planning   PEGGY:      Code Status: Full Code   Is the patient medically ready for discharge?:     Reason for patient still in hospital (select all that apply): Patient trending condition and Treatment  Discharge Plan A: Home          Yaw Mccullough MD  Department of Hospital Medicine   [facility] - Stepdown Flex ([place])    "

## 2024-11-17 NOTE — SUBJECTIVE & OBJECTIVE
Interval History: No acute events. Pt feels better. Mild bump in Cr    Review of Systems  Objective:     Vital Signs (Most Recent):  Temp: 98.3 °F (36.8 °C) (11/17/24 1127)  Pulse: 88 (11/17/24 1127)  Resp: 18 (11/17/24 1127)  BP: (!) 94/55 (11/17/24 1127)  SpO2: 98 % (11/17/24 1127) Vital Signs (24h Range):  Temp:  [97.5 °F (36.4 °C)-98.5 °F (36.9 °C)] 98.3 °F (36.8 °C)  Pulse:  [73-92] 88  Resp:  [16-20] 18  SpO2:  [95 %-98 %] 98 %  BP: ()/(54-62) 94/55     Weight: 78.5 kg (173 lb)  Body mass index is 30.65 kg/m².    Intake/Output Summary (Last 24 hours) at 11/17/2024 1402  Last data filed at 11/17/2024 0715  Gross per 24 hour   Intake 280 ml   Output 550 ml   Net -270 ml         Physical Exam  Constitutional:       General: She is not in acute distress.     Appearance: Normal appearance. She is not toxic-appearing or diaphoretic.   Cardiovascular:      Rate and Rhythm: Normal rate and regular rhythm.      Heart sounds: No murmur heard.     No friction rub. No gallop.   Pulmonary:      Effort: Pulmonary effort is normal. No respiratory distress.      Breath sounds: Normal breath sounds. No wheezing or rales.   Abdominal:      General: Abdomen is flat. There is no distension.      Palpations: Abdomen is soft.      Tenderness: There is no abdominal tenderness. There is no guarding or rebound.   Musculoskeletal:      Right lower leg: No edema.      Left lower leg: No edema.   Neurological:      Mental Status: She is alert.             Significant Labs: All pertinent labs within the past 24 hours have been reviewed.    Significant Imaging: I have reviewed all pertinent imaging results/findings within the past 24 hours.

## 2024-11-17 NOTE — PLAN OF CARE
Hypotensive overnight, entresto held. MAP as low as 66, asymptomatic. MD notified of hypotension. Home BP meds reverified. IV lasix. Insulin pump. CPAP QHS. I/O. Daily weight. 1500cc Fluid Restriction. Telemetry. No further changes    Problem: Adult Inpatient Plan of Care  Goal: Plan of Care Review  Outcome: Progressing  Goal: Patient-Specific Goal (Individualized)  Outcome: Progressing  Goal: Absence of Hospital-Acquired Illness or Injury  Outcome: Progressing  Goal: Optimal Comfort and Wellbeing  Outcome: Progressing  Goal: Readiness for Transition of Care  Outcome: Progressing     Problem: Diabetes Comorbidity  Goal: Blood Glucose Level Within Targeted Range  Outcome: Progressing     Problem: Acute Kidney Injury/Impairment  Goal: Fluid and Electrolyte Balance  Outcome: Progressing  Goal: Improved Oral Intake  Outcome: Progressing  Goal: Effective Renal Function  Outcome: Progressing

## 2024-11-17 NOTE — PROGRESS NOTES
"[facility] ([facility]-)  Endocrinology  Progress Note    Admit Date: 11/15/2024     Reason for Consult: Management of T2DM, Hyperglycemia     Diabetes diagnosis year: >10 years ago    Home Diabetes Medications:    Omnipod 5-  Active time: 3.5 hours  Mn--0700 1.2 u/hr   0700-mn 1.5 u/hr     Mn-0700 55  0700-5p 50  5p-mn 50     Mn-10a 1:10   10a-5p 1 :12   5p-mn 1:10     Metformin 500 mg  Mounjaro 10 mg  Jardiance 25 mg    How often checking glucose at home? >4 x day   BG readings on regimen: 100s and lower  Hypoglycemia on the regimen?  Yes  Missed doses on regimen?  No    Diabetes Complications include:     Hyperglycemia    Complicating diabetes co morbidities:   History of MI      HPI:   Patient is a 69 y.o. female with CAD s/p stenting, DM2 on insulin pump, neuropathy, HTN, hyperaldosteronism, HPLD, CHF, CKD, TIKA presented for acute shortness of breath. She was found tripoding on arrival of EMS, was placed on CPAP and given 4 nitro sprays. She arrived to the ER on CPAP and was able to be weaned down to 2L of oxygen.  Endocrine consulted for bg management of patient on pump.     Interval HPI:   No acute events overnight. Patient in room 24448/15581 A. Blood glucose stable. BG at goal on current insulin regimen (Home Insulin Pump). Steroid use- None .      Renal function- Normal   Vasopressors-  None     Diet Low Sodium, 2gm Fluid - 1500mL     Eatin%  Nausea: No  Hypoglycemia and intervention: No  Fever: No  TPN and/or TF: No      /62 (BP Location: Right arm, Patient Position: Lying)   Pulse 81   Temp 98 °F (36.7 °C) (Oral)   Resp 18   Ht 5' 3" (1.6 m)   Wt 78.5 kg (173 lb)   LMP 2020 (Approximate)   SpO2 95%   Breastfeeding No   BMI 30.65 kg/m²     Labs Reviewed and Include    Recent Labs   Lab 24  0522   *   CALCIUM 8.6*      K 4.2   CO2 24      BUN 17   CREATININE 1.4     Lab Results   Component Value Date    WBC 7.49 2024    HGB 12.7 " "11/17/2024    HCT 42.4 11/17/2024    MCV 75 (L) 11/17/2024     11/17/2024     No results for input(s): "TSH", "FREET4" in the last 168 hours.  Lab Results   Component Value Date    HGBA1C 7.7 (H) 08/22/2024       Nutritional status:   Body mass index is 30.65 kg/m².  Lab Results   Component Value Date    ALBUMIN 3.2 (L) 11/15/2024    ALBUMIN 3.1 (L) 08/22/2024    ALBUMIN 3.1 (L) 12/21/2023     No results found for: "PREALBUMIN"    Estimated Creatinine Clearance: 37.1 mL/min (based on SCr of 1.4 mg/dL).    Accu-Checks  Recent Labs     11/16/24  0732 11/16/24  1537   POCTGLUCOSE 139* 77       Current Medications and/or Treatments Impacting Glycemic Control  Immunotherapy:    Immunosuppressants       None          Steroids:   Hormones (From admission, onward)      Start     Stop Route Frequency Ordered    11/16/24 0046  melatonin tablet 6 mg         -- Oral Nightly PRN 11/15/24 2348          Pressors:    Autonomic Drugs (From admission, onward)      None          Hyperglycemia/Diabetes Medications:   Antihyperglycemics (From admission, onward)      Start     Stop Route Frequency Ordered    11/16/24 1300  insulin aspart U-100 insulin pump from home        Question Answer Comment   Target number 120    Basal Rate #1 1.2    Basal rate #1 time 6059-1328    Basal Rate #2 1.5    Basal rate #2 time 8200-0139        -- SubQ Continuous 11/16/24 1152    11/16/24 1250  insulin aspart U-100 insulin pump from home 0-10 Units        Question Answer Comment   Target number 120    Carbohydrate coverage #1 1:10    Carbohydrate coverage #1 time 1396-2599    Sensitivity #1 1:50    Sensitivity #1 time 0065-7854        -- SubQ As needed (PRN) 11/16/24 1152            ASSESSMENT and PLAN    Cardiac/Vascular  * Acute on chronic systolic congestive heart failure    Managed by primary team  Optimize BG control      Hyperlipidemia associated with type 2 diabetes mellitus  Managed per primary.   On statin per ADA        Endocrine  Insulin " pump in place  At time of evaluation, pt meets criteria to continue home insulin pump usage.  - Has all adequate supplies   - Insulin pump site change on 7/14/2024.    - Bolus settings reviewed    - No changes to home regimen.   - Nurse to check BG qac/hs/0200 & record in epic   - Patient to input glucose into pump and use bolus wizard for prandial needs   - Will continue to monitor accuchecks and titrate insulin as clinically indicated .     - Discussed above plan with patient, patient verbalized understanding.   - Understands in case of pump malfunction or cognitive decline in which pt can no longer safely use insulin pump, will transition to SC MDI       Current inpatient pump settings:  Active time: 3.5 hours  Mn--0700 1.2 u/hr   0700-mn 1.5 u/hr     Mn-0700 55  0700-5p 50  5p-mn 50     Mn-10a 1:10   10a-5p 1 :12   5p-mn 1:10      If pump malfunctions or is disconnected, please contact endocrine    Taking fixed bolus doses with meals based on size.  Post prandial low today.  Recommend adjust as below:    Small 12 units  to 9 units  Medium 14 units to 11 units  Large 16 units to 13 units    Type 2 diabetes mellitus with diabetic polyneuropathy, with long-term current use of insulin  BG goal: 140-180  T2DM on Omnipod 5.    Blood sugar stable with low recommended adjusting her fixed meal bolus    - -Continue Insulin pump    - POCT Glucose before meals, at bedtime and at 2 am  - Hypoglycemia protocol in place      ** Please notify Endocrine for any change and/or advance in diet**  ** Please call Endocrine for any BG related issues **     Discharge Planning:   TBD. Please notify endocrinology prior to discharge.            Ryder Benito PA-C  Endocrinology  [facility] ([place])

## 2024-11-18 ENCOUNTER — DOCUMENTATION ONLY (OUTPATIENT)
Dept: CARDIOLOGY | Facility: CLINIC | Age: 69
End: 2024-11-18
Payer: MEDICARE

## 2024-11-18 DIAGNOSIS — R06.02 SOB (SHORTNESS OF BREATH): Primary | ICD-10-CM

## 2024-11-18 LAB
ANION GAP SERPL CALC-SCNC: 9 MMOL/L (ref 8–16)
BUN SERPL-MCNC: 25 MG/DL (ref 8–23)
CALCIUM SERPL-MCNC: 8.4 MG/DL (ref 8.7–10.5)
CHLORIDE SERPL-SCNC: 100 MMOL/L (ref 95–110)
CO2 SERPL-SCNC: 27 MMOL/L (ref 23–29)
CREAT SERPL-MCNC: 1.5 MG/DL (ref 0.5–1.4)
EST. GFR  (NO RACE VARIABLE): 37.3 ML/MIN/1.73 M^2
GLUCOSE SERPL-MCNC: 99 MG/DL (ref 70–110)
POTASSIUM SERPL-SCNC: 3.9 MMOL/L (ref 3.5–5.1)
SODIUM SERPL-SCNC: 136 MMOL/L (ref 136–145)

## 2024-11-18 PROCEDURE — 25000003 PHARM REV CODE 250: Performed by: INTERNAL MEDICINE

## 2024-11-18 PROCEDURE — 36415 COLL VENOUS BLD VENIPUNCTURE: CPT | Performed by: INTERNAL MEDICINE

## 2024-11-18 PROCEDURE — 94761 N-INVAS EAR/PLS OXIMETRY MLT: CPT

## 2024-11-18 PROCEDURE — 99232 SBSQ HOSP IP/OBS MODERATE 35: CPT | Mod: ,,, | Performed by: PHYSICIAN ASSISTANT

## 2024-11-18 PROCEDURE — 99900035 HC TECH TIME PER 15 MIN (STAT)

## 2024-11-18 PROCEDURE — 94799 UNLISTED PULMONARY SVC/PX: CPT

## 2024-11-18 PROCEDURE — 20600001 HC STEP DOWN PRIVATE ROOM

## 2024-11-18 PROCEDURE — 94660 CPAP INITIATION&MGMT: CPT

## 2024-11-18 PROCEDURE — 63600175 PHARM REV CODE 636 W HCPCS: Performed by: INTERNAL MEDICINE

## 2024-11-18 PROCEDURE — 80048 BASIC METABOLIC PNL TOTAL CA: CPT | Performed by: INTERNAL MEDICINE

## 2024-11-18 PROCEDURE — 27100171 HC OXYGEN HIGH FLOW UP TO 24 HOURS

## 2024-11-18 RX ADMIN — METOPROLOL SUCCINATE 200 MG: 100 TABLET, EXTENDED RELEASE ORAL at 08:11

## 2024-11-18 RX ADMIN — GABAPENTIN 300 MG: 300 CAPSULE ORAL at 09:11

## 2024-11-18 RX ADMIN — GABAPENTIN 300 MG: 300 CAPSULE ORAL at 02:11

## 2024-11-18 RX ADMIN — GABAPENTIN 300 MG: 300 CAPSULE ORAL at 08:11

## 2024-11-18 RX ADMIN — ENOXAPARIN SODIUM 40 MG: 40 INJECTION SUBCUTANEOUS at 05:11

## 2024-11-18 RX ADMIN — ATORVASTATIN CALCIUM 80 MG: 40 TABLET, FILM COATED ORAL at 09:11

## 2024-11-18 RX ADMIN — AMITRIPTYLINE HYDROCHLORIDE 75 MG: 25 TABLET, FILM COATED ORAL at 09:11

## 2024-11-18 RX ADMIN — CLOPIDOGREL BISULFATE 75 MG: 75 TABLET ORAL at 08:11

## 2024-11-18 NOTE — PROGRESS NOTES
"[facility]down [facility] ([facility]-)  Endocrinology  Progress Note    Admit Date: 11/15/2024     Reason for Consult: Management of T2DM, Hyperglycemia     Diabetes diagnosis year: >10 years ago    Home Diabetes Medications:    Omnipod 5-  Active time: 3.5 hours  Mn--0700 1.2 u/hr   0700-mn 1.5 u/hr     Mn-0700 55  0700-5p 50  5p-mn 50     Mn-10a 1:10   10a-5p 1 :12   5p-mn 1:10     Metformin 500 mg  Mounjaro 10 mg  Jardiance 25 mg    How often checking glucose at home? >4 x day   BG readings on regimen: 100s and lower  Hypoglycemia on the regimen?  Yes  Missed doses on regimen?  No    Diabetes Complications include:     Hyperglycemia    Complicating diabetes co morbidities:   History of MI      HPI:   Patient is a 69 y.o. female with CAD s/p stenting, DM2 on insulin pump, neuropathy, HTN, hyperaldosteronism, HPLD, CHF, CKD, TIKA presented for acute shortness of breath. She was found tripoding on arrival of EMS, was placed on CPAP and given 4 nitro sprays. She arrived to the ER on CPAP and was able to be weaned down to 2L of oxygen.  Endocrine consulted for bg management of patient on pump.     Interval HPI:   No acute events overnight. Patient in room 88930/99889 A. Blood glucose stable. BG at and below goal on current insulin regimen (Home Insulin Pump). Steroid use- None .      Renal function- Abnormal - Cr 1.5   Vasopressors-  None     Diet Low Sodium, 2gm Fluid - 1500mL     Eatin%  Nausea: No  Hypoglycemia and intervention: Yes OJ  Fever: No  TPN and/or TF: No      BP (!) 98/56 (BP Location: Left arm, Patient Position: Sitting)   Pulse 72   Temp 97.3 °F (36.3 °C) (Oral)   Resp 18   Ht 5' 3" (1.6 m)   Wt 78.5 kg (173 lb)   LMP 2020 (Approximate)   SpO2 98%   Breastfeeding No   BMI 30.65 kg/m²     Labs Reviewed and Include    Recent Labs   Lab 24  0344   GLU 99   CALCIUM 8.4*      K 3.9   CO2 27      BUN 25*   CREATININE 1.5*     Lab Results   Component Value Date    WBC " "7.49 11/17/2024    HGB 12.7 11/17/2024    HCT 42.4 11/17/2024    MCV 75 (L) 11/17/2024     11/17/2024     No results for input(s): "TSH", "FREET4" in the last 168 hours.  Lab Results   Component Value Date    HGBA1C 7.7 (H) 08/22/2024       Nutritional status:   Body mass index is 30.65 kg/m².  Lab Results   Component Value Date    ALBUMIN 3.2 (L) 11/15/2024    ALBUMIN 3.1 (L) 08/22/2024    ALBUMIN 3.1 (L) 12/21/2023     No results found for: "PREALBUMIN"    Estimated Creatinine Clearance: 34.6 mL/min (A) (based on SCr of 1.5 mg/dL (H)).    Accu-Checks  Recent Labs     11/16/24  0732 11/16/24  1537   POCTGLUCOSE 139* 77       Current Medications and/or Treatments Impacting Glycemic Control  Immunotherapy:    Immunosuppressants       None          Steroids:   Hormones (From admission, onward)      Start     Stop Route Frequency Ordered    11/16/24 0046  melatonin tablet 6 mg         -- Oral Nightly PRN 11/15/24 2348          Pressors:    Autonomic Drugs (From admission, onward)      None          Hyperglycemia/Diabetes Medications:   Antihyperglycemics (From admission, onward)      Start     Stop Route Frequency Ordered    11/16/24 1300  insulin aspart U-100 insulin pump from home        Question Answer Comment   Target number 120    Basal Rate #1 1.2    Basal rate #1 time 9495-9186    Basal Rate #2 1.5    Basal rate #2 time 7845-8729        -- SubQ Continuous 11/16/24 1152    11/16/24 1250  insulin aspart U-100 insulin pump from home 0-10 Units        Question Answer Comment   Target number 120    Carbohydrate coverage #1 1:10    Carbohydrate coverage #1 time 0884-1055    Sensitivity #1 1:50    Sensitivity #1 time 4261-8084        -- SubQ As needed (PRN) 11/16/24 1152            ASSESSMENT and PLAN    Endocrine  Insulin pump in place  At time of evaluation, pt meets criteria to continue home insulin pump usage.  - Has all adequate supplies   - Insulin pump site change on 11/16//2024.    - Bolus settings " reviewed    - No changes to home regimen.   - Nurse to check BG qac/hs/0200 & record in epic   - Patient to input glucose into pump and use bolus wizard for prandial needs   - Will continue to monitor accuchecks and titrate insulin as clinically indicated .     - Discussed above plan with patient, patient verbalized understanding.   - Understands in case of pump malfunction or cognitive decline in which pt can no longer safely use insulin pump, will transition to SC MDI       Current inpatient pump settings:  Active time: 3.5 hours  Mn--0700 1.2 u/hr   0700-mn 1.5 u/hr     Mn-0700 55  0700-5p 50  5p-mn 50     Mn-10a 1:10   10a-5p 1 :12   5p-mn 1:10      If pump malfunctions or is disconnected, please contact endocrine    Small 12 units  to 5 units  Medium 14 units to 7 units  Large 16 units to 9 units      Type 2 diabetes mellitus with diabetic polyneuropathy, with long-term current use of insulin  BG goal: 140-180  T2DM on Omnipod 5.    Blood sugar stable with some lows after mealtime boluses    - -Continue Insulin pump    - POCT Glucose before meals, at bedtime and at 2 am  - Hypoglycemia protocol in place      ** Please notify Endocrine for any change and/or advance in diet**  ** Please call Endocrine for any BG related issues **     Discharge Planning:   TBD. Please notify endocrinology prior to discharge.            Ryder Benito PA-C  Endocrinology  [facility] - [facility]down Flex ([place])

## 2024-11-18 NOTE — PROGRESS NOTES
[facility] - Stepdown Flex ([place] [address])  [facility] Medicine  Progress Note    Patient Name: Mercedez Purvis  MRN: [id number]  Patient Class: IP- Inpatient   Admission Date: 11/15/2024  Length of Stay: 3 days  Attending Physician: Yaw Mccullough*  Primary Care Provider: Dottie Merritt MD        Subjective:     Principal Problem:Acute on chronic systolic congestive heart failure        HPI:  68 yo female with CAD s/p stenting, DM2 on insulin pump, neuropathy, HTN, hyperaldosteronism, HPLD, CHF, CKD, TIKA presenting for acute shortness of breath. She was found tripoding on arrival of EMS, was placed on CPAP and given 4 nitro sprays. She arrived to the ER on CPAP and was able to be weaned down to 2L of oxygen. She was helping her sister move this afternoon, she states that she does not always take her medications when she goes out, but states she has only been sleeping on one pillow, she denies any chest pain but does have some lower abd pain. She has not noticed any leg swelling.     She was given 40mg IV lasix in the ER. BNP elevated, troponin mildly elevated. Medicine called for admission.     Overview/Hospital Course:  No notes on file    Interval History: No acute events  Held entresto, aldactone for hypotension  Cr 1.5 this am    Review of Systems  Objective:     Vital Signs (Most Recent):  Temp: 97.3 °F (36.3 °C) (11/18/24 0758)  Pulse: 70 (11/18/24 1055)  Resp: 18 (11/18/24 0758)  BP: (!) 98/56 (11/18/24 0758)  SpO2: 98 % (11/18/24 0758) Vital Signs (24h Range):  Temp:  [97.3 °F (36.3 °C)-99 °F (37.2 °C)] 97.3 °F (36.3 °C)  Pulse:  [70-88] 70  Resp:  [18-20] 18  SpO2:  [94 %-98 %] 98 %  BP: ()/(53-60) 98/56     Weight: 78.5 kg (173 lb)  Body mass index is 30.65 kg/m².    Intake/Output Summary (Last 24 hours) at 11/18/2024 1058  Last data filed at 11/18/2024 0900  Gross per 24 hour   Intake 400 ml   Output 300 ml   Net 100 ml         Physical Exam  Constitutional:       General: She is not in  acute distress.     Appearance: Normal appearance. She is not toxic-appearing or diaphoretic.   Cardiovascular:      Rate and Rhythm: Normal rate and regular rhythm.      Heart sounds: No murmur heard.     No friction rub. No gallop.   Pulmonary:      Effort: Pulmonary effort is normal. No respiratory distress.      Breath sounds: Normal breath sounds. No wheezing or rales.   Abdominal:      General: Abdomen is flat. There is no distension.      Palpations: Abdomen is soft.      Tenderness: There is no abdominal tenderness. There is no guarding or rebound.   Musculoskeletal:      Right lower leg: No edema.      Left lower leg: No edema.   Neurological:      Mental Status: She is alert.             Significant Labs: All pertinent labs within the past 24 hours have been reviewed.    Significant Imaging: I have reviewed all pertinent imaging results/findings within the past 24 hours.    Assessment/Plan:      * Acute on chronic systolic congestive heart failure  Patient has Systolic (HFrEF) heart failure that is Acute on chronic. On presentation their CHF was decompensated. Evidence of decompensated CHF on presentation includes: edema, crackles on lung auscultation, and shortness of breath. The etiology of their decompensation is likely medication non-compliance. Most recent BNP and echo results are listed below.  Recent Labs     11/15/24  2143   *       Latest ECHO  Results for orders placed during the hospital encounter of 11/14/23    Echo Saline Bubble? No    Interpretation Summary    Left Ventricle: The left ventricle is normal in size. Normal wall thickness. There is mild eccentric hypertrophy. Severe global hypokinesis present. There is severely reduced systolic function with a visually estimated ejection fraction of 15 - 20%. Unable to assess diastolic function due to E-A fusion. No thrombus present.    Right Ventricle: Normal right ventricular cavity size. Wall thickness is normal. Right ventricle wall  "motion  is normal. Systolic function is normal.    Aortic Valve: The aortic valve is a trileaflet valve. There is mild aortic valve sclerosis. There is moderate annular dilation present.    Mitral Valve: There is mild mitral annular calcification present.    IVC/SVC: Normal venous pressure at 3 mmHg.    Pericardium: There is a trivial circumferential effusion.    Current Heart Failure Medications  metoprolol succinate (TOPROL-XL) 24 hr tablet 200 mg, Daily, Oral    Plan  - Monitor strict I&Os and daily weights.    - Place on telemetry  - Low sodium diet  - Place on fluid restriction of 1.5 L.   - Cardiology has not been consulted  - The patient's volume status is at their baseline        Hyperaldosteronism  Chronic, controlled, on spironolactone      Hyperlipidemia associated with type 2 diabetes mellitus  Chronic, controlled, continue home statin      Hypertension associated with diabetes  Chronic  Hold entresto  Dc amlodipine      Gastroesophageal reflux disease without esophagitis  Chronic, controlled, continue home to monitor      TIKA (obstructive sleep apnea)  Chronic, controlled, continue CPAP at night      Coronary artery disease of native artery of native heart with stable angina pectoris  Patient with known CAD s/p stent placement, which is controlled Will continue ASA, Plavix, and Statin and monitor for S/Sx of angina/ACS. Continue to monitor on telemetry.     Type 2 diabetes mellitus with diabetic polyneuropathy, with long-term current use of insulin  Patient's FSGs are controlled on current medication regimen.  Last A1c reviewed-   Lab Results   Component Value Date    HGBA1C 7.7 (H) 08/22/2024     Most recent fingerstick glucose reviewed- No results for input(s): "POCTGLUCOSE" in the last 24 hours.  Current correctional scale  Low  Maintain anti-hyperglycemic dose as follows-   Antihyperglycemics (From admission, onward)      Start     Stop Route Frequency Ordered    11/16/24 0104  insulin aspart U-100 " pen 0-5 Units         -- SubQ Before meals & nightly PRN 11/16/24 0005        CONTINUE PATIENT'S HOME INSULIN PUMP for now, consult endocrine      Hold Oral hypoglycemics while patient is in the hospital.      VTE Risk Mitigation (From admission, onward)           Ordered     enoxaparin injection 40 mg  Daily         11/15/24 2348     IP VTE HIGH RISK PATIENT  Once         11/15/24 2348     Place sequential compression device  Until discontinued         11/15/24 2348                    Discharge Planning   PEGGY: 11/19/2024     Code Status: Full Code   Is the patient medically ready for discharge?:     Reason for patient still in hospital (select all that apply): Patient trending condition and Treatment  Discharge Plan A: Home        Yaw Mccullough MD  Department of Hospital Medicine   [facility] - Stepdown Flex ([place]-[facility])

## 2024-11-18 NOTE — SUBJECTIVE & OBJECTIVE
"Interval HPI:   No acute events overnight. Patient in room 26693/23088 A. Blood glucose stable. BG at and below goal on current insulin regimen (Home Insulin Pump). Steroid use- None .      Renal function- Abnormal - Cr 1.5   Vasopressors-  None     Diet Low Sodium, 2gm Fluid - 1500mL     Eatin%  Nausea: No  Hypoglycemia and intervention: Yes OJ  Fever: No  TPN and/or TF: No      BP (!) 98/56 (BP Location: Left arm, Patient Position: Sitting)   Pulse 72   Temp 97.3 °F (36.3 °C) (Oral)   Resp 18   Ht 5' 3" (1.6 m)   Wt 78.5 kg (173 lb)   LMP 2020 (Approximate)   SpO2 98%   Breastfeeding No   BMI 30.65 kg/m²     Labs Reviewed and Include    Recent Labs   Lab 24  0344   GLU 99   CALCIUM 8.4*      K 3.9   CO2 27      BUN 25*   CREATININE 1.5*     Lab Results   Component Value Date    WBC 7.49 2024    HGB 12.7 2024    HCT 42.4 2024    MCV 75 (L) 2024     2024     No results for input(s): "TSH", "FREET4" in the last 168 hours.  Lab Results   Component Value Date    HGBA1C 7.7 (H) 2024       Nutritional status:   Body mass index is 30.65 kg/m².  Lab Results   Component Value Date    ALBUMIN 3.2 (L) 11/15/2024    ALBUMIN 3.1 (L) 2024    ALBUMIN 3.1 (L) 2023     No results found for: "PREALBUMIN"    Estimated Creatinine Clearance: 34.6 mL/min (A) (based on SCr of 1.5 mg/dL (H)).    Accu-Checks  Recent Labs     24  0732 24  1537   POCTGLUCOSE 139* 77       Current Medications and/or Treatments Impacting Glycemic Control  Immunotherapy:    Immunosuppressants       None          Steroids:   Hormones (From admission, onward)      Start     Stop Route Frequency Ordered    24 0046  melatonin tablet 6 mg         -- Oral Nightly PRN 11/15/24 2348          Pressors:    Autonomic Drugs (From admission, onward)      None          Hyperglycemia/Diabetes Medications:   Antihyperglycemics (From admission, onward)      Start     " Stop Route Frequency Ordered    11/16/24 1300  insulin aspart U-100 insulin pump from home        Question Answer Comment   Target number 120    Basal Rate #1 1.2    Basal rate #1 time 1312-8079    Basal Rate #2 1.5    Basal rate #2 time 7274-8080        -- SubQ Continuous 11/16/24 1152    11/16/24 1250  insulin aspart U-100 insulin pump from home 0-10 Units        Question Answer Comment   Target number 120    Carbohydrate coverage #1 1:10    Carbohydrate coverage #1 time 9589-1735    Sensitivity #1 1:50    Sensitivity #1 time 2816-7106        -- SubQ As needed (PRN) 11/16/24 1152

## 2024-11-18 NOTE — PLAN OF CARE
Patient aaox4. Patient complains of no pain. Patient blood sugar was low. Patient requested for orange juice. Patient due medication administered and patient tolerating well. Plan of care reviewed with patient and patient verbalized understanding. Patient had her bath. Patient safety measures ongoing, call bell within reach, patient made comfortable in chair. No other concerns at this time.                     Problem: Adult Inpatient Plan of Care  Goal: Plan of Care Review  Outcome: Progressing  Goal: Patient-Specific Goal (Individualized)  Outcome: Progressing  Goal: Absence of Hospital-Acquired Illness or Injury  Outcome: Progressing  Goal: Optimal Comfort and Wellbeing  Outcome: Progressing  Goal: Readiness for Transition of Care  Outcome: Progressing     Problem: Diabetes Comorbidity  Goal: Blood Glucose Level Within Targeted Range  Outcome: Progressing     Problem: Acute Kidney Injury/Impairment  Goal: Fluid and Electrolyte Balance  Outcome: Progressing  Goal: Improved Oral Intake  Outcome: Progressing  Goal: Effective Renal Function  Outcome: Progressing

## 2024-11-18 NOTE — ASSESSMENT & PLAN NOTE
At time of evaluation, pt meets criteria to continue home insulin pump usage.  - Has all adequate supplies   - Insulin pump site change on 11/16//2024.    - Bolus settings reviewed    - No changes to home regimen.   - Nurse to check BG qac/hs/0200 & record in epic   - Patient to input glucose into pump and use bolus wizard for prandial needs   - Will continue to monitor accuchecks and titrate insulin as clinically indicated .     - Discussed above plan with patient, patient verbalized understanding.   - Understands in case of pump malfunction or cognitive decline in which pt can no longer safely use insulin pump, will transition to [facility] MDI       Current inpatient pump settings:  Active time: 3.5 hours  Mn--0700 1.2 u/hr   0700-mn 1.5 u/hr     Mn-0700 55  0700-5p 50  5p-mn 50     Mn-10a 1:10   10a-5p 1 :12   5p-mn 1:10      If pump malfunctions or is disconnected, please contact endocrine    Small 12 units  to 5 units  Medium 14 units to 7 units  Large 16 units to 9 units

## 2024-11-18 NOTE — PLAN OF CARE
Hypoglycemic (47) at start of shift, improved to 99 with OJ (MD notified); Hypotensive overnight, entresto held. asymptomatic. MD notified of hypotension. Insulin pump. CPAP QHS. I/O. Daily weight. 1500cc Fluid Restriction. Telemetry. No further changes      Problem: Adult Inpatient Plan of Care  Goal: Plan of Care Review  Outcome: Progressing  Goal: Patient-Specific Goal (Individualized)  Outcome: Progressing  Goal: Absence of Hospital-Acquired Illness or Injury  Outcome: Progressing  Goal: Optimal Comfort and Wellbeing  Outcome: Progressing  Goal: Readiness for Transition of Care  Outcome: Progressing     Problem: Diabetes Comorbidity  Goal: Blood Glucose Level Within Targeted Range  Outcome: Progressing     Problem: Acute Kidney Injury/Impairment  Goal: Fluid and Electrolyte Balance  Outcome: Progressing  Goal: Improved Oral Intake  Outcome: Progressing  Goal: Effective Renal Function  Outcome: Progressing

## 2024-11-18 NOTE — ASSESSMENT & PLAN NOTE
Patient has Systolic (HFrEF) heart failure that is Acute on chronic. On presentation their CHF was decompensated. Evidence of decompensated CHF on presentation includes: edema, crackles on lung auscultation, and shortness of breath. The etiology of their decompensation is likely medication non-compliance. Most recent BNP and echo results are listed below.  Recent Labs     11/15/24  2143   *       Latest ECHO  Results for orders placed during the hospital encounter of 11/14/23    Echo Saline Bubble? No    Interpretation Summary    Left Ventricle: The left ventricle is normal in size. Normal wall thickness. There is mild eccentric hypertrophy. Severe global hypokinesis present. There is severely reduced systolic function with a visually estimated ejection fraction of 15 - 20%. Unable to assess diastolic function due to E-A fusion. No thrombus present.    Right Ventricle: Normal right ventricular cavity size. Wall thickness is normal. Right ventricle wall motion  is normal. Systolic function is normal.    Aortic Valve: The aortic valve is a trileaflet valve. There is mild aortic valve sclerosis. There is moderate annular dilation present.    Mitral Valve: There is mild mitral annular calcification present.    IVC/SVC: Normal venous pressure at 3 mmHg.    Pericardium: There is a trivial circumferential effusion.    Current Heart Failure Medications  metoprolol succinate (TOPROL-XL) 24 hr tablet 200 mg, Daily, Oral    Plan  - Monitor strict I&Os and daily weights.    - Place on telemetry  - Low sodium diet  - Place on fluid restriction of 1.5 L.   - Cardiology has not been consulted  - The patient's volume status is at their baseline

## 2024-11-18 NOTE — SUBJECTIVE & OBJECTIVE
Interval History: No acute events  Held entresto, aldactone for hypotension  Cr 1.5 this am    Review of Systems  Objective:     Vital Signs (Most Recent):  Temp: 97.3 °F (36.3 °C) (11/18/24 0758)  Pulse: 70 (11/18/24 1055)  Resp: 18 (11/18/24 0758)  BP: (!) 98/56 (11/18/24 0758)  SpO2: 98 % (11/18/24 0758) Vital Signs (24h Range):  Temp:  [97.3 °F (36.3 °C)-99 °F (37.2 °C)] 97.3 °F (36.3 °C)  Pulse:  [70-88] 70  Resp:  [18-20] 18  SpO2:  [94 %-98 %] 98 %  BP: ()/(53-60) 98/56     Weight: 78.5 kg (173 lb)  Body mass index is 30.65 kg/m².    Intake/Output Summary (Last 24 hours) at 11/18/2024 1058  Last data filed at 11/18/2024 0900  Gross per 24 hour   Intake 400 ml   Output 300 ml   Net 100 ml         Physical Exam  Constitutional:       General: She is not in acute distress.     Appearance: Normal appearance. She is not toxic-appearing or diaphoretic.   Cardiovascular:      Rate and Rhythm: Normal rate and regular rhythm.      Heart sounds: No murmur heard.     No friction rub. No gallop.   Pulmonary:      Effort: Pulmonary effort is normal. No respiratory distress.      Breath sounds: Normal breath sounds. No wheezing or rales.   Abdominal:      General: Abdomen is flat. There is no distension.      Palpations: Abdomen is soft.      Tenderness: There is no abdominal tenderness. There is no guarding or rebound.   Musculoskeletal:      Right lower leg: No edema.      Left lower leg: No edema.   Neurological:      Mental Status: She is alert.             Significant Labs: All pertinent labs within the past 24 hours have been reviewed.    Significant Imaging: I have reviewed all pertinent imaging results/findings within the past 24 hours.

## 2024-11-18 NOTE — ASSESSMENT & PLAN NOTE
BG goal: 140-180  T2DM on Omnipod 5.    Blood sugar stable with some lows after mealtime boluses    - -Continue Insulin pump    - POCT Glucose before meals, at bedtime and at 2 am  - Hypoglycemia protocol in place      ** Please notify Endocrine for any change and/or advance in diet**  ** Please call Endocrine for any BG related issues **     Discharge Planning:   TBD. Please notify endocrinology prior to discharge.

## 2024-11-18 NOTE — PROGRESS NOTES
"[facility]) nurse navigator notified of H[facility] candidate in need of education and introduction to 4-6 week program.      PT aao x 3 while sitting in the recliner 11/18/2024. Introduced self to pt as [facility] nurse navigator.     Patient given "[facility] Home Care Guide" which includes "Daily weight and symptom tracker".  Encouraged to review information.      Reviewed the following key points of H[facility] program with pt and family:    Take your medications as directed. Call our provider if any Health Care Professional changes your heart/fluid medications.   Weight yourself daily. Daily dry weights. Upon waking, empty your bladder, weigh with as little clothes as possible before eating or drinking. Record weight in Symptom Tracker and compare these weights for fluid gain. Weight gain overnight of 3 - 4lbs is fluid, also gain of 5lbs in 3 days is fluid as well. Call us!  Follow low salt and limit fluid diet. Salt/sodium restrictions 2000 - 3000mg, see page 4. Sodium makes you hold onto fluid. High sodium foods: deli meat, deli cheese, sausages, hot dogs, fast food, restaurant food, anything in a box, bottle, or can. Cook with fresh or frozen ingredients and use seasonings that are labeled NO SALT/SODIUM. Check portions sizes, salt is reported for one portion. A can may contain 2 - 3 portions. Fluid restriction 1.5 - 2 liters per day, see page 6, measure all your fluid, the milk in your cereal, broth in your soup and ice cream because anything that melts at room temperature is a liquid.   Stop smoking and start exercising. Brisk walking is good, don't walk like you're going to the hangman and DON'T WALK IN THE HEAT! Start slow and increase as tolerated. Remember if you don't use it, you lose it.   Go to all your appointments and call your team. Have your weight, blood pressure and pulse ready when we call to do the phone check-ins and call us if you have fluid " gain or questions.     The pt prefers to have morning appts.      Pt reminded to follow Symptom tracker and to call at the onset of symptoms according to tracker.     Reviewed plan for follow up once discharged to include phone calls, in person and virtual visits to assist pt optimizing their heart failure medication regimen and encouraging healthy lifestyle modifications.  Reminded pt that program will assist them over the next 4-6 weeks and then patient will be transferred to long term care provider .  Reminded pt how to contact H[facility] navigator via phone and or via [employer].     Pt instructed appointment will be printed on hospital discharge paperwork.     Pt also reminded Nurse will call 48-72 hours after discharge to check on them.     PT verbalize read back some of the information given.  Encouraged pt and family to read over information often and contact team with any questions or concerns.

## 2024-11-19 VITALS
DIASTOLIC BLOOD PRESSURE: 67 MMHG | BODY MASS INDEX: 30.65 KG/M2 | OXYGEN SATURATION: 99 % | TEMPERATURE: 98 F | WEIGHT: 173 LBS | HEART RATE: 67 BPM | HEIGHT: 63 IN | SYSTOLIC BLOOD PRESSURE: 117 MMHG | RESPIRATION RATE: 18 BRPM

## 2024-11-19 LAB
ANION GAP SERPL CALC-SCNC: 10 MMOL/L (ref 8–16)
BUN SERPL-MCNC: 25 MG/DL (ref 8–23)
CALCIUM SERPL-MCNC: 8.6 MG/DL (ref 8.7–10.5)
CHLORIDE SERPL-SCNC: 101 MMOL/L (ref 95–110)
CO2 SERPL-SCNC: 25 MMOL/L (ref 23–29)
CREAT SERPL-MCNC: 1.2 MG/DL (ref 0.5–1.4)
EST. GFR  (NO RACE VARIABLE): 48.7 ML/MIN/1.73 M^2
GLUCOSE SERPL-MCNC: 120 MG/DL (ref 70–110)
POCT GLUCOSE: 101 MG/DL (ref 70–110)
POCT GLUCOSE: 107 MG/DL (ref 70–110)
POTASSIUM SERPL-SCNC: 4.1 MMOL/L (ref 3.5–5.1)
SODIUM SERPL-SCNC: 136 MMOL/L (ref 136–145)

## 2024-11-19 PROCEDURE — 94660 CPAP INITIATION&MGMT: CPT

## 2024-11-19 PROCEDURE — 36415 COLL VENOUS BLD VENIPUNCTURE: CPT | Performed by: INTERNAL MEDICINE

## 2024-11-19 PROCEDURE — 99900035 HC TECH TIME PER 15 MIN (STAT)

## 2024-11-19 PROCEDURE — 80048 BASIC METABOLIC PNL TOTAL CA: CPT | Performed by: INTERNAL MEDICINE

## 2024-11-19 PROCEDURE — 25000003 PHARM REV CODE 250: Performed by: STUDENT IN AN ORGANIZED HEALTH CARE EDUCATION/TRAINING PROGRAM

## 2024-11-19 PROCEDURE — 94761 N-INVAS EAR/PLS OXIMETRY MLT: CPT

## 2024-11-19 PROCEDURE — 25000003 PHARM REV CODE 250: Performed by: INTERNAL MEDICINE

## 2024-11-19 PROCEDURE — 99232 SBSQ HOSP IP/OBS MODERATE 35: CPT | Mod: ,,, | Performed by: PHYSICIAN ASSISTANT

## 2024-11-19 RX ADMIN — METOPROLOL SUCCINATE 200 MG: 100 TABLET, EXTENDED RELEASE ORAL at 08:11

## 2024-11-19 RX ADMIN — GABAPENTIN 300 MG: 300 CAPSULE ORAL at 08:11

## 2024-11-19 RX ADMIN — CLOPIDOGREL BISULFATE 75 MG: 75 TABLET ORAL at 08:11

## 2024-11-19 RX ADMIN — SACUBITRIL AND VALSARTAN 1 TABLET: 24; 26 TABLET, FILM COATED ORAL at 10:11

## 2024-11-19 NOTE — PROGRESS NOTES
"[facility] - Stepdown Flex ([facility]-)  Endocrinology  Progress Note    Admit Date: 11/15/2024     Reason for Consult: Management of T2DM, Hyperglycemia     Diabetes diagnosis year: >10 years ago    Home Diabetes Medications:    Omnipod 5-  Active time: 3.5 hours  Mn--0700 1.2 u/hr   0700-mn 1.5 u/hr     Mn-0700 55  0700-5p 50  5p-mn 50     Mn-10a 1:10   10a-5p 1 :12   5p-mn 1:10     Metformin 500 mg  Mounjaro 10 mg  Jardiance 25 mg    How often checking glucose at home? >4 x day   BG readings on regimen: 100s and lower  Hypoglycemia on the regimen?  Yes  Missed doses on regimen?  No    Diabetes Complications include:     Hyperglycemia    Complicating diabetes co morbidities:   History of MI      HPI:   Patient is a 69 y.o. female with CAD s/p stenting, DM2 on insulin pump, neuropathy, HTN, hyperaldosteronism, HPLD, CHF, CKD, TIKA presented for acute shortness of breath. She was found tripoding on arrival of EMS, was placed on CPAP and given 4 nitro sprays. She arrived to the ER on CPAP and was able to be weaned down to 2L of oxygen.  Endocrine consulted for bg management of patient on pump.     Interval HPI:   No acute events overnight. Patient in room 51323/84028 A. Blood glucose stable. BG at and below goal on current insulin regimen (Home Insulin Pump). Steroid use- None .      Renal function- Normal   Vasopressors-  None     Diet Low Sodium, 2gm Fluid - 1500mL     Eatin%  Nausea: No  Hypoglycemia and intervention: Yesterday reported low post prandial, drank OJ  Fever: No  TPN and/or TF: No    /66 (BP Location: Left arm, Patient Position: Lying)   Pulse 73   Temp 98 °F (36.7 °C) (Oral)   Resp 18   Ht 5' 3" (1.6 m)   Wt 78.5 kg (173 lb)   LMP 2020 (Approximate)   SpO2 97%   Breastfeeding No   BMI 30.65 kg/m²     Labs Reviewed and Include    Recent Labs   Lab 24  0540   *   CALCIUM 8.6*      K 4.1   CO2 25      BUN 25*   CREATININE 1.2     Lab Results " "  Component Value Date    WBC 7.49 11/17/2024    HGB 12.7 11/17/2024    HCT 42.4 11/17/2024    MCV 75 (L) 11/17/2024     11/17/2024     No results for input(s): "TSH", "FREET4" in the last 168 hours.  Lab Results   Component Value Date    HGBA1C 7.7 (H) 08/22/2024       Nutritional status:   Body mass index is 30.65 kg/m².  Lab Results   Component Value Date    ALBUMIN 3.2 (L) 11/15/2024    ALBUMIN 3.1 (L) 08/22/2024    ALBUMIN 3.1 (L) 12/21/2023     No results found for: "PREALBUMIN"    Estimated Creatinine Clearance: 43.2 mL/min (based on SCr of 1.2 mg/dL).    Accu-Checks  Recent Labs     11/16/24  1537 11/19/24  0727   POCTGLUCOSE 77 107       Current Medications and/or Treatments Impacting Glycemic Control  Immunotherapy:    Immunosuppressants       None          Steroids:   Hormones (From admission, onward)      Start     Stop Route Frequency Ordered    11/16/24 0046  melatonin tablet 6 mg         -- Oral Nightly PRN 11/15/24 2348          Pressors:    Autonomic Drugs (From admission, onward)      None          Hyperglycemia/Diabetes Medications:   Antihyperglycemics (From admission, onward)      Start     Stop Route Frequency Ordered    11/16/24 1300  insulin aspart U-100 insulin pump from home        Question Answer Comment   Target number 120    Basal Rate #1 1.2    Basal rate #1 time 4070-8122    Basal Rate #2 1.5    Basal rate #2 time 5798-3305        -- SubQ Continuous 11/16/24 1152    11/16/24 1250  insulin aspart U-100 insulin pump from home 0-10 Units        Question Answer Comment   Target number 120    Carbohydrate coverage #1 1:10    Carbohydrate coverage #1 time 4843-4043    Sensitivity #1 1:50    Sensitivity #1 time 7438-1672        -- SubQ As needed (PRN) 11/16/24 1152            ASSESSMENT and PLAN    Cardiac/Vascular  * Acute on chronic systolic congestive heart failure    Managed by primary team  Optimize BG control      Hyperlipidemia associated with type 2 diabetes mellitus  Managed " per primary.   On statin per ADA        Endocrine  Insulin pump in place  At time of evaluation, pt meets criteria to continue home insulin pump usage.  - Has all adequate supplies   - Insulin pump site change on 11/18/2024.    - Bolus settings reviewed    - No changes to home regimen.   - Nurse to check BG qac/hs/0200 & record in epic   - Patient to input glucose into pump and use bolus wizard for prandial needs   - Will continue to monitor accuchecks and titrate insulin as clinically indicated .     - Discussed above plan with patient, patient verbalized understanding.   - Understands in case of pump malfunction or cognitive decline in which pt can no longer safely use insulin pump, will transition to SC MDI       Current inpatient pump settings:  Active time: 3.5 hours  Mn--0700 1.2 u/hr   0700-mn 1.5 u/hr     Mn-0700 55  0700-5p 50  5p-mn 50     Mn-10a 1:10   10a-5p 1 :12   5p-mn 1:10      If pump malfunctions or is disconnected, please contact endocrine    Small 12 units  to 5 units  Medium 14 units to 7 units  Large 16 units to 9 units      Type 2 diabetes mellitus with diabetic polyneuropathy, with long-term current use of insulin  BG goal: 140-180  T2DM on Omnipod 5.    Blood sugar stable with some lows after mealtime boluses-much improved with lower fixed doses with meals    - -Continue Insulin pump    - POCT Glucose before meals, at bedtime and at 2 am  - Hypoglycemia protocol in place      ** Please notify Endocrine for any change and/or advance in diet**  ** Please call Endocrine for any BG related issues **     Discharge Planning:   TBD. Please notify endocrinology prior to discharge.            Ryder Benito PA-C  Endocrinology  [facility] ([place]-)

## 2024-11-19 NOTE — PLAN OF CARE
[facility] - Stepdown Flex ([place])  Discharge Final Note    Primary Care Provider: Dottie Merritt MD    Expected Discharge Date: 11/19/2024  Discharge Plan A and Plan B have been determined by review of patient's clinical status, future medical and therapeutic needs, and coverage/benefits for post-acute care in coordination with multidisciplinary team members.     Final Discharge Note (most recent)       Final Note - 11/19/24 1217          Final Note    Assessment Type Final Discharge Note     Anticipated Discharge Disposition Planned Readmission - Home or self care     What phone number can be called within the next 1-3 days to see how you are doing after discharge? [phone]        Post-Acute Status    Post-Acute Authorization Other     [facility] -     Other Status Community Services     Discharge Delays None known at this time                     Important Message from Medicare  Important Message from Medicare regarding Discharge Appeal Rights: Explained to patient/caregiver, Signed/date by patient/caregiver     Date IMM was signed: 11/19/24  Time IMM was signed: 1042      Future Appointments   Date Time Provider Department Center   11/21/2024  9:30 AM Dottie Merritt MD [facility]   11/26/2024  9:30 AM LAB, APPOINTMENT [facility]   11/26/2024 10:00 AM Whitney Engle PA-C [facility]   11/26/2024 10:00 AM [facility] HEART FAILURE NURSE [facility]   12/9/2024  9:30 AM Ian Alanis MD [facility] OPHTHAL [facility]   12/13/2024  9:40 AM Ricardo Baez MD [facility] CARDIO [facility]   12/30/2024  9:30 AM BREATH, TEST NOM LAB VNP [facility]   12/30/2024  9:30 AM LAB, SPECIMEN [facility] INTMED [facility] SPLABIM [facility]   1/3/2025  9:00 AM Piero Schofield, APRN, FNP [facility]         CM met with patient   and discussed discharge plans, patient to [place] home with no needs. Patients  medications delivered  to bedside,  No HME/DME  recommended or not recommended delivered/not delivered  to bedside and follow up appointment[s] scheduled.   Transportation provided by friend via private vehicle.         Irma Abarca RN  Case Management  [facility]  [phone]

## 2024-11-19 NOTE — PLAN OF CARE
NO Changes      Problem: Adult Inpatient Plan of Care  Goal: Plan of Care Review  Outcome: Progressing  Goal: Patient-Specific Goal (Individualized)  Outcome: Progressing  Goal: Absence of Hospital-Acquired Illness or Injury  Outcome: Progressing  Goal: Optimal Comfort and Wellbeing  Outcome: Progressing  Goal: Readiness for Transition of Care  Outcome: Progressing     Problem: Diabetes Comorbidity  Goal: Blood Glucose Level Within Targeted Range  Outcome: Progressing     Problem: Acute Kidney Injury/Impairment  Goal: Fluid and Electrolyte Balance  Outcome: Progressing  Goal: Improved Oral Intake  Outcome: Progressing  Goal: Effective Renal Function  Outcome: Progressing

## 2024-11-19 NOTE — SUBJECTIVE & OBJECTIVE
"Interval HPI:   No acute events overnight. Patient in room 57272/68877 A. Blood glucose stable. BG at and below goal on current insulin regimen (Home Insulin Pump). Steroid use- None .      Renal function- Normal   Vasopressors-  None     Diet Low Sodium, 2gm Fluid - 1500mL     Eatin%  Nausea: No  Hypoglycemia and intervention: Yesterday reported low post prandial, drank OJ  Fever: No  TPN and/or TF: No    /66 (BP Location: Left arm, Patient Position: Lying)   Pulse 73   Temp 98 °F (36.7 °C) (Oral)   Resp 18   Ht 5' 3" (1.6 m)   Wt 78.5 kg (173 lb)   LMP 2020 (Approximate)   SpO2 97%   Breastfeeding No   BMI 30.65 kg/m²     Labs Reviewed and Include    Recent Labs   Lab 24  0540   *   CALCIUM 8.6*      K 4.1   CO2 25      BUN 25*   CREATININE 1.2     Lab Results   Component Value Date    WBC 7.49 2024    HGB 12.7 2024    HCT 42.4 2024    MCV 75 (L) 2024     2024     No results for input(s): "TSH", "FREET4" in the last 168 hours.  Lab Results   Component Value Date    HGBA1C 7.7 (H) 2024       Nutritional status:   Body mass index is 30.65 kg/m².  Lab Results   Component Value Date    ALBUMIN 3.2 (L) 11/15/2024    ALBUMIN 3.1 (L) 2024    ALBUMIN 3.1 (L) 2023     No results found for: "PREALBUMIN"    Estimated Creatinine Clearance: 43.2 mL/min (based on SCr of 1.2 mg/dL).    Accu-Checks  Recent Labs     24  1537 24  0727   POCTGLUCOSE 77 107       Current Medications and/or Treatments Impacting Glycemic Control  Immunotherapy:    Immunosuppressants       None          Steroids:   Hormones (From admission, onward)      Start     Stop Route Frequency Ordered    24 0046  melatonin tablet 6 mg         -- Oral Nightly PRN 11/15/24 2348          Pressors:    Autonomic Drugs (From admission, onward)      None          Hyperglycemia/Diabetes Medications:   Antihyperglycemics (From admission, onward) "      Start     Stop Route Frequency Ordered    11/16/24 1300  insulin aspart U-100 insulin pump from home        Question Answer Comment   Target number 120    Basal Rate #1 1.2    Basal rate #1 time 6380-0955    Basal Rate #2 1.5    Basal rate #2 time 4161-2897        -- SubQ Continuous 11/16/24 1152    11/16/24 1250  insulin aspart U-100 insulin pump from home 0-10 Units        Question Answer Comment   Target number 120    Carbohydrate coverage #1 1:10    Carbohydrate coverage #1 time 1889-0895    Sensitivity #1 1:50    Sensitivity #1 time 2993-2433        -- SubQ As needed (PRN) 11/16/24 1152

## 2024-11-19 NOTE — PLAN OF CARE
CHW met with patient/family at bedside. Patient experience rounding completed and reviewed the following.     Do you know your discharge plan? Yes        If yes, what is the plan? Home with family     Have you discussed your needs and preferences with your SW/CM? Yes     If you are discharging home, do you have help at home? Yes family     Do you think you will need help additional at home at discharge? No     Do you currently have difficulty keeping up with bills, affording medicine or buying food? No    Assigned SW/CM notified of any patient/family needs or concerns. Appropriate resources provided to address patient's needs.      The patient was happy with her stay at [facility].

## 2024-11-19 NOTE — PLAN OF CARE
Unit [facility] Care Support Interaction      I have reviewed the chart of Mercedez Purvis who is hospitalized for Acute on chronic systolic congestive heart failure. The patient is currently located in the following unit: 14W       I have seen and examined the patient and provided the following support:     Readmission Reduction - Acute Care at Home Referral       Loretta Garcia PA-C  Unit Based [facility]

## 2024-11-19 NOTE — PLAN OF CARE
Patient had no acute events during shift. Up in chair, dexcom for glucose checks. No coverage needed . Patient is AAOx4 and independent with ADLS. No concerns voiced at this time. Call light within reach of the patient.           Problem: Adult Inpatient Plan of Care  Goal: Plan of Care Review  Outcome: Progressing  Goal: Patient-Specific Goal (Individualized)  Outcome: Progressing  Goal: Absence of Hospital-Acquired Illness or Injury  Outcome: Progressing  Goal: Optimal Comfort and Wellbeing  Outcome: Progressing  Goal: Readiness for Transition of Care  Outcome: Progressing     Problem: Diabetes Comorbidity  Goal: Blood Glucose Level Within Targeted Range  Outcome: Progressing     Problem: Acute Kidney Injury/Impairment  Goal: Fluid and Electrolyte Balance  Outcome: Progressing  Goal: Improved Oral Intake  Outcome: Progressing  Goal: Effective Renal Function  Outcome: Progressing

## 2024-11-19 NOTE — ASSESSMENT & PLAN NOTE
At time of evaluation, pt meets criteria to continue home insulin pump usage.  - Has all adequate supplies   - Insulin pump site change on 11/18/2024.    - Bolus settings reviewed    - No changes to home regimen.   - Nurse to check BG qac/hs/0200 & record in epic   - Patient to input glucose into pump and use bolus wizard for prandial needs   - Will continue to monitor accuchecks and titrate insulin as clinically indicated .     - Discussed above plan with patient, patient verbalized understanding.   - Understands in case of pump malfunction or cognitive decline in which pt can no longer safely use insulin pump, will transition to SC MDI       Current inpatient pump settings:  Active time: 3.5 hours  Mn--0700 1.2 u/hr   0700-mn 1.5 u/hr     Mn-0700 55  0700-5p 50  5p-mn 50     Mn-10a 1:10   10a-5p 1 :12   5p-mn 1:10      If pump malfunctions or is disconnected, please contact endocrine    Small 12 units  to 5 units  Medium 14 units to 7 units  Large 16 units to 9 units

## 2024-11-20 ENCOUNTER — PATIENT OUTREACH (OUTPATIENT)
Dept: ADMINISTRATIVE | Facility: CLINIC | Age: 69
End: 2024-11-20
Payer: MEDICARE

## 2024-11-20 NOTE — PROGRESS NOTES
C3 nurse attempted to contact .Mercedez Purvis  for a TCC post hospital discharge follow up call. No answer. Left voicemail with callback information. The patient has a scheduled appointment with Dottie Merritt MD  on 11/21/24 @ 7190.

## 2024-11-21 ENCOUNTER — LAB VISIT (OUTPATIENT)
Dept: LAB | Facility: OTHER | Age: 69
End: 2024-11-21
Attending: INTERNAL MEDICINE
Payer: MEDICARE

## 2024-11-21 ENCOUNTER — OFFICE VISIT (OUTPATIENT)
Dept: INTERNAL MEDICINE | Facility: CLINIC | Age: 69
End: 2024-11-21
Payer: MEDICARE

## 2024-11-21 ENCOUNTER — PATIENT MESSAGE (OUTPATIENT)
Dept: ADMINISTRATIVE | Facility: CLINIC | Age: 69
End: 2024-11-21
Payer: MEDICARE

## 2024-11-21 VITALS
SYSTOLIC BLOOD PRESSURE: 124 MMHG | WEIGHT: 194.75 LBS | HEIGHT: 63 IN | HEART RATE: 78 BPM | BODY MASS INDEX: 34.51 KG/M2 | DIASTOLIC BLOOD PRESSURE: 68 MMHG | OXYGEN SATURATION: 99 %

## 2024-11-21 DIAGNOSIS — I25.118 CORONARY ARTERY DISEASE OF NATIVE ARTERY OF NATIVE HEART WITH STABLE ANGINA PECTORIS: ICD-10-CM

## 2024-11-21 DIAGNOSIS — Z09 HOSPITAL DISCHARGE FOLLOW-UP: ICD-10-CM

## 2024-11-21 DIAGNOSIS — I50.20 SYSTOLIC HEART FAILURE, UNSPECIFIED HF CHRONICITY: ICD-10-CM

## 2024-11-21 DIAGNOSIS — E66.01 SEVERE OBESITY: ICD-10-CM

## 2024-11-21 DIAGNOSIS — E11.65 TYPE 2 DIABETES MELLITUS WITH HYPERGLYCEMIA, UNSPECIFIED WHETHER LONG TERM INSULIN USE: ICD-10-CM

## 2024-11-21 DIAGNOSIS — E11.65 TYPE 2 DIABETES MELLITUS WITH HYPERGLYCEMIA, UNSPECIFIED WHETHER LONG TERM INSULIN USE: Primary | ICD-10-CM

## 2024-11-21 LAB
ANION GAP SERPL CALC-SCNC: 7 MMOL/L (ref 8–16)
BUN SERPL-MCNC: 21 MG/DL (ref 8–23)
CALCIUM SERPL-MCNC: 8.7 MG/DL (ref 8.7–10.5)
CHLORIDE SERPL-SCNC: 105 MMOL/L (ref 95–110)
CHOLEST SERPL-MCNC: 161 MG/DL (ref 120–199)
CHOLEST/HDLC SERPL: 4.2 {RATIO} (ref 2–5)
CO2 SERPL-SCNC: 29 MMOL/L (ref 23–29)
CREAT SERPL-MCNC: 1.2 MG/DL (ref 0.5–1.4)
EST. GFR  (NO RACE VARIABLE): 49 ML/MIN/1.73 M^2
ESTIMATED AVG GLUCOSE: 134 MG/DL (ref 68–131)
GLUCOSE SERPL-MCNC: 116 MG/DL (ref 70–110)
HBA1C MFR BLD: 6.3 % (ref 4–5.6)
HDLC SERPL-MCNC: 38 MG/DL (ref 40–75)
HDLC SERPL: 23.6 % (ref 20–50)
LDLC SERPL CALC-MCNC: 106 MG/DL (ref 63–159)
NONHDLC SERPL-MCNC: 123 MG/DL
POTASSIUM SERPL-SCNC: 4.7 MMOL/L (ref 3.5–5.1)
SODIUM SERPL-SCNC: 141 MMOL/L (ref 136–145)
TRIGL SERPL-MCNC: 85 MG/DL (ref 30–150)

## 2024-11-21 PROCEDURE — 80048 BASIC METABOLIC PNL TOTAL CA: CPT | Performed by: INTERNAL MEDICINE

## 2024-11-21 PROCEDURE — 99999 PR PBB SHADOW E&M-EST. PATIENT-LVL V: CPT | Mod: PBBFAC,,, | Performed by: INTERNAL MEDICINE

## 2024-11-21 PROCEDURE — 80061 LIPID PANEL: CPT | Performed by: INTERNAL MEDICINE

## 2024-11-21 PROCEDURE — 36415 COLL VENOUS BLD VENIPUNCTURE: CPT | Performed by: INTERNAL MEDICINE

## 2024-11-21 PROCEDURE — 83036 HEMOGLOBIN GLYCOSYLATED A1C: CPT | Performed by: INTERNAL MEDICINE

## 2024-11-21 NOTE — PROGRESS NOTES
"Subjective:      Patient ID: Mercedez Purvis is a 69 y.o. female.    Chief Complaint: Diabetes (3 mon f/u )      Mercedez Purvis is a 69 y.o. female with chronic conditions significant for CAD, MI s/p stent placement,T2DM, diabetic neuropathy, obesity, HTN, HLD, hyperaldosteronism on spironolactone, diabetic retinopathy, CAD with stable angina, sCHF, CKD3, GERD, BPPV, TIKA, migraines.    Presenting today for follow up. Date of last annual is 8/20/2024         The patient consents verbally to being recorded for [employer] service today.     History of Present Illness    CHIEF COMPLAINT:  Ms. Purvis presents today for follow-up after a recent hospitalization for heart failure.    RECENT HOSPITALIZATION/HEART FAILURE EXACERBATION:  She reports a recent overnight hospitalization due to fluid retention resulting from non-adherence to prescribed diuretic medication. She experienced breathing difficulties during this episode and received a Warner catheter. She is feeling much better now after the excess fluid was removed. She has congestive heart failure with an EF of 35%. She follows up with a heart failure clinic and is scheduled to see a new cardiologist in December.    She takes Lasix (furosemide) for fluid retention and is advised to take an extra dose when fluid retention increases beyond a certain threshold, particularly during periods of illness or inflammation.     DIABETES:  Her recent A1C was 7.7%. She reports compliance with fasting requirements for labs. She uses a Dexcom continuous glucose monitor and an Omnipod insulin pump for diabetes management. Her insulin dose has been reduced to 7 units.    MUSCULOSKELETAL:  She reports knee pain, particularly noticeable when descending stairs. The pain is localized to a specific area of the knee, which was identified during the exam. She denies severe discomfort, describing the pain as "not too bad" today.    IMMUNIZATIONS:  She is due for " several vaccinations including flu vaccine, COVID booster, and tetanus booster (last administered 10 years ago). The healthcare provider also suggested considering an RSV vaccine.      ROS:  General: -fever, -chills, -fatigue, -weight gain, -weight loss  Eyes: -vision changes, -redness, -discharge  ENT: -ear pain, -nasal congestion, -sore throat  Cardiovascular: -chest pain, -palpitations, -lower extremity edema  Respiratory: -cough, -shortness of breath, +difficulty breathing  Gastrointestinal: -abdominal pain, -nausea, -vomiting, -diarrhea, -constipation, -blood in stool  Genitourinary: -dysuria, -hematuria, -frequency  Musculoskeletal: +joint pain, -muscle pain  Skin: -rash, -lesion  Neurological: -headache, -dizziness, -numbness, -tingling  Psychiatric: -anxiety, -depression, -sleep difficulty            Current Outpatient Medications:     amitriptyline (ELAVIL) 75 MG tablet, Take 1 tablet (75 mg total) by mouth every evening., Disp: 90 tablet, Rfl: 3    atorvastatin (LIPITOR) 80 MG tablet, Take 1 tablet (80 mg total) by mouth every evening., Disp: 90 tablet, Rfl: 3    blood-glucose meter,continuous Misc, 1 Device by Misc.(Non-Drug; Combo Route) route continuous., Disp: 1 each, Rfl: 0    blood-glucose sensor (DEXCOM G7 SENSOR) Roberta, 1 each by Misc.(Non-Drug; Combo Route) route every 10 days., Disp: 10 each, Rfl: 3    capsaicin 0.1 % Crea, Apply 1-2 times a day, Disp: 42.5 g, Rfl: 2    ciclopirox (PENLAC) 8 % Soln, Apply topically nightly., Disp: 6.6 mL, Rfl: 11    clopidogreL (PLAVIX) 75 mg tablet, Take 1 tablet (75 mg total) by mouth once daily., Disp: 90 tablet, Rfl: 1    clotrimazole-betamethasone 1-0.05% (LOTRISONE) cream, Apply topically 2 (two) times daily., Disp: 45 g, Rfl: 0    cyclobenzaprine (FLEXERIL) 10 MG tablet, Take 1 tablet (10 mg total) by mouth 3 times daily, Disp: 90 tablet, Rfl: 0    diclofenac sodium (VOLTAREN) 1 % Gel, Apply topically once daily., Disp: 100 g, Rfl: 0    empagliflozin  (JARDIANCE) 25 mg tablet, Take 1 tablet (25 mg total) by mouth once daily., Disp: 90 tablet, Rfl: 1    fluconazole (DIFLUCAN) 150 MG Tab, Take 1 dose by mouth. May repeat after 72 hrs for yeast infection., Disp: 2 tablet, Rfl: 1    furosemide (LASIX) 40 MG tablet, Take 1 tablet (40 mg total) by mouth once daily., Disp: 90 tablet, Rfl: 3    gabapentin (NEURONTIN) 300 MG capsule, Take 1 capsule (300 mg total) by mouth 2 (two) times daily AND 2 capsules (600 mg total) every evening., Disp: 360 capsule, Rfl: 1    glucagon (BAQSIMI) 3 mg/actuation Spry, 1 each by Nasal route daily as needed (if glucose is 70 or less.)., Disp: 2 each, Rfl: 1    insulin detemir U-100 (LEVEMIR FLEXTOUCH U-100 INSULN) 100 unit/mL (3 mL) InPn pen, Inject 40 Units into the skin once daily. FOR EMERGENCY USE ONLY - BACK UP INSULIN, IF OFF OF YOUR INSULIN PUMP - USE 40 UNITS INJECTION DAILY UNTIL GETTING BACK ON PUMP., Disp: 15 mL, Rfl: 1    insulin lispro (HUMALOG U-100 INSULIN) 100 unit/mL injection, Inject 100 Units into the skin continuous. Gives up to 100 units daily via Omnipod. Do not directly inject - only use within pods., Disp: 40 mL, Rfl: 11    insulin pump cart,automated,BT (OMNIPOD 5 G6 PODS, GEN 5,) Crtg, Inject 1 each into the skin every other day., Disp: 45 each, Rfl: 3    LIDOcaine (XYLOCAINE) 5 % Oint ointment, Apply topically as needed., Disp: 120 g, Rfl: 3    LIDOcaine HCL 2% (XYLOCAINE) 2 % jelly, Apply topically as needed. Apply topically once nightly to affected part of foot/feet., Disp: 30 mL, Rfl: 2    LIDOcaine-prilocaine (EMLA) cream, Apply topically 2 (two) times a day., Disp: 30 g, Rfl: 1    meclizine (ANTIVERT) 12.5 mg tablet, Take 2 tablets (25 mg total) by mouth 2 (two) times daily as needed for Dizziness., Disp: 30 tablet, Rfl: 6    metFORMIN (GLUCOPHAGE-XR) 500 MG ER 24hr tablet, Take 1 tablet (500 mg total) by mouth every morning., Disp: 90 tablet, Rfl: 3    metoprolol succinate (TOPROL-XL) 200 MG 24 hr tablet,  "Take 1 tablet (200 mg total) by mouth once daily., Disp: 90 tablet, Rfl: 1    pen needle, diabetic (PEN NEEDLE) 32 gauge x 5/32" Ndle, 1 each by Misc.(Non-Drug; Combo Route) route 4 (four) times daily. ICD 10 E11.42, Disp: 400 each, Rfl: 3    sacubitriL-valsartan (ENTRESTO) 24-26 mg per tablet, Take 1 tablet by mouth 2 (two) times daily., Disp: 60 tablet, Rfl: 0    spironolactone (ALDACTONE) 50 MG tablet, Take 1 tablet (50 mg total) by mouth once daily., Disp: 90 tablet, Rfl: 3    tirzepatide 10 mg/0.5 mL PnIj, Inject 10 mg into the skin every 7 days., Disp: 2 mL, Rfl: 11    triamcinolone acetonide 0.1% (KENALOG) 0.1 % cream, Apply topically 2 (two) times daily., Disp: 15 g, Rfl: 2    blood sugar diagnostic Strp, 1 each by Misc.(Non-Drug; Combo Route) route 4 (four) times daily. (Patient not taking: Reported on 11/21/2024), Disp: 200 each, Rfl: 3    Lab Results   Component Value Date    HGBA1C 7.7 (H) 08/22/2024    HGBA1C 6.8 (H) 12/21/2023    HGBA1C 7.5 (H) 04/21/2023     Lab Results   Component Value Date    MICALBCREAT 41.6 (H) 12/21/2023     Lab Results   Component Value Date    LDLCALC 116.6 08/22/2024    LDLCALC 93.6 12/21/2023    CHOL 183 08/22/2024    HDL 34 (L) 08/22/2024    TRIG 162 (H) 08/22/2024       Lab Results   Component Value Date     11/21/2024    K 4.7 11/21/2024     11/21/2024    CO2 29 11/21/2024     (H) 11/21/2024    BUN 21 11/21/2024    CREATININE 1.2 11/21/2024    CALCIUM 8.7 11/21/2024    PROT 7.5 11/15/2024    ALBUMIN 3.2 (L) 11/15/2024    BILITOT 0.2 11/15/2024    ALKPHOS 119 11/15/2024    AST 17 11/15/2024    ALT 11 11/15/2024    ANIONGAP 7 (L) 11/21/2024    ESTGFRAFRICA >60.0 03/21/2022    EGFRNONAA 58.0 (A) 03/21/2022    WBC 7.49 11/17/2024    HGB 12.7 11/17/2024    HGB 12.3 11/16/2024    HCT 42.4 11/17/2024    MCV 75 (L) 11/17/2024     11/17/2024    TSH 1.627 08/22/2024    HEPCAB Non-reactive 11/15/2024       Lab Results   Component Value Date    APMYGATP97RS " "18 (L) 07/13/2020    GLDINNJN33QM 16 (L) 09/23/2010         Past Medical History:   Diagnosis Date    Chronic headache     Diabetes     Heart attack 2004    8 stents    Heart failure     History of heart artery stent     Hyperlipemia     Hypertension     Obesity (BMI 30-39.9)     Yeast infection      Past Surgical History:   Procedure Laterality Date    CARDIAC CATHETERIZATION      INJECTION OF STEROID Left 03/24/2023    Procedure: INJECTION, STEROID,LEFT RING FINGER;  Surgeon: Rona Lema MD;  Location: [facility] OR;  Service: Orthopedics;  Laterality: Left;    TRIGGER FINGER RELEASE Right 03/24/2023    Procedure: RELEASE, TRIGGER FINGER, RIGHT LONG;  Surgeon: Rona Lema MD;  Location: [facility] OR;  Service: Orthopedics;  Laterality: Right;    TUBAL LIGATION       Social History     Social History Narrative    Not on file     Family History   Problem Relation Name Age of Onset    Stroke Father Scott Sigala Sr     Arthritis Brother Kartik Sigala     Diabetes Brother Kartik Sigala     Heart disease Brother Kartik Sigala     Hypertension Brother Kartik Sigala      Vitals:    11/21/24 0947   BP: 124/68   Pulse: 78   SpO2: 99%   Weight: 88.3 kg (194 lb 12.4 oz)   Height: 5' 3" (1.6 m)   PainSc: 0-No pain     Objective:   Physical Exam    General: No acute distress. Well-developed. Well-nourished.  Eyes: EOMI. Sclerae anicteric.  HENT: Normocephalic. Atraumatic. Nares patent. Moist oral mucosa.  Ears: Bilateral TMs clear. Bilateral EACs clear.  Cardiovascular: Regular rate. Regular rhythm. No murmurs. No rubs. No gallops. Normal S1, S2.  Respiratory: Normal respiratory effort. Clear to auscultation bilaterally. No rales. No rhonchi. No wheezing.  Abdomen: Soft. Non-tender. Non-distended. Normoactive bowel sounds.  Musculoskeletal: No  obvious deformity.  Extremities: No lower extremity edema.  Neurological: Alert & oriented x3. No slurred speech. Normal gait.  Psychiatric: Normal mood. Normal affect. " Good insight. Good judgment.  Skin: Warm. Dry. No rash.        Assessment/Plan     Assessment & Plan    - Assessed recent hospital admission for congestive heart failure, noting improved fluid status post-treatment  - Evaluated current heart function, noting EF of 35%  - Considered strategies to prevent future CHF exacerbations  - Reviewed diabetes management, noting recent adjustments made by endocrinologist  - Assessed knee pain, likely due to osteoarthritis    CHRONIC SYSTOLIC (CONGESTIVE) HEART FAILURE:  - Explained relationship between fluid retention, weight gain, and heart failure symptoms.  - Discussed importance of daily weight monitoring and when to take extra diuretic dose.  - Ms. Purvis to maintain daily weight monitoring, noting any gains >3-5 lbs overnight.  - Recommend limiting salt intake to <2 g daily.  - Recommend limiting fluid intake to 1.5 L daily.  - Ms. Purvis can use ice chips to manage thirst.  - Started additional Lasix 40mg protocol: Take extra dose if weight increases >3-5 lbs overnight; can split into two 40mg doses 6 hours apart if needed.  - Continued Lasix 40mg daily.  - Continue Jardiance  - Contact office if experiencing increased dyspnea or significant weight gain.    OSTEOARTHRITIS OF KNEE:  - Educated on connection between inflammation, fluid retention, and arthritis symptoms.  - Started OTC ibuprofen as needed for knee pain.  - Started OTC Aleve (naproxen) as alternative to ibuprofen: Take 2 tablets at a time, lasts 12 hours.    TYPE 2 DIABETES MELLITUS:  - Hemoglobin A1C, cholesterol panel, electrolytes, and kidney function tests ordered.    REDUCED MOBILITY:  - Ms. Purvis can consider using female urination device for public restroom use to improve medication adherence.    PREVENTIVE CARE:  - Provided information on various vaccine options available.    FOLLOW-UP:  - Follow up in 3 months for A1C check.           Type 2 diabetes mellitus with hyperglycemia, unspecified whether  long term insulin use  -     Hemoglobin A1C; Future; Expected date: 11/21/2024  -     Basic Metabolic Panel; Future; Expected date: 11/21/2024  -     Lipid Panel; Future; Expected date: 11/21/2024    Hospital discharge follow-up  - as above  - Bloodwork that was obtained prior to this appointment was reviewed in detail with the patient during the office visit.     Severe obesity    Coronary artery disease of native artery of native heart with stable angina pectoris            Chronic conditions status updated as per HPI.  Other than changes above, cont current medications and maintain follow up with specialists.      Dottie Merritt MD  [facility]    Total time spent on this encounter: 34 minutes. This includes face to face time and non-face to face time preparing to see the patient (eg, review of tests), obtaining and/or reviewing separately obtained history, documenting clinical information in the electronic or other health record, independently interpreting results and communicating results to the patient/family/caregiver, or care coordinator.    This note was generated with the assistance of ambient listening technology. Verbal consent was obtained by the patient and accompanying visitor(s) for the recording of patient appointment to facilitate this note. I attest to having reviewed and edited the generated note for accuracy, though some syntax or spelling errors may persist. Please contact the author of this note for any clarification.       There are no Patient Instructions on file for this visit.  Tests to Keep You Healthy    Mammogram: ORDERED BUT NOT SCHEDULED  Eye Exam: Met on 11/4/2024  Colon Cancer Screening: Met on 4/15/2022  Last Blood Pressure <= 139/89 (11/21/2024): Yes  Last HbA1c < 8 (08/22/2024): Yes

## 2024-11-21 NOTE — PROGRESS NOTES
C3 nurse spoke with Mercedez Purvis  for a [facility] post hospital discharge follow up call. The patient has a scheduled  appointment with Dottie Merritt MD  on 11/21/24 @ [id number].

## 2024-11-26 NOTE — PROGRESS NOTES
[facility] Provider Note (Initial Clinic) Consult Note    Age: 69 y.o.  Gender: female  Number of admissions for CHF within the preceding year: 1   Type of Congestive Heart Failure: Combined   Etiology: Ischemic   Enrolled in Infusion suite: no    Diagnostic Labs:   EKG - 11/16/2024  CXR - 11/16/2024  ECHO - 11/16/2024  Stress test -   Stress echo - 09/12/2023  Pharmacologic stress -   Cardiac catheterization -    Cardiac MRI -     Lab Results   Component Value Date     11/21/2024     11/19/2024    K 4.7 11/21/2024    K 4.1 11/19/2024     11/21/2024     11/19/2024    CO2 29 11/21/2024    CO2 25 11/19/2024     (H) 11/21/2024     (H) 11/19/2024    BUN 21 11/21/2024    BUN 25 (H) 11/19/2024    CREATININE 1.2 11/21/2024    CREATININE 1.2 11/19/2024    CALCIUM 8.7 11/21/2024    CALCIUM 8.6 (L) 11/19/2024    PROT 7.5 11/15/2024    PROT 6.9 08/22/2024    ALBUMIN 3.2 (L) 11/15/2024    ALBUMIN 3.1 (L) 08/22/2024    BILITOT 0.2 11/15/2024    BILITOT 0.2 08/22/2024    ALKPHOS 119 11/15/2024    ALKPHOS 123 08/22/2024    AST 17 11/15/2024    AST 12 08/22/2024    ALT 11 11/15/2024    ALT 11 08/22/2024    ANIONGAP 7 (L) 11/21/2024    ANIONGAP 10 11/19/2024    ESTGFRAFRICA >60.0 03/21/2022    ESTGFRAFRICA >60.0 12/16/2021    EGFRNONAA 58.0 (A) 03/21/2022    EGFRNONAA 52.1 (A) 12/16/2021       Lab Results   Component Value Date    WBC 7.49 11/17/2024    WBC 10.42 11/16/2024    RBC 5.66 (H) 11/17/2024    RBC 5.33 11/16/2024    HGB 12.7 11/17/2024    HGB 12.3 11/16/2024    HCT 42.4 11/17/2024    HCT 40.1 11/16/2024    MCV 75 (L) 11/17/2024    MCV 75 (L) 11/16/2024    MCH 22.4 (L) 11/17/2024    MCH 23.1 (L) 11/16/2024    MCHC 30.0 (L) 11/17/2024    MCHC 30.7 (L) 11/16/2024    RDW 16.4 (H) 11/17/2024    RDW 16.9 (H) 11/16/2024     11/17/2024     11/16/2024    MPV 12.1 11/17/2024    MPV 12.1 11/16/2024    IMMGR 0.1 11/17/2024    IMMGR 0.2 11/16/2024    IGABS 0.01 11/17/2024    IGABS 0.02  11/16/2024    LYMPH 2.0 11/17/2024    LYMPH 26.8 11/17/2024    MONO 0.8 11/17/2024    MONO 10.1 11/17/2024    EOS 0.1 11/17/2024    EOS 0.0 11/16/2024    BASO 0.04 11/17/2024    BASO 0.03 11/16/2024    NRBC 0 11/17/2024    NRBC 0 11/16/2024    GRAN 4.6 11/17/2024    GRAN 61.4 11/17/2024    EOSINOPHIL 1.1 11/17/2024    EOSINOPHIL 0.3 11/16/2024    BASOPHIL 0.5 11/17/2024    BASOPHIL 0.3 11/16/2024    PLTEST Adequate 08/30/2010    PLTEST Adequate 08/29/2010    ANISO 1+ (A) 08/30/2010    ANISO 1+ (A) 08/29/2010    HYPO sl 08/30/2010    HYPO sl 08/29/2010       Lab Results   Component Value Date     (H) 11/15/2024     (H) 04/18/2023    MG 2.1 11/15/2024    MG 2.3 10/30/2021    PHOS 4.4 11/15/2024    PHOS 3.8 10/30/2021    TROPONINI 0.052 (H) 11/15/2024    TROPONINI 0.022 10/30/2021    HGBA1C 6.3 (H) 11/21/2024    HGBA1C 7.7 (H) 08/22/2024    TSH 1.627 08/22/2024    TSH 1.525 04/21/2023    FREET4 0.86 09/23/2010    FREET4 1.09 08/27/2010       Lab Results   Component Value Date    CHOL 161 11/21/2024    TRIG 85 11/21/2024    HDL 38 (L) 11/21/2024    LDLCALC 106.0 11/21/2024    CHOLHDL 23.6 11/21/2024    TOTALCHOLEST 4.2 11/21/2024    NONHDLCHOL 123 11/21/2024    COLORU Colorless (A) 11/16/2024    APPEARANCEUA Clear 11/16/2024    PHUR 6.0 11/16/2024    SPECGRAV 1.005 11/16/2024    PROTEINUA Negative 11/16/2024    GLUCUA 2+ (A) 11/16/2024    KETONESU Negative 11/16/2024    BILIRUBINUA Negative 11/16/2024    OCCULTUA Negative 11/16/2024    NITRITE Positive (A) 11/16/2024    LEUKOCYTESUR Negative 11/16/2024       No implanted cardiac devices    Current Outpatient Medications on File Prior to Visit   Medication Sig Dispense Refill    amitriptyline (ELAVIL) 75 MG tablet Take 1 tablet (75 mg total) by mouth every evening. 90 tablet 3    atorvastatin (LIPITOR) 80 MG tablet Take 1 tablet (80 mg total) by mouth every evening. 90 tablet 3    blood sugar diagnostic Strp 1 each by Misc.(Non-Drug; Combo Route) route 4  (four) times daily. (Patient not taking: Reported on 11/21/2024) 200 each 3    blood-glucose meter,continuous Misc 1 Device by Misc.(Non-Drug; Combo Route) route continuous. 1 each 0    blood-glucose sensor (DEXCOM G7 SENSOR) Roberta 1 each by Misc.(Non-Drug; Combo Route) route every 10 days. 10 each 3    capsaicin 0.1 % Crea Apply 1-2 times a day 42.5 g 2    ciclopirox (PENLAC) 8 % Soln Apply topically nightly. 6.6 mL 11    clopidogreL (PLAVIX) 75 mg tablet Take 1 tablet (75 mg total) by mouth once daily. 90 tablet 1    clotrimazole-betamethasone 1-0.05% (LOTRISONE) cream Apply topically 2 (two) times daily. 45 g 0    cyclobenzaprine (FLEXERIL) 10 MG tablet Take 1 tablet (10 mg total) by mouth 3 times daily 90 tablet 0    diclofenac sodium (VOLTAREN) 1 % Gel Apply topically once daily. 100 g 0    empagliflozin (JARDIANCE) 25 mg tablet Take 1 tablet (25 mg total) by mouth once daily. 90 tablet 1    fluconazole (DIFLUCAN) 150 MG Tab Take 1 dose by mouth. May repeat after 72 hrs for yeast infection. 2 tablet 1    furosemide (LASIX) 40 MG tablet Take 1 tablet (40 mg total) by mouth once daily. 90 tablet 3    gabapentin (NEURONTIN) 300 MG capsule Take 1 capsule (300 mg total) by mouth 2 (two) times daily AND 2 capsules (600 mg total) every evening. 360 capsule 1    glucagon (BAQSIMI) 3 mg/actuation Spry 1 each by Nasal route daily as needed (if glucose is 70 or less.). 2 each 1    insulin detemir U-100 (LEVEMIR FLEXTOUCH U-100 INSULN) 100 unit/mL (3 mL) InPn pen Inject 40 Units into the skin once daily. FOR EMERGENCY USE ONLY - BACK UP INSULIN, IF OFF OF YOUR INSULIN PUMP - USE 40 UNITS INJECTION DAILY UNTIL GETTING BACK ON PUMP. 15 mL 1    insulin lispro (HUMALOG U-100 INSULIN) 100 unit/mL injection Inject 100 Units into the skin continuous. Gives up to 100 units daily via Omnipod. Do not directly inject - only use within pods. 40 mL 11    insulin pump cart,automated,BT (OMNIPOD 5 G6 PODS, GEN 5,) Crtg Inject 1 each into  "the skin every other day. 45 each 3    LIDOcaine (XYLOCAINE) 5 % Oint ointment Apply topically as needed. 120 g 3    LIDOcaine HCL 2% (XYLOCAINE) 2 % jelly Apply topically as needed. Apply topically once nightly to affected part of foot/feet. 30 mL 2    LIDOcaine-prilocaine (EMLA) cream Apply topically 2 (two) times a day. 30 g 1    meclizine (ANTIVERT) 12.5 mg tablet Take 2 tablets (25 mg total) by mouth 2 (two) times daily as needed for Dizziness. 30 tablet 6    metFORMIN (GLUCOPHAGE-XR) 500 MG ER 24hr tablet Take 1 tablet (500 mg total) by mouth every morning. 90 tablet 3    metoprolol succinate (TOPROL-XL) 200 MG 24 hr tablet Take 1 tablet (200 mg total) by mouth once daily. 90 tablet 1    pen needle, diabetic (PEN NEEDLE) 32 gauge x 5/32" Ndle 1 each by Misc.(Non-Drug; Combo Route) route 4 (four) times daily. ICD 10 E11.42 400 each 3    sacubitriL-valsartan (ENTRESTO) 24-26 mg per tablet Take 1 tablet by mouth 2 (two) times daily. 60 tablet 0    spironolactone (ALDACTONE) 50 MG tablet Take 1 tablet (50 mg total) by mouth once daily. 90 tablet 3    tirzepatide 10 mg/0.5 mL PnIj Inject 10 mg into the skin every 7 days. 2 mL 11    triamcinolone acetonide 0.1% (KENALOG) 0.1 % cream Apply topically 2 (two) times daily. 15 g 2    [DISCONTINUED] insulin aspart U-100 (NOVOLOG) 100 unit/mL (3 mL) InPn pen Inject 25 Units into the skin 3 (three) times daily with meals. 69 mL 3     No current facility-administered medications on file prior to visit.         HPI:  Patient reports improvement in SOB from recent admission. Denies appreciable SOB with ADL and pace normal   Patient sleeps on 1 number of pillows   Patient wakes up SOB, has to get out of bed, associated cough- denies PND symptoms, sleeps with CPAP   Palpitations - denies   Dizzy, light-headed, pre-syncope or syncope- denies   Since discharge frequency of performing weights, home weight and weight change- not performing home weights   Other information felt " pertinent to HPI: Ms. Mercedez Purvis is a 68 yo female with CAD s/p PCI, DM2 on insulin pump, neuropathy, HTN, hyperaldosteronism on aldactone, HLD, ICM, HFrEF (EF 15-20%), CKD III, TIKA who presents to first [facility] visit following recent admission for AHRF 2/2 ADHF.     PHYSICAL:   Vitals:    12/03/24 1026   BP: 120/60   Pulse: 67      @[id number](3)@    JVD: no, difficult to assess   Heart rhythm: regular  Cardiac murmur: No    S3: no  S4: no  Lungs: clear and diminished breath sounds in posterior lung bases  Hepatojugular reflux: yes, couple cm above clavicle  Edema: yes, 1+ BLE edema      Echo 11/16/24:    Left Ventricle: The left ventricle is normal in size. Increased ventricular mass. Normal wall thickness. There is eccentric hypertrophy. Severe global hypokinesis present. There is severely reduced systolic function with a visually estimated ejection fraction of 15 - 20%. Unable to assess diastolic function due to E-A fusion. Elevated left ventricular filling pressure.    Right Ventricle: Normal right ventricular cavity size. Wall thickness is normal. Systolic function is mildly reduced.    Aortic Valve: There is moderate aortic valve sclerosis. Mildly restricted motion. There is mild stenosis. Aortic valve area by VTI is 1.8 cm². Aortic valve peak velocity is 1.8 m/s. Mean gradient is 7.9 mmHg. The dimensionless index is 0.48.    Mitral Valve: There is mild regurgitation.    IVC/SVC: Normal venous pressure at 3 mmHg.    ASSESSMENT/PLAN:    1. HFrEF (heart failure with reduced ejection fraction)  -NYHA Class II symptoms. Mild fluid volume on exam. Previously tolerating high dose entresto 97/103mg BID, which was decreased during admission to 24/26mg BID due to concern for hypotension. Amlodipine 10mg also discontinued.   -Increase entresto to middose 49/51mg BID with repeat labs next week for continued trending of renal function. Ideally would like to titrate back up to high dose as tolerates. Entresto to also  help with diuresis.   -Otherwise on excellent GDMT: toprol 200mg daily, lasix 40mg daily,  jardiance 25mg daily, aldactone 50mg daily.  -Recommend 2-3 gram sodium restriction and 1500cc- 2000cc fluid restriction.  -Requested patient to weigh themselves daily, and to notify us if their weight increases by more than 3 lbs in 1 day or 5 lbs in 3 days.  -Repeat labs next week. RTC in 2 weeks or sooner prn    2. Chronic kidney disease, stage 3b  -Cr 1.5 today with mild fluid volume on exam. Appears baseline Cr 1.1-1.4 on lab review.   -Repeat labs next week.    3. TIKA (obstructive sleep apnea)   -On CPAP.    Whitney Engle PA-C

## 2024-12-02 NOTE — PHYSICIAN QUERY
Question: Please clarify the relationship between the Hypertension and Diabetes Mellitus.    Provider Query Response:  Hypertension is not a manifestation of diabetes mellitus (essential hypertension)   
wine

## 2024-12-03 ENCOUNTER — LAB VISIT (OUTPATIENT)
Dept: LAB | Facility: HOSPITAL | Age: 69
End: 2024-12-03
Payer: MEDICARE

## 2024-12-03 ENCOUNTER — OFFICE VISIT (OUTPATIENT)
Dept: CARDIOLOGY | Facility: CLINIC | Age: 69
End: 2024-12-03
Payer: MEDICARE

## 2024-12-03 ENCOUNTER — DOCUMENTATION ONLY (OUTPATIENT)
Dept: CARDIOLOGY | Facility: CLINIC | Age: 69
End: 2024-12-03

## 2024-12-03 VITALS
HEART RATE: 67 BPM | BODY MASS INDEX: 38.11 KG/M2 | HEIGHT: 60 IN | OXYGEN SATURATION: 98 % | DIASTOLIC BLOOD PRESSURE: 60 MMHG | WEIGHT: 194.13 LBS | SYSTOLIC BLOOD PRESSURE: 120 MMHG

## 2024-12-03 DIAGNOSIS — N18.32 CHRONIC KIDNEY DISEASE, STAGE 3B: ICD-10-CM

## 2024-12-03 DIAGNOSIS — I50.20 HFREF (HEART FAILURE WITH REDUCED EJECTION FRACTION): Primary | ICD-10-CM

## 2024-12-03 DIAGNOSIS — R06.02 SOB (SHORTNESS OF BREATH): ICD-10-CM

## 2024-12-03 DIAGNOSIS — G47.33 OSA (OBSTRUCTIVE SLEEP APNEA): ICD-10-CM

## 2024-12-03 DIAGNOSIS — G89.29 CHRONIC MUSCULOSKELETAL PAIN: ICD-10-CM

## 2024-12-03 DIAGNOSIS — M79.18 CHRONIC MUSCULOSKELETAL PAIN: ICD-10-CM

## 2024-12-03 LAB
ALBUMIN SERPL BCP-MCNC: 3.4 G/DL (ref 3.5–5.2)
ALP SERPL-CCNC: 116 U/L (ref 40–150)
ALT SERPL W/O P-5'-P-CCNC: 8 U/L (ref 10–44)
ANION GAP SERPL CALC-SCNC: 12 MMOL/L (ref 8–16)
AST SERPL-CCNC: 13 U/L (ref 10–40)
BASOPHILS # BLD AUTO: 0.04 K/UL (ref 0–0.2)
BASOPHILS NFR BLD: 0.6 % (ref 0–1.9)
BILIRUB SERPL-MCNC: 0.2 MG/DL (ref 0.1–1)
BNP SERPL-MCNC: 317 PG/ML (ref 0–99)
BUN SERPL-MCNC: 27 MG/DL (ref 8–23)
CALCIUM SERPL-MCNC: 8.9 MG/DL (ref 8.7–10.5)
CHLORIDE SERPL-SCNC: 102 MMOL/L (ref 95–110)
CO2 SERPL-SCNC: 26 MMOL/L (ref 23–29)
CREAT SERPL-MCNC: 1.5 MG/DL (ref 0.5–1.4)
DIFFERENTIAL METHOD BLD: ABNORMAL
EOSINOPHIL # BLD AUTO: 0.1 K/UL (ref 0–0.5)
EOSINOPHIL NFR BLD: 1.4 % (ref 0–8)
ERYTHROCYTE [DISTWIDTH] IN BLOOD BY AUTOMATED COUNT: 17.4 % (ref 11.5–14.5)
EST. GFR  (NO RACE VARIABLE): 37.3 ML/MIN/1.73 M^2
GLUCOSE SERPL-MCNC: 121 MG/DL (ref 70–110)
HCT VFR BLD AUTO: 45.3 % (ref 37–48.5)
HGB BLD-MCNC: 13.2 G/DL (ref 12–16)
IMM GRANULOCYTES # BLD AUTO: 0.02 K/UL (ref 0–0.04)
IMM GRANULOCYTES NFR BLD AUTO: 0.3 % (ref 0–0.5)
LYMPHOCYTES # BLD AUTO: 2.3 K/UL (ref 1–4.8)
LYMPHOCYTES NFR BLD: 34.2 % (ref 18–48)
MAGNESIUM SERPL-MCNC: 2.1 MG/DL (ref 1.6–2.6)
MCH RBC QN AUTO: 22.4 PG (ref 27–31)
MCHC RBC AUTO-ENTMCNC: 29.1 G/DL (ref 32–36)
MCV RBC AUTO: 77 FL (ref 82–98)
MONOCYTES # BLD AUTO: 0.6 K/UL (ref 0.3–1)
MONOCYTES NFR BLD: 8.5 % (ref 4–15)
NEUTROPHILS # BLD AUTO: 3.6 K/UL (ref 1.8–7.7)
NEUTROPHILS NFR BLD: 55 % (ref 38–73)
NRBC BLD-RTO: 0 /100 WBC
PHOSPHATE SERPL-MCNC: 3.8 MG/DL (ref 2.7–4.5)
PLATELET # BLD AUTO: 286 K/UL (ref 150–450)
PMV BLD AUTO: 11.3 FL (ref 9.2–12.9)
POTASSIUM SERPL-SCNC: 4.3 MMOL/L (ref 3.5–5.1)
PROT SERPL-MCNC: 7.7 G/DL (ref 6–8.4)
RBC # BLD AUTO: 5.88 M/UL (ref 4–5.4)
SODIUM SERPL-SCNC: 140 MMOL/L (ref 136–145)
WBC # BLD AUTO: 6.57 K/UL (ref 3.9–12.7)

## 2024-12-03 PROCEDURE — 3288F FALL RISK ASSESSMENT DOCD: CPT | Mod: CPTII,S$GLB,,

## 2024-12-03 PROCEDURE — 3074F SYST BP LT 130 MM HG: CPT | Mod: CPTII,S$GLB,,

## 2024-12-03 PROCEDURE — 84100 ASSAY OF PHOSPHORUS: CPT

## 2024-12-03 PROCEDURE — 85025 COMPLETE CBC W/AUTO DIFF WBC: CPT

## 2024-12-03 PROCEDURE — 1111F DSCHRG MED/CURRENT MED MERGE: CPT | Mod: CPTII,S$GLB,,

## 2024-12-03 PROCEDURE — 1101F PT FALLS ASSESS-DOCD LE1/YR: CPT | Mod: CPTII,S$GLB,,

## 2024-12-03 PROCEDURE — 3078F DIAST BP <80 MM HG: CPT | Mod: CPTII,S$GLB,,

## 2024-12-03 PROCEDURE — 99204 OFFICE O/P NEW MOD 45 MIN: CPT | Mod: S$GLB,,,

## 2024-12-03 PROCEDURE — 1159F MED LIST DOCD IN RCRD: CPT | Mod: CPTII,S$GLB,,

## 2024-12-03 PROCEDURE — 3008F BODY MASS INDEX DOCD: CPT | Mod: CPTII,S$GLB,,

## 2024-12-03 PROCEDURE — 1125F AMNT PAIN NOTED PAIN PRSNT: CPT | Mod: CPTII,S$GLB,,

## 2024-12-03 PROCEDURE — 80053 COMPREHEN METABOLIC PANEL: CPT

## 2024-12-03 PROCEDURE — 3044F HG A1C LEVEL LT 7.0%: CPT | Mod: CPTII,S$GLB,,

## 2024-12-03 PROCEDURE — 36415 COLL VENOUS BLD VENIPUNCTURE: CPT

## 2024-12-03 PROCEDURE — 83735 ASSAY OF MAGNESIUM: CPT

## 2024-12-03 PROCEDURE — 4010F ACE/ARB THERAPY RXD/TAKEN: CPT | Mod: CPTII,S$GLB,,

## 2024-12-03 PROCEDURE — 99999 PR PBB SHADOW E&M-EST. PATIENT-LVL V: CPT | Mod: PBBFAC,,,

## 2024-12-03 PROCEDURE — 83880 ASSAY OF NATRIURETIC PEPTIDE: CPT

## 2024-12-03 RX ORDER — CYCLOBENZAPRINE HCL 10 MG
10 TABLET ORAL 2 TIMES DAILY PRN
Qty: 60 TABLET | Refills: 1 | Status: SHIPPED | OUTPATIENT
Start: 2024-12-03

## 2024-12-03 RX ORDER — SACUBITRIL AND VALSARTAN 49; 51 MG/1; MG/1
1 TABLET, FILM COATED ORAL 2 TIMES DAILY
Qty: 60 TABLET | Refills: 11 | Status: SHIPPED | OUTPATIENT
Start: 2024-12-03 | End: 2025-12-03

## 2024-12-03 NOTE — PROGRESS NOTES
"[facility]) First Week Visit     Pt presents to clinic 12/3/2024.     Most Recent Hospital Discharge Date: 11/19/2024  Last admission Diagnosis/chief complaint: SOB        Visit Vitals:     Wt Readings from Last 3 Encounters:   12/03/24 88 kg (194 lb 1.8 oz)   11/21/24 88.3 kg (194 lb 12.4 oz)   11/16/24 78.5 kg (173 lb)     Temp Readings from Last 3 Encounters:   11/19/24 98.3 °F (36.8 °C) (Oral)   02/26/24 97.3 °F (36.3 °C) (Temporal)   10/18/23 98 °F (36.7 °C)     BP Readings from Last 3 Encounters:   12/03/24 120/60   11/21/24 124/68   11/19/24 117/67     Pulse Readings from Last 3 Encounters:   12/03/24 67   11/21/24 78   11/19/24 67            Pt reports the following:  []  Shortness of Breath with activity  []  Shortness of Breath at rest/ sleeping on 2-3 pillows "some days"  []  Fatigue  [x]  Edema bilateral in legs  [] Chest pain or tightness  [] Weight Increase since discharge  [] None of the above    Pt reports being in the Green (color) Zone. If in yellow/red, reminded that they should be calling TCC today or now.      Medications:     Pt reports having all medications available and understands how to take them appropriately. Reminded pt to call prior to making any changes to medications.      Education:    [x] Gave pt new  / Confirmed pt has  "[facility] Home Care Guide" .   Reviewed key points as listed below.      Recommend 2 gram sodium restriction and 1500cc fluid restriction.  Encourage physical activity with graded exercise program.  Requested patient to weigh themselves daily, and to notify us if their weight increases by more than 3 lbs in 1 day or 5 lbs in 3 days.      [x] Gave / Reviewed "Daily Weight and Symptom Tracker".  Reviewed with patient when and how to call  [facility] according to "Yellow Zone" and "Red Zone".                  [x] Pt given list of low/high sodium food list    Pt was given our Your Heart-Healthy Nutrition " "booklet and Connecting with Your [facility] Team pamphlet to ensure success.                   Watch for these Signs and Symptoms: If any of these occur, contact [facility] immediately:   Increase in shortness of breath with movement   Increase in swelling in your legs and ankles   Weight gain of more than 3 pounds in a night or 5 pounds in 3 days.   Difficulty breathing when you are lying down   Worsening fatigue or tiredness   Stomach bloating, a full feeling or a loss of appetite   Increased coughing--especially when you are lying down     MyChart and Care Companion:              Patient active on myChart? Yes, patient uses regularly.    Contacting our Team:              Reviewed with pt how to contact [facility] Nurse via phone or [employer] messaging.      [facility] Program Plan:  Pt educated on follow-up plan while in [facility] program.   [x]  PT given /reviewed upcoming appointment/check in dates. These will include weekly contact with Nurse or visits with providers over the next 4-6 weeks.                   [x]  Pt educated that they will transitioned to long term care provider team at week 4-6.  This team will be either Cardiology, PCP or Advanced Heart Failure depending on needs.          Pt was able to verbalize back to LPN in their own words correct diet/fluid restrictions, necessity for exercise, warning signs and symptoms, when and how to contact their [facility] team .       Plan:     See PA-C note.       [x]  Pt given AVS with follow up appointment within three weeks and medication detail list for ease of use. 12/23/2024 for 10:30a with labs prior.    [x]  Explained to pt they will have a phone "check in" by Nurse in one week on 12/10/2024.          Will follow up with pt at next clinic visit and Nurse navigator available for pt questions, issues or concerns.     Please refer to provider visit for additional details and assessment.    "

## 2024-12-03 NOTE — PATIENT INSTRUCTIONS
Notify us ([phone]) with any worsening shortness of breath, swelling or 3lb weight gain on home scale.    Increase entresto to 49/51mg twice daily. New script sent to pharmacy.    Okay to double up on low dose entresto 24/26mg (taking 2 tablets twice daily) until starting new prescription.    Repeat labs next week.

## 2024-12-07 NOTE — DISCHARGE SUMMARY
[facility] - Stepdown Flex ([place] [address])  [facility] Medicine  Discharge Summary      Patient Name: Mercedez Purvis  MRN: [id number]  PETER: [id number]  Patient Class: IP- Inpatient  Admission Date: 11/15/2024  Hospital Length of Stay: 4 days  Discharge Date and Time: 11/19/24  Attending Physician: No att. providers found   Discharging Provider: Yaw Mccullough MD  Primary Care Provider: Dottie Merritt MD  [facility] Medicine Team: [facility] HOSP MED D Yaw Mccullough MD  Primary Care Team: [facility] MED D    HPI:   70 yo female with CAD s/p stenting, DM2 on insulin pump, neuropathy, HTN, hyperaldosteronism, HPLD, CHF, CKD, TIKA presenting for acute shortness of breath. She was found tripoding on arrival of EMS, was placed on CPAP and given 4 nitro sprays. She arrived to the ER on CPAP and was able to be weaned down to 2L of oxygen. She was helping her sister move this afternoon, she states that she does not always take her medications when she goes out, but states she has only been sleeping on one pillow, she denies any chest pain but does have some lower abd pain. She has not noticed any leg swelling.     She was given 40mg IV lasix in the ER. BNP elevated, troponin mildly elevated. Medicine called for admission.       Hospital Course:   * Acute on chronic systolic congestive heart failure  Patient has Systolic (HFrEF) heart failure that is Acute on chronic. On presentation their CHF was decompensated. Evidence of decompensated CHF on presentation includes: edema, crackles on lung auscultation, and shortness of breath. The etiology of their decompensation is likely medication non-compliance. Most recent BNP and echo results are listed below.  Recent Labs     11/15/24  2143   *       Latest ECHO  Results for orders placed during the hospital encounter of 11/14/23    Echo Saline Bubble? No    Interpretation Summary    Left Ventricle: The left ventricle is normal in size. Normal wall thickness. There is  mild eccentric hypertrophy. Severe global hypokinesis present. There is severely reduced systolic function with a visually estimated ejection fraction of 15 - 20%. Unable to assess diastolic function due to E-A fusion. No thrombus present.    Right Ventricle: Normal right ventricular cavity size. Wall thickness is normal. Right ventricle wall motion  is normal. Systolic function is normal.    Aortic Valve: The aortic valve is a trileaflet valve. There is mild aortic valve sclerosis. There is moderate annular dilation present.    Mitral Valve: There is mild mitral annular calcification present.    IVC/SVC: Normal venous pressure at 3 mmHg.    Pericardium: There is a trivial circumferential effusion.    Current Heart Failure Medications  metoprolol succinate (TOPROL-XL) 24 hr tablet 200 mg, Daily, Oral    Plan  - Monitor strict I&Os and daily weights.    - Place on telemetry  - Low sodium diet  - Place on fluid restriction of 1.5 L.   - diuresed to baseline  - resumed on gdmt as tolerated  Patient deemed ready for discharge. Plan discussed with pt, who was agreeable and amenable; medications were discussed and reviewed, outpatient follow-up arranged, ER precautions were given, all questions were answered to the pt's satisfaction, and Mercedez Purvis  was subsequently discharged.      Final Active Diagnoses:    Diagnosis Date Noted POA    PRINCIPAL PROBLEM:  Acute on chronic systolic congestive heart failure [I50.23] 07/15/2008 Yes    Insulin pump in place [Z96.41] 11/16/2024 Not Applicable    Hyperaldosteronism [E26.9] 03/21/2022 Yes    Hypertension associated with diabetes [E11.59, I15.2] 09/22/2020 Yes    Hyperlipidemia associated with type 2 diabetes mellitus [E11.69, E78.5] 09/22/2020 Yes    Coronary artery disease of native artery of native heart with stable angina pectoris [I25.118] 07/01/2020 Yes    TIKA (obstructive sleep apnea) [G47.33] 07/01/2020 Yes    Gastroesophageal reflux disease without  esophagitis [K21.9] 07/01/2020 Yes    Type 2 diabetes mellitus with diabetic polyneuropathy, with long-term current use of insulin [E11.42, Z79.4]  Not Applicable      Problems Resolved During this Admission:       Discharged Condition: good    Disposition: Home or Self Care    Follow Up:   Follow-up Information       Dottie Merritt MD Follow up on 11/21/2024.    Specialty: Internal Medicine  Why: 0930 am  Contact information:  [address] [place] [facility] [address]  [phone]               [facility] HEART FAILURE NURSE Follow up in 1 week(s).                           Patient Instructions:      Ambulatory referral/consult to Acute Care at Home   Standing Status: Future   Referral Priority: Routine Referral Type: Consultation   Referred to Provider: KULDIP PROVIDER Requested Specialty: Internal Medicine   Number of Visits Requested: 1     Ambulatory referral/consult to [facility]   Standing Status: Future   Referral Priority: Routine Referral Type: Consultation   Referral Reason: Specialty Services Required   Requested Specialty: Cardiology   Number of Visits Requested: 1     Diet Cardiac     Diet diabetic     Notify your health care provider if you experience any of the following:  temperature >100.4     Notify your health care provider if you experience any of the following:  persistent nausea and vomiting or diarrhea     Notify your health care provider if you experience any of the following:  severe uncontrolled pain     Notify your health care provider if you experience any of the following:  redness, tenderness, or signs of infection (pain, swelling, redness, odor or green/yellow discharge around incision site)     Notify your health care provider if you experience any of the following:  difficulty breathing or increased cough     Notify your health care provider if you experience any of the following:  severe persistent headache     Notify your health care provider if you  experience any of the following:  persistent dizziness, light-headedness, or visual disturbances     Notify your health care provider if you experience any of the following:  increased confusion or weakness     Activity as tolerated       Significant Diagnostic Studies: N/A    Pending Diagnostic Studies:       None           Medications:  Reconciled Home Medications:      Medication List        CONTINUE taking these medications      amitriptyline 75 MG tablet  Commonly known as: ELAVIL  Take 1 tablet (75 mg total) by mouth every evening.     atorvastatin 80 MG tablet  Commonly known as: LIPITOR  Take 1 tablet (80 mg total) by mouth every evening.     BAQSIMI 3 mg/actuation Spry  Generic drug: glucagon  1 each by Nasal route daily as needed (if glucose is 70 or less.).     blood sugar diagnostic Strp  1 each by Misc.(Non-Drug; Combo Route) route 4 (four) times daily.     capsaicin 0.1 % Crea  Apply 1-2 times a day     ciclopirox 8 % Soln  Commonly known as: PENLAC  Apply topically nightly.     clopidogreL 75 mg tablet  Commonly known as: PLAVIX  Take 1 tablet (75 mg total) by mouth once daily.     clotrimazole-betamethasone 1-0.05% cream  Commonly known as: LOTRISONE  Apply topically 2 (two) times daily.     DEXCOM G7  Misc  Generic drug: blood-glucose meter,continuous  1 Device by Misc.(Non-Drug; Combo Route) route continuous.     DEXCOM G7 SENSOR Roberta  Generic drug: blood-glucose sensor  1 each by Misc.(Non-Drug; Combo Route) route every 10 days.     diclofenac sodium 1 % Gel  Commonly known as: VOLTAREN  Apply topically once daily.     fluconazole 150 MG Tab  Commonly known as: DIFLUCAN  Take 1 dose by mouth. May repeat after 72 hrs for yeast infection.     furosemide 40 MG tablet  Commonly known as: LASIX  Take 1 tablet (40 mg total) by mouth once daily.     gabapentin 300 MG capsule  Commonly known as: NEURONTIN  Take 1 capsule (300 mg total) by mouth 2 (two) times daily AND 2 capsules (600 mg total)  "every evening.     HumaLOG U-100 Insulin 100 unit/mL injection  Generic drug: insulin lispro  Inject 100 Units into the skin continuous. Gives up to 100 units daily via Omnipod. Do not directly inject - only use within pods.     JARDIANCE 25 mg tablet  Generic drug: empagliflozin  Take 1 tablet (25 mg total) by mouth once daily.     LEVEMIR FLEXTOUCH U100 INSULIN 100 unit/mL (3 mL) Inpn pen  Generic drug: insulin detemir U-100 (Levemir)  Inject 40 Units into the skin once daily. FOR EMERGENCY USE ONLY - BACK UP INSULIN, IF OFF OF YOUR INSULIN PUMP - USE 40 UNITS INJECTION DAILY UNTIL GETTING BACK ON PUMP.     LIDOcaine 5 % Oint ointment  Commonly known as: XYLOCAINE  Apply topically as needed.     LIDOcaine HCL 2% 2 % jelly  Commonly known as: XYLOCAINE  Apply topically as needed. Apply topically once nightly to affected part of foot/feet.     LIDOcaine-prilocaine cream  Commonly known as: EMLA  Apply topically 2 (two) times a day.     meclizine 12.5 mg tablet  Commonly known as: ANTIVERT  Take 2 tablets (25 mg total) by mouth 2 (two) times daily as needed for Dizziness.     metFORMIN 500 MG ER 24hr tablet  Commonly known as: GLUCOPHAGE-XR  Take 1 tablet (500 mg total) by mouth every morning.     metoprolol succinate 200 MG 24 hr tablet  Commonly known as: TOPROL-XL  Take 1 tablet (200 mg total) by mouth once daily.     OMNIPOD 5 G6 PODS (GEN 5) Crtg  Generic drug: insulin pump cart,automated,BT  Inject 1 each into the skin every other day.     pen needle, diabetic 32 gauge x 5/32" Ndle  Commonly known as: PEN NEEDLE  1 each by Misc.(Non-Drug; Combo Route) route 4 (four) times daily. ICD 10 E11.42     spironolactone 50 MG tablet  Commonly known as: ALDACTONE  Take 1 tablet (50 mg total) by mouth once daily.     tirzepatide 10 mg/0.5 mL Pnij  Inject 10 mg into the skin every 7 days.     triamcinolone acetonide 0.1% 0.1 % cream  Commonly known as: KENALOG  Apply topically 2 (two) times daily.            STOP taking " these medications      amLODIPine 10 MG tablet  Commonly known as: NORVASC     ENTRESTO  mg per tablet  Generic drug: sacubitriL-valsartan              Indwelling Lines/Drains at time of discharge:   Lines/Drains/Airways       None                   Time spent on the discharge of patient: 35 minutes         Yaw Mccullough MD  Department of Hospital Medicine  [facility] - Stepdown Flex ([place]-[facility])

## 2024-12-09 ENCOUNTER — OFFICE VISIT (OUTPATIENT)
Dept: OPHTHALMOLOGY | Facility: CLINIC | Age: 69
End: 2024-12-09
Payer: MEDICARE

## 2024-12-09 ENCOUNTER — CLINICAL SUPPORT (OUTPATIENT)
Dept: OPHTHALMOLOGY | Facility: CLINIC | Age: 69
End: 2024-12-09
Payer: MEDICARE

## 2024-12-09 DIAGNOSIS — Z79.4 TYPE 2 DIABETES MELLITUS WITH BOTH EYES AFFECTED BY MILD NONPROLIFERATIVE RETINOPATHY WITHOUT MACULAR EDEMA, WITH LONG-TERM CURRENT USE OF INSULIN: Primary | ICD-10-CM

## 2024-12-09 DIAGNOSIS — E11.36 DIABETIC CATARACT OF BOTH EYES: ICD-10-CM

## 2024-12-09 DIAGNOSIS — E11.3293 TYPE 2 DIABETES MELLITUS WITH BOTH EYES AFFECTED BY MILD NONPROLIFERATIVE RETINOPATHY WITHOUT MACULAR EDEMA, WITH LONG-TERM CURRENT USE OF INSULIN: Primary | ICD-10-CM

## 2024-12-09 PROCEDURE — 2022F DILAT RTA XM EVC RTNOPTHY: CPT | Mod: CPTII,S$GLB,, | Performed by: OPHTHALMOLOGY

## 2024-12-09 PROCEDURE — 92014 COMPRE OPH EXAM EST PT 1/>: CPT | Mod: S$GLB,,, | Performed by: OPHTHALMOLOGY

## 2024-12-09 PROCEDURE — 1160F RVW MEDS BY RX/DR IN RCRD: CPT | Mod: CPTII,S$GLB,, | Performed by: OPHTHALMOLOGY

## 2024-12-09 PROCEDURE — 92134 CPTRZ OPH DX IMG PST SGM RTA: CPT | Mod: S$GLB,,, | Performed by: OPHTHALMOLOGY

## 2024-12-09 PROCEDURE — 99999 PR PBB SHADOW E&M-EST. PATIENT-LVL III: CPT | Mod: PBBFAC,,, | Performed by: OPHTHALMOLOGY

## 2024-12-09 PROCEDURE — 3044F HG A1C LEVEL LT 7.0%: CPT | Mod: CPTII,S$GLB,, | Performed by: OPHTHALMOLOGY

## 2024-12-09 PROCEDURE — 1159F MED LIST DOCD IN RCRD: CPT | Mod: CPTII,S$GLB,, | Performed by: OPHTHALMOLOGY

## 2024-12-09 PROCEDURE — 4010F ACE/ARB THERAPY RXD/TAKEN: CPT | Mod: CPTII,S$GLB,, | Performed by: OPHTHALMOLOGY

## 2024-12-10 ENCOUNTER — LAB VISIT (OUTPATIENT)
Dept: LAB | Facility: HOSPITAL | Age: 69
End: 2024-12-10
Payer: MEDICARE

## 2024-12-10 ENCOUNTER — TELEPHONE (OUTPATIENT)
Dept: CARDIOLOGY | Facility: CLINIC | Age: 69
End: 2024-12-10
Payer: MEDICARE

## 2024-12-10 DIAGNOSIS — R06.02 SOB (SHORTNESS OF BREATH): ICD-10-CM

## 2024-12-10 DIAGNOSIS — I50.22 CHRONIC SYSTOLIC HEART FAILURE: ICD-10-CM

## 2024-12-10 LAB
ALBUMIN SERPL BCP-MCNC: 3.1 G/DL (ref 3.5–5.2)
ALP SERPL-CCNC: 110 U/L (ref 40–150)
ALT SERPL W/O P-5'-P-CCNC: 8 U/L (ref 10–44)
ANION GAP SERPL CALC-SCNC: 10 MMOL/L (ref 8–16)
AST SERPL-CCNC: 12 U/L (ref 10–40)
BILIRUB SERPL-MCNC: 0.4 MG/DL (ref 0.1–1)
BNP SERPL-MCNC: 181 PG/ML (ref 0–99)
BUN SERPL-MCNC: 26 MG/DL (ref 8–23)
CALCIUM SERPL-MCNC: 8.4 MG/DL (ref 8.7–10.5)
CHLORIDE SERPL-SCNC: 104 MMOL/L (ref 95–110)
CO2 SERPL-SCNC: 24 MMOL/L (ref 23–29)
CREAT SERPL-MCNC: 1.6 MG/DL (ref 0.5–1.4)
EST. GFR  (NO RACE VARIABLE): 34.5 ML/MIN/1.73 M^2
GLUCOSE SERPL-MCNC: 189 MG/DL (ref 70–110)
POTASSIUM SERPL-SCNC: 4.5 MMOL/L (ref 3.5–5.1)
PROT SERPL-MCNC: 6.8 G/DL (ref 6–8.4)
SODIUM SERPL-SCNC: 138 MMOL/L (ref 136–145)

## 2024-12-10 PROCEDURE — 83880 ASSAY OF NATRIURETIC PEPTIDE: CPT

## 2024-12-10 PROCEDURE — 80053 COMPREHEN METABOLIC PANEL: CPT

## 2024-12-10 PROCEDURE — 36415 COLL VENOUS BLD VENIPUNCTURE: CPT

## 2024-12-10 RX ORDER — FUROSEMIDE 40 MG/1
20 TABLET ORAL DAILY
Start: 2024-12-10

## 2024-12-10 NOTE — PROGRESS NOTES
Subjective:       Patient ID: Mercedez Purvis is a 69 y.o. female      Chief Complaint   Patient presents with    Follow-up     History of Present Illness  HPI    Pt here for overdue f/u  DLS 7/14/23    Pt sts she feels like VA is blurry OU since last visit   No f/f/pain OU    Hemoglobin A1C       Date                     Value               Ref Range             Status                11/21/2024               6.3 (H)             4.0 - 5.6 %           Final                      08/22/2024               7.7 (H)             4.0 - 5.6 %           Final                      12/21/2023               6.8 (H)             4.0 - 5.6 %           Final                Last edited by Ian Alanis MD on 12/10/2024  4:46 PM.        Imaging:    See report    Assessment/Plan:     1. Type 2 diabetes mellitus with both eyes affected by mild nonproliferative retinopathy without macular edema, with long-term current use of insulin  Min cystoid eccentric spaces on OCT OD--stable     Diabetic Retinopathy discussed in detail, all questions answered  Stressed importance of good BS/BP/Chol Control  RTC immediately PRN any vision changes, hortencia blurry vision, missing vision, floaters, distortions, etc    - Posterior Segment OCT Retina-Both eyes    2. Diabetic cataract of both eyes  Refraction vs CE/IOL per pt's preference  F/u Dr Akins also as planned    Needs yearly exam at this point.  Could be with Dr Akins yearly and retina PRN any changes    Follow up in about 1 year (around 12/9/2025), or if symptoms worsen or fail to improve, for Comprehensive Examination, OCT Mac.

## 2024-12-10 NOTE — TELEPHONE ENCOUNTER
"[facility]) weekly phone follow up / triage call completed.     [facility] RN Navigator spoke with PT    Current Patient reported weight: Scale is "Kaput"   Patient Goal Weight: (~194 lbs)  Recent Patient reported blood pressure and heart rate: BP-126/85 P-73    Pt reports the following:  []  Shortness of Breath with Activity  []  Shortness of Breath at rest   []  Fatigue  []  Edema   [] Chest pain or tightness  [] Weight Increase since discharge  [x] None of the above    Pt reports using "Daily weight and symptom tracker".    Pt reports being in the GREEN(color) Zone. If in yellow/red, reminded that they should be calling [facility] today or now.     Medications:   Medication compliance reviewed with pt.  Pt reports having medication list available and has all medications at home for use per list. See end of note for Medication Changes.    Education:   Confirmed pt still has "[facility] Home Care Guide"  . YES    Reminded of key points as listed below.     Recommend 2 -3 gram sodium restriction and 1500 cc-2000 cc fluid restriction.  Encourage physical activity with graded exercise program.  Requested patient to weigh themselves daily, and to notify us if their weight increases by more than 3 lbs in 1 day or 5 lbs in 3 days.   Reminded to use "Daily weight and symptom tracker".  Even if pt does not have a scale, to use symptom tracker.       Watch for these Signs and Symptoms: If any of these occur, contact [facility] immediately:   Increase in shortness of breath with movement   Increase in swelling in your legs and ankles   Weight gain of more than 3 pounds in a day or 5 pounds in 3 days.   Difficulty breathing when you are lying down   Worsening fatigue or tiredness   Stomach bloating, a full feeling or a loss of appetite   Increased coughing--especially when you are lying down      Pt was able to verbalize back to RN in their own words correct diet/fluid " restrictions, necessity for exercise, warning signs and symptoms, when and how to contact their [facility] team.      Pt reminded of upcoming appointment, 12-23-24 PT reports they will attend. PT sees [employer] next week.      Pt reminded of how and when to contact [facility]:  [phone](Mon-Fri, 8a-5p) & for urgent issues on the weekend to page the Heart Transplant MD on call.  Pt also encouraged utilize [facility] messaging as well.      Pt  verbalized understanding and in agreement of plan.       Will follow up with pt at next clinic visit and RN navigator available for pt questions, issues or concerns.     Can we call with lab results please? Looks like kidney function still lower than her baseline, BNP downtrending. If no issues on weekly check, can we have her reduce daily lasix to 20mg daily with 40mg only prn for weight gain or worsening symptoms? As long as she's not having weight gain, etc.      Her scale is Kaput, she tried changing batteries, didn't work. Cuff not working either. You still want to reduce her Lasix?? I told her to pay particular attention to her SOB, Edema and CP, she has none of those symptoms.     Yeah, if she's asymptomatic. She sees cards this week and us next  Week.    PT changed the batteries in her BP cuff and now it's working!     week

## 2024-12-13 ENCOUNTER — OFFICE VISIT (OUTPATIENT)
Dept: CARDIOLOGY | Facility: CLINIC | Age: 69
End: 2024-12-13
Payer: MEDICARE

## 2024-12-13 DIAGNOSIS — I15.2 HYPERTENSION ASSOCIATED WITH DIABETES: ICD-10-CM

## 2024-12-13 DIAGNOSIS — E78.5 HYPERLIPIDEMIA ASSOCIATED WITH TYPE 2 DIABETES MELLITUS: ICD-10-CM

## 2024-12-13 DIAGNOSIS — Z96.41 DIABETES MELLITUS TYPE 2, WITH COMPLICATION, ON LONG TERM INSULIN PUMP: ICD-10-CM

## 2024-12-13 DIAGNOSIS — E11.69 HYPERLIPIDEMIA ASSOCIATED WITH TYPE 2 DIABETES MELLITUS: ICD-10-CM

## 2024-12-13 DIAGNOSIS — E11.59 HYPERTENSION ASSOCIATED WITH DIABETES: ICD-10-CM

## 2024-12-13 DIAGNOSIS — I50.22 CHRONIC SYSTOLIC CONGESTIVE HEART FAILURE: Primary | ICD-10-CM

## 2024-12-13 DIAGNOSIS — N18.32 CHRONIC KIDNEY DISEASE, STAGE 3B: ICD-10-CM

## 2024-12-13 DIAGNOSIS — I25.118 CORONARY ARTERY DISEASE OF NATIVE ARTERY OF NATIVE HEART WITH STABLE ANGINA PECTORIS: ICD-10-CM

## 2024-12-13 DIAGNOSIS — E66.01 SEVERE OBESITY: ICD-10-CM

## 2024-12-13 DIAGNOSIS — E11.8 DIABETES MELLITUS TYPE 2, WITH COMPLICATION, ON LONG TERM INSULIN PUMP: ICD-10-CM

## 2024-12-13 PROCEDURE — 1111F DSCHRG MED/CURRENT MED MERGE: CPT | Mod: CPTII,S$GLB,, | Performed by: INTERNAL MEDICINE

## 2024-12-13 PROCEDURE — 1126F AMNT PAIN NOTED NONE PRSNT: CPT | Mod: CPTII,S$GLB,, | Performed by: INTERNAL MEDICINE

## 2024-12-13 PROCEDURE — 3074F SYST BP LT 130 MM HG: CPT | Mod: CPTII,S$GLB,, | Performed by: INTERNAL MEDICINE

## 2024-12-13 PROCEDURE — 99214 OFFICE O/P EST MOD 30 MIN: CPT | Mod: S$GLB,,, | Performed by: INTERNAL MEDICINE

## 2024-12-13 PROCEDURE — 3008F BODY MASS INDEX DOCD: CPT | Mod: CPTII,S$GLB,, | Performed by: INTERNAL MEDICINE

## 2024-12-13 PROCEDURE — 4010F ACE/ARB THERAPY RXD/TAKEN: CPT | Mod: CPTII,S$GLB,, | Performed by: INTERNAL MEDICINE

## 2024-12-13 PROCEDURE — 3044F HG A1C LEVEL LT 7.0%: CPT | Mod: CPTII,S$GLB,, | Performed by: INTERNAL MEDICINE

## 2024-12-13 PROCEDURE — 99999 PR PBB SHADOW E&M-EST. PATIENT-LVL V: CPT | Mod: PBBFAC,,, | Performed by: INTERNAL MEDICINE

## 2024-12-13 PROCEDURE — 1159F MED LIST DOCD IN RCRD: CPT | Mod: CPTII,S$GLB,, | Performed by: INTERNAL MEDICINE

## 2024-12-13 PROCEDURE — 1160F RVW MEDS BY RX/DR IN RCRD: CPT | Mod: CPTII,S$GLB,, | Performed by: INTERNAL MEDICINE

## 2024-12-13 PROCEDURE — 3078F DIAST BP <80 MM HG: CPT | Mod: CPTII,S$GLB,, | Performed by: INTERNAL MEDICINE

## 2024-12-13 NOTE — PROGRESS NOTES
[facility] Cardiology     Subjective:    Patient ID:  Mercedez Purvis is a 69 y.o. female who presents for follow-up of Coronary Artery Disease, Congestive Heart Failure, Diabetes Mellitus, Hyperlipidemia, and Hypertension    Review of patient's allergies indicates:  No Known Allergies  She was hospitalized for volume overload in November.  Admission BNP was 345 pg/mL.  One troponin was minimally elevated at 0.052 ng/mL.  She has markedly reduced ejection fraction, 30% by PET in 2023 and 15-20% by echoes.  She has not been to the cath lab since 2016.  At that time her ejection fraction was normal.  She appears to have mixed ischemic and nonischemic triggers for decreased EF.  I see no fixed defects on PET scan 2023.  A nuclear stress in 2019 showed fixed apical defect and no ischemia.    Since release she has been doing better.  She reported that she had not been taking her furosemide as recommended prior to her hospitalization.  Frequent urination interrupting lifestyle was the issue.  She has GFR 37 range, serum creatinine 1.5.  She is back to normal activity.  She wants to resume going to the gym.  She is diabetic most recent hemoglobin A1c 7.7.  She takes clopidogrel but no aspirin.  She states her blood pressures have been stable.    Her other heart failure medications have been utilized chronically and have been maintained.  Her dose of Entresto has varied from low-dose to high-dose and she is currently on mid dose.  She denies any chest pain complaints.         Review of Systems   Constitutional: Negative for chills, decreased appetite, diaphoresis, fever, malaise/fatigue, night sweats, weight gain and weight loss.   HENT:  Positive for hearing loss. Negative for congestion, ear discharge, ear pain, hoarse voice, nosebleeds, odynophagia, sore throat, stridor and tinnitus.    Eyes:  Negative for blurred vision, discharge, double  vision, pain, photophobia, redness, vision loss in left eye, vision loss in right eye, visual disturbance and visual halos.   Cardiovascular:  Negative for chest pain, claudication, cyanosis, dyspnea on exertion, irregular heartbeat, leg swelling, near-syncope, orthopnea, palpitations, paroxysmal nocturnal dyspnea and syncope.   Respiratory:  Negative for cough, hemoptysis, shortness of breath, sleep disturbances due to breathing, snoring, sputum production and wheezing.    Endocrine: Negative for cold intolerance, heat intolerance, polydipsia, polyphagia and polyuria.   Hematologic/Lymphatic: Negative for adenopathy and bleeding problem. Does not bruise/bleed easily.   Skin:  Negative for color change, dry skin, flushing, itching, nail changes, poor wound healing, rash, skin cancer, suspicious lesions and unusual hair distribution.   Musculoskeletal:  Negative for arthritis, back pain, falls, gout, joint pain, joint swelling, muscle cramps, muscle weakness, myalgias, neck pain and stiffness.   Gastrointestinal:  Negative for bloating, abdominal pain, anorexia, change in bowel habit, bowel incontinence, constipation, diarrhea, dysphagia, excessive appetite, flatus, heartburn, hematemesis, hematochezia, hemorrhoids, jaundice, melena, nausea and vomiting.   Genitourinary:  Negative for bladder incontinence, decreased libido, dysuria, flank pain, frequency, genital sores, hematuria, hesitancy, incomplete emptying, nocturia and urgency.   Neurological:  Positive for headaches. Negative for aphonia, brief paralysis, difficulty with concentration, disturbances in coordination, excessive daytime sleepiness, dizziness, focal weakness, light-headedness, loss of balance, numbness, paresthesias, seizures, sensory change, tremors, vertigo and weakness.   Psychiatric/Behavioral:  Negative for altered mental status, depression, hallucinations, memory loss, substance abuse, suicidal ideas and thoughts of violence. The patient does  not have insomnia and is not nervous/anxious.    Allergic/Immunologic: Negative for hives and persistent infections.        Objective:       Vitals:    12/13/24 1018   BP: (!) 110/57   Pulse: 77   SpO2: 98%   Weight: 86.1 kg (189 lb 13.1 oz)   Height: 5' (1.524 m)    Physical Exam  Constitutional:       General: She is not in acute distress.     Appearance: She is well-developed. She is not diaphoretic.   HENT:      Head: Normocephalic and atraumatic.      Nose: Nose normal.   Eyes:      General: No scleral icterus.        Right eye: No discharge.      Conjunctiva/sclera: Conjunctivae normal.      Pupils: Pupils are equal, round, and reactive to light.   Neck:      Thyroid: No thyromegaly.      Vascular: No JVD.      Trachea: No tracheal deviation.   Cardiovascular:      Rate and Rhythm: Normal rate and regular rhythm.      Pulses:           Carotid pulses are 2+ on the right side and 2+ on the left side.       Radial pulses are 2+ on the right side and 2+ on the left side.      Heart sounds: Murmur heard.      Harsh early systolic murmur is present with a grade of 2/6 at the upper right sternal border.      No friction rub. No gallop.   Pulmonary:      Effort: Pulmonary effort is normal. No respiratory distress.      Breath sounds: Normal breath sounds. No stridor. No wheezing or rales.   Chest:      Chest wall: No tenderness.   Abdominal:      General: Bowel sounds are normal. There is no distension.      Palpations: Abdomen is soft. There is no mass.      Tenderness: There is no abdominal tenderness. There is no guarding or rebound.   Musculoskeletal:         General: No tenderness. Normal range of motion.      Cervical back: Normal range of motion and neck supple.   Lymphadenopathy:      Cervical: No cervical adenopathy.   Skin:     General: Skin is warm and dry.      Coloration: Skin is not pale.      Findings: No erythema or rash.   Neurological:      Mental Status: She is alert and oriented to person, place,  and time.      Cranial Nerves: No cranial nerve deficit.      Coordination: Coordination normal.   Psychiatric:         Behavior: Behavior normal.         Thought Content: Thought content normal.         Judgment: Judgment normal.           Assessment:       1. Chronic systolic congestive heart failure    2. Coronary artery disease of native artery of native heart with stable angina pectoris    3. Hyperlipidemia associated with type 2 diabetes mellitus    4. Hypertension associated with diabetes    5. Chronic kidney disease, stage 3b    6. Diabetes mellitus type 2, with complication, on long term insulin pump    7. Severe obesity      Results for orders placed or performed in visit on 12/10/24   BNP    Collection Time: 12/10/24 10:11 AM   Result Value Ref Range     (H) 0 - 99 pg/mL   Comprehensive Metabolic Panel    Collection Time: 12/10/24 10:11 AM   Result Value Ref Range    Sodium 138 136 - 145 mmol/L    Potassium 4.5 3.5 - 5.1 mmol/L    Chloride 104 95 - 110 mmol/L    CO2 24 23 - 29 mmol/L    Glucose 189 (H) 70 - 110 mg/dL    BUN 26 (H) 8 - 23 mg/dL    Creatinine 1.6 (H) 0.5 - 1.4 mg/dL    Calcium 8.4 (L) 8.7 - 10.5 mg/dL    Total Protein 6.8 6.0 - 8.4 g/dL    Albumin 3.1 (L) 3.5 - 5.2 g/dL    Total Bilirubin 0.4 0.1 - 1.0 mg/dL    Alkaline Phosphatase 110 40 - 150 U/L    AST 12 10 - 40 U/L    ALT 8 (L) 10 - 44 U/L    eGFR 34.5 (A) >60 mL/min/1.73 m^2    Anion Gap 10 8 - 16 mmol/L         Current Outpatient Medications:     amitriptyline (ELAVIL) 75 MG tablet, Take 1 tablet (75 mg total) by mouth every evening., Disp: 90 tablet, Rfl: 3    atorvastatin (LIPITOR) 80 MG tablet, Take 1 tablet (80 mg total) by mouth every evening., Disp: 90 tablet, Rfl: 3    blood sugar diagnostic Strp, 1 each by Misc.(Non-Drug; Combo Route) route 4 (four) times daily., Disp: 200 each, Rfl: 3    blood-glucose meter,continuous Misc, 1 Device by Misc.(Non-Drug; Combo Route) route continuous., Disp: 1 each, Rfl: 0     blood-glucose sensor (DEXCOM G7 SENSOR) Roberta, 1 each by Misc.(Non-Drug; Combo Route) route every 10 days., Disp: 10 each, Rfl: 3    capsaicin 0.1 % Crea, Apply 1-2 times a day, Disp: 42.5 g, Rfl: 2    ciclopirox (PENLAC) 8 % Soln, Apply topically nightly., Disp: 6.6 mL, Rfl: 11    clopidogreL (PLAVIX) 75 mg tablet, Take 1 tablet (75 mg total) by mouth once daily., Disp: 90 tablet, Rfl: 1    clotrimazole-betamethasone 1-0.05% (LOTRISONE) cream, Apply topically 2 (two) times daily., Disp: 45 g, Rfl: 0    cyclobenzaprine (FLEXERIL) 10 MG tablet, Take 1 tablet (10 mg total) by mouth 2 (two) times daily as needed for Muscle spasms., Disp: 60 tablet, Rfl: 1    diclofenac sodium (VOLTAREN) 1 % Gel, Apply topically once daily., Disp: 100 g, Rfl: 0    empagliflozin (JARDIANCE) 25 mg tablet, Take 1 tablet (25 mg total) by mouth once daily., Disp: 90 tablet, Rfl: 1    fluconazole (DIFLUCAN) 150 MG Tab, Take 1 dose by mouth. May repeat after 72 hrs for yeast infection., Disp: 2 tablet, Rfl: 1    furosemide (LASIX) 40 MG tablet, Take 0.5 tablets (20 mg total) by mouth once daily. Okay to take 40mg lasix daily as needed for worsening shortness of breath, swelling or 3lb weight gain., Disp: , Rfl:     gabapentin (NEURONTIN) 300 MG capsule, Take 1 capsule (300 mg total) by mouth 2 (two) times daily AND 2 capsules (600 mg total) every evening., Disp: 360 capsule, Rfl: 1    glucagon (BAQSIMI) 3 mg/actuation Spry, 1 each by Nasal route daily as needed (if glucose is 70 or less.)., Disp: 2 each, Rfl: 1    insulin pump cart,automated,BT (OMNIPOD 5 G6 PODS, GEN 5,) Crtg, Inject 1 each into the skin every other day., Disp: 45 each, Rfl: 3    LIDOcaine (XYLOCAINE) 5 % Oint ointment, Apply topically as needed., Disp: 120 g, Rfl: 3    LIDOcaine HCL 2% (XYLOCAINE) 2 % jelly, Apply topically as needed. Apply topically once nightly to affected part of foot/feet., Disp: 30 mL, Rfl: 2    LIDOcaine-prilocaine (EMLA) cream, Apply topically 2 (two)  "times a day., Disp: 30 g, Rfl: 1    meclizine (ANTIVERT) 12.5 mg tablet, Take 2 tablets (25 mg total) by mouth 2 (two) times daily as needed for Dizziness., Disp: 30 tablet, Rfl: 6    metoprolol succinate (TOPROL-XL) 200 MG 24 hr tablet, Take 1 tablet (200 mg total) by mouth once daily., Disp: 90 tablet, Rfl: 1    pen needle, diabetic (PEN NEEDLE) 32 gauge x 5/32" Ndle, 1 each by Misc.(Non-Drug; Combo Route) route 4 (four) times daily. ICD 10 E11.42, Disp: 400 each, Rfl: 3    sacubitriL-valsartan (ENTRESTO) 49-51 mg per tablet, Take 1 tablet by mouth 2 (two) times daily., Disp: 60 tablet, Rfl: 11    spironolactone (ALDACTONE) 50 MG tablet, Take 1 tablet (50 mg total) by mouth once daily., Disp: 90 tablet, Rfl: 3    tirzepatide 10 mg/0.5 mL PnIj, Inject 10 mg into the skin every 7 days., Disp: 2 mL, Rfl: 11    triamcinolone acetonide 0.1% (KENALOG) 0.1 % cream, Apply topically 2 (two) times daily., Disp: 15 g, Rfl: 2    insulin detemir U-100 (LEVEMIR FLEXTOUCH U-100 INSULN) 100 unit/mL (3 mL) InPn pen, Inject 40 Units into the skin once daily. FOR EMERGENCY USE ONLY - BACK UP INSULIN, IF OFF OF YOUR INSULIN PUMP - USE 40 UNITS INJECTION DAILY UNTIL GETTING BACK ON PUMP., Disp: 15 mL, Rfl: 1    insulin lispro (HUMALOG U-100 INSULIN) 100 unit/mL injection, Inject 100 Units into the skin continuous. Gives up to 100 units daily via Omnipod. Do not directly inject - only use within pods., Disp: 40 mL, Rfl: 11    metFORMIN (GLUCOPHAGE-XR) 500 MG ER 24hr tablet, Take 1 tablet (500 mg total) by mouth every morning., Disp: 90 tablet, Rfl: 3     Lab Results   Component Value Date    WBC 6.57 12/03/2024    RBC 5.88 (H) 12/03/2024    HGB 13.2 12/03/2024    HCT 45.3 12/03/2024    MCV 77 (L) 12/03/2024    MCH 22.4 (L) 12/03/2024    MCHC 29.1 (L) 12/03/2024    RDW 17.4 (H) 12/03/2024     12/03/2024    MPV 11.3 12/03/2024    GRAN 3.6 12/03/2024    GRAN 55.0 12/03/2024    LYMPH 2.3 12/03/2024    LYMPH 34.2 12/03/2024    MONO " 0.6 12/03/2024    MONO 8.5 12/03/2024    EOS 0.1 12/03/2024    BASO 0.04 12/03/2024    EOSINOPHIL 1.4 12/03/2024    BASOPHIL 0.6 12/03/2024    MG 2.1 12/03/2024        CMP  Lab Results   Component Value Date     12/10/2024    K 4.5 12/10/2024     12/10/2024    CO2 24 12/10/2024     (H) 12/10/2024    BUN 26 (H) 12/10/2024    CREATININE 1.6 (H) 12/10/2024    CALCIUM 8.4 (L) 12/10/2024    PROT 6.8 12/10/2024    ALBUMIN 3.1 (L) 12/10/2024    BILITOT 0.4 12/10/2024    ALKPHOS 110 12/10/2024    AST 12 12/10/2024    ALT 8 (L) 12/10/2024    ANIONGAP 10 12/10/2024    ESTGFRAFRICA >60.0 03/21/2022    EGFRNONAA 58.0 (A) 03/21/2022        Lab Results   Component Value Date    LABBLOO NO GROWTH AFTER 5 DAYS - FINAL REPORT. 08/27/2010            Results for orders placed or performed during the hospital encounter of 11/15/24   Repeat EKG 12-lead    Collection Time: 11/15/24 10:13 PM   Result Value Ref Range    QRS Duration 104 ms    [facility] QTC Calculation 482 ms    Narrative    Test Reason : R06.00,    Vent. Rate :  97 BPM     Atrial Rate :  97 BPM     P-R Int : 200 ms          QRS Dur : 104 ms      QT Int : 380 ms       P-R-T Axes :  65 -16  80 degrees    QTcB Int : 482 ms    Normal sinus rhythm  Leftward axis  Low voltage QRS  Abnormal R wave progression in the precordial leads  Borderline Abnormal ECG  When compared with ECG of 15-Nov-2024 21:35,  No significant change was found  Confirmed by Ian Sellers ([id number]) on 11/16/2024 7:47:03 AM    Referred By: AAAREFERRAL SELF           Confirmed By: Ian Sellers                   Plan:       Problem List Items Addressed This Visit          Cardiac/Vascular    Coronary artery disease of native artery of native heart with stable angina pectoris     Peak troponin on most recent hospitalization 0.052 ng/mL.  She had no chest pain complaints but did have shortness of breath.  No MI confirmed.    Her history includes 2016 stenting to RCA and circumflex.  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  Last PET stress 2023 no active ischemia.         Hypertension associated with diabetes     Current therapy working well.  No changes made today.  She is tolerating Aldactone.         Hyperlipidemia associated with type 2 diabetes mellitus     She reports noncompliance E with statin therapy.  Most recent  mg%.  80 mg atorvastatin to be continued.         Chronic systolic congestive heart failure - Primary     She remains without recurrent heart failure symptomatology at this time on appropriate medications.    I think her true EF is probably 30% range or even low.    ICD implantation to be discussed.              Renal/    Chronic kidney disease, stage 3b     Lasix 40 mg to continue.  GFR 37 range on most recent calculation.  Admission  5 pg/mL on recent hospital admission.            Endocrine    Severe obesity     Weight loss encouraged.         Diabetes mellitus type 2, with complication, on long term insulin pump     Condition unchanged.  Last hemoglobin A1c 7.7.               She is back to stable status.  I made a six-month follow-up.    Her blood pressures have been stable.  She is back on 40 Lasix daily.  No angina reported.  She has been seen by advanced heart failure team.    ICD implantation has been addressed with the patient.  A referral to EP is planned if she agrees to go for a discussion.    She needs to be compliant with statin therapy-106 mg% on most recent admission with compliancy issues reported by the patient             Ricardo Baez MD  12/16/2024   10:34 AM

## 2024-12-16 ENCOUNTER — PATIENT MESSAGE (OUTPATIENT)
Dept: CARDIOLOGY | Facility: CLINIC | Age: 69
End: 2024-12-16
Payer: MEDICARE

## 2024-12-16 VITALS
DIASTOLIC BLOOD PRESSURE: 57 MMHG | WEIGHT: 189.81 LBS | HEART RATE: 77 BPM | HEIGHT: 60 IN | OXYGEN SATURATION: 98 % | SYSTOLIC BLOOD PRESSURE: 110 MMHG | BODY MASS INDEX: 37.27 KG/M2

## 2024-12-16 NOTE — ASSESSMENT & PLAN NOTE
Peak troponin on most recent hospitalization 0.052 ng/mL.  She had no chest pain complaints but did have shortness of breath.  No MI confirmed.    Her history includes 2016 stenting to RCA and circumflex.  Chronic distal circumflex occlusion reported.  Last PET stress 2023 no active ischemia.

## 2024-12-16 NOTE — ASSESSMENT & PLAN NOTE
Lasix 40 mg to continue.  GFR 37 range on most recent calculation.  Admission  5 pg/mL on recent hospital admission.

## 2024-12-16 NOTE — ASSESSMENT & PLAN NOTE
She remains without recurrent heart failure symptomatology at this time on appropriate medications.    I think her true EF is probably 30% range or even low.    ICD implantation to be discussed.

## 2024-12-16 NOTE — ASSESSMENT & PLAN NOTE
She reports noncompliance E with statin therapy.  Most recent  mg%.  80 mg atorvastatin to be continued.

## 2024-12-17 ENCOUNTER — TELEPHONE (OUTPATIENT)
Dept: CARDIOLOGY | Facility: CLINIC | Age: 69
End: 2024-12-17
Payer: MEDICARE

## 2024-12-17 NOTE — TELEPHONE ENCOUNTER
"[facility]    Attempted to call pt to complete 1 week "check in" call. Unable to reach pt at listed phone numbers.  Was able to leave message on voicemail encouraging pt to return call with [facility] phone number and reminded of upcoming appt 12/23/2024 for 10:30a with labs prior.   "

## 2024-12-18 NOTE — PROGRESS NOTES
[facility] Provider Note (Follow-up) Consult Note      HPI:     Patient reports stable SOB from last visit, mild SOB going up clinic ramp today              Patient sleeps on 1 number of pillows              Patient wakes up SOB, has to get out of bed, associated cough- denies PND symptoms, sleeps with CPAP              Palpitations - denies              Dizzy, light-headed, pre-syncope or syncope- denies              Since discharge frequency of performing weights, home weight and weight change- 189lbs on home scale today, weight stable on clinic scale              Other information felt pertinent to HPI: Ms. Mercedez Purvis is a 68 yo female with CAD s/p PCI, DM2 on insulin pump, neuropathy, HTN, hyperaldosteronism on aldactone, HLD, ICM, HFrEF (EF 15-20%), CKD III, TIKA who presents to second H[facility] visit following recent admission for AHRF 2/2 ADHF.     PHYSICAL:   Vitals:    12/23/24 1014   BP: (!) 115/55   Pulse: 81      @[id number](3)@    JVD: no  Heart rhythm: regular  Cardiac murmur: No    S3: no  S4: no  Lungs: clear  Hepatojugular reflux: no  Edema: yes, trace BLE edema      Lab Results   Component Value Date     12/10/2024    K 4.5 12/10/2024    MG 2.1 12/03/2024     12/10/2024    CO2 24 12/10/2024    BUN 26 (H) 12/10/2024    CREATININE 1.6 (H) 12/10/2024     (H) 12/10/2024    CALCIUM 8.4 (L) 12/10/2024    AST 12 12/10/2024    ALT 8 (L) 12/10/2024    ALBUMIN 3.1 (L) 12/10/2024    PROT 6.8 12/10/2024    BILITOT 0.4 12/10/2024     Lab Results   Component Value Date     (H) 12/10/2024     (H) 12/03/2024     (H) 11/15/2024       ASSESSMENT/PLAN:     1. HFrEF (heart failure with reduced ejection fraction)  -NYHA Class II symptoms. Well compensated on exam. Continue current lasix 40mg once daily pending lab results.   -Current GDMT: entresto 49/51mg BID, aldactone 50mg daily, toprol 200mg daily, jardiance 25mg daily.   -Recommend 2-3 gram sodium restriction and 1500cc- 2000cc  fluid restriction.  -Requested patient to weigh themselves daily, and to notify us if their weight increases by more than 3 lbs in 1 day or 5 lbs in 3 days.  -Weekly H[facility] call with tentative phone dc next week.     2. Chronic kidney disease, stage 3b  -Appears baseline Cr 1.1-1.4 on lab review. Labs today pending.     3. TIKA (obstructive sleep apnea)              -On CPAP.     Whitney Engle PA-C

## 2024-12-23 ENCOUNTER — LAB VISIT (OUTPATIENT)
Dept: LAB | Facility: HOSPITAL | Age: 69
End: 2024-12-23
Payer: MEDICARE

## 2024-12-23 ENCOUNTER — OFFICE VISIT (OUTPATIENT)
Dept: CARDIOLOGY | Facility: CLINIC | Age: 69
End: 2024-12-23
Payer: MEDICARE

## 2024-12-23 ENCOUNTER — TELEPHONE (OUTPATIENT)
Dept: CARDIOLOGY | Facility: CLINIC | Age: 69
End: 2024-12-23

## 2024-12-23 VITALS
HEART RATE: 81 BPM | SYSTOLIC BLOOD PRESSURE: 115 MMHG | WEIGHT: 193 LBS | HEIGHT: 60 IN | BODY MASS INDEX: 37.89 KG/M2 | OXYGEN SATURATION: 98 % | DIASTOLIC BLOOD PRESSURE: 55 MMHG

## 2024-12-23 DIAGNOSIS — G47.33 OSA (OBSTRUCTIVE SLEEP APNEA): ICD-10-CM

## 2024-12-23 DIAGNOSIS — N18.32 CHRONIC KIDNEY DISEASE, STAGE 3B: ICD-10-CM

## 2024-12-23 DIAGNOSIS — I50.20 HFREF (HEART FAILURE WITH REDUCED EJECTION FRACTION): Primary | ICD-10-CM

## 2024-12-23 DIAGNOSIS — R06.02 SOB (SHORTNESS OF BREATH): ICD-10-CM

## 2024-12-23 LAB
ALBUMIN SERPL BCP-MCNC: 3.3 G/DL (ref 3.5–5.2)
ALP SERPL-CCNC: 132 U/L (ref 40–150)
ALT SERPL W/O P-5'-P-CCNC: 8 U/L (ref 10–44)
ANION GAP SERPL CALC-SCNC: 12 MMOL/L (ref 8–16)
AST SERPL-CCNC: 11 U/L (ref 10–40)
BILIRUB SERPL-MCNC: 0.3 MG/DL (ref 0.1–1)
BNP SERPL-MCNC: 224 PG/ML (ref 0–99)
BUN SERPL-MCNC: 23 MG/DL (ref 8–23)
CALCIUM SERPL-MCNC: 9 MG/DL (ref 8.7–10.5)
CHLORIDE SERPL-SCNC: 103 MMOL/L (ref 95–110)
CO2 SERPL-SCNC: 25 MMOL/L (ref 23–29)
CREAT SERPL-MCNC: 1.6 MG/DL (ref 0.5–1.4)
EST. GFR  (NO RACE VARIABLE): 34.5 ML/MIN/1.73 M^2
GLUCOSE SERPL-MCNC: 180 MG/DL (ref 70–110)
MAGNESIUM SERPL-MCNC: 2.2 MG/DL (ref 1.6–2.6)
PHOSPHATE SERPL-MCNC: 4.2 MG/DL (ref 2.7–4.5)
POTASSIUM SERPL-SCNC: 4.7 MMOL/L (ref 3.5–5.1)
PROT SERPL-MCNC: 7.6 G/DL (ref 6–8.4)
SODIUM SERPL-SCNC: 140 MMOL/L (ref 136–145)

## 2024-12-23 PROCEDURE — 1159F MED LIST DOCD IN RCRD: CPT | Mod: CPTII,S$GLB,,

## 2024-12-23 PROCEDURE — 3288F FALL RISK ASSESSMENT DOCD: CPT | Mod: CPTII,S$GLB,,

## 2024-12-23 PROCEDURE — 83735 ASSAY OF MAGNESIUM: CPT

## 2024-12-23 PROCEDURE — 3044F HG A1C LEVEL LT 7.0%: CPT | Mod: CPTII,S$GLB,,

## 2024-12-23 PROCEDURE — 3008F BODY MASS INDEX DOCD: CPT | Mod: CPTII,S$GLB,,

## 2024-12-23 PROCEDURE — 3078F DIAST BP <80 MM HG: CPT | Mod: CPTII,S$GLB,,

## 2024-12-23 PROCEDURE — 99214 OFFICE O/P EST MOD 30 MIN: CPT | Mod: S$GLB,,,

## 2024-12-23 PROCEDURE — 84100 ASSAY OF PHOSPHORUS: CPT

## 2024-12-23 PROCEDURE — 80053 COMPREHEN METABOLIC PANEL: CPT

## 2024-12-23 PROCEDURE — 1101F PT FALLS ASSESS-DOCD LE1/YR: CPT | Mod: CPTII,S$GLB,,

## 2024-12-23 PROCEDURE — 83880 ASSAY OF NATRIURETIC PEPTIDE: CPT

## 2024-12-23 PROCEDURE — 4010F ACE/ARB THERAPY RXD/TAKEN: CPT | Mod: CPTII,S$GLB,,

## 2024-12-23 PROCEDURE — 3074F SYST BP LT 130 MM HG: CPT | Mod: CPTII,S$GLB,,

## 2024-12-23 PROCEDURE — 36415 COLL VENOUS BLD VENIPUNCTURE: CPT

## 2024-12-23 PROCEDURE — 99999 PR PBB SHADOW E&M-EST. PATIENT-LVL V: CPT | Mod: PBBFAC,,,

## 2024-12-23 PROCEDURE — 1126F AMNT PAIN NOTED NONE PRSNT: CPT | Mod: CPTII,S$GLB,,

## 2024-12-23 RX ORDER — FUROSEMIDE 40 MG/1
40 TABLET ORAL DAILY
Qty: 30 TABLET | Refills: 11 | Status: SHIPPED | OUTPATIENT
Start: 2024-12-23 | End: 2025-12-23

## 2024-12-23 NOTE — TELEPHONE ENCOUNTER
Refill request sent to [facility] to be sent to [facility]

## 2024-12-23 NOTE — TELEPHONE ENCOUNTER
No care due was identified.  [facility] Embedded Care Due Messages. Reference number: [id number].   12/23/2024 11:02:18 AM CST

## 2024-12-23 NOTE — TELEPHONE ENCOUNTER
[facility]    Spoke with Ms. Mercedez Purvis to inform her of the following.     Can we call and let her know her labs look stable. Continue lasix 40mg daily.    Your labs are stable. Ms. Engle would like for you to continue to take your lasix at 40mg once a day. The pt verbalized understanding.

## 2024-12-23 NOTE — TELEPHONE ENCOUNTER
----- Message from Whitney Engle PA-C sent at 12/23/2024 10:49 AM CST -----  Regarding: Humalog refill  Hi team,    I saw this mutual patient in [facility] clinic today and pt requesting refill of humalog. Would you all mind helping facilitate?    Thanks,  Whitney

## 2024-12-23 NOTE — PATIENT INSTRUCTIONS
Will call today with lab results.    Notify us ([phone]) with any worsening shortness of breath, swelling or 3lb weight gain on home scale.

## 2024-12-24 RX ORDER — INSULIN LISPRO 100 [IU]/ML
100 INJECTION, SOLUTION INTRAVENOUS; SUBCUTANEOUS CONTINUOUS
Qty: 40 ML | Refills: 11 | Status: SHIPPED | OUTPATIENT
Start: 2024-12-24 | End: 2025-12-24

## 2024-12-24 NOTE — TELEPHONE ENCOUNTER
Refill Routing Note   Medication(s) are not appropriate for processing by [facility] for the following reason(s):      No active prescription written by provider    [facility] action(s):  Defer Care Due:  None identified            Appointments  past 12m or future 3m with PCP    Date Provider   Last Visit   11/21/2024 Dottie Merritt MD   Next Visit   Visit date not found Dottie Merritt MD   ED visits in past 90 days: 0        Note composed:7:05 PM 12/23/2024

## 2024-12-27 ENCOUNTER — PATIENT MESSAGE (OUTPATIENT)
Dept: OPTOMETRY | Facility: CLINIC | Age: 69
End: 2024-12-27
Payer: MEDICARE

## 2025-01-02 ENCOUNTER — TELEPHONE (OUTPATIENT)
Dept: CARDIOLOGY | Facility: CLINIC | Age: 70
End: 2025-01-02
Payer: MEDICARE

## 2025-01-02 NOTE — TELEPHONE ENCOUNTER
"[facility]    Attempted to call pt to complete 1 week "check in"  and discharge phone call. Unable to reach pt at listed phone numbers.  Was able to leave message on voicemail encouraging pt to return call with [facility] phone number. The pt's episode will be closed as of today.   "

## 2025-02-28 DIAGNOSIS — E11.65 TYPE 2 DIABETES MELLITUS WITH HYPERGLYCEMIA, UNSPECIFIED WHETHER LONG TERM INSULIN USE: Primary | ICD-10-CM

## 2025-02-28 RX ORDER — INSULIN LISPRO 100 [IU]/ML
100 INJECTION, SOLUTION INTRAVENOUS; SUBCUTANEOUS CONTINUOUS
Qty: 40 ML | Refills: 11 | Status: SHIPPED | OUTPATIENT
Start: 2025-02-28 | End: 2026-02-28

## 2025-02-28 RX ORDER — INSULIN LISPRO 100 [IU]/ML
100 INJECTION, SOLUTION INTRAVENOUS; SUBCUTANEOUS CONTINUOUS
Qty: 40 ML | Refills: 11 | Status: CANCELLED | OUTPATIENT
Start: 2025-02-28 | End: 2026-02-28

## 2025-03-03 DIAGNOSIS — E11.65 TYPE 2 DIABETES MELLITUS WITH HYPERGLYCEMIA, UNSPECIFIED WHETHER LONG TERM INSULIN USE: ICD-10-CM

## 2025-03-03 RX ORDER — INSULIN LISPRO 100 [IU]/ML
100 INJECTION, SOLUTION INTRAVENOUS; SUBCUTANEOUS CONTINUOUS
Qty: 40 ML | Refills: 11 | OUTPATIENT
Start: 2025-03-03 | End: 2026-03-03

## 2025-03-25 ENCOUNTER — OFFICE VISIT (OUTPATIENT)
Dept: INTERNAL MEDICINE | Facility: CLINIC | Age: 70
End: 2025-03-25
Payer: MEDICARE

## 2025-03-25 ENCOUNTER — LAB VISIT (OUTPATIENT)
Dept: LAB | Facility: OTHER | Age: 70
End: 2025-03-25
Payer: MEDICARE

## 2025-03-25 VITALS
BODY MASS INDEX: 37.78 KG/M2 | HEART RATE: 76 BPM | TEMPERATURE: 98 F | OXYGEN SATURATION: 99 % | WEIGHT: 192.44 LBS | DIASTOLIC BLOOD PRESSURE: 62 MMHG | SYSTOLIC BLOOD PRESSURE: 120 MMHG | HEIGHT: 60 IN

## 2025-03-25 DIAGNOSIS — Z79.4 TYPE 2 DIABETES MELLITUS WITH LEFT EYE AFFECTED BY MILD NONPROLIFERATIVE RETINOPATHY AND MACULAR EDEMA, WITH LONG-TERM CURRENT USE OF INSULIN: ICD-10-CM

## 2025-03-25 DIAGNOSIS — L03.116 CELLULITIS OF LEFT FOOT: Primary | ICD-10-CM

## 2025-03-25 DIAGNOSIS — M79.672 LEFT FOOT PAIN: ICD-10-CM

## 2025-03-25 DIAGNOSIS — E11.3212 TYPE 2 DIABETES MELLITUS WITH LEFT EYE AFFECTED BY MILD NONPROLIFERATIVE RETINOPATHY AND MACULAR EDEMA, WITH LONG-TERM CURRENT USE OF INSULIN: ICD-10-CM

## 2025-03-25 DIAGNOSIS — G89.29 CHRONIC PAIN OF RIGHT KNEE: ICD-10-CM

## 2025-03-25 DIAGNOSIS — M25.561 CHRONIC PAIN OF RIGHT KNEE: ICD-10-CM

## 2025-03-25 DIAGNOSIS — Z23 IMMUNIZATION DUE: ICD-10-CM

## 2025-03-25 DIAGNOSIS — R05.9 COUGH, UNSPECIFIED TYPE: ICD-10-CM

## 2025-03-25 LAB
ALBUMIN/CREAT UR: 22 UG/MG
CREAT UR-MCNC: 82 MG/DL (ref 15–325)
EAG (OHS): 157 MG/DL (ref 68–131)
HBA1C MFR BLD: 7.1 % (ref 4–5.6)
MICROALBUMIN UR-MCNC: 18 UG/ML (ref ?–5000)

## 2025-03-25 PROCEDURE — 99999 PR PBB SHADOW E&M-EST. PATIENT-LVL V: CPT | Mod: PBBFAC,,,

## 2025-03-25 PROCEDURE — 1101F PT FALLS ASSESS-DOCD LE1/YR: CPT | Mod: CPTII,S$GLB,,

## 2025-03-25 PROCEDURE — 1125F AMNT PAIN NOTED PAIN PRSNT: CPT | Mod: CPTII,S$GLB,,

## 2025-03-25 PROCEDURE — 1160F RVW MEDS BY RX/DR IN RCRD: CPT | Mod: CPTII,S$GLB,,

## 2025-03-25 PROCEDURE — 4010F ACE/ARB THERAPY RXD/TAKEN: CPT | Mod: CPTII,S$GLB,,

## 2025-03-25 PROCEDURE — 36415 COLL VENOUS BLD VENIPUNCTURE: CPT

## 2025-03-25 PROCEDURE — 83036 HEMOGLOBIN GLYCOSYLATED A1C: CPT

## 2025-03-25 PROCEDURE — 3008F BODY MASS INDEX DOCD: CPT | Mod: CPTII,S$GLB,,

## 2025-03-25 PROCEDURE — 99214 OFFICE O/P EST MOD 30 MIN: CPT | Mod: S$GLB,,,

## 2025-03-25 PROCEDURE — 3074F SYST BP LT 130 MM HG: CPT | Mod: CPTII,S$GLB,,

## 2025-03-25 PROCEDURE — 1159F MED LIST DOCD IN RCRD: CPT | Mod: CPTII,S$GLB,,

## 2025-03-25 PROCEDURE — 82570 ASSAY OF URINE CREATININE: CPT

## 2025-03-25 PROCEDURE — 3078F DIAST BP <80 MM HG: CPT | Mod: CPTII,S$GLB,,

## 2025-03-25 PROCEDURE — 3288F FALL RISK ASSESSMENT DOCD: CPT | Mod: CPTII,S$GLB,,

## 2025-03-25 RX ORDER — DEXTROMETHORPHAN POLISTIREX 30 MG/5ML
60 SUSPENSION ORAL 2 TIMES DAILY
Qty: 89 ML | Refills: 0 | Status: SHIPPED | OUTPATIENT
Start: 2025-03-25 | End: 2025-04-04

## 2025-03-25 RX ORDER — BENZONATATE 200 MG/1
200 CAPSULE ORAL 3 TIMES DAILY PRN
Qty: 30 CAPSULE | Refills: 0 | Status: SHIPPED | OUTPATIENT
Start: 2025-03-25 | End: 2025-04-04

## 2025-03-25 RX ORDER — DICLOFENAC SODIUM 10 MG/G
2 GEL TOPICAL DAILY
Qty: 20 G | Refills: 0 | Status: SHIPPED | OUTPATIENT
Start: 2025-03-25

## 2025-03-25 RX ORDER — LIDOCAINE 50 MG/G
1 PATCH TOPICAL DAILY
Qty: 5 PATCH | Refills: 0 | Status: SHIPPED | OUTPATIENT
Start: 2025-03-25 | End: 2025-03-30

## 2025-03-25 RX ORDER — CEPHALEXIN 500 MG/1
500 CAPSULE ORAL 4 TIMES DAILY
Qty: 20 CAPSULE | Refills: 0 | Status: SHIPPED | OUTPATIENT
Start: 2025-03-25 | End: 2025-03-30

## 2025-03-25 NOTE — PATIENT INSTRUCTIONS
Patient preferred podiatry:     Manuel Kuhn DPM - [facility]  [address] [facility] · [phone]    Ordered placed for podiatry internally as well.   Labs and urine today.   Immunization counseling provided    antibiotics for non-purulent cellulitis   Medications for cough proscribed.

## 2025-03-25 NOTE — PROGRESS NOTES
[facility]  Subjective:     Patient ID: Mercedez Purvis is a 70-year-old female with a medical history significant for coronary artery disease (CAD), myocardial infarction (MI) status post stent placement, type 2 diabetes mellitus (T2DM), diabetic neuropathy, obesity, hypertension (HTN), hyperlipidemia (HLD), hyperaldosteronism managed with spironolactone, diabetic retinopathy, stable angina, congestive heart failure (sCHF), chronic kidney disease (CKD) stage 3, gastroesophageal reflux disease (GERD), benign paroxysmal positional vertigo (BPPV), obstructive sleep apnea (TIKA), and migraines. She is an established patient of Dr. Merritt and is new to my care. Her most recent annual visit with Dr. Merritt was in November 2024. She is actively followed by the heart clinic and endocrinology for her diabetes.    She presents today with acute concerns regarding left foot pain that began on Thursday. Initially, she did not note any redness or swelling. However, redness and pain developed the night before her visit. She denies any history of injury, trauma, animal bites, lacerations, or abrasions. She has been managing her symptoms with heat therapy and leg elevation. She reports no loss of sensation or movement and denies fever or chills. Her blood sugars have been well-controlled, with a current Dexcom reading of 108 in clinic.    Additionally, she reports chronic right knee pain, which has worsened over the past few days. She denies any recent injury or trauma and has been using heat therapy along with a cream she believes was originally prescribed for itching.    She also mentions having had the flu 1.5-2 weeks ago, with a persistent cough. She is requesting cough medication, as the current expectorant she has been using does not seem effective. She denies fever or chills and is seeking a treatment option that will alleviate her cough, particularly during Bahai and at bedtime.    Of note, the  patient is due for a diabetic urine screening, HbA1c, and several immunizations.    Current Outpatient Medications   Medication Instructions    amitriptyline (ELAVIL) 75 mg, Oral, Nightly    atorvastatin (LIPITOR) 80 mg, Oral, Nightly    benzonatate (TESSALON) 200 mg, Oral, 3 times daily PRN    blood sugar diagnostic Strp 1 each, Misc.(Non-Drug; Combo Route), 4 times daily    blood-glucose meter,continuous Misc 1 Device, Misc.(Non-Drug; Combo Route), Continuous    blood-glucose sensor (DEXCOM G7 SENSOR) Roberta 1 each, Misc.(Non-Drug; Combo Route), Every 10 days    capsaicin 0.1 % Crea Apply 1-2 times a day    cephALEXin (KEFLEX) 500 mg, Oral, 4 times daily    ciclopirox (PENLAC) 8 % Soln Topical (Top), Nightly    clopidogreL (PLAVIX) 75 mg, Oral, Daily    clotrimazole-betamethasone 1-0.05% (LOTRISONE) cream Topical (Top), 2 times daily    cyclobenzaprine (FLEXERIL) 10 mg, Oral, 2 times daily PRN    dextromethorphan (DELSYM 12 HOUR) 60 mg, Oral, 2 times daily    diclofenac sodium (VOLTAREN ARTHRITIS PAIN) 2 g, Topical (Top), Daily    diclofenac sodium (VOLTAREN) 1 % Gel Topical (Top), Daily    fluconazole (DIFLUCAN) 150 MG Tab Take 1 dose by mouth. May repeat after 72 hrs for yeast infection.    furosemide (LASIX) 40 mg, Oral, Daily    gabapentin (NEURONTIN) 300 MG capsule Take 1 capsule (300 mg total) by mouth 2 (two) times daily AND 2 capsules (600 mg total) every evening.    glucagon (BAQSIMI) 3 mg/actuation Spry 1 each, Nasal, Daily PRN    insulin lispro (HUMALOG U-100 INSULIN) 100 Units, Subcutaneous, Continuous, Gives up to 100 units daily via Omnipod. Do not directly inject - only use within pods.    insulin pump cart,automated,BT (OMNIPOD 5 G6 PODS, GEN 5,) Crtg 1 each, Subcutaneous, Every other day    JARDIANCE 25 mg, Oral, Daily    LEVEMIR FLEXTOUCH U100 INSULIN 40 Units, Subcutaneous, Daily, FOR EMERGENCY USE ONLY - BACK UP INSULIN, IF OFF OF YOUR INSULIN PUMP - USE 40 UNITS INJECTION DAILY UNTIL GETTING  "BACK ON PUMP.    LIDOcaine (LIDODERM) 5 % 1 patch, Transdermal, Daily, Remove & Discard patch within 12 hours or as directed by MD    LIDOcaine (XYLOCAINE) 5 % Oint ointment Topical (Top), As needed (PRN)    LIDOcaine HCL 2% (XYLOCAINE) 2 % jelly Topical (Top), As needed (PRN), Apply topically once nightly to affected part of foot/feet.    LIDOcaine-prilocaine (EMLA) cream Topical (Top), 2 times daily    meclizine (ANTIVERT) 25 mg, Oral, 2 times daily PRN    metFORMIN (GLUCOPHAGE-XR) 500 mg, Oral, Every morning    metoprolol succinate (TOPROL-XL) 200 mg, Oral, Daily    pen needle, diabetic (PEN NEEDLE) 32 gauge x 5/32" Ndle 1 each, Misc.(Non-Drug; Combo Route), 4 times daily, ICD 10 E11.42    sacubitriL-valsartan (ENTRESTO) 49-51 mg per tablet 1 tablet, Oral, 2 times daily    spironolactone (ALDACTONE) 50 mg, Oral, Daily    tirzepatide 10 mg, Subcutaneous, Every 7 days    triamcinolone acetonide 0.1% (KENALOG) 0.1 % cream Topical (Top), 2 times daily     Objective:      Body mass index is 37.59 kg/m².  Vitals:    03/25/25 1000   BP: 120/62   Pulse: 76   Temp: 98.1 °F (36.7 °C)   TempSrc: Oral   SpO2: 99%   Weight: 87.3 kg (192 lb 7.4 oz)   Height: 5' (1.524 m)   PainSc:   8     Physical Exam  Vitals and nursing note reviewed.   Constitutional:       General: She is not in acute distress.     Appearance: Normal appearance. She is not toxic-appearing or diaphoretic.   HENT:      Right Ear: External ear normal.      Left Ear: External ear normal.      Nose: Nose normal. No congestion or rhinorrhea.      Mouth/Throat:      Mouth: Mucous membranes are moist.      Pharynx: No oropharyngeal exudate or posterior oropharyngeal erythema.   Eyes:      Extraocular Movements: Extraocular movements intact.   Cardiovascular:      Rate and Rhythm: Normal rate and regular rhythm.      Heart sounds: Normal heart sounds.   Pulmonary:      Breath sounds: Normal breath sounds.      Comments: +productive cough. (Pt had flu 2 weeks ago, " lingering cough)  Musculoskeletal:      Cervical back: Normal range of motion.      Right knee: Tenderness present.      Left foot: Normal range of motion. Swelling and tenderness present.        Legs:    Skin:     General: Skin is warm and dry.      Findings: No bruising, lesion or rash.   Neurological:      General: No focal deficit present.      Mental Status: She is alert and oriented to person, place, and time.   Psychiatric:         Mood and Affect: Mood normal.         Behavior: Behavior normal.       Left foot          Comparing left and right feet     Assessment:       1. Cellulitis of left foot    2. Left foot pain    3. 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 Chronic pain of right knee    5. Cough, unspecified type    6. Immunization due        Plan:       Cellulitis of left foot  -     Ambulatory referral/consult to Podiatry; Future; Expected date: 04/01/2025  -     cephALEXin (KEFLEX) 500 MG capsule; Take 1 capsule (500 mg total) by mouth 4 (four) times daily. for 5 days  Dispense: 20 capsule; Refill: 0  Chart reviewed and physical exam performed. She has swelling and redness and to her left foot, which she states started abruptly last night and is worse today. There are no signs of lacerations, abrasions or broken skin. There is no purulent draining. She has no fever. Will treat with keflex for cellulitis. Referral placed for podiatry per patients request.     Left foot pain  -     Ambulatory referral/consult to Podiatry; Future; Expected date: 04/01/2025  -     cephALEXin (KEFLEX) 500 MG capsule; Take 1 capsule (500 mg total) by mouth 4 (four) times daily. 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 for 5 days  Dispense: 20 capsule; Refill: 0  -     Hemoglobin A1C; Future; Expected date: 03/25/2025  -     Microalbumin/creatinine urine ratio; Future; Expected date: 03/25/2025    Chronic pain of right knee  -     diclofenac sodium (VOLTAREN ARTHRITIS PAIN) 1 % Gel; Apply 2 g topically once daily.  Dispense: 20 g; Refill: 0  -     LIDOcaine (LIDODERM) 5 %; Place 1 patch onto the skin once daily. Remove & Discard patch within 12 hours or as directed by MD for 5 days  Dispense: 5 patch; Refill: 0  -We discussed the importance of avoiding NSAIDs given the patients history of kidney disease and heart failure, as these medications can exacerbate kidney function decline and potentially lead to edema. Alternative pain management strategies were reviewed, including Tylenol Arthritis, Voltaren cream, lidocaine patches, hot and cold therapy, leg elevation, and the use of a knee brace for compression. Additionally, we addressed the possibility that her right knee pain may be aggravated by compensating for the pain in her left foot.    Cough, unspecified type  -     dextromethorphan (DELSYM 12 HOUR) 30 mg/5 mL liquid; Take 10 mLs (60 mg total) by mouth 2 (two) times daily. for 10 days  Dispense: 89 mL; Refill: 0  -     benzonatate (TESSALON) 200 MG capsule; Take 1 capsule (200 mg total) by mouth 3 (three) times daily as needed for Cough.  Dispense: 30 capsule; Refill: 0  Educated patient that both medications will suppress cough, not break up mucus. Take medications during Denominational/public spaces/night when you want to suppress cough but keep taking mucinex to break up mucus.     Immunization due  - educated on need for multiple vaccinations.       Tests to Keep You Healthy    Mammogram: ORDERED BUT NOT SCHEDULED  Eye Exam: Met on 12/9/2024  Colon Cancer Screening: Met on 4/15/2022  Last  Blood Pressure <= 139/89 (3/25/2025): Yes  Last HbA1c < 8 (11/21/2024): Yes.     Hemoglobin A1C- reordered due to [facility] lab update. Complete today    Diabetes urine screening- reordered due to [facility] lab update. Complete today.         Follow up if symptoms worsen or fail to improve. Pt needs appointment with endo or PCP for diabetes follow up        PUMA Gallagher, MPH  [facility]  [address] [place] [address] [address]  [place] [address]  Phone: [phone]  Fax: [phone]        Part of this note is dictated using the M*Modal Fluency Direct word recognition program. It may contain word recognition mistakes or wrong word substitutions (commonly he/she and is/was substitutions) that were missed on review.

## 2025-03-25 NOTE — Clinical Note
Hi! Pt was overdue for diabetic urine screening and HgA1C. I reordered due to the [facility] lab update. Results should be forwarded to all. Pt needs a follow up visit scheduled with either PCP or Irielle for DM.

## 2025-03-26 ENCOUNTER — RESULTS FOLLOW-UP (OUTPATIENT)
Dept: INTERNAL MEDICINE | Facility: CLINIC | Age: 70
End: 2025-03-26

## 2025-03-26 ENCOUNTER — TELEPHONE (OUTPATIENT)
Dept: INTERNAL MEDICINE | Facility: CLINIC | Age: 70
End: 2025-03-26
Payer: MEDICARE

## 2025-03-26 ENCOUNTER — OFFICE VISIT (OUTPATIENT)
Dept: PODIATRY | Facility: CLINIC | Age: 70
End: 2025-03-26
Payer: MEDICARE

## 2025-03-26 VITALS
HEIGHT: 60 IN | BODY MASS INDEX: 38.09 KG/M2 | SYSTOLIC BLOOD PRESSURE: 112 MMHG | WEIGHT: 194 LBS | DIASTOLIC BLOOD PRESSURE: 73 MMHG | HEART RATE: 83 BPM

## 2025-03-26 DIAGNOSIS — L60.9 DISEASE OF NAIL: ICD-10-CM

## 2025-03-26 DIAGNOSIS — E11.42 DIABETIC POLYNEUROPATHY ASSOCIATED WITH TYPE 2 DIABETES MELLITUS: ICD-10-CM

## 2025-03-26 DIAGNOSIS — L03.116 CELLULITIS OF LEFT FOOT: ICD-10-CM

## 2025-03-26 PROCEDURE — 99999 PR PBB SHADOW E&M-EST. PATIENT-LVL V: CPT | Mod: PBBFAC,,, | Performed by: PODIATRIST

## 2025-03-26 RX ORDER — AMLODIPINE BESYLATE 10 MG/1
10 TABLET ORAL
COMMUNITY
Start: 2025-02-26

## 2025-03-26 NOTE — TELEPHONE ENCOUNTER
----- Message from KAMI Awan FNP sent at 3/25/2025  2:17 PM CDT -----  Regarding: f/u appt for diabetes  Hi!Pt needs f/u appt for diabetes management.Thanks for letting me know!Piero  ----- Message -----  From: Margarita Ivory FNP  Sent: 3/25/2025  11:16 AM CDT  To: KAMI Spicer FNP; Dottie Merritt MD    Hi! Pt was overdue for diabetic urine screening and HgA1C. I reordered due to the [facility] lab update. Results should be forwarded to all. Pt needs a follow up visit scheduled with either PCP or Piero for DM.

## 2025-03-26 NOTE — PROGRESS NOTES
Subjective:      Patient ID: Mercedez Purvis is a 70 y.o. female.    Chief Complaint: Diabetes Mellitus (3/25/2025 - Margarita Ivory, DEYSIP) and Foot Problem (Left, cellulitis, no broken skin)    Pt presents today as a f/u, sent from her PCP for cellulitis of her left foot. Pt states she was prescribed abx yesterday but she has not picked them up from the pharmacy. Pt admits to mild pain. Denies any injury or broken skin.     Review of Systems   Constitutional: Negative for chills, fever and malaise/fatigue.   HENT:  Negative for hearing loss.    Cardiovascular:  Positive for leg swelling. Negative for claudication.   Respiratory:  Negative for shortness of breath.    Skin:  Positive for color change, dry skin, nail changes and unusual hair distribution. Negative for flushing and rash.   Musculoskeletal:  Negative for joint pain and myalgias.   Gastrointestinal:  Positive for nausea. Negative for vomiting.   Neurological:  Positive for numbness and paresthesias. Negative for loss of balance and sensory change.   Psychiatric/Behavioral:  Negative for altered mental status.    Allergic/Immunologic: Negative for hives.           Objective:      Physical Exam  Vitals reviewed.   Cardiovascular:      Pulses: Decreased pulses.      Comments: Localized edema to left dorsal foot.   Musculoskeletal:      Comments:        Feet:      Right foot:      Protective Sensation: 5 sites tested.  5 sites sensed.      Left foot:      Protective Sensation: 5 sites tested.  5 sites sensed.   Skin:     General: Skin is warm.      Capillary Refill: Capillary refill takes 2 to 3 seconds.      Findings: Erythema present. No abrasion or abscess.      Comments: Erythema noted to dorsal left foot    Nails x10 are elongated by  5-7mm's, thickened by 2-3 mm's, dystrophic, and are yellowish in  coloration . Xerosis Bilaterally. No open lesions noted.          Neurological:      Mental Status: She is alert.      Comments: Gross sensation intact  b/L               Assessment:       Encounter Diagnoses   Name Primary?    Cellulitis of left foot     Left foot pain     Diabetic polyneuropathy associated with type 2 diabetes mellitus          Plan:       Mercedez was seen today for diabetes mellitus and foot problem.    Diagnoses and all orders for this visit:    Cellulitis of left foot  -     Ambulatory referral/consult to Podiatry    Left foot pain  -     Ambulatory referral/consult to Podiatry    Diabetic polyneuropathy associated with type 2 diabetes mellitus  -     Ambulatory referral/consult to Podiatry      I counseled the patient on her conditions, their implications and medical management.    Medical records reviewed    Labs: A1c reviewed    With the patient's permission, nails were aggressively reduced and debrided X 10 to their soft tissue attachment mechanically and with an electric  removing all offending nail and debris. Pt relates relief following the procedure.    Pt advised to begin taking abx, and complete them    Pt advised to keep area clean    Decision making:  Chronic illnesses discussed in detail, previous records/notes and imaging independently reviewed, prescription drug management performed in addition to lengthy discussion regarding conservative treatment options.    RTC in 1 week or sooner if any new pedal problems arise, any signs of infection occur, or if condition worsens.       .

## 2025-03-26 NOTE — TELEPHONE ENCOUNTER
----- Message from SEAN Gallagher sent at 3/26/2025  9:35 AM CDT -----  Pt is due for follow up with Dr. Merritt, please schedule next available.  ----- Message -----  From: Lab, Background User  Sent: 3/25/2025   9:31 PM CDT  To: SEAN Doyle

## 2025-04-02 ENCOUNTER — OFFICE VISIT (OUTPATIENT)
Dept: PODIATRY | Facility: CLINIC | Age: 70
End: 2025-04-02
Payer: MEDICARE

## 2025-04-02 VITALS
DIASTOLIC BLOOD PRESSURE: 81 MMHG | HEART RATE: 85 BPM | SYSTOLIC BLOOD PRESSURE: 144 MMHG | BODY MASS INDEX: 38.09 KG/M2 | WEIGHT: 194 LBS | HEIGHT: 60 IN

## 2025-04-02 DIAGNOSIS — L03.116 CELLULITIS OF LEFT FOOT: Primary | ICD-10-CM

## 2025-04-02 PROCEDURE — 3066F NEPHROPATHY DOC TX: CPT | Mod: CPTII,S$GLB,, | Performed by: PODIATRIST

## 2025-04-02 PROCEDURE — 4010F ACE/ARB THERAPY RXD/TAKEN: CPT | Mod: CPTII,S$GLB,, | Performed by: PODIATRIST

## 2025-04-02 PROCEDURE — 99213 OFFICE O/P EST LOW 20 MIN: CPT | Mod: S$GLB,,, | Performed by: PODIATRIST

## 2025-04-02 PROCEDURE — 3077F SYST BP >= 140 MM HG: CPT | Mod: CPTII,S$GLB,, | Performed by: PODIATRIST

## 2025-04-02 PROCEDURE — 99999 PR PBB SHADOW E&M-EST. PATIENT-LVL IV: CPT | Mod: PBBFAC,,, | Performed by: PODIATRIST

## 2025-04-02 PROCEDURE — 1159F MED LIST DOCD IN RCRD: CPT | Mod: CPTII,S$GLB,, | Performed by: PODIATRIST

## 2025-04-02 PROCEDURE — 1126F AMNT PAIN NOTED NONE PRSNT: CPT | Mod: CPTII,S$GLB,, | Performed by: PODIATRIST

## 2025-04-02 PROCEDURE — 3288F FALL RISK ASSESSMENT DOCD: CPT | Mod: CPTII,S$GLB,, | Performed by: PODIATRIST

## 2025-04-02 PROCEDURE — 3051F HG A1C>EQUAL 7.0%<8.0%: CPT | Mod: CPTII,S$GLB,, | Performed by: PODIATRIST

## 2025-04-02 PROCEDURE — 1101F PT FALLS ASSESS-DOCD LE1/YR: CPT | Mod: CPTII,S$GLB,, | Performed by: PODIATRIST

## 2025-04-02 PROCEDURE — 3061F NEG MICROALBUMINURIA REV: CPT | Mod: CPTII,S$GLB,, | Performed by: PODIATRIST

## 2025-04-02 PROCEDURE — 3079F DIAST BP 80-89 MM HG: CPT | Mod: CPTII,S$GLB,, | Performed by: PODIATRIST

## 2025-04-02 PROCEDURE — 3008F BODY MASS INDEX DOCD: CPT | Mod: CPTII,S$GLB,, | Performed by: PODIATRIST

## 2025-04-02 NOTE — PROGRESS NOTES
Subjective:      Patient ID: Mercedez Purvis is a 70 y.o. female.    Chief Complaint: Foot Problem (Cellulitis left, 1 wk f/u)    Pt presents today as a f/u, sent from her PCP for cellulitis of her left foot. Pt states she was prescribed abx yesterday but she has not picked them up from the pharmacy. Pt admits to mild pain. Denies any injury or broken skin.       4/2/2025 pt presents today for f/u for cellulitis of her left foot. Pt states she has been taking abx and has 3 more pills left to take. Pt states redness to foot has resolved. Pt denies any new issues.     Review of Systems   Constitutional: Negative for chills, fever and malaise/fatigue.   HENT:  Negative for hearing loss.    Cardiovascular:  Positive for leg swelling. Negative for claudication.   Respiratory:  Negative for shortness of breath.    Skin:  Positive for color change, dry skin, nail changes and unusual hair distribution. Negative for flushing and rash.   Musculoskeletal:  Negative for joint pain and myalgias.   Gastrointestinal:  Positive for nausea. Negative for vomiting.   Neurological:  Positive for numbness and paresthesias. Negative for loss of balance and sensory change.   Psychiatric/Behavioral:  Negative for altered mental status.    Allergic/Immunologic: Negative for hives.           Objective:      Physical Exam  Vitals reviewed.   Cardiovascular:      Pulses: Decreased pulses.      Comments: Localized edema to left dorsal foot.   Musculoskeletal:      Comments:        Feet:      Right foot:      Protective Sensation: 5 sites tested.  5 sites sensed.      Skin integrity: No skin breakdown or erythema.      Left foot:      Protective Sensation: 5 sites tested.  5 sites sensed.      Skin integrity: Erythema present. No skin breakdown.   Skin:     General: Skin is warm.      Capillary Refill: Capillary refill takes 2 to 3 seconds.      Findings: No abrasion, abscess, erythema or wound.      Comments: Erythema to left dorsal foot  resolved    Skin temperature normal       Neurological:      Mental Status: She is alert.      Comments: Gross sensation intact b/L               Assessment:       Encounter Diagnosis   Name Primary?    Cellulitis of left foot Yes         Plan:       Mercedez was seen today for foot problem.    Diagnoses and all orders for this visit:    Cellulitis of left foot      I counseled the patient on her conditions, their implications and medical management.    Medical records reviewed    Foot exam performed, pt advised cellulitis is resolved.     Pt advised to complete all abx    Discussed DM foot care:  Wear comfortable, proper fitting shoes. Wash feet daily. Dry well. After drying, apply moisturizer to feet (no lotion to webspaces). Inspect feet daily for skin breaks, blisters, swelling, or redness. Wear cotton socks (preferably white)  Change socks every day. Do NOT walk barefoot. Do NOT use heating pads or warm/hot water soaks      - Discussed importance of daily moisturizer to the feet such as Gold bonds diabetic foot cream     - Patient is intermediate  risk for developing lower extremity issues secondary to diabetes     - Advised the patient that fungus likes warm, dark, and moist environments such as bathrooms, gyms, and pools. Advised to spray shower, shower mat , and any shoes w/ Lysol periodically      Pt a. RTC in 4 months for nail care or sooner if any new pedal problems should arise

## 2025-04-11 DIAGNOSIS — N18.30 STAGE 3 CHRONIC KIDNEY DISEASE, UNSPECIFIED WHETHER STAGE 3A OR 3B CKD: ICD-10-CM

## 2025-04-11 NOTE — TELEPHONE ENCOUNTER
No care due was identified.  [facility] Embedded Care Due Messages. Reference number: [id number].   4/11/2025 1:38:54 PM CDT

## 2025-04-11 NOTE — TELEPHONE ENCOUNTER
----- Message from Patricia sent at 4/11/2025  1:05 PM CDT -----  Call the clinic reply in [facility]:y Please refill the medication(s) listed below. Please call the patient when the prescription(s) is ready for  at this phone number.[phone] #1:empagliflozin (JARDIANCE) 25 mg tabletMedication #2:[id number] [place], [place] - [address] AT [facility] [id number] [facility] [id number]: [phone] Fax: [phone]

## 2025-04-28 ENCOUNTER — TELEPHONE (OUTPATIENT)
Dept: INTERNAL MEDICINE | Facility: CLINIC | Age: 70
End: 2025-04-28
Payer: MEDICARE

## 2025-04-29 ENCOUNTER — OFFICE VISIT (OUTPATIENT)
Dept: INTERNAL MEDICINE | Facility: CLINIC | Age: 70
End: 2025-04-29
Payer: MEDICARE

## 2025-04-29 VITALS
WEIGHT: 189.81 LBS | BODY MASS INDEX: 37.27 KG/M2 | HEIGHT: 60 IN | DIASTOLIC BLOOD PRESSURE: 76 MMHG | HEART RATE: 98 BPM | OXYGEN SATURATION: 94 % | SYSTOLIC BLOOD PRESSURE: 126 MMHG

## 2025-04-29 DIAGNOSIS — I50.20 HFREF (HEART FAILURE WITH REDUCED EJECTION FRACTION): ICD-10-CM

## 2025-04-29 DIAGNOSIS — E66.01 SEVERE OBESITY: ICD-10-CM

## 2025-04-29 DIAGNOSIS — G59 MONONEUROPATHY DUE TO UNDERLYING DISEASE: Primary | ICD-10-CM

## 2025-04-29 DIAGNOSIS — N18.32 CHRONIC KIDNEY DISEASE, STAGE 3B: ICD-10-CM

## 2025-04-29 PROCEDURE — 99999 PR PBB SHADOW E&M-EST. PATIENT-LVL V: CPT | Mod: PBBFAC,,, | Performed by: INTERNAL MEDICINE

## 2025-04-29 RX ORDER — PREGABALIN 75 MG/1
75 CAPSULE ORAL 2 TIMES DAILY
Qty: 120 CAPSULE | Refills: 3 | Status: SHIPPED | OUTPATIENT
Start: 2025-04-29

## 2025-04-29 NOTE — PROGRESS NOTES
Subjective:      Patient ID: Mercedez Purvis is a 70 y.o. female.    Chief Complaint: Annual Exam      Mercedez Purvis is a 70 y.o. female with chronic conditions significant for CAD, MI s/p stent placement,T2DM, diabetic neuropathy, obesity, HTN, HLD, hyperaldosteronism on spironolactone, diabetic retinopathy, CAD with stable angina, sCHF, CKD3, GERD, BPPV, TIKA, migraines.    Presenting today for follow up.     The patient consents verbally to being recorded for [facility] service today.     History of Present Illness    CHIEF COMPLAINT:  Ms. Purvis presents today for medication management of foot pain.    NEUROPATHIC PAIN:  She reports severe neuropathic foot pain managed with gabapentin 900mg daily. She experiences intense itching in feet that requires pulling over while driving. Multiple topical treatments are used for symptom management.    DIABETES MANAGEMENT:  She uses an insulin pump with Humalog and keeps Levemir as backup insulin when off the pump.     Visit today is associated with current or anticipated ongoing medical care related to this patient's single serious condition/complex condition of CAD, MI s/p stent placement,T2DM, diabetic neuropathy, obesity, HTN, HLD, hyperaldosteronism on spironolactone, diabetic retinopathy, CAD with stable angina, sCHF, CKD3, GERD, BPPV, TIKA, migraines. The patient will return to see me as these issues will be followed longitudinally.         Current Medications[1]    Lab Results   Component Value Date    HGBA1C 7.1 (H) 03/25/2025    HGBA1C 6.3 (H) 11/21/2024    HGBA1C 7.7 (H) 08/22/2024     Lab Results   Component Value Date    MICALBCREAT 22.0 03/25/2025     Lab Results   Component Value Date    LDLCALC 106.0 11/21/2024    LDLCALC 116.6 08/22/2024    CHOL 161 11/21/2024    HDL 38 (L) 11/21/2024    TRIG 85 11/21/2024       Lab Results   Component Value Date     12/23/2024    K 4.7 12/23/2024     12/23/2024    CO2 25 12/23/2024      (H) 12/23/2024    BUN 23 12/23/2024    CREATININE 1.6 (H) 12/23/2024    CALCIUM 9.0 12/23/2024    PROT 7.6 12/23/2024    ALBUMIN 3.3 (L) 12/23/2024    BILITOT 0.3 12/23/2024    ALKPHOS 132 12/23/2024    AST 11 12/23/2024    ALT 8 (L) 12/23/2024    ANIONGAP 12 12/23/2024    ESTGFRAFRICA >60.0 03/21/2022    EGFRNONAA 58.0 (A) 03/21/2022    WBC 6.57 12/03/2024    HGB 13.2 12/03/2024    HGB 12.7 11/17/2024    HCT 45.3 12/03/2024    MCV 77 (L) 12/03/2024     12/03/2024    TSH 1.627 08/22/2024    HEPCAB Non-reactive 11/15/2024       Lab Results   Component Value Date    MFSNDTDH24HX 18 (L) 07/13/2020    LKRLFIDR39BM 16 (L) 09/23/2010         Past Medical History:   Diagnosis Date    Chronic headache     Diabetes     Heart attack 2004    8 stents    Heart failure     History of heart artery stent     Hyperlipemia     Hypertension     Obesity (BMI 30-39.9)     Yeast infection      Past Surgical History:   Procedure Laterality Date    CARDIAC CATHETERIZATION      INJECTION OF STEROID Left 03/24/2023    Procedure: INJECTION, STEROID,LEFT RING FINGER;  Surgeon: Rona Lema MD;  Location: [facility] OR;  Service: Orthopedics;  Laterality: Left;    TRIGGER FINGER RELEASE Right 03/24/2023    Procedure: RELEASE, TRIGGER FINGER, RIGHT LONG;  Surgeon: Rona Lema MD;  Location: [facility] OR;  Service: Orthopedics;  Laterality: Right;    TUBAL LIGATION       Social History     Social History Narrative    Not on file     Family History   Problem Relation Name Age of Onset    Stroke Father Scott Sigala Sr     Arthritis Brother Kartik Sigala     Diabetes Brother Kartik Sigala     Heart disease Brother Kartik Sigala     Hypertension Brother Kartik Sigala          Vitals:    04/29/25 1004   BP: 126/76   Pulse: 98   SpO2: (!) 94%   Weight: 86.1 kg (189 lb 13.1 oz)   Height: 5' (1.524 m)   PainSc: 0-No pain     Objective:      Physical Exam  Vitals reviewed.   Constitutional:       Appearance: Normal appearance.    HENT:      Head: Normocephalic.   Eyes:      Pupils: Pupils are equal, round, and reactive to light.   Cardiovascular:      Rate and Rhythm: Normal rate.      Pulses: Normal pulses.      Heart sounds: Normal heart sounds.   Pulmonary:      Effort: Pulmonary effort is normal.      Breath sounds: Normal breath sounds.   Musculoskeletal:         General: Normal range of motion.   Skin:     General: Skin is warm.   Neurological:      General: No focal deficit present.      Mental Status: She is alert. Mental status is at baseline.   Psychiatric:         Mood and Affect: Mood normal.         Assessment/Plan     Assessment & Plan    - Evaluated various topical treatments for foot discomfort.    PLAN SUMMARY:  - Continue current insulin regimen (Humalog in pump, Levemir as backup)  - Discontinue gabapentin 300 mg 3 times daily  - Prescribe Lyrica 75 mg twice daily for neuropathic pain management  - Apply ice or use biofreeze for itching relief  - Contact office if Lyrica is ineffective or higher dose needed  - Follow-up in approximately 6 months (around May)    ## DIABETIC NEUROPATHY:  - Evaluated patient's ongoing foot issues related to diabetic neuropathy.  - Current gabapentin 300 mg 3 times daily (900 mg total) is not providing adequate relief and will be discontinued.  - Prescribed Lyrica 75 mg twice daily as an alternative for neuropathic pain management.  - Instructed patient to contact the office if Lyrica is not effective or if a higher dose is needed.    ## CHRONIC PAIN SYNDROME:  - Monitored patient's ongoing chronic pain, particularly in the feet.  - Current pain management regimen including gabapentin and amitriptyline is not providing adequate relief.  - Discussed Cymbalta as another potential option for pain management  - Noted patient is using various topical treatments for pain relief in addition to the prescribed medications.    ## INSULIN USE:  - Reviewed and confirmed continuation of current insulin  regimen, which includes Humalog in an insulin pump and Levemir as a backup.  - Clarified potential confusion between different insulin types  - Addressed patient's questions about insulin prescriptions to ensure proper understanding of the regimen.    ## FOLLOW-UP:  - Follow up in approximately 6 months (around May).           Mononeuropathy due to underlying disease  -     pregabalin (LYRICA) 75 MG capsule; Take 1 capsule (75 mg total) by mouth 2 (two) times daily.  Dispense: 120 capsule; Refill: 3    Severe obesity    HFrEF (heart failure with reduced ejection fraction)    Chronic kidney disease, stage 3b        Chronic conditions status updated as per HPI.  Other than changes above, cont current medications and maintain follow up with specialists.  Return to clinic in Follow up in about 4 months (around 8/29/2025).      Dottie Merritt MD  [facility]    Total time spent on this encounter: 23 minutes. This includes face to face time and non-face to face time preparing to see the patient (eg, review of tests), obtaining and/or reviewing separately obtained history, documenting clinical information in the electronic or other health record, independently interpreting results and communicating results to the patient/family/caregiver, or care coordinator.    This note was generated with the assistance of ambient listening technology. Verbal consent was obtained by the patient and accompanying visitor(s) for the recording of patient appointment to facilitate this note. I attest to having reviewed and edited the generated note for accuracy, though some syntax or spelling errors may persist. Please contact the author of this note for any clarification.       There are no Patient Instructions on file for this visit.  Tests to Keep You Healthy    Mammogram: SCHEDULED  Eye Exam: Met on 12/9/2024  Colon Cancer Screening: DUE  Last Blood Pressure <= 139/89 (4/29/2025): Yes  Last HbA1c < 8 (03/25/2025):  Yes                                 [1]   Current Outpatient Medications:     amitriptyline (ELAVIL) 75 MG tablet, Take 1 tablet (75 mg total) by mouth every evening., Disp: 90 tablet, Rfl: 3    amLODIPine (NORVASC) 10 MG tablet, Take 10 mg by mouth., Disp: , Rfl:     atorvastatin (LIPITOR) 80 MG tablet, Take 1 tablet (80 mg total) by mouth every evening., Disp: 90 tablet, Rfl: 3    blood-glucose sensor (DEXCOM G7 SENSOR) Roberta, 1 each by Misc.(Non-Drug; Combo Route) route every 10 days., Disp: 10 each, Rfl: 3    capsaicin 0.1 % Crea, Apply 1-2 times a day, Disp: 42.5 g, Rfl: 2    ciclopirox (PENLAC) 8 % Soln, Apply topically nightly., Disp: 6.6 mL, Rfl: 11    clopidogreL (PLAVIX) 75 mg tablet, Take 1 tablet (75 mg total) by mouth once daily., Disp: 90 tablet, Rfl: 1    clotrimazole-betamethasone 1-0.05% (LOTRISONE) cream, Apply topically 2 (two) times daily., Disp: 45 g, Rfl: 0    cyclobenzaprine (FLEXERIL) 10 MG tablet, Take 1 tablet (10 mg total) by mouth 2 (two) times daily as needed for Muscle spasms., Disp: 60 tablet, Rfl: 1    diclofenac sodium (VOLTAREN ARTHRITIS PAIN) 1 % Gel, Apply 2 g topically once daily., Disp: 20 g, Rfl: 0    diclofenac sodium (VOLTAREN) 1 % Gel, Apply topically once daily., Disp: 100 g, Rfl: 0    empagliflozin (JARDIANCE) 25 mg tablet, Take 1 tablet (25 mg total) by mouth once daily., Disp: 90 tablet, Rfl: 0    fluconazole (DIFLUCAN) 150 MG Tab, Take 1 dose by mouth. May repeat after 72 hrs for yeast infection., Disp: 2 tablet, Rfl: 1    furosemide (LASIX) 40 MG tablet, Take 1 tablet (40 mg total) by mouth once daily., Disp: 30 tablet, Rfl: 11    gabapentin (NEURONTIN) 300 MG capsule, Take 1 capsule (300 mg total) by mouth 2 (two) times daily AND 2 capsules (600 mg total) every evening., Disp: 360 capsule, Rfl: 1    glucagon (BAQSIMI) 3 mg/actuation Spry, 1 each by Nasal route daily as needed (if glucose is 70 or less.)., Disp: 2 each, Rfl: 1    insulin detemir U-100 (LEVEMIR FLEXTOUCH  "U-100 INSULN) 100 unit/mL (3 mL) InPn pen, Inject 40 Units into the skin once daily. FOR EMERGENCY USE ONLY - BACK UP INSULIN, IF OFF OF YOUR INSULIN PUMP - USE 40 UNITS INJECTION DAILY UNTIL GETTING BACK ON PUMP., Disp: 15 mL, Rfl: 1    insulin lispro (HUMALOG U-100 INSULIN) 100 unit/mL injection, Inject 100 Units into the skin continuous. Gives up to 100 units daily via Omnipod. Do not directly inject - only use within pods., Disp: 40 mL, Rfl: 11    insulin pump cart,automated,BT (OMNIPOD 5 G6 PODS, GEN 5,) Crtg, Inject 1 each into the skin every other day., Disp: 45 each, Rfl: 3    LIDOcaine (XYLOCAINE) 5 % Oint ointment, Apply topically as needed., Disp: 120 g, Rfl: 3    LIDOcaine HCL 2% (XYLOCAINE) 2 % jelly, Apply topically as needed. Apply topically once nightly to affected part of foot/feet., Disp: 30 mL, Rfl: 2    LIDOcaine-prilocaine (EMLA) cream, Apply topically 2 (two) times a day., Disp: 30 g, Rfl: 1    meclizine (ANTIVERT) 12.5 mg tablet, Take 2 tablets (25 mg total) by mouth 2 (two) times daily as needed for Dizziness., Disp: 30 tablet, Rfl: 6    metFORMIN (GLUCOPHAGE-XR) 500 MG ER 24hr tablet, Take 1 tablet (500 mg total) by mouth every morning., Disp: 90 tablet, Rfl: 3    metoprolol succinate (TOPROL-XL) 200 MG 24 hr tablet, Take 1 tablet (200 mg total) by mouth once daily., Disp: 90 tablet, Rfl: 1    pen needle, diabetic (PEN NEEDLE) 32 gauge x 5/32" Ndle, 1 each by Misc.(Non-Drug; Combo Route) route 4 (four) times daily. ICD 10 E11.42, Disp: 400 each, Rfl: 3    sacubitriL-valsartan (ENTRESTO) 49-51 mg per tablet, Take 1 tablet by mouth 2 (two) times daily., Disp: 60 tablet, Rfl: 11    spironolactone (ALDACTONE) 50 MG tablet, Take 1 tablet (50 mg total) by mouth once daily., Disp: 90 tablet, Rfl: 3    tirzepatide 10 mg/0.5 mL PnIj, Inject 10 mg into the skin every 7 days., Disp: 2 mL, Rfl: 11    triamcinolone acetonide 0.1% (KENALOG) 0.1 % cream, Apply topically 2 (two) times daily., Disp: 15 g, " Rfl: 2    blood sugar diagnostic Strp, 1 each by Misc.(Non-Drug; Combo Route) route 4 (four) times daily. (Patient not taking: Reported on 4/29/2025), Disp: 200 each, Rfl: 3    blood-glucose meter,continuous Misc, 1 Device by Misc.(Non-Drug; Combo Route) route continuous. (Patient not taking: Reported on 4/29/2025), Disp: 1 each, Rfl: 0    pregabalin (LYRICA) 75 MG capsule, Take 1 capsule (75 mg total) by mouth 2 (two) times daily., Disp: 120 capsule, Rfl: 3

## 2025-04-30 DIAGNOSIS — E11.65 TYPE 2 DIABETES MELLITUS WITH HYPERGLYCEMIA, UNSPECIFIED WHETHER LONG TERM INSULIN USE: ICD-10-CM

## 2025-04-30 DIAGNOSIS — E11.42 TYPE 2 DIABETES MELLITUS WITH DIABETIC POLYNEUROPATHY, WITH LONG-TERM CURRENT USE OF INSULIN: Primary | ICD-10-CM

## 2025-04-30 DIAGNOSIS — Z79.4 TYPE 2 DIABETES MELLITUS WITH DIABETIC POLYNEUROPATHY, WITH LONG-TERM CURRENT USE OF INSULIN: Primary | ICD-10-CM

## 2025-04-30 RX ORDER — INSULIN LISPRO 100 [IU]/ML
100 INJECTION, SOLUTION INTRAVENOUS; SUBCUTANEOUS CONTINUOUS
Qty: 40 ML | Refills: 0 | Status: SHIPPED | OUTPATIENT
Start: 2025-04-30 | End: 2026-04-30

## 2025-04-30 RX ORDER — INSULIN PMP CART,AUT,G6/7,CNTR
1 EACH SUBCUTANEOUS
Qty: 15 EACH | Refills: 0 | Status: SHIPPED | OUTPATIENT
Start: 2025-04-30

## 2025-04-30 NOTE — TELEPHONE ENCOUNTER
Refill Encounter    PCP Visits: Recent Visits  Date Type Provider Dept   04/29/25 Office Visit Dottie Merritt MD [facility]   03/25/25 Office Visit Margarita Ivory FNP [facility]   11/21/24 Office Visit Dottie Merritt MD [facility]   08/20/24 Office Visit Dottie Merritt MD [facility]   Showing recent visits within past 360 days and meeting all other requirements  Future Appointments  Date Type Provider Dept   09/09/25 Appointment Dottie Merritt MD [facility]   Showing future appointments within next 720 days and meeting all other requirements      Last 3 Blood Pressure:   BP Readings from Last 3 Encounters:   04/29/25 126/76   04/02/25 (!) 144/81   03/26/25 112/73     Preferred Pharmacy:     "[facility][id number] [address] AT [facility] [address]  [facility] [place]  [facility] [address]  Phone: [phone] Fax: [phone]    Requested RX:  Requested Prescriptions     Pending Prescriptions Disp Refills    insulin lispro (HUMALOG U-100 INSULIN) 100 unit/mL injection 40 mL 11     Sig: Inject 100 Units into the skin continuous. Gives up to 100 units daily via Omnipod. Do not directly inject - only use within pods.    insulin pump cart,auto,BT,G6/7 (OMNIPOD 5 G6-G7 PODS, GEN 5,) Crtg 15 each 3     Sig: Inject 1 Device into the skin every 48 hours.      RX Route: Normal

## 2025-04-30 NOTE — TELEPHONE ENCOUNTER
"Spoke to patient in response to message. Patient stated " she understands how to take her insulin, the message was sent wrong". Patient was calling to get refills on her pods and humalog.  "

## 2025-04-30 NOTE — TELEPHONE ENCOUNTER
Refill Encounter    PCP Visits: Recent Visits  Date Type Provider Dept   04/29/25 Office Visit Dottie Merritt MD [facility]   03/25/25 Office Visit Margarita Ivory FNP [facility]   11/21/24 Office Visit Dottie Merritt MD [facility]   08/20/24 Office Visit Dottie Merritt MD [facility]   Showing recent visits within past 360 days and meeting all other requirements  Future Appointments  Date Type Provider Dept   09/09/25 Appointment Dottie Merritt MD [facility]   Showing future appointments within next 720 days and meeting all other requirements      Last 3 Blood Pressure:   BP Readings from Last 3 Encounters:   04/29/25 126/76   04/02/25 (!) 144/81   03/26/25 112/73     Preferred Pharmacy:   [facility] #[id number] [address] AT [facility] [place]  [facility] [place]  [facility] [address]  Phone: [phone] Fax: [phone]    Requested RX:  Requested Prescriptions     Pending Prescriptions Disp Refills    insulin lispro (HUMALOG U-100 INSULIN) 100 unit/mL injection 40 mL 11     Sig: Inject 100 Units into the skin continuous. Gives up to 100 units daily via Omnipod. Do not directly inject - only use within pods.    insulin pump cart,auto,BT,G6/7 (OMNIPOD 5 G6-G7 PODS, GEN 5,) Crtg 15 each 3     Sig: Inject 1 Device into the skin every 48 hours.      RX Route: Normal

## 2025-04-30 NOTE — TELEPHONE ENCOUNTER
----- Message from Erick sent at 4/29/2025 11:34 AM CDT -----  Regarding: self  Type: Patient Call BackWho called:selfWhat is the request in detail:Patient has received medication humalog 100 units. Can the clinic reply by [facility]? NoWould the patient rather a call back or a response via [facility]? Call [facility] call back number:[phone] Information:Thank you.

## 2025-04-30 NOTE — TELEPHONE ENCOUNTER
No care due was identified.  [facility] Embedded Care Due Messages. Reference number: [id number].   4/30/2025 9:02:31 AM CDT

## 2025-05-19 ENCOUNTER — PATIENT OUTREACH (OUTPATIENT)
Dept: ADMINISTRATIVE | Facility: HOSPITAL | Age: 70
End: 2025-05-19
Payer: MEDICARE

## 2025-05-21 ENCOUNTER — CLINICAL SUPPORT (OUTPATIENT)
Dept: DIABETES | Facility: CLINIC | Age: 70
End: 2025-05-21
Payer: MEDICARE

## 2025-05-21 ENCOUNTER — HOSPITAL ENCOUNTER (OUTPATIENT)
Dept: RADIOLOGY | Facility: OTHER | Age: 70
Discharge: HOME | End: 2025-05-21
Attending: INTERNAL MEDICINE
Payer: MEDICARE

## 2025-05-21 DIAGNOSIS — Z79.4 TYPE 2 DIABETES MELLITUS WITH DIABETIC POLYNEUROPATHY, WITH LONG-TERM CURRENT USE OF INSULIN: ICD-10-CM

## 2025-05-21 DIAGNOSIS — E11.42 TYPE 2 DIABETES MELLITUS WITH DIABETIC POLYNEUROPATHY, WITH LONG-TERM CURRENT USE OF INSULIN: ICD-10-CM

## 2025-05-21 DIAGNOSIS — Z12.31 OTHER SCREENING MAMMOGRAM: ICD-10-CM

## 2025-05-21 PROCEDURE — 77067 SCR MAMMO BI INCL CAD: CPT | Mod: 26,,, | Performed by: RADIOLOGY

## 2025-05-21 PROCEDURE — 77063 BREAST TOMOSYNTHESIS BI: CPT | Mod: 26,,, | Performed by: RADIOLOGY

## 2025-05-21 PROCEDURE — 77067 SCR MAMMO BI INCL CAD: CPT | Mod: TC

## 2025-05-21 PROCEDURE — 99999 PR PBB SHADOW E&M-EST. PATIENT-LVL IV: CPT | Mod: PBBFAC,,,

## 2025-05-22 NOTE — PROGRESS NOTES
Diabetes Care Specialist Progress Note  Author: Meghan Olson RN, [facility]  Date: 5/22/2025    Intake    Program Intake  Reason for Diabetes Program Visit:: Initial Diabetes Assessment  Current diabetes risk level:: low  In the last month, have you used the ER or been admitted to the hospital: No    Current Diabetes Treatment: Oral Medications, Insulin  Oral Medication Type/Dose: Jardiance  Method of insulin delivery?: Insulin Pump  Type of Pump: Omnipod 5  Does patient have back-up plan?: Yes  Any problems obtaining supplies?: Yes    Continuous Glucose Monitoring  Patient has CGM: Yes  Personal CGM type:: Dexcom G7 w/   GMI Date: 05/21/25  GMI Value: 7.5 %    Lab Results   Component Value Date    HGBA1C 7.1 (H) 03/25/2025     Lifestyle Coping Support & Clinical    Lifestyle/Coping/Support  Compared to other people your age, how would you rate your health?: Good  Learning Barriers:: None  Culture or Hoahaoism beliefs that may impact ability to access healthcare: No  Psychosocial/Coping Skills Assessment Completed: : Yes  Assessment indicates:: Adequate understanding  Area of need?: No    Problem Review  Active Comorbidities: Retinopathy, Hypertension, Neuropathy    Diabetes Self-Management Skills Assessment    Medication Skills Assessment  Patient is able to identify current diabetes medications, dosages, and appropriate timing of medications.: yes  Patient is  aware that some diabetes medications can cause low blood sugar?: Yes  Medication Skills Assessment Completed:: Yes  Assessment indicates:: Adequate understanding, Instruction Needed  Area of need?: Yes    Diabetes Disease Process/Treatment Options  Diabetes Type?: Type II  Is patient aware of what causes diabetes?: Yes  Diabetes Disease Process/Treatment Options: Skills Assessment Completed: Yes  Assessment indicates:: Adequate understanding  Area of need?: No    Nutrition/Healthy Eating  Meal Plan 24 Hour Recall - Breakfast: 2 slices toast, water  Meal  Plan 24 Hour Recall - Lunch: skipped  Meal Plan 24 Hour Recall - Dinner: dirty rice (had some sausage), string beans, root beer (not diet)  Meal Plan 24 Hour Recall - Snack: tangerine  Patient can identify foods that impact blood sugar.: yes  Challenges to healthy eating:: eating out, going to parties, portion control  Nutrition/Healthy Eating Skills Assessment Completed:: Yes  Assessment indicates:: Adequate understanding, Instruction Needed  Area of need?: Yes    Physical Activity/Exercise  Physical Activity/Exercise Skills Assessment Completed: : No  Deffered due to:: Time  Area of need?: Deferred    Home Blood Glucose Monitoring  Patient states that blood sugar is checked at home daily.: yes  Monitoring Method:: personal continuous glucose monitor  Personal CGM type:: Dexcom G7 w/    What is your current Time in Range?: 57%  What is your A1c Target?: <7%  Home Blood Glucose Monitoring Skills Assessment Completed: : Yes  Assessment indicates:: Adequate understanding  Area of need?: No    Acute Complications  Have you ever had hypoglycemia (low BG 70 or less)?: no  Have you ever had hyperglycemia (high  or more)?: yes  Acute Complications Skills Assessment Completed: : Yes  Assessment indicates:: Adequate understanding  Area of need?: No    Chronic Complications  Chronic Complications Skills Assessment Completed: : No  Deferred due to:: Time  Area of need?: Deferred      Assessment Summary and Plan    Based on today's diabetes care assessment, the following areas of need were identified:      Identified Areas of Need      Medication/Current Diabetes Treatment: Yes: see care planning    Lifestyle Coping/Support: No   Diabetes Disease Process/Treatment Options: No   Nutrition/Healthy Eating: Yes: not following strict nutrition guidelines; discussed importance of at least bolusing before meals.    Physical Activity/Exercise: Deferred    Home Blood Glucose Monitoring: No    Acute Complications: No     Chronic Complications: Deferred     Today's interventions were provided through individual discussion, instruction, and written materials were provided.      Patient verbalized understanding of instruction and written materials.  Pt was able to return back demonstration of instructions today. Patient understood key points, needs reinforcement and further instruction.     Diabetes Self-Management Care Plan:    Today's Diabetes Self-Management Care Plan was developed with Mercedez's input. Mercedez has agreed to work toward the following goal(s) to improve his/her overall diabetes control.      Care Plan: Diabetes Management   Updates made since 5/22/2024 12:00 AM        Problem: Medications Resolved 5/22/2025        Goal: Pt will use Omnipod for insulin delivery. Completed 5/22/2025   Start Date: 5/2/2024   Expected End Date: 6/1/2024   Recent Progress: On track   Priority: High   Barriers: No Barriers Identified   Note:    5/2/24 -   OMNIPOD DASH INSULIN PUMP RE-START    Patient here today for setting up replacement Omnipod Dash controller after previous controller broke. She confirms has been using back up insulin pens while off Dash.     Pt last took Levemir dose yesterday morning - held Levemir this morning. Pt is an experienced Omnipod Dash user and shows good understanding of proper use.    SETTINGS obtained from Juli Mejia NP:  Basal rate: 1.5 u/hr  Maximum basal rate: 3 u/hr    Bolus Menu:    Bolus Pre-sets:  Small Snack 3 units  Large Snack 5 units    Small Meal 12 units  Medium Meal 14 units  Large Meal 16 units    Pt is correcting with 2 units if BG >180    Maximum bolus = 20 units  Reverse Correction: off   Auto Off: off    Pt successfully filled and started pod in office without any assistance or prompting.     7/8/24 - Pt has been on Omnipod Dash; however, since last visit, she reports PDM was lost or stolen and picked up replacement from pharmacy. Upon inspection of Omnipod PDM, pt received Omnipod5  controller and Omnipod 5 pods instead of Dash. She states that pharmacist told her Dash was discontinued and that was reason for the switch. Discussed while Omnipod CLASSIC has been discontinued, Dash has not. Still, since she has picked this system up from pharmacy, she will not be able to return to get replaced. Explained Omnipod 5 does not allow for bolus presets and will have to enter set doses in bolus calculator set doses as she does not carb count. Pt has not yet set up appt with endocrinology since Juli Mejia NP has left. Contacted Piero Schofield NP and her staff has scheduled appt next month.     OMNI POD 5 INSULIN PUMP START    Pump training was provided per Omni Pod protocol.  Patient has used an insulin pump in the past.   Settings for pump based on last office visit note from Juli Mejia NP.    Patient is not new to insulin pump therapy. Will not be using Automated mode at this time as she is on Dexcom G7 for CGM and not yet compatible with Omnipod 5 - should be available within next few months.      Basal:  12AM: 1.5 units/hr     Max basal rate: 3 units/hr     Bolus:    Pt to enter only set doses at this time:  12 units for small meal  14 units for medium meal  16 units for large meal  3 units for small snack  5 units for large snack    CHO ratio: 1:10 (weight based - however, entered only as place coker as system requires entering ICR for set up - pt will be entering set units)  ISF: 1:50 (weight based 1:40 - however, entered lower dose correction for safety; also instructed on 2 unit correction for glucose >200 per Juli's last note)  Glucose target : 120 mg/dL  Correct  over: 120 mg/dl  Active insulin time: 4 hours  Max bolus: 18 units     Low reservoir insulin alarm: 10 units  Change pod alarm: every 3 days       Details of pump therapy were covered included following controller features and programming, pod activation, pod site selection and rotation, automatic pod priming and insertion,  setting & editing basal rates in manual mode, giving bolus and other features in the set-up menu.  Patient demonstrated ability to activate and insert pod using aseptic technique.  Pt demonstrated ability to enter set doses in insulin field of bolus calculator. Instructed to leave carbs at ZERO, enter glucose reading from Dexcom G7, and enter set bolus amount as above. Pt practiced several times on simulator brunilda until comfortable.   Instructed pt on use of basic pump features ie...give a bolus, pause insulin   Reviewed site selection of pods, rotation of sites and hard stop to change pod every 72 hrs.   Instructed that insulin vial is good out of refrigeration for up to 28-30 days.   Reviewed treatment of hypoglycemia, hyperglycemia; sick day care, DKA, and troubleshooting of pump. Advised to change site if issue is suspected.   Omni Pod 24-hour support line provided.         Dean username: Emilee Moran password: [email or web address]     [email or web address] username: [email or web address]  Keegan password: [email or web address]      7/17/24 - Pt is using Omnipod 5 in manual mode since currently using dexcom G7 and not currently integrated. She successfully changed pods on her own since last visit without any difficulty. She is also bolusing before meals; however, she has some questions about bolusing. Omnipod 5 screens are different compared to her old Dash controller - does not have presets - and she wanted to make sure she is bolusing correctly. She demonstrated how she is bolusing and all steps were correct. However, she is only bolusing 12 units (small meal) bolus with each meal and not bolusing at all for snacks. High PP spikes on report noted. Had an especially high PP spike after eating a piece of cake about 2 hours after dinner without bolusing with it. Reviewed and encouraged using 12 units for small meal, 14 units for medium meal, and 16 units for large meal as well as 3 units for small snack and 5 units for large snacks as  were her previous presets. Explained this version of Omnipod does not allow for bolus presets and she will need to continue to manually enter units until she is able to carb count and enter grams of carbs - needs to establish with new endocrine NP prior to making such a transition as setting adjustment would be warranted. She verbalized better understanding and will adjust the units she is bolusing according to meal size. Also, reviewed OP5 is supposed to be able to connect with Dexcom G7 in the near future and will plan to aid in that transition at next visit.        Problem: Medications         Goal: Pt will change pod within 1 hour of it expiring.    Start Date: 2025   Expected End Date: 2025   Note:    25 - Pt letting pod  and not changing for 6-10 hours; not giving levemir for pod break either. Reviewed with dexcom report and what BG are doing while pod is off. Showed pt where to check pod status and encouraged to pay attention to alarms.        Problem: Medications         Goal: Pt will bolus before each meal (Small = 5u, Medium = 7u, Large = 9u).    Start Date: 2025   Expected End Date: 2025   Note:    25 -   Mealtime bolus recs from hospital (reduced) from endocrine visit. Pt only bolusing for breakfast usually (~5u). Eats larger dinner, and only 1 bolus after 12pm on 2 week report. Encouraged to bolus before each meal eaten.        Follow Up Plan     Follow up if symptoms worsen or fail to improve.    Today's care plan and follow up schedule was discussed with patient.  Mercdeez verbalized understanding of the care plan, goals, and agrees to follow up plan.        The patient was encouraged to communicate with his/her health care provider/physician and care team regarding his/her condition(s) and treatment.  I provided the patient with my contact information today and encouraged to contact me via phone or [facility] Patient Portal as needed.     Length of Visit   Total  Time: 60 Minutes

## 2025-06-02 ENCOUNTER — RESULTS FOLLOW-UP (OUTPATIENT)
Dept: INTERNAL MEDICINE | Facility: CLINIC | Age: 70
End: 2025-06-02

## 2025-06-02 NOTE — PROGRESS NOTES
Your mammogram was unremarkable.    As a reminder, a mammogram is a screening test and not perfect. A normal mammogram does not outweigh a palpable mass. If you notice a mass in your breast, let me know so we can discuss further.

## 2025-06-10 DIAGNOSIS — N18.30 STAGE 3 CHRONIC KIDNEY DISEASE, UNSPECIFIED WHETHER STAGE 3A OR 3B CKD: ICD-10-CM

## 2025-06-10 DIAGNOSIS — E11.65 TYPE 2 DIABETES MELLITUS WITH HYPERGLYCEMIA, UNSPECIFIED WHETHER LONG TERM INSULIN USE: ICD-10-CM

## 2025-06-10 NOTE — TELEPHONE ENCOUNTER
No care due was identified.  [facility] Embedded Care Due Messages. Reference number: [id number].   6/10/2025 6:13:33 PM CDT

## 2025-06-10 NOTE — TELEPHONE ENCOUNTER
No care due was identified.  [facility] Embedded Care Due Messages. Reference number: [id number].   6/10/2025 6:14:19 PM CDT

## 2025-06-11 RX ORDER — EMPAGLIFLOZIN 25 MG/1
TABLET, FILM COATED ORAL
Qty: 90 TABLET | Refills: 0 | Status: SHIPPED | OUTPATIENT
Start: 2025-06-11

## 2025-06-11 RX ORDER — INSULIN PMP CART,AUT,G6/7,CNTR
EACH SUBCUTANEOUS
Qty: 15 EACH | Refills: 0 | Status: SHIPPED | OUTPATIENT
Start: 2025-06-11

## 2025-06-11 NOTE — TELEPHONE ENCOUNTER
Refill Encounter    PCP Visits: Recent Visits  Date Type Provider Dept   04/29/25 Office Visit Dottie Merritt MD [facility] Medicine   03/25/25 Office Visit Margarita Ivory FNP [facility]   11/21/24 Office Visit Dottie Merritt MD [facility]   08/20/24 Office Visit Dottie Merritt MD [facility]   Showing recent visits within past 360 days and meeting all other requirements  Future Appointments  Date Type Provider Dept   09/09/25 Appointment Dottie Merritt MD [facility] Medicine   Showing future appointments within next 720 days and meeting all other requirements      Last 3 Blood Pressure:   BP Readings from Last 3 Encounters:   04/29/25 126/76   04/02/25 (!) 144/81   03/26/25 112/73     Preferred Pharmacy: [id number] [address] [facility] [place]  [facility] [place]  [facility] [address]  Phone: [phone] Fax: [phone]    Requested RX:  Requested Prescriptions     Pending Prescriptions Disp Refills    JARDIANCE 25 mg tablet [Pharmacy Med Name: JARDIANCE 25MG TABLETS] 90 tablet 0     Sig: TAKE 1 TABLET(25 MG) BY MOUTH DAILY      RX Route: Normal

## 2025-06-11 NOTE — TELEPHONE ENCOUNTER
Refill Routing Note   Medication(s) are not appropriate for processing by [facility] for the following reason(s):        No active prescription written by provider  New or recently adjusted medication    [facility] action(s):  Defer             Appointments  past 12m or future 3m with PCP    Date Provider   Last Visit   4/29/2025 Dottie Merritt MD   Next Visit   9/9/2025 Dottie Merritt MD   ED visits in past 90 days: 0        Note composed:7:42 PM 06/10/2025

## 2025-06-24 DIAGNOSIS — I25.118 CORONARY ARTERY DISEASE OF NATIVE ARTERY OF NATIVE HEART WITH STABLE ANGINA PECTORIS: ICD-10-CM

## 2025-06-24 RX ORDER — CLOPIDOGREL BISULFATE 75 MG/1
75 TABLET ORAL DAILY
Qty: 90 TABLET | Refills: 1 | Status: SHIPPED | OUTPATIENT
Start: 2025-06-24 | End: 2025-12-21

## 2025-06-24 NOTE — TELEPHONE ENCOUNTER
----- Message from Whitney Engle PA-C sent at 6/24/2025  9:12 AM CDT -----  Regarding: FW: plavix refill  Hi team,    Former [facility] patient. Would you all mind assisting with plavix refill?    Thanks!  Whitney    ----- Message -----  From: Selene Huizar PharmD  Sent: 6/20/2025   8:59 AM CDT  To: Whitney Engle PA-C  Subject: FW: plavix refill                                We erroneously received a fax/email for this refill request. I'm just passing on the info.      ----- Message -----  From: Joyce Wallis MA  Sent: 6/20/2025   7:41 AM CDT  To: Selene Huizar, Kylie  Subject: RE: plavix refill                                Pt if following up with Dr. Whitney Engle      ----- Message -----  From: Selene Huizar PharmD  Sent: 6/19/2025   5:08 PM CDT  To: Nestor Silva Staff  Subject: plavix refill                                    Pt needs refill on Plavix sent to [facility]. Please address. Thanks!

## 2025-07-14 ENCOUNTER — PATIENT OUTREACH (OUTPATIENT)
Dept: ADMINISTRATIVE | Facility: HOSPITAL | Age: 70
End: 2025-07-14
Payer: MEDICARE

## 2025-07-14 DIAGNOSIS — Z12.11 ENCOUNTER FOR COLORECTAL CANCER SCREENING: Primary | ICD-10-CM

## 2025-07-14 DIAGNOSIS — Z12.12 ENCOUNTER FOR COLORECTAL CANCER SCREENING: Primary | ICD-10-CM

## 2025-07-17 DIAGNOSIS — E11.65 TYPE 2 DIABETES MELLITUS WITH HYPERGLYCEMIA, UNSPECIFIED WHETHER LONG TERM INSULIN USE: ICD-10-CM

## 2025-07-17 NOTE — TELEPHONE ENCOUNTER
Care Due:                  Date            Visit Type   Department     Provider  --------------------------------------------------------------------------------                                EP -                              PRIMARY      [facility] INTERNAL  Last Visit: 04-      CARE ([facility])   MEDICINE       Dottie Merritt                               -                              PRIMARY      [facility] INTERNAL  Next Visit: 09-      CARE ([facility])   MEDICINE       Dottie Merritt                                                            Last  Test          Frequency    Reason                     Performed    Due Date  --------------------------------------------------------------------------------    HBA1C.......  6 months...  JARDIANCE, insulin,        03- 09-                             metFORMIN, tirzepatide...    [employer] Embedded Care Due Messages. Reference number: [id number].   7/17/2025 2:32:55 PM CDT

## 2025-07-17 NOTE — TELEPHONE ENCOUNTER
Refill Routing Note   Medication(s) are not appropriate for processing by [facility] for the following reason(s):        No active prescription written by provider    [facility] action(s):  Defer     Requires labs : Yes             Appointments  past 12m or future 3m with PCP    Date Provider   Last Visit   4/29/2025 Dottie Merritt MD   Next Visit   9/9/2025 Dottie Merritt MD   ED visits in past 90 days: 0        Note composed:6:21 PM 07/17/2025

## 2025-07-18 ENCOUNTER — OFFICE VISIT (OUTPATIENT)
Dept: CARDIOLOGY | Facility: CLINIC | Age: 70
End: 2025-07-18
Payer: MEDICARE

## 2025-07-18 ENCOUNTER — PATIENT MESSAGE (OUTPATIENT)
Dept: CARDIOLOGY | Facility: CLINIC | Age: 70
End: 2025-07-18

## 2025-07-18 VITALS
DIASTOLIC BLOOD PRESSURE: 60 MMHG | HEART RATE: 82 BPM | SYSTOLIC BLOOD PRESSURE: 112 MMHG | OXYGEN SATURATION: 99 % | HEIGHT: 60 IN | RESPIRATION RATE: 18 BRPM | WEIGHT: 192.88 LBS | BODY MASS INDEX: 37.87 KG/M2

## 2025-07-18 DIAGNOSIS — E11.69 HYPERLIPIDEMIA ASSOCIATED WITH TYPE 2 DIABETES MELLITUS: Primary | ICD-10-CM

## 2025-07-18 DIAGNOSIS — E66.01 SEVERE OBESITY: ICD-10-CM

## 2025-07-18 DIAGNOSIS — E78.5 HYPERLIPIDEMIA ASSOCIATED WITH TYPE 2 DIABETES MELLITUS: Primary | ICD-10-CM

## 2025-07-18 DIAGNOSIS — I50.22 CHRONIC SYSTOLIC HEART FAILURE: ICD-10-CM

## 2025-07-18 DIAGNOSIS — I15.2 HYPERTENSION ASSOCIATED WITH DIABETES: ICD-10-CM

## 2025-07-18 DIAGNOSIS — E11.59 HYPERTENSION ASSOCIATED WITH DIABETES: ICD-10-CM

## 2025-07-18 DIAGNOSIS — I50.22 CHRONIC SYSTOLIC CONGESTIVE HEART FAILURE: ICD-10-CM

## 2025-07-18 DIAGNOSIS — I25.118 CORONARY ARTERY DISEASE OF NATIVE ARTERY OF NATIVE HEART WITH STABLE ANGINA PECTORIS: ICD-10-CM

## 2025-07-18 DIAGNOSIS — N18.32 CHRONIC KIDNEY DISEASE, STAGE 3B: ICD-10-CM

## 2025-07-18 DIAGNOSIS — R05.9 COUGH, UNSPECIFIED TYPE: Primary | ICD-10-CM

## 2025-07-18 DIAGNOSIS — E11.3291 TYPE 2 DIABETES MELLITUS WITH RIGHT EYE AFFECTED BY MILD NONPROLIFERATIVE RETINOPATHY WITHOUT MACULAR EDEMA, WITH LONG-TERM CURRENT USE OF INSULIN: ICD-10-CM

## 2025-07-18 DIAGNOSIS — Z79.4 TYPE 2 DIABETES MELLITUS WITH RIGHT EYE AFFECTED BY MILD NONPROLIFERATIVE RETINOPATHY WITHOUT MACULAR EDEMA, WITH LONG-TERM CURRENT USE OF INSULIN: ICD-10-CM

## 2025-07-18 PROBLEM — I77.9 DISORDER OF ARTERIES AND ARTERIOLES, UNSPECIFIED: Status: RESOLVED | Noted: 2023-04-18 | Resolved: 2025-07-18

## 2025-07-18 PROBLEM — R07.9 CHEST PAIN, RULE OUT ACUTE MYOCARDIAL INFARCTION: Status: RESOLVED | Noted: 2021-10-29 | Resolved: 2025-07-18

## 2025-07-18 PROCEDURE — 99999 PR PBB SHADOW E&M-EST. PATIENT-LVL V: CPT | Mod: PBBFAC,,, | Performed by: INTERNAL MEDICINE

## 2025-07-18 RX ORDER — SPIRONOLACTONE 25 MG/1
25 TABLET ORAL DAILY
Qty: 90 TABLET | Refills: 3 | Status: SHIPPED | OUTPATIENT
Start: 2025-07-18

## 2025-07-18 RX ORDER — BENZONATATE 200 MG/1
200 CAPSULE ORAL 3 TIMES DAILY PRN
Qty: 10 CAPSULE | Refills: 0 | Status: SHIPPED | OUTPATIENT
Start: 2025-07-18 | End: 2025-07-28

## 2025-07-18 RX ORDER — INSULIN LISPRO 100 [IU]/ML
INJECTION, SOLUTION INTRAVENOUS; SUBCUTANEOUS
Qty: 40 ML | Refills: 0 | Status: SHIPPED | OUTPATIENT
Start: 2025-07-18

## 2025-07-18 RX ORDER — INSULIN PMP CART,AUT,G6/7,CNTR
EACH SUBCUTANEOUS
Qty: 15 EACH | Refills: 0 | Status: SHIPPED | OUTPATIENT
Start: 2025-07-18

## 2025-07-18 RX ORDER — EZETIMIBE 10 MG/1
10 TABLET ORAL DAILY
Qty: 90 TABLET | Refills: 4 | Status: SHIPPED | OUTPATIENT
Start: 2025-07-18

## 2025-07-18 NOTE — ASSESSMENT & PLAN NOTE
Most recent trip to the cath lab 2019.  She has had stents to RCA and circumflex more than once.  She has had ISR requiring POBA in 2019.    No angina reported.  She is on clopidogrel.

## 2025-07-18 NOTE — ASSESSMENT & PLAN NOTE
Current therapy including Mounjaro and Jardiance to continue.  She also takes insulin and metformin.

## 2025-07-18 NOTE — ASSESSMENT & PLAN NOTE
Atorvastatin 80 mg to continue most recent  mg%.  Zetia added for better blood pressure control.  Follow-up labs scheduled.    Compliance may be an issue.

## 2025-07-18 NOTE — PROGRESS NOTES
[facility] Cardiology     Subjective:    Patient ID:  Mercedez Purvis is a 70 y.o. female who presents for follow-up of Congestive Heart Failure, Coronary Artery Disease, Hypertension, Hyperlipidemia, CKD 3 B, and Diabetes Mellitus    Review of patient's allergies indicates:  No Known Allergies  She continues to report stable functional capacity with mild shortness of breath intermittently.  She follows with heart failure as well.  Her ejection fraction is 32%.  She has refused ICD implantation consideration.    She has ischemic cardiomyopathy.  She has had stents to RCA and circumflex.  Her original intervention was 2016 followed by 8:19 p.m. at [facility] with repeat stenting to RCA and circumflex.  Last PET stress was 20 13- for ischemia.    Her blood pressures are normal.  She monitors at home.  She is on Jardiance as well as Mounjaro for her diabetes.  Takes insulin as well and she is on metformin.    She is on Plavix chronically.  Atorvastatin 80 mg utilized.  Most recent  mg%.  It has never been optimally controlled.         Review of Systems   Constitutional: Negative for chills, decreased appetite, diaphoresis, fever, malaise/fatigue, night sweats, weight gain and weight loss.   HENT:  Positive for hearing loss. Negative for congestion, ear discharge, ear pain, hoarse voice, nosebleeds, odynophagia, sore throat, stridor and tinnitus.    Eyes:  Negative for blurred vision, discharge, double vision, pain, photophobia, redness, vision loss in left eye, vision loss in right eye, visual disturbance and visual halos.   Cardiovascular:  Negative for chest pain, claudication, cyanosis, dyspnea on exertion, irregular heartbeat, leg swelling, near-syncope, orthopnea, palpitations, paroxysmal nocturnal dyspnea and syncope.   Respiratory:  Negative for cough, hemoptysis, shortness of breath, sleep disturbances due to breathing, snoring, sputum  production and wheezing.    Endocrine: Negative for cold intolerance, heat intolerance, polydipsia, polyphagia and polyuria.   Hematologic/Lymphatic: Negative for adenopathy and bleeding problem. Does not bruise/bleed easily.   Skin:  Negative for color change, dry skin, flushing, itching, nail changes, poor wound healing, rash, skin cancer, suspicious lesions and unusual hair distribution.   Musculoskeletal:  Negative for arthritis, back pain, falls, gout, joint pain, joint swelling, muscle cramps, muscle weakness, myalgias, neck pain and stiffness.   Gastrointestinal:  Negative for bloating, abdominal pain, anorexia, change in bowel habit, bowel incontinence, constipation, diarrhea, dysphagia, excessive appetite, flatus, heartburn, hematemesis, hematochezia, hemorrhoids, jaundice, melena, nausea and vomiting.   Genitourinary:  Negative for bladder incontinence, decreased libido, dysuria, flank pain, frequency, genital sores, hematuria, hesitancy, incomplete emptying, nocturia and urgency.   Neurological:  Positive for headaches. Negative for aphonia, brief paralysis, difficulty with concentration, disturbances in coordination, excessive daytime sleepiness, dizziness, focal weakness, light-headedness, loss of balance, numbness, paresthesias, seizures, sensory change, tremors, vertigo and weakness.   Psychiatric/Behavioral:  Negative for altered mental status, depression, hallucinations, memory loss, substance abuse, suicidal ideas and thoughts of violence. The patient does not have insomnia and is not nervous/anxious.    Allergic/Immunologic: Negative for hives and persistent infections.        Objective:       Vitals:    07/18/25 0856   BP: 112/60   BP Location: Left arm   Patient Position: Sitting   Pulse: 82   Resp: 18   SpO2: 99%   Weight: 87.5 kg (192 lb 14.4 oz)   Height: 5' (1.524 m)    Physical Exam  Constitutional:       General: She is not in acute distress.     Appearance: She is well-developed. She is  not diaphoretic.   HENT:      Head: Normocephalic and atraumatic.      Nose: Nose normal.   Eyes:      General: No scleral icterus.        Right eye: No discharge.      Conjunctiva/sclera: Conjunctivae normal.      Pupils: Pupils are equal, round, and reactive to light.   Neck:      Thyroid: No thyromegaly.      Vascular: No JVD.      Trachea: No tracheal deviation.   Cardiovascular:      Rate and Rhythm: Normal rate and regular rhythm.      Pulses:           Carotid pulses are 2+ on the right side and 2+ on the left side.       Radial pulses are 2+ on the right side and 2+ on the left side.      Heart sounds: Murmur heard.      Harsh early systolic murmur is present with a grade of 2/6 at the upper right sternal border.      No friction rub. No gallop.   Pulmonary:      Effort: Pulmonary effort is normal. No respiratory distress.      Breath sounds: Normal breath sounds. No stridor. No wheezing or rales.   Chest:      Chest wall: No tenderness.   Abdominal:      General: Bowel sounds are normal. There is no distension.      Palpations: Abdomen is soft. There is no mass.      Tenderness: There is no abdominal tenderness. There is no guarding or rebound.   Musculoskeletal:         General: No tenderness. Normal range of motion.      Cervical back: Normal range of motion and neck supple.   Lymphadenopathy:      Cervical: No cervical adenopathy.   Skin:     General: Skin is warm and dry.      Coloration: Skin is not pale.      Findings: No erythema or rash.   Neurological:      Mental Status: She is alert and oriented to person, place, and time.      Cranial Nerves: No cranial nerve deficit.      Coordination: Coordination normal.   Psychiatric:         Behavior: Behavior normal.         Thought Content: Thought content normal.         Judgment: Judgment normal.           Assessment:       1. Hyperlipidemia associated with type 2 diabetes mellitus    2. Chronic systolic heart failure    3. Chronic systolic congestive  heart failure    4. Coronary artery disease of native artery of native heart with stable angina pectoris    5. Hypertension associated with diabetes    6. Chronic kidney disease, stage 3b    7. 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 Severe obesity      Results for orders placed or performed in visit on 03/25/25   Hemoglobin A1C    Collection Time: 03/25/25 11:38 AM   Result Value Ref Range    Hemoglobin A1c 7.1 (H) 4.0 - 5.6 %    Estimated Average Glucose 157 (H) 68 - 131 mg/dL   Microalbumin/creatinine urine ratio    Collection Time: 03/25/25 11:38 AM   Result Value Ref Range    Urine Microalbumin 18.0   ug/mL    Urine Creatinine 82.0 15.0 - 325.0 mg/dL    Microalbumin/Creatinine Ratio Urine 22.0 <=30.0 ug/mg       Current Medications[1]     Lab Results   Component Value Date    WBC 6.57 12/03/2024    RBC 5.88 (H) 12/03/2024    HGB 13.2 12/03/2024    HCT 45.3 12/03/2024    MCV 77 (L) 12/03/2024    MCH 22.4 (L) 12/03/2024    MCHC 29.1 (L) 12/03/2024    RDW 17.4 (H) 12/03/2024     12/03/2024    MPV 11.3 12/03/2024    GRAN 3.6 12/03/2024    GRAN 55.0 12/03/2024    LYMPH 2.3 12/03/2024    LYMPH 34.2 12/03/2024    MONO 0.6 12/03/2024    MONO 8.5 12/03/2024    EOS 0.1 12/03/2024    BASO 0.04 12/03/2024    EOSINOPHIL 1.4 12/03/2024    BASOPHIL 0.6 12/03/2024    MG 2.2 12/23/2024        CMP  Lab Results   Component Value Date     12/23/2024    K 4.7 12/23/2024     12/23/2024    CO2 25 12/23/2024     (H) 12/23/2024    BUN 23 12/23/2024    CREATININE 1.6 (H) 12/23/2024    CALCIUM 9.0 12/23/2024    PROT 7.6 12/23/2024    ALBUMIN 3.3 (L) 12/23/2024    BILITOT 0.3 12/23/2024    ALKPHOS 132 12/23/2024    AST 11 12/23/2024    ALT 8 (L) 12/23/2024    ANIONGAP 12 12/23/2024    ESTGFRAFRICA >60.0 03/21/2022    EGFRNONAA 58.0 (A) 03/21/2022        Lab Results   Component Value Date    LABBLOO NO  GROWTH AFTER 5 DAYS - FINAL REPORT. 08/27/2010            Results for orders placed or performed during the hospital encounter of 11/15/24   Repeat EKG 12-lead    Collection Time: 11/15/24 10:13 PM   Result Value Ref Range    QRS Duration 104 ms    OHS QTC Calculation 482 ms    Narrative    Test Reason : R06.00,    Vent. Rate :  97 BPM     Atrial Rate :  97 BPM     P-R Int : 200 ms          QRS Dur : 104 ms      QT Int : 380 ms       P-R-T Axes :  65 -16  80 degrees    QTcB Int : 482 ms    Normal sinus rhythm  Leftward axis  Low voltage QRS  Abnormal R wave progression in the precordial leads  Borderline Abnormal ECG  When compared with ECG of 15-Nov-2024 21:35,  No significant change was found  Confirmed by Ian Sellers ([id number]) on 11/16/2024 7:47:03 AM    Referred By: TIGREREFERRAL SELF           Confirmed By: Ian Sellers                   Plan:       Problem List Items Addressed This Visit          Cardiac/Vascular    Coronary artery disease of native artery of native heart with stable angina pectoris    Most recent trip to the cath lab 2019.  She has had stents to RCA and circumflex more than once.  She has had ISR requiring POBA in 2019.    No angina reported.  She is on clopidogrel.         Hypertension associated with diabetes    Current therapy to continue.  Entresto Aldactone Toprol to continue.         Hyperlipidemia associated with type 2 diabetes mellitus - Primary    Atorvastatin 80 mg to continue most recent  mg%.  Zetia added for better blood pressure control.  Follow-up labs scheduled.    Compliance may be an issue.         Relevant Medications    ezetimibe (ZETIA) 10 mg tablet    Other Relevant Orders    Comprehensive Metabolic Panel    Lipid Panel    Chronic systolic congestive heart failure    Most recent estimation of EF 32% in 2023 by PET stress.  He does not want a defibrillator.    She is well compensated from a heart failure standpoint.  Current therapy to continue.    Given reduced  GFR I did decrease aldosterone to 25 mg daily.            Renal/    Chronic kidney disease, stage 3b    Diabetic kidney disease noted.  GFR 37 range.  Furosemide utilize daily.            Endocrine    Severe obesity    Weight loss encouraged.         Type 2 diabetes mellitus with right eye affected by mild nonproliferative retinopathy without macular edema, with long-term current use of insulin    Current therapy including Mounjaro and Jardiance to continue.  She also takes insulin and metformin.          Other Visit Diagnoses         Chronic systolic heart failure        Relevant Medications    spironolactone (ALDACTONE) 25 MG tablet               Six-month follow-up advised.    Zetia added to atorvastatin for better LDL reduction.    Heart failure symptomatology under control.  She is free of angina.             Ricardo Baez MD  07/18/2025   9:11 AM               [1]   Current Outpatient Medications:     amitriptyline (ELAVIL) 75 MG tablet, Take 1 tablet (75 mg total) by mouth every evening., Disp: 90 tablet, Rfl: 3    amLODIPine (NORVASC) 10 MG tablet, Take 10 mg by mouth once daily., Disp: , Rfl:     atorvastatin (LIPITOR) 80 MG tablet, Take 1 tablet (80 mg total) by mouth every evening., Disp: 90 tablet, Rfl: 3    blood sugar diagnostic Strp, 1 each by Misc.(Non-Drug; Combo Route) route 4 (four) times daily., Disp: 200 each, Rfl: 3    blood-glucose meter,continuous Misc, 1 Device by Misc.(Non-Drug; Combo Route) route continuous., Disp: 1 each, Rfl: 0    blood-glucose sensor (DEXCOM G7 SENSOR) Roberta, 1 each by Misc.(Non-Drug; Combo Route) route every 10 days., Disp: 10 each, Rfl: 3    capsaicin 0.1 % Crea, Apply 1-2 times a day, Disp: 42.5 g, Rfl: 2    ciclopirox (PENLAC) 8 % Soln, Apply topically nightly., Disp: 6.6 mL, Rfl: 11    clopidogreL (PLAVIX) 75 mg tablet, Take 1 tablet (75 mg total) by mouth once daily., Disp: 90 tablet, Rfl: 1    clotrimazole-betamethasone 1-0.05% (LOTRISONE) cream, Apply  topically 2 (two) times daily., Disp: 45 g, Rfl: 0    cyclobenzaprine (FLEXERIL) 10 MG tablet, Take 1 tablet (10 mg total) by mouth 2 (two) times daily as needed for Muscle spasms., Disp: 60 tablet, Rfl: 1    diclofenac sodium (VOLTAREN) 1 % Gel, Apply topically once daily., Disp: 100 g, Rfl: 0    furosemide (LASIX) 40 MG tablet, Take 1 tablet (40 mg total) by mouth once daily., Disp: 30 tablet, Rfl: 11    gabapentin (NEURONTIN) 300 MG capsule, Take 1 capsule (300 mg total) by mouth 2 (two) times daily AND 2 capsules (600 mg total) every evening. (Patient taking differently: Take 1 capsule by mouth 2 times daily), Disp: 360 capsule, Rfl: 1    glucagon (BAQSIMI) 3 mg/actuation Spry, 1 each by Nasal route daily as needed (if glucose is 70 or less.)., Disp: 2 each, Rfl: 1    HUMALOG U-100 INSULIN 100 unit/mL injection, INJECT 100 UNITS INTO THE SKIN CONTINUOUSLY VIA OMNIPOD PODS. DO NOT DIRECTLY INJECT ONLY USE WITHIN PODS, Disp: 40 mL, Rfl: 0    insulin detemir U-100 (LEVEMIR FLEXTOUCH U-100 INSULN) 100 unit/mL (3 mL) InPn pen, Inject 40 Units into the skin once daily. FOR EMERGENCY USE ONLY - BACK UP INSULIN, IF OFF OF YOUR INSULIN PUMP - USE 40 UNITS INJECTION DAILY UNTIL GETTING BACK ON PUMP., Disp: 15 mL, Rfl: 1    JARDIANCE 25 mg tablet, TAKE 1 TABLET(25 MG) BY MOUTH DAILY, Disp: 90 tablet, Rfl: 0    LIDOcaine (XYLOCAINE) 5 % Oint ointment, Apply topically as needed., Disp: 120 g, Rfl: 3    LIDOcaine HCL 2% (XYLOCAINE) 2 % jelly, Apply topically as needed. Apply topically once nightly to affected part of foot/feet., Disp: 30 mL, Rfl: 2    LIDOcaine-prilocaine (EMLA) cream, Apply topically 2 (two) times a day., Disp: 30 g, Rfl: 1    meclizine (ANTIVERT) 12.5 mg tablet, Take 2 tablets (25 mg total) by mouth 2 (two) times daily as needed for Dizziness., Disp: 30 tablet, Rfl: 6    metoprolol succinate (TOPROL-XL) 200 MG 24 hr tablet, Take 1 tablet (200 mg total) by mouth once daily., Disp: 90 tablet, Rfl: 1     "OMNIPOD 5 G6-G7 PODS, GEN 5, Crtg, USE AS DIRECTED EVERY 48 HOURS, Disp: 15 each, Rfl: 0    pen needle, diabetic (PEN NEEDLE) 32 gauge x 5/32" Ndle, 1 each by Misc.(Non-Drug; Combo Route) route 4 (four) times daily. ICD 10 E11.42, Disp: 400 each, Rfl: 3    pregabalin (LYRICA) 75 MG capsule, Take 1 capsule (75 mg total) by mouth 2 (two) times daily., Disp: 120 capsule, Rfl: 3    sacubitriL-valsartan (ENTRESTO) 49-51 mg per tablet, Take 1 tablet by mouth 2 (two) times daily., Disp: 60 tablet, Rfl: 11    tirzepatide 10 mg/0.5 mL PnIj, Inject 10 mg into the skin every 7 days., Disp: 2 mL, Rfl: 11    triamcinolone acetonide 0.1% (KENALOG) 0.1 % cream, Apply topically 2 (two) times daily., Disp: 15 g, Rfl: 2    ezetimibe (ZETIA) 10 mg tablet, Take 1 tablet (10 mg total) by mouth once daily., Disp: 90 tablet, Rfl: 4    spironolactone (ALDACTONE) 25 MG tablet, Take 1 tablet (25 mg total) by mouth once daily., Disp: 90 tablet, Rfl: 3    "

## 2025-07-18 NOTE — ASSESSMENT & PLAN NOTE
Most recent estimation of EF 32% in 2023 by PET stress.  He does not want a defibrillator.    She is well compensated from a heart failure standpoint.  Current therapy to continue.    Given reduced GFR I did decrease aldosterone to 25 mg daily.

## 2025-07-18 NOTE — TELEPHONE ENCOUNTER
Copied from CRM #[id number]. Topic: Medications - Medication Refill  >> Jul 18, 2025  2:39 PM Aung wrote:  Type:  RX Refill Request    Who Called: pa  Refill or New Rx:refill  RX Name and Strength:benzonatate 200mg  How is the patient currently taking it? (ex. 1XDay):3xday  Is this a 30 day or 90 day RX:  Preferred Pharmacy with phone number:[id number] - [place] [address] AT [facility] [address]   Phone: [phone]  Fax: [phone]  Local or Mail Order:local  Ordering Provider:René  Would the patient rather a call back or a response via [facility]? Call back  Best Call Back Number:[phone]  Additional Information: Pt is still coughing and would like to get this refilled

## 2025-08-18 DIAGNOSIS — E11.65 TYPE 2 DIABETES MELLITUS WITH HYPERGLYCEMIA, UNSPECIFIED WHETHER LONG TERM INSULIN USE: ICD-10-CM

## 2025-08-19 RX ORDER — INSULIN PMP CART,AUT,G6/7,CNTR
EACH SUBCUTANEOUS
Qty: 15 EACH | Refills: 0 | Status: SHIPPED | OUTPATIENT
Start: 2025-08-19

## 2025-08-25 PROBLEM — I50.23 ACUTE ON CHRONIC SYSTOLIC HEART FAILURE: Status: ACTIVE | Noted: 2025-08-25

## 2025-08-25 PROBLEM — R07.9 CHEST PAIN: Status: ACTIVE | Noted: 2025-08-25

## 2025-08-25 PROBLEM — R65.10 SIRS (SYSTEMIC INFLAMMATORY RESPONSE SYNDROME): Status: ACTIVE | Noted: 2025-08-25

## 2025-08-25 PROBLEM — J18.9 RIGHT UPPER LOBE PNEUMONIA: Status: ACTIVE | Noted: 2025-08-25

## 2025-08-26 ENCOUNTER — DOCUMENTATION ONLY (OUTPATIENT)
Dept: CARDIOLOGY | Facility: CLINIC | Age: 70
End: 2025-08-26
Payer: MEDICARE

## 2025-08-26 DIAGNOSIS — R06.02 SOB (SHORTNESS OF BREATH): Primary | ICD-10-CM

## 2025-08-26 PROBLEM — I21.4 NSTEMI (NON-ST ELEVATED MYOCARDIAL INFARCTION): Status: ACTIVE | Noted: 2025-08-26

## 2025-08-26 RX ORDER — BENZONATATE 200 MG/1
CAPSULE ORAL
COMMUNITY
Start: 2025-08-01

## 2025-08-28 ENCOUNTER — PATIENT MESSAGE (OUTPATIENT)
Dept: ENDOCRINOLOGY | Facility: HOSPITAL | Age: 70
End: 2025-08-28
Payer: MEDICARE

## 2025-08-28 ENCOUNTER — TELEPHONE (OUTPATIENT)
Dept: CARDIOLOGY | Facility: CLINIC | Age: 70
End: 2025-08-28
Payer: MEDICARE

## 2025-08-29 ENCOUNTER — PATIENT OUTREACH (OUTPATIENT)
Dept: ADMINISTRATIVE | Facility: CLINIC | Age: 70
End: 2025-08-29
Payer: MEDICARE

## 2025-08-29 ENCOUNTER — TELEPHONE (OUTPATIENT)
Dept: ADMINISTRATIVE | Facility: CLINIC | Age: 70
End: 2025-08-29
Payer: MEDICARE

## 2025-09-02 ENCOUNTER — OFFICE VISIT (OUTPATIENT)
Dept: INTERNAL MEDICINE | Facility: CLINIC | Age: 70
End: 2025-09-02
Payer: MEDICARE

## 2025-09-02 VITALS
BODY MASS INDEX: 38.74 KG/M2 | WEIGHT: 197.31 LBS | HEIGHT: 60 IN | SYSTOLIC BLOOD PRESSURE: 106 MMHG | DIASTOLIC BLOOD PRESSURE: 69 MMHG | HEART RATE: 99 BPM | OXYGEN SATURATION: 97 %

## 2025-09-02 DIAGNOSIS — G89.29 CHRONIC NONINTRACTABLE HEADACHE, UNSPECIFIED HEADACHE TYPE: ICD-10-CM

## 2025-09-02 DIAGNOSIS — I50.22 CHRONIC SYSTOLIC HEART FAILURE: ICD-10-CM

## 2025-09-02 DIAGNOSIS — Z09 HOSPITAL DISCHARGE FOLLOW-UP: Primary | ICD-10-CM

## 2025-09-02 DIAGNOSIS — R51.9 CHRONIC NONINTRACTABLE HEADACHE, UNSPECIFIED HEADACHE TYPE: ICD-10-CM

## 2025-09-02 DIAGNOSIS — I50.23 ACUTE ON CHRONIC SYSTOLIC HEART FAILURE: ICD-10-CM

## 2025-09-02 DIAGNOSIS — G59 MONONEUROPATHY DUE TO UNDERLYING DISEASE: ICD-10-CM

## 2025-09-02 PROCEDURE — 3044F HG A1C LEVEL LT 7.0%: CPT | Mod: CPTII,S$GLB,, | Performed by: INTERNAL MEDICINE

## 2025-09-02 PROCEDURE — 3078F DIAST BP <80 MM HG: CPT | Mod: CPTII,S$GLB,, | Performed by: INTERNAL MEDICINE

## 2025-09-02 PROCEDURE — 99495 TRANSJ CARE MGMT MOD F2F 14D: CPT | Mod: S$GLB,,, | Performed by: INTERNAL MEDICINE

## 2025-09-02 PROCEDURE — 1111F DSCHRG MED/CURRENT MED MERGE: CPT | Mod: CPTII,S$GLB,, | Performed by: INTERNAL MEDICINE

## 2025-09-02 PROCEDURE — 4010F ACE/ARB THERAPY RXD/TAKEN: CPT | Mod: CPTII,S$GLB,, | Performed by: INTERNAL MEDICINE

## 2025-09-02 PROCEDURE — 1159F MED LIST DOCD IN RCRD: CPT | Mod: CPTII,S$GLB,, | Performed by: INTERNAL MEDICINE

## 2025-09-02 PROCEDURE — 99999 PR PBB SHADOW E&M-EST. PATIENT-LVL V: CPT | Mod: PBBFAC,,, | Performed by: INTERNAL MEDICINE

## 2025-09-02 PROCEDURE — 1160F RVW MEDS BY RX/DR IN RCRD: CPT | Mod: CPTII,S$GLB,, | Performed by: INTERNAL MEDICINE

## 2025-09-02 PROCEDURE — 3074F SYST BP LT 130 MM HG: CPT | Mod: CPTII,S$GLB,, | Performed by: INTERNAL MEDICINE

## 2025-09-02 PROCEDURE — 3061F NEG MICROALBUMINURIA REV: CPT | Mod: CPTII,S$GLB,, | Performed by: INTERNAL MEDICINE

## 2025-09-02 PROCEDURE — 3066F NEPHROPATHY DOC TX: CPT | Mod: CPTII,S$GLB,, | Performed by: INTERNAL MEDICINE

## 2025-09-02 PROCEDURE — 1126F AMNT PAIN NOTED NONE PRSNT: CPT | Mod: CPTII,S$GLB,, | Performed by: INTERNAL MEDICINE

## 2025-09-02 PROCEDURE — 3288F FALL RISK ASSESSMENT DOCD: CPT | Mod: CPTII,S$GLB,, | Performed by: INTERNAL MEDICINE

## 2025-09-02 PROCEDURE — 1101F PT FALLS ASSESS-DOCD LE1/YR: CPT | Mod: CPTII,S$GLB,, | Performed by: INTERNAL MEDICINE

## 2025-09-02 RX ORDER — CEFPODOXIME PROXETIL 200 MG/1
200 TABLET, FILM COATED ORAL EVERY 12 HOURS
COMMUNITY

## 2025-09-02 RX ORDER — PREGABALIN 75 MG/1
75 CAPSULE ORAL 2 TIMES DAILY
Qty: 120 CAPSULE | Refills: 3 | Status: SHIPPED | OUTPATIENT
Start: 2025-09-02

## 2025-09-02 RX ORDER — PREGABALIN 200 MG/1
200 CAPSULE ORAL 2 TIMES DAILY
COMMUNITY
End: 2025-09-02

## 2025-09-02 RX ORDER — AMITRIPTYLINE HYDROCHLORIDE 75 MG/1
75 TABLET ORAL NIGHTLY
Qty: 90 TABLET | Refills: 3 | Status: SHIPPED | OUTPATIENT
Start: 2025-09-02 | End: 2026-09-02

## 2025-09-02 RX ORDER — SPIRONOLACTONE 25 MG/1
25 TABLET ORAL DAILY
Qty: 90 TABLET | Refills: 3 | Status: SHIPPED | OUTPATIENT
Start: 2025-09-02

## 2025-09-03 ENCOUNTER — OFFICE VISIT (OUTPATIENT)
Dept: CARDIOLOGY | Facility: CLINIC | Age: 70
End: 2025-09-03
Payer: MEDICARE

## 2025-09-03 VITALS
HEART RATE: 77 BPM | OXYGEN SATURATION: 98 % | WEIGHT: 192.13 LBS | SYSTOLIC BLOOD PRESSURE: 110 MMHG | BODY MASS INDEX: 37.72 KG/M2 | DIASTOLIC BLOOD PRESSURE: 55 MMHG | HEIGHT: 60 IN

## 2025-09-03 DIAGNOSIS — E11.69 HYPERLIPIDEMIA ASSOCIATED WITH TYPE 2 DIABETES MELLITUS: ICD-10-CM

## 2025-09-03 DIAGNOSIS — I25.118 CORONARY ARTERY DISEASE OF NATIVE ARTERY OF NATIVE HEART WITH STABLE ANGINA PECTORIS: ICD-10-CM

## 2025-09-03 DIAGNOSIS — G47.33 OSA (OBSTRUCTIVE SLEEP APNEA): ICD-10-CM

## 2025-09-03 DIAGNOSIS — I50.20 HFREF (HEART FAILURE WITH REDUCED EJECTION FRACTION): Primary | ICD-10-CM

## 2025-09-03 DIAGNOSIS — E78.5 HYPERLIPIDEMIA ASSOCIATED WITH TYPE 2 DIABETES MELLITUS: ICD-10-CM

## 2025-09-03 DIAGNOSIS — E11.59 HYPERTENSION ASSOCIATED WITH DIABETES: ICD-10-CM

## 2025-09-03 DIAGNOSIS — E11.42 TYPE 2 DIABETES MELLITUS WITH DIABETIC POLYNEUROPATHY, WITH LONG-TERM CURRENT USE OF INSULIN: ICD-10-CM

## 2025-09-03 DIAGNOSIS — N18.32 CHRONIC KIDNEY DISEASE, STAGE 3B: ICD-10-CM

## 2025-09-03 DIAGNOSIS — I15.2 HYPERTENSION ASSOCIATED WITH DIABETES: ICD-10-CM

## 2025-09-03 DIAGNOSIS — Z79.4 TYPE 2 DIABETES MELLITUS WITH DIABETIC POLYNEUROPATHY, WITH LONG-TERM CURRENT USE OF INSULIN: ICD-10-CM

## 2025-09-03 PROCEDURE — 99999 PR PBB SHADOW E&M-EST. PATIENT-LVL V: CPT | Mod: PBBFAC,,,

## 2025-09-03 PROCEDURE — 4010F ACE/ARB THERAPY RXD/TAKEN: CPT | Mod: CPTII,S$GLB,,

## 2025-09-03 PROCEDURE — 1159F MED LIST DOCD IN RCRD: CPT | Mod: CPTII,S$GLB,,

## 2025-09-03 PROCEDURE — 3008F BODY MASS INDEX DOCD: CPT | Mod: CPTII,S$GLB,,

## 2025-09-03 PROCEDURE — 1160F RVW MEDS BY RX/DR IN RCRD: CPT | Mod: CPTII,S$GLB,,

## 2025-09-03 PROCEDURE — 1126F AMNT PAIN NOTED NONE PRSNT: CPT | Mod: CPTII,S$GLB,,

## 2025-09-03 PROCEDURE — 99214 OFFICE O/P EST MOD 30 MIN: CPT | Mod: S$GLB,,,

## 2025-09-03 PROCEDURE — 3078F DIAST BP <80 MM HG: CPT | Mod: CPTII,S$GLB,,

## 2025-09-03 PROCEDURE — 3074F SYST BP LT 130 MM HG: CPT | Mod: CPTII,S$GLB,,

## 2025-09-03 PROCEDURE — 3044F HG A1C LEVEL LT 7.0%: CPT | Mod: CPTII,S$GLB,,

## 2025-09-03 PROCEDURE — 1101F PT FALLS ASSESS-DOCD LE1/YR: CPT | Mod: CPTII,S$GLB,,

## 2025-09-03 PROCEDURE — 3288F FALL RISK ASSESSMENT DOCD: CPT | Mod: CPTII,S$GLB,,

## 2025-09-03 PROCEDURE — 3066F NEPHROPATHY DOC TX: CPT | Mod: CPTII,S$GLB,,

## 2025-09-03 PROCEDURE — 1111F DSCHRG MED/CURRENT MED MERGE: CPT | Mod: CPTII,S$GLB,,

## 2025-09-03 PROCEDURE — 3061F NEG MICROALBUMINURIA REV: CPT | Mod: CPTII,S$GLB,,

## 2025-09-03 RX ORDER — FUROSEMIDE 40 MG/1
40 TABLET ORAL DAILY
Qty: 120 TABLET | Refills: 3 | Status: SHIPPED | OUTPATIENT
Start: 2025-09-03

## 2025-09-04 ENCOUNTER — DOCUMENTATION ONLY (OUTPATIENT)
Dept: CARDIOLOGY | Facility: CLINIC | Age: 70
End: 2025-09-04
Payer: MEDICARE

## (undated) DEVICE — SCRUB 10% POVIDONE IODINE 4OZ

## (undated) DEVICE — BANDAGE KERLIX AMD

## (undated) DEVICE — SOL IRR SOD CHL .9% POUR

## (undated) DEVICE — NDL 18GA X1 1/2 REG BEVEL

## (undated) DEVICE — BANDAGE MATRIX HK LOOP 4IN 5YD

## (undated) DEVICE — SUT MONOCRYL PLUS 4-0 P3

## (undated) DEVICE — BANDAGE ELASTIC ACE 2IN 10/CA

## (undated) DEVICE — CORD FOR BIPOLAR FORCEPS 12

## (undated) DEVICE — GLOVE BIOGEL SKINSENSE PI 7.0

## (undated) DEVICE — DRAPE STERI-DRAPE 1000 17X11IN

## (undated) DEVICE — COVER CAMERA OPERATING ROOM

## (undated) DEVICE — Device

## (undated) DEVICE — NDL 22GA X1 1/2 REG BEVEL

## (undated) DEVICE — SUT CTD VICRYL 4-0 P-3 18IN

## (undated) DEVICE — DRESSING N ADH OIL EMUL 3X3

## (undated) DEVICE — TOURNIQUET SB QC DP 18X4IN

## (undated) DEVICE — BANDAGE MATRIX HK LOOP 2IN 5YD

## (undated) DEVICE — SUT 4/0 18IN ETHILON BL P3

## (undated) DEVICE — GAUZE SPONGE 4X4 12PLY

## (undated) DEVICE — GLOVE BIOGEL PI MICRO INDIC 7

## (undated) DEVICE — ADHESIVE DERMABOND ADVANCED

## (undated) DEVICE — SYR B-D DISP CONTROL 10CC100/C

## (undated) DEVICE — GLOVE BIOGEL ECLIPSE SZ 7